# Patient Record
Sex: FEMALE | Race: BLACK OR AFRICAN AMERICAN | Employment: OTHER | URBAN - METROPOLITAN AREA
[De-identification: names, ages, dates, MRNs, and addresses within clinical notes are randomized per-mention and may not be internally consistent; named-entity substitution may affect disease eponyms.]

---

## 2017-01-17 ENCOUNTER — TELEPHONE (OUTPATIENT)
Dept: INTERNAL MEDICINE CLINIC | Age: 72
End: 2017-01-17

## 2017-02-08 ENCOUNTER — OFFICE VISIT (OUTPATIENT)
Dept: INTERNAL MEDICINE CLINIC | Age: 72
End: 2017-02-08

## 2017-02-08 VITALS
HEIGHT: 66 IN | OXYGEN SATURATION: 94 % | TEMPERATURE: 98.7 F | DIASTOLIC BLOOD PRESSURE: 86 MMHG | BODY MASS INDEX: 30.73 KG/M2 | SYSTOLIC BLOOD PRESSURE: 132 MMHG | WEIGHT: 191.2 LBS | HEART RATE: 104 BPM

## 2017-02-08 DIAGNOSIS — I10 ESSENTIAL HYPERTENSION: ICD-10-CM

## 2017-02-08 DIAGNOSIS — E78.00 PURE HYPERCHOLESTEROLEMIA: ICD-10-CM

## 2017-02-08 DIAGNOSIS — J45.20 ALLERGIC BRONCHITIS, MILD INTERMITTENT, UNCOMPLICATED: Primary | ICD-10-CM

## 2017-02-08 DIAGNOSIS — R09.89 ABNORMAL FINDING OF LUNG: ICD-10-CM

## 2017-02-08 PROCEDURE — 99214 OFFICE O/P EST MOD 30 MIN: CPT | Performed by: INTERNAL MEDICINE

## 2017-02-08 RX ORDER — ALBUTEROL SULFATE 90 UG/1
2 AEROSOL, METERED RESPIRATORY (INHALATION) EVERY 6 HOURS PRN
Qty: 1 INHALER | Refills: 3 | Status: SHIPPED | OUTPATIENT
Start: 2017-02-08 | End: 2017-04-27 | Stop reason: SDUPTHER

## 2017-02-09 ENCOUNTER — HOSPITAL ENCOUNTER (OUTPATIENT)
Dept: GENERAL RADIOLOGY | Facility: MEDICAL CENTER | Age: 72
Discharge: HOME | End: 2017-02-09
Attending: INTERNAL MEDICINE | Admitting: INTERNAL MEDICINE

## 2017-02-09 DIAGNOSIS — J45.20 ALLERGIC BRONCHITIS, MILD INTERMITTENT, UNCOMPLICATED: ICD-10-CM

## 2017-02-17 DIAGNOSIS — R91.8 LUNG MASS: Primary | ICD-10-CM

## 2017-02-20 RX ORDER — LORAZEPAM 1 MG/1
TABLET ORAL
Qty: 2 TABLET | Refills: 0 | Status: SHIPPED | OUTPATIENT
Start: 2017-02-20 | End: 2017-03-02 | Stop reason: ALTCHOICE

## 2017-02-20 ASSESSMENT — ENCOUNTER SYMPTOMS
EYES NEGATIVE: 1
GASTROINTESTINAL NEGATIVE: 1

## 2017-02-22 ENCOUNTER — HOSPITAL ENCOUNTER (OUTPATIENT)
Dept: CT IMAGING | Age: 72
Discharge: HOME | End: 2017-02-22
Attending: INTERNAL MEDICINE | Admitting: INTERNAL MEDICINE

## 2017-02-22 DIAGNOSIS — R91.8 OTHER NONSPECIFIC ABNORMAL FINDING OF LUNG FIELD: ICD-10-CM

## 2017-02-22 DIAGNOSIS — R91.8 LUNG MASS: ICD-10-CM

## 2017-02-22 RX ORDER — HYDROCODONE POLISTIREX AND CHLORPHENIRAMINE POLISTIREX 10; 8 MG/5ML; MG/5ML
5 SUSPENSION, EXTENDED RELEASE ORAL EVERY 12 HOURS PRN
Qty: 140 ML | Refills: 0 | Status: SHIPPED | OUTPATIENT
Start: 2017-02-22 | End: 2017-03-02 | Stop reason: ALTCHOICE

## 2017-02-23 ENCOUNTER — TELEPHONE (OUTPATIENT)
Dept: PULMONOLOGY | Age: 72
End: 2017-02-23

## 2017-02-23 ENCOUNTER — TELEPHONE (OUTPATIENT)
Dept: INTERNAL MEDICINE CLINIC | Age: 72
End: 2017-02-23

## 2017-02-27 ENCOUNTER — OFFICE VISIT (OUTPATIENT)
Dept: PULMONOLOGY | Age: 72
End: 2017-02-27

## 2017-02-27 ENCOUNTER — TELEPHONE (OUTPATIENT)
Dept: PULMONOLOGY | Age: 72
End: 2017-02-27

## 2017-02-27 VITALS
HEIGHT: 66 IN | OXYGEN SATURATION: 95 % | DIASTOLIC BLOOD PRESSURE: 88 MMHG | RESPIRATION RATE: 16 BRPM | HEART RATE: 64 BPM | BODY MASS INDEX: 29.67 KG/M2 | WEIGHT: 184.6 LBS | SYSTOLIC BLOOD PRESSURE: 134 MMHG

## 2017-02-27 DIAGNOSIS — R91.8 MASS OF RIGHT LUNG: Primary | ICD-10-CM

## 2017-02-27 DIAGNOSIS — R59.0 MEDIASTINAL LYMPHADENOPATHY: ICD-10-CM

## 2017-02-27 PROCEDURE — 99204 OFFICE O/P NEW MOD 45 MIN: CPT | Performed by: INTERNAL MEDICINE

## 2017-02-27 RX ORDER — AMLODIPINE BESYLATE 10 MG/1
10 TABLET ORAL
COMMUNITY
End: 2017-04-27 | Stop reason: SDUPTHER

## 2017-02-27 RX ORDER — HYDROCHLOROTHIAZIDE 25 MG/1
25 TABLET ORAL
COMMUNITY
End: 2017-04-27 | Stop reason: SDUPTHER

## 2017-03-02 ENCOUNTER — HOSPITAL ENCOUNTER (OUTPATIENT)
Dept: ENDOSCOPY | Age: 72
Discharge: HOME | End: 2017-03-02
Attending: INTERNAL MEDICINE | Admitting: INTERNAL MEDICINE

## 2017-03-02 ENCOUNTER — TELEPHONE (OUTPATIENT)
Dept: PULMONOLOGY | Age: 72
End: 2017-03-02

## 2017-03-02 VITALS
SYSTOLIC BLOOD PRESSURE: 146 MMHG | HEIGHT: 66 IN | HEART RATE: 98 BPM | WEIGHT: 180 LBS | TEMPERATURE: 98.5 F | RESPIRATION RATE: 18 BRPM | OXYGEN SATURATION: 95 % | BODY MASS INDEX: 28.93 KG/M2 | DIASTOLIC BLOOD PRESSURE: 91 MMHG

## 2017-03-02 DIAGNOSIS — E87.6 HYPOKALEMIA: Primary | ICD-10-CM

## 2017-03-02 LAB
MAGNESIUM: 2.1 MG/DL (ref 1.8–2.4)
POTASSIUM SERPL-SCNC: 3 MMOL/L (ref 3.5–5.1)

## 2017-03-02 PROCEDURE — 93010 ELECTROCARDIOGRAM REPORT: CPT | Performed by: INTERNAL MEDICINE

## 2017-03-02 RX ORDER — POTASSIUM CHLORIDE 20 MEQ/1
20 TABLET, EXTENDED RELEASE ORAL ONCE
Status: COMPLETED | OUTPATIENT
Start: 2017-03-02 | End: 2017-03-02

## 2017-03-02 RX ORDER — ATORVASTATIN CALCIUM 10 MG/1
10 TABLET, FILM COATED ORAL DAILY
COMMUNITY
End: 2017-04-27 | Stop reason: SDUPTHER

## 2017-03-02 RX ORDER — POTASSIUM CHLORIDE 20 MEQ/1
20 TABLET, EXTENDED RELEASE ORAL DAILY
Qty: 30 TABLET | Refills: 3 | Status: SHIPPED | OUTPATIENT
Start: 2017-03-02 | End: 2017-07-23 | Stop reason: SDUPTHER

## 2017-03-02 RX ORDER — MULTIVIT-MIN/IRON/FOLIC ACID/K 18-600-40
1 CAPSULE ORAL DAILY
COMMUNITY
End: 2019-09-09

## 2017-03-02 RX ADMIN — POTASSIUM CHLORIDE 20 MEQ: 20 TABLET, EXTENDED RELEASE ORAL at 08:42

## 2017-03-03 LAB
EKG ATRIAL RATE: 93 BPM
EKG DIAGNOSIS: NORMAL
EKG P AXIS: 86 DEGREES
EKG P-R INTERVAL: 156 MS
EKG Q-T INTERVAL: 364 MS
EKG QRS DURATION: 82 MS
EKG QTC CALCULATION (BAZETT): 452 MS
EKG R AXIS: 22 DEGREES
EKG T AXIS: 24 DEGREES
EKG VENTRICULAR RATE: 93 BPM

## 2017-03-06 LAB
ALBUMIN SERPL-MCNC: 4.3 G/DL (ref 3.4–5)
ANION GAP SERPL CALCULATED.3IONS-SCNC: 18 MMOL/L (ref 3–16)
BUN BLDV-MCNC: 6 MG/DL (ref 7–20)
CALCIUM SERPL-MCNC: 10.2 MG/DL (ref 8.3–10.6)
CHLORIDE BLD-SCNC: 101 MMOL/L (ref 99–110)
CO2: 25 MMOL/L (ref 21–32)
CREAT SERPL-MCNC: 0.7 MG/DL (ref 0.6–1.2)
GFR AFRICAN AMERICAN: >60
GFR NON-AFRICAN AMERICAN: >60
GLUCOSE BLD-MCNC: 103 MG/DL (ref 70–99)
PHOSPHORUS: 4 MG/DL (ref 2.5–4.9)
POTASSIUM SERPL-SCNC: 3.2 MMOL/L (ref 3.5–5.1)
SODIUM BLD-SCNC: 144 MMOL/L (ref 136–145)

## 2017-03-07 ENCOUNTER — HOSPITAL ENCOUNTER (OUTPATIENT)
Dept: SURGERY | Age: 72
Discharge: HOME | End: 2017-03-07
Attending: INTERNAL MEDICINE | Admitting: INTERNAL MEDICINE

## 2017-03-07 VITALS
OXYGEN SATURATION: 92 % | RESPIRATION RATE: 16 BRPM | SYSTOLIC BLOOD PRESSURE: 112 MMHG | TEMPERATURE: 98.1 F | HEART RATE: 105 BPM | DIASTOLIC BLOOD PRESSURE: 76 MMHG

## 2017-03-07 PROCEDURE — 31652 BRONCH EBUS SAMPLNG 1/2 NODE: CPT | Performed by: INTERNAL MEDICINE

## 2017-03-07 PROCEDURE — 31625 BRONCHOSCOPY W/BIOPSY(S): CPT | Performed by: INTERNAL MEDICINE

## 2017-03-07 RX ORDER — SODIUM CHLORIDE 0.9 % (FLUSH) 0.9 %
10 SYRINGE (ML) INJECTION EVERY 12 HOURS SCHEDULED
Status: DISCONTINUED | OUTPATIENT
Start: 2017-03-07 | End: 2017-03-08 | Stop reason: HOSPADM

## 2017-03-07 RX ORDER — LABETALOL HYDROCHLORIDE 5 MG/ML
5 INJECTION, SOLUTION INTRAVENOUS EVERY 10 MIN PRN
Status: DISCONTINUED | OUTPATIENT
Start: 2017-03-07 | End: 2017-03-08 | Stop reason: HOSPADM

## 2017-03-07 RX ORDER — ALBUTEROL SULFATE 2.5 MG/3ML
2.5 SOLUTION RESPIRATORY (INHALATION) EVERY 6 HOURS PRN
Status: DISCONTINUED | OUTPATIENT
Start: 2017-03-07 | End: 2017-03-08 | Stop reason: HOSPADM

## 2017-03-07 RX ORDER — SODIUM CHLORIDE 0.9 % (FLUSH) 0.9 %
10 SYRINGE (ML) INJECTION PRN
Status: DISCONTINUED | OUTPATIENT
Start: 2017-03-07 | End: 2017-03-08 | Stop reason: HOSPADM

## 2017-03-07 RX ORDER — SODIUM CHLORIDE, SODIUM LACTATE, POTASSIUM CHLORIDE, CALCIUM CHLORIDE 600; 310; 30; 20 MG/100ML; MG/100ML; MG/100ML; MG/100ML
INJECTION, SOLUTION INTRAVENOUS CONTINUOUS
Status: DISCONTINUED | OUTPATIENT
Start: 2017-03-07 | End: 2017-03-08 | Stop reason: HOSPADM

## 2017-03-07 RX ORDER — FENTANYL CITRATE 50 UG/ML
25 INJECTION, SOLUTION INTRAMUSCULAR; INTRAVENOUS EVERY 5 MIN PRN
Status: DISCONTINUED | OUTPATIENT
Start: 2017-03-07 | End: 2017-03-08 | Stop reason: HOSPADM

## 2017-03-07 RX ORDER — ALBUTEROL SULFATE 2.5 MG/3ML
2.5 SOLUTION RESPIRATORY (INHALATION)
Status: DISCONTINUED | OUTPATIENT
Start: 2017-03-07 | End: 2017-03-08 | Stop reason: HOSPADM

## 2017-03-07 RX ORDER — PROMETHAZINE HYDROCHLORIDE 25 MG/ML
6.25 INJECTION, SOLUTION INTRAMUSCULAR; INTRAVENOUS
Status: ACTIVE | OUTPATIENT
Start: 2017-03-07 | End: 2017-03-07

## 2017-03-07 RX ORDER — HYDRALAZINE HYDROCHLORIDE 20 MG/ML
2.5 INJECTION INTRAMUSCULAR; INTRAVENOUS EVERY 10 MIN PRN
Status: DISCONTINUED | OUTPATIENT
Start: 2017-03-07 | End: 2017-03-08 | Stop reason: HOSPADM

## 2017-03-07 RX ADMIN — ALBUTEROL SULFATE 2.5 MG: 2.5 SOLUTION RESPIRATORY (INHALATION) at 10:59

## 2017-03-07 RX ADMIN — ALBUTEROL SULFATE 2.5 MG: 2.5 SOLUTION RESPIRATORY (INHALATION) at 09:36

## 2017-03-07 ASSESSMENT — PAIN SCALES - GENERAL
PAINLEVEL_OUTOF10: 0

## 2017-03-08 ENCOUNTER — TELEPHONE (OUTPATIENT)
Dept: PULMONOLOGY | Age: 72
End: 2017-03-08

## 2017-03-09 ENCOUNTER — OFFICE VISIT (OUTPATIENT)
Dept: PULMONOLOGY | Age: 72
End: 2017-03-09

## 2017-03-09 VITALS
RESPIRATION RATE: 18 BRPM | DIASTOLIC BLOOD PRESSURE: 70 MMHG | BODY MASS INDEX: 31.55 KG/M2 | SYSTOLIC BLOOD PRESSURE: 120 MMHG | OXYGEN SATURATION: 94 % | HEIGHT: 64 IN | WEIGHT: 184.8 LBS | HEART RATE: 100 BPM

## 2017-03-09 DIAGNOSIS — C34.91 ADENOCARCINOMA OF RIGHT LUNG (HCC): Primary | ICD-10-CM

## 2017-03-09 DIAGNOSIS — R91.8 MASS OF RIGHT LUNG: ICD-10-CM

## 2017-03-09 DIAGNOSIS — R59.0 MEDIASTINAL LYMPHADENOPATHY: ICD-10-CM

## 2017-03-09 PROCEDURE — 99214 OFFICE O/P EST MOD 30 MIN: CPT | Performed by: INTERNAL MEDICINE

## 2017-03-10 ENCOUNTER — TELEPHONE (OUTPATIENT)
Dept: PULMONOLOGY | Age: 72
End: 2017-03-10

## 2017-03-16 PROBLEM — C79.72 MALIGNANT NEOPLASM METASTATIC TO LEFT ADRENAL GLAND (HCC): Status: ACTIVE | Noted: 2017-03-16

## 2017-03-16 PROBLEM — C78.7 SECONDARY MALIGNANT NEOPLASM OF LIVER (HCC): Status: ACTIVE | Noted: 2017-03-16

## 2017-03-16 PROBLEM — C77.1 SECONDARY MALIGNANT NEOPLASM OF MEDIASTINAL LYMPH NODES (HCC): Status: ACTIVE | Noted: 2017-02-27

## 2017-03-23 ENCOUNTER — TELEPHONE (OUTPATIENT)
Dept: SURGERY | Age: 72
End: 2017-03-23

## 2017-03-27 ENCOUNTER — OFFICE VISIT (OUTPATIENT)
Dept: SURGERY | Age: 72
End: 2017-03-27

## 2017-03-27 VITALS
TEMPERATURE: 98.1 F | SYSTOLIC BLOOD PRESSURE: 146 MMHG | DIASTOLIC BLOOD PRESSURE: 90 MMHG | BODY MASS INDEX: 28.12 KG/M2 | HEIGHT: 66 IN | WEIGHT: 175 LBS

## 2017-03-27 DIAGNOSIS — C34.11 PRIMARY MALIGNANT NEOPLASM OF RIGHT UPPER LOBE OF LUNG (HCC): Primary | ICD-10-CM

## 2017-03-27 PROCEDURE — 99214 OFFICE O/P EST MOD 30 MIN: CPT | Performed by: SURGERY

## 2017-03-28 PROBLEM — C34.11 PRIMARY MALIGNANT NEOPLASM OF RIGHT UPPER LOBE OF LUNG (HCC): Status: ACTIVE | Noted: 2017-03-28

## 2017-03-28 RX ORDER — SODIUM CHLORIDE, SODIUM LACTATE, POTASSIUM CHLORIDE, CALCIUM CHLORIDE 600; 310; 30; 20 MG/100ML; MG/100ML; MG/100ML; MG/100ML
INJECTION, SOLUTION INTRAVENOUS CONTINUOUS
Status: CANCELLED | OUTPATIENT
Start: 2017-03-28

## 2017-03-29 ENCOUNTER — HOSPITAL ENCOUNTER (OUTPATIENT)
Dept: PREADMISSION TESTING | Age: 72
Discharge: HOME | End: 2017-03-29
Attending: SURGERY | Admitting: SURGERY

## 2017-03-29 VITALS
DIASTOLIC BLOOD PRESSURE: 76 MMHG | HEART RATE: 93 BPM | OXYGEN SATURATION: 92 % | RESPIRATION RATE: 16 BRPM | SYSTOLIC BLOOD PRESSURE: 128 MMHG | WEIGHT: 175 LBS | TEMPERATURE: 98.3 F | BODY MASS INDEX: 28.12 KG/M2 | HEIGHT: 66 IN

## 2017-03-29 DIAGNOSIS — C34.91 MALIGNANT NEOPLASM OF UNSPECIFIED PART OF RIGHT BRONCHUS OR LUNG (HCC): ICD-10-CM

## 2017-03-29 DIAGNOSIS — C34.91 ADENOCARCINOMA OF RIGHT LUNG (HCC): ICD-10-CM

## 2017-03-29 PROCEDURE — 38510 BIOPSY/REMOVAL LYMPH NODES: CPT | Performed by: SURGERY

## 2017-03-29 PROCEDURE — 36561 INSERT TUNNELED CV CATH: CPT | Performed by: SURGERY

## 2017-03-29 PROCEDURE — 77001 FLUOROGUIDE FOR VEIN DEVICE: CPT | Performed by: SURGERY

## 2017-03-29 RX ORDER — SODIUM CHLORIDE, SODIUM LACTATE, POTASSIUM CHLORIDE, CALCIUM CHLORIDE 600; 310; 30; 20 MG/100ML; MG/100ML; MG/100ML; MG/100ML
INJECTION, SOLUTION INTRAVENOUS CONTINUOUS
Status: DISCONTINUED | OUTPATIENT
Start: 2017-03-29 | End: 2017-03-30 | Stop reason: HOSPADM

## 2017-03-29 RX ORDER — METOCLOPRAMIDE HYDROCHLORIDE 5 MG/ML
10 INJECTION INTRAMUSCULAR; INTRAVENOUS ONCE
Status: COMPLETED | OUTPATIENT
Start: 2017-03-29 | End: 2017-03-29

## 2017-03-29 RX ORDER — SODIUM CHLORIDE 0.9 % (FLUSH) 0.9 %
10 SYRINGE (ML) INJECTION PRN
Status: DISCONTINUED | OUTPATIENT
Start: 2017-03-29 | End: 2017-03-30 | Stop reason: HOSPADM

## 2017-03-29 RX ORDER — LABETALOL HYDROCHLORIDE 5 MG/ML
5 INJECTION, SOLUTION INTRAVENOUS EVERY 10 MIN PRN
Status: DISCONTINUED | OUTPATIENT
Start: 2017-03-29 | End: 2017-03-30 | Stop reason: HOSPADM

## 2017-03-29 RX ORDER — MIDAZOLAM HYDROCHLORIDE 1 MG/ML
2 INJECTION INTRAMUSCULAR; INTRAVENOUS
Status: ACTIVE | OUTPATIENT
Start: 2017-03-29 | End: 2017-03-29

## 2017-03-29 RX ORDER — MORPHINE SULFATE 2 MG/ML
2 INJECTION, SOLUTION INTRAMUSCULAR; INTRAVENOUS EVERY 5 MIN PRN
Status: DISCONTINUED | OUTPATIENT
Start: 2017-03-29 | End: 2017-03-30 | Stop reason: HOSPADM

## 2017-03-29 RX ORDER — MEPERIDINE HYDROCHLORIDE 25 MG/ML
12.5 INJECTION INTRAMUSCULAR; INTRAVENOUS; SUBCUTANEOUS EVERY 5 MIN PRN
Status: DISCONTINUED | OUTPATIENT
Start: 2017-03-29 | End: 2017-03-30 | Stop reason: HOSPADM

## 2017-03-29 RX ORDER — GLYCOPYRROLATE 0.2 MG/ML
0.1 INJECTION INTRAMUSCULAR; INTRAVENOUS ONCE
Status: COMPLETED | OUTPATIENT
Start: 2017-03-29 | End: 2017-03-29

## 2017-03-29 RX ORDER — ONDANSETRON 2 MG/ML
4 INJECTION INTRAMUSCULAR; INTRAVENOUS
Status: ACTIVE | OUTPATIENT
Start: 2017-03-29 | End: 2017-03-29

## 2017-03-29 RX ORDER — DEXAMETHASONE SODIUM PHOSPHATE 4 MG/ML
10 INJECTION, SOLUTION INTRA-ARTICULAR; INTRALESIONAL; INTRAMUSCULAR; INTRAVENOUS; SOFT TISSUE ONCE
Status: COMPLETED | OUTPATIENT
Start: 2017-03-29 | End: 2017-03-29

## 2017-03-29 RX ORDER — SODIUM CHLORIDE 0.9 % (FLUSH) 0.9 %
10 SYRINGE (ML) INJECTION EVERY 12 HOURS SCHEDULED
Status: DISCONTINUED | OUTPATIENT
Start: 2017-03-29 | End: 2017-03-30 | Stop reason: HOSPADM

## 2017-03-29 RX ORDER — ONDANSETRON 2 MG/ML
4 INJECTION INTRAMUSCULAR; INTRAVENOUS ONCE
Status: COMPLETED | OUTPATIENT
Start: 2017-03-29 | End: 2017-03-29

## 2017-03-29 RX ORDER — FENTANYL CITRATE 50 UG/ML
50 INJECTION, SOLUTION INTRAMUSCULAR; INTRAVENOUS EVERY 5 MIN PRN
Status: DISCONTINUED | OUTPATIENT
Start: 2017-03-29 | End: 2017-03-30 | Stop reason: HOSPADM

## 2017-03-29 RX ORDER — HYDROCODONE BITARTRATE AND ACETAMINOPHEN 7.5; 325 MG/1; MG/1
1 TABLET ORAL EVERY 6 HOURS PRN
Qty: 20 TABLET | Refills: 0 | Status: SHIPPED | OUTPATIENT
Start: 2017-03-29 | End: 2018-03-25 | Stop reason: CLARIF

## 2017-03-29 RX ADMIN — GLYCOPYRROLATE 0.1 MG: 0.2 INJECTION INTRAMUSCULAR; INTRAVENOUS at 10:35

## 2017-03-29 RX ADMIN — DEXAMETHASONE SODIUM PHOSPHATE 10 MG: 4 INJECTION, SOLUTION INTRA-ARTICULAR; INTRALESIONAL; INTRAMUSCULAR; INTRAVENOUS; SOFT TISSUE at 10:33

## 2017-03-29 RX ADMIN — METOCLOPRAMIDE HYDROCHLORIDE 10 MG: 5 INJECTION INTRAMUSCULAR; INTRAVENOUS at 10:34

## 2017-03-29 RX ADMIN — SODIUM CHLORIDE, SODIUM LACTATE, POTASSIUM CHLORIDE, CALCIUM CHLORIDE: 600; 310; 30; 20 INJECTION, SOLUTION INTRAVENOUS at 10:31

## 2017-03-29 RX ADMIN — ONDANSETRON 4 MG: 2 INJECTION INTRAMUSCULAR; INTRAVENOUS at 10:32

## 2017-03-29 ASSESSMENT — PAIN - FUNCTIONAL ASSESSMENT: PAIN_FUNCTIONAL_ASSESSMENT: 0-10

## 2017-03-29 ASSESSMENT — PAIN SCALES - GENERAL: PAINLEVEL_OUTOF10: 0

## 2017-03-30 ENCOUNTER — HOSPITAL ENCOUNTER (OUTPATIENT)
Dept: MRI IMAGING | Age: 72
Discharge: HOME | End: 2017-03-30
Attending: INTERNAL MEDICINE | Admitting: INTERNAL MEDICINE

## 2017-03-30 DIAGNOSIS — C34.91 MALIGNANT NEOPLASM OF UNSPECIFIED PART OF RIGHT BRONCHUS OR LUNG (HCC): ICD-10-CM

## 2017-03-30 DIAGNOSIS — C79.72 MALIGNANT NEOPLASM METASTATIC TO LEFT ADRENAL GLAND (HCC): ICD-10-CM

## 2017-03-30 DIAGNOSIS — C77.1 SECONDARY MALIGNANT NEOPLASM OF MEDIASTINAL LYMPH NODES (HCC): ICD-10-CM

## 2017-03-30 DIAGNOSIS — C34.91 ADENOCARCINOMA OF RIGHT LUNG (HCC): ICD-10-CM

## 2017-03-30 DIAGNOSIS — C78.7 SECONDARY MALIGNANT NEOPLASM OF LIVER (HCC): ICD-10-CM

## 2017-04-06 ENCOUNTER — TELEPHONE (OUTPATIENT)
Dept: INTERNAL MEDICINE CLINIC | Age: 72
End: 2017-04-06

## 2017-04-06 PROBLEM — Z51.11 ENCOUNTER FOR CHEMOTHERAPY MANAGEMENT: Status: ACTIVE | Noted: 2017-04-06

## 2017-04-07 PROBLEM — Z00.6 PATIENT IN CANCER RELATED RESEARCH STUDY: Status: ACTIVE | Noted: 2017-04-07

## 2017-04-27 ENCOUNTER — OFFICE VISIT (OUTPATIENT)
Dept: INTERNAL MEDICINE CLINIC | Age: 72
End: 2017-04-27

## 2017-04-27 VITALS
BODY MASS INDEX: 27.18 KG/M2 | WEIGHT: 168.4 LBS | OXYGEN SATURATION: 98 % | HEART RATE: 115 BPM | DIASTOLIC BLOOD PRESSURE: 84 MMHG | SYSTOLIC BLOOD PRESSURE: 124 MMHG

## 2017-04-27 DIAGNOSIS — J98.09 RECURRENT BRONCHOSPASM: ICD-10-CM

## 2017-04-27 DIAGNOSIS — I10 ESSENTIAL HYPERTENSION: Primary | ICD-10-CM

## 2017-04-27 DIAGNOSIS — Z12.31 ENCOUNTER FOR SCREENING MAMMOGRAM FOR HIGH-RISK PATIENT: ICD-10-CM

## 2017-04-27 DIAGNOSIS — Z13.9 SCREENING: ICD-10-CM

## 2017-04-27 DIAGNOSIS — E78.49 OTHER HYPERLIPIDEMIA: ICD-10-CM

## 2017-04-27 PROCEDURE — 99214 OFFICE O/P EST MOD 30 MIN: CPT | Performed by: INTERNAL MEDICINE

## 2017-04-27 RX ORDER — ALBUTEROL SULFATE 90 UG/1
2 AEROSOL, METERED RESPIRATORY (INHALATION) EVERY 6 HOURS PRN
Qty: 3 INHALER | Refills: 3 | Status: SHIPPED | OUTPATIENT
Start: 2017-04-27 | End: 2018-02-15 | Stop reason: SDUPTHER

## 2017-04-27 RX ORDER — HYDROCHLOROTHIAZIDE 25 MG/1
25 TABLET ORAL DAILY
Qty: 90 TABLET | Refills: 3 | Status: SHIPPED | OUTPATIENT
Start: 2017-04-27 | End: 2018-02-15 | Stop reason: SDUPTHER

## 2017-04-27 RX ORDER — ATORVASTATIN CALCIUM 10 MG/1
10 TABLET, FILM COATED ORAL DAILY
Qty: 90 TABLET | Refills: 3 | Status: SHIPPED | OUTPATIENT
Start: 2017-04-27 | End: 2018-02-15 | Stop reason: SDUPTHER

## 2017-04-27 RX ORDER — AMLODIPINE BESYLATE 10 MG/1
10 TABLET ORAL DAILY
Qty: 90 TABLET | Refills: 3 | Status: SHIPPED | OUTPATIENT
Start: 2017-04-27 | End: 2017-08-31 | Stop reason: SDUPTHER

## 2017-04-27 ASSESSMENT — PATIENT HEALTH QUESTIONNAIRE - PHQ9
SUM OF ALL RESPONSES TO PHQ9 QUESTIONS 1 & 2: 0
SUM OF ALL RESPONSES TO PHQ QUESTIONS 1-9: 0
2. FEELING DOWN, DEPRESSED OR HOPELESS: 0
1. LITTLE INTEREST OR PLEASURE IN DOING THINGS: 0

## 2017-05-07 ASSESSMENT — ENCOUNTER SYMPTOMS
EYES NEGATIVE: 1
GASTROINTESTINAL NEGATIVE: 1

## 2017-05-17 ENCOUNTER — HOSPITAL ENCOUNTER (OUTPATIENT)
Dept: CT IMAGING | Age: 72
Discharge: HOME | End: 2017-05-17
Attending: INTERNAL MEDICINE | Admitting: INTERNAL MEDICINE

## 2017-05-17 DIAGNOSIS — C34.91 MALIGNANT NEOPLASM OF UNSPECIFIED PART OF RIGHT BRONCHUS OR LUNG (HCC): ICD-10-CM

## 2017-05-17 DIAGNOSIS — C34.11 PRIMARY MALIGNANT NEOPLASM OF RIGHT UPPER LOBE OF LUNG (HCC): ICD-10-CM

## 2017-05-19 PROBLEM — Z71.2 ENCOUNTER TO DISCUSS TEST RESULTS: Status: ACTIVE | Noted: 2017-05-19

## 2017-06-28 ENCOUNTER — HOSPITAL ENCOUNTER (OUTPATIENT)
Dept: CT IMAGING | Age: 72
Discharge: HOME | End: 2017-06-28
Attending: INTERNAL MEDICINE | Admitting: INTERNAL MEDICINE

## 2017-06-28 DIAGNOSIS — C34.11 PRIMARY MALIGNANT NEOPLASM OF RIGHT UPPER LOBE OF LUNG (HCC): ICD-10-CM

## 2017-06-28 DIAGNOSIS — C34.91 MALIGNANT NEOPLASM OF UNSPECIFIED PART OF RIGHT BRONCHUS OR LUNG (HCC): ICD-10-CM

## 2017-07-24 RX ORDER — POTASSIUM CHLORIDE 1500 MG/1
TABLET, FILM COATED, EXTENDED RELEASE ORAL
Qty: 30 TABLET | Refills: 2 | Status: SHIPPED | OUTPATIENT
Start: 2017-07-24 | End: 2018-03-25 | Stop reason: CLARIF

## 2017-08-09 ENCOUNTER — HOSPITAL ENCOUNTER (OUTPATIENT)
Dept: CT IMAGING | Age: 72
Discharge: HOME | End: 2017-08-09
Attending: INTERNAL MEDICINE | Admitting: INTERNAL MEDICINE

## 2017-08-09 DIAGNOSIS — C34.11 PRIMARY MALIGNANT NEOPLASM OF RIGHT UPPER LOBE OF LUNG (HCC): ICD-10-CM

## 2017-08-09 DIAGNOSIS — C34.91 MALIGNANT NEOPLASM OF UNSPECIFIED PART OF RIGHT BRONCHUS OR LUNG (HCC): ICD-10-CM

## 2017-08-31 ENCOUNTER — OFFICE VISIT (OUTPATIENT)
Dept: INTERNAL MEDICINE CLINIC | Age: 72
End: 2017-08-31

## 2017-08-31 VITALS
HEART RATE: 95 BPM | WEIGHT: 164.4 LBS | DIASTOLIC BLOOD PRESSURE: 62 MMHG | OXYGEN SATURATION: 97 % | BODY MASS INDEX: 26.42 KG/M2 | SYSTOLIC BLOOD PRESSURE: 106 MMHG

## 2017-08-31 DIAGNOSIS — E78.2 MIXED HYPERLIPIDEMIA: ICD-10-CM

## 2017-08-31 DIAGNOSIS — R63.4 WEIGHT LOSS: ICD-10-CM

## 2017-08-31 DIAGNOSIS — I10 ESSENTIAL HYPERTENSION: ICD-10-CM

## 2017-08-31 DIAGNOSIS — Z12.31 ENCOUNTER FOR SCREENING MAMMOGRAM FOR HIGH-RISK PATIENT: ICD-10-CM

## 2017-08-31 DIAGNOSIS — I10 ESSENTIAL HYPERTENSION: Primary | ICD-10-CM

## 2017-08-31 PROCEDURE — 99214 OFFICE O/P EST MOD 30 MIN: CPT | Performed by: INTERNAL MEDICINE

## 2017-08-31 RX ORDER — AMLODIPINE BESYLATE 10 MG/1
10 TABLET ORAL DAILY
Qty: 90 TABLET | Refills: 3 | Status: SHIPPED | OUTPATIENT
Start: 2017-08-31 | End: 2018-02-15 | Stop reason: SDUPTHER

## 2017-08-31 RX ORDER — IBUPROFEN 800 MG/1
800 TABLET ORAL 2 TIMES DAILY PRN
Qty: 40 TABLET | Refills: 1 | Status: SHIPPED | OUTPATIENT
Start: 2017-08-31 | End: 2017-11-21 | Stop reason: SDUPTHER

## 2017-09-02 ASSESSMENT — ENCOUNTER SYMPTOMS
EYES NEGATIVE: 1
GASTROINTESTINAL NEGATIVE: 1

## 2017-09-25 DIAGNOSIS — I10 ESSENTIAL HYPERTENSION: ICD-10-CM

## 2017-09-26 RX ORDER — ASPIRIN 81 MG
TABLET, DELAYED RELEASE (ENTERIC COATED) ORAL
Qty: 30 TABLET | Refills: 2 | Status: SHIPPED | OUTPATIENT
Start: 2017-09-26 | End: 2018-03-25 | Stop reason: CLARIF

## 2017-10-04 ENCOUNTER — TELEPHONE (OUTPATIENT)
Dept: INTERNAL MEDICINE CLINIC | Age: 72
End: 2017-10-04

## 2017-10-11 RX ORDER — BUDESONIDE AND FORMOTEROL FUMARATE DIHYDRATE 160; 4.5 UG/1; UG/1
2 AEROSOL RESPIRATORY (INHALATION) 2 TIMES DAILY
Qty: 1 INHALER | Refills: 3 | Status: SHIPPED | OUTPATIENT
Start: 2017-10-11 | End: 2018-02-15 | Stop reason: SDUPTHER

## 2017-11-09 ENCOUNTER — HOSPITAL ENCOUNTER (OUTPATIENT)
Dept: CT IMAGING | Age: 72
Discharge: HOME | End: 2017-11-09
Attending: INTERNAL MEDICINE | Admitting: INTERNAL MEDICINE

## 2017-11-09 DIAGNOSIS — C34.90 MALIGNANT NEOPLASM OF LUNG, UNSPECIFIED LATERALITY, UNSPECIFIED PART OF LUNG (HCC): ICD-10-CM

## 2017-11-09 DIAGNOSIS — C34.91 MALIGNANT NEOPLASM OF UNSPECIFIED PART OF RIGHT BRONCHUS OR LUNG (HCC): ICD-10-CM

## 2017-11-16 RX ORDER — POTASSIUM CHLORIDE 20 MEQ/1
TABLET, EXTENDED RELEASE ORAL
Qty: 30 TABLET | Refills: 1 | OUTPATIENT
Start: 2017-11-16

## 2017-11-21 RX ORDER — IBUPROFEN 800 MG/1
TABLET ORAL
Qty: 40 TABLET | Refills: 0 | Status: SHIPPED | OUTPATIENT
Start: 2017-11-21 | End: 2017-12-18 | Stop reason: SDUPTHER

## 2017-12-18 RX ORDER — IBUPROFEN 800 MG/1
TABLET ORAL
Qty: 40 TABLET | Refills: 0 | Status: SHIPPED | OUTPATIENT
Start: 2017-12-18 | End: 2018-03-25 | Stop reason: CLARIF

## 2018-01-30 ENCOUNTER — HOSPITAL ENCOUNTER (OUTPATIENT)
Dept: CT IMAGING | Age: 73
Discharge: HOME | End: 2018-01-30
Attending: INTERNAL MEDICINE | Admitting: INTERNAL MEDICINE

## 2018-01-30 DIAGNOSIS — C34.91 MALIGNANT NEOPLASM OF UNSPECIFIED PART OF RIGHT BRONCHUS OR LUNG (HCC): ICD-10-CM

## 2018-01-30 DIAGNOSIS — C34.11 CANCER OF BRONCHUS OF RIGHT UPPER LOBE (HCC): ICD-10-CM

## 2018-02-15 ENCOUNTER — OFFICE VISIT (OUTPATIENT)
Dept: INTERNAL MEDICINE CLINIC | Age: 73
End: 2018-02-15

## 2018-02-15 VITALS
DIASTOLIC BLOOD PRESSURE: 78 MMHG | RESPIRATION RATE: 18 BRPM | TEMPERATURE: 99 F | WEIGHT: 161 LBS | SYSTOLIC BLOOD PRESSURE: 118 MMHG | HEIGHT: 66 IN | HEART RATE: 100 BPM | BODY MASS INDEX: 25.88 KG/M2

## 2018-02-15 DIAGNOSIS — J45.20 MILD INTERMITTENT ASTHMA WITHOUT COMPLICATION: ICD-10-CM

## 2018-02-15 DIAGNOSIS — E78.49 OTHER HYPERLIPIDEMIA: ICD-10-CM

## 2018-02-15 DIAGNOSIS — I10 ESSENTIAL HYPERTENSION: ICD-10-CM

## 2018-02-15 DIAGNOSIS — R00.0 TACHYCARDIA: ICD-10-CM

## 2018-02-15 LAB — TSH REFLEX: 1.19 UIU/ML (ref 0.27–4.2)

## 2018-02-15 PROCEDURE — G8400 PT W/DXA NO RESULTS DOC: HCPCS | Performed by: INTERNAL MEDICINE

## 2018-02-15 PROCEDURE — G8427 DOCREV CUR MEDS BY ELIG CLIN: HCPCS | Performed by: INTERNAL MEDICINE

## 2018-02-15 PROCEDURE — 1090F PRES/ABSN URINE INCON ASSESS: CPT | Performed by: INTERNAL MEDICINE

## 2018-02-15 PROCEDURE — 1123F ACP DISCUSS/DSCN MKR DOCD: CPT | Performed by: INTERNAL MEDICINE

## 2018-02-15 PROCEDURE — G8484 FLU IMMUNIZE NO ADMIN: HCPCS | Performed by: INTERNAL MEDICINE

## 2018-02-15 PROCEDURE — G8419 CALC BMI OUT NRM PARAM NOF/U: HCPCS | Performed by: INTERNAL MEDICINE

## 2018-02-15 PROCEDURE — 99214 OFFICE O/P EST MOD 30 MIN: CPT | Performed by: INTERNAL MEDICINE

## 2018-02-15 PROCEDURE — 1036F TOBACCO NON-USER: CPT | Performed by: INTERNAL MEDICINE

## 2018-02-15 PROCEDURE — 4040F PNEUMOC VAC/ADMIN/RCVD: CPT | Performed by: INTERNAL MEDICINE

## 2018-02-15 PROCEDURE — 3014F SCREEN MAMMO DOC REV: CPT | Performed by: INTERNAL MEDICINE

## 2018-02-15 PROCEDURE — 93000 ELECTROCARDIOGRAM COMPLETE: CPT | Performed by: INTERNAL MEDICINE

## 2018-02-15 PROCEDURE — 3017F COLORECTAL CA SCREEN DOC REV: CPT | Performed by: INTERNAL MEDICINE

## 2018-02-15 RX ORDER — ALBUTEROL SULFATE 90 UG/1
2 AEROSOL, METERED RESPIRATORY (INHALATION) EVERY 6 HOURS PRN
Qty: 3 INHALER | Refills: 3 | Status: SHIPPED | OUTPATIENT
Start: 2018-02-15 | End: 2019-03-18 | Stop reason: SDUPTHER

## 2018-02-15 RX ORDER — HYDROCHLOROTHIAZIDE 25 MG/1
25 TABLET ORAL DAILY
Qty: 90 TABLET | Refills: 3 | Status: SHIPPED | OUTPATIENT
Start: 2018-02-15 | End: 2019-01-24 | Stop reason: SDUPTHER

## 2018-02-15 RX ORDER — BUDESONIDE AND FORMOTEROL FUMARATE DIHYDRATE 160; 4.5 UG/1; UG/1
2 AEROSOL RESPIRATORY (INHALATION) 2 TIMES DAILY
Qty: 1 INHALER | Refills: 3 | Status: SHIPPED | OUTPATIENT
Start: 2018-02-15 | End: 2018-07-09 | Stop reason: SDUPTHER

## 2018-02-15 RX ORDER — AMLODIPINE BESYLATE 10 MG/1
10 TABLET ORAL DAILY
Qty: 90 TABLET | Refills: 3 | Status: SHIPPED | OUTPATIENT
Start: 2018-02-15 | End: 2018-10-01 | Stop reason: SDUPTHER

## 2018-02-15 RX ORDER — ATORVASTATIN CALCIUM 10 MG/1
10 TABLET, FILM COATED ORAL DAILY
Qty: 90 TABLET | Refills: 3 | Status: SHIPPED | OUTPATIENT
Start: 2018-02-15 | End: 2019-03-21 | Stop reason: SDUPTHER

## 2018-02-15 ASSESSMENT — PATIENT HEALTH QUESTIONNAIRE - PHQ9
SUM OF ALL RESPONSES TO PHQ9 QUESTIONS 1 & 2: 0
1. LITTLE INTEREST OR PLEASURE IN DOING THINGS: 0
2. FEELING DOWN, DEPRESSED OR HOPELESS: 0
SUM OF ALL RESPONSES TO PHQ QUESTIONS 1-9: 0

## 2018-02-15 NOTE — PROGRESS NOTES
Subjective:      Patient ID: Kathleen Mandel is a 71 y.o. female. [facility]. Seen today for hypertension, hyperlipidemia and asthma. She is taking medication as prescribed, eating a fairly balanced meal plan and is physically active as tolerated. She is on maintenance chemo for lung cancer. Scans are negative! Review of Systems   Constitutional: Negative. HENT: Negative. Eyes: Negative. Respiratory: Negative. Negative for wheezing. Asthma, stable. Mild intermittent. Cardiovascular:        Hypertension, treated with amlodipine, HCTZ. Tachycardia noted. H/H 11/34.4, 12/17. TSH normal.    Gastrointestinal: Negative. Endocrine:        Hyperlipidemia, treated with statin. Genitourinary: Negative. Musculoskeletal: Negative. Skin: Negative. Neurological: Negative. Psychiatric/Behavioral: Negative. Objective:   Physical Exam   Constitutional: She is oriented to person, place, and time. She appears well-developed and well-nourished. No distress. HENT:   Head: Normocephalic and atraumatic. Nose: Nose normal.   Mouth/Throat: Oropharynx is clear and moist.   Eyes: Conjunctivae and EOM are normal. Pupils are equal, round, and reactive to light. Neck: Normal range of motion. Neck supple. No thyromegaly present. Cardiovascular: Normal rate, regular rhythm and normal heart sounds. No murmur heard. Tachycardic. Pulmonary/Chest: Effort normal and breath sounds normal.   Abdominal: Soft. Bowel sounds are normal.   Musculoskeletal: Normal range of motion. Neurological: She is alert and oriented to person, place, and time. She has normal strength and normal reflexes. No cranial nerve deficit or sensory deficit. Skin: Skin is warm and dry. Normal skin turgor. Psychiatric: She has a normal mood and affect. Her behavior is normal. Judgment and thought content normal.       Assessment:       1. Tachycardia  EKG 12 Lead. NS changes.  [place]

## 2018-02-25 ASSESSMENT — ENCOUNTER SYMPTOMS
EYES NEGATIVE: 1
GASTROINTESTINAL NEGATIVE: 1
RESPIRATORY NEGATIVE: 1
WHEEZING: 0

## 2018-03-08 RX ORDER — IBUPROFEN 800 MG/1
TABLET ORAL
Qty: 40 TABLET | Refills: 0 | Status: SHIPPED | OUTPATIENT
Start: 2018-03-08 | End: 2018-03-25 | Stop reason: SDUPTHER

## 2018-03-26 RX ORDER — IBUPROFEN 800 MG/1
TABLET ORAL
Qty: 40 TABLET | Refills: 0 | Status: SHIPPED | OUTPATIENT
Start: 2018-03-26 | End: 2018-05-25 | Stop reason: SDUPTHER

## 2018-04-28 ENCOUNTER — CARE COORDINATION (OUTPATIENT)
Dept: CASE MANAGEMENT | Age: 73
End: 2018-04-28

## 2018-04-28 DIAGNOSIS — R53.1 WEAKNESS: Primary | ICD-10-CM

## 2018-04-28 PROCEDURE — 1111F DSCHRG MED/CURRENT MED MERGE: CPT

## 2018-05-03 ENCOUNTER — CARE COORDINATION (OUTPATIENT)
Dept: CASE MANAGEMENT | Age: 73
End: 2018-05-03

## 2018-05-10 ENCOUNTER — CARE COORDINATION (OUTPATIENT)
Dept: CASE MANAGEMENT | Age: 73
End: 2018-05-10

## 2018-05-14 ENCOUNTER — TELEPHONE (OUTPATIENT)
Dept: INTERNAL MEDICINE CLINIC | Age: 73
End: 2018-05-14

## 2018-05-14 RX ORDER — AZITHROMYCIN 500 MG/1
500 TABLET, FILM COATED ORAL DAILY
Qty: 1 PACKET | Refills: 0 | Status: SHIPPED | OUTPATIENT
Start: 2018-05-14 | End: 2018-05-17

## 2018-05-17 ENCOUNTER — OFFICE VISIT (OUTPATIENT)
Dept: INTERNAL MEDICINE CLINIC | Age: 73
End: 2018-05-17

## 2018-05-17 VITALS
HEART RATE: 100 BPM | DIASTOLIC BLOOD PRESSURE: 64 MMHG | BODY MASS INDEX: 23.95 KG/M2 | WEIGHT: 149 LBS | OXYGEN SATURATION: 93 % | SYSTOLIC BLOOD PRESSURE: 118 MMHG | HEIGHT: 66 IN

## 2018-05-17 DIAGNOSIS — Z23 NEED FOR PROPHYLACTIC VACCINATION AGAINST STREPTOCOCCUS PNEUMONIAE (PNEUMOCOCCUS): ICD-10-CM

## 2018-05-17 DIAGNOSIS — I10 ESSENTIAL HYPERTENSION: ICD-10-CM

## 2018-05-17 DIAGNOSIS — Z23 NEED FOR PROPHYLACTIC VACCINATION AGAINST DIPHTHERIA-TETANUS-PERTUSSIS (DTP): ICD-10-CM

## 2018-05-17 DIAGNOSIS — J98.01 BRONCHOSPASM: ICD-10-CM

## 2018-05-17 DIAGNOSIS — Z12.31 ENCOUNTER FOR SCREENING MAMMOGRAM FOR BREAST CANCER: ICD-10-CM

## 2018-05-17 DIAGNOSIS — J40 BRONCHITIS: Primary | ICD-10-CM

## 2018-05-17 DIAGNOSIS — H53.9 CHANGE IN VISION: ICD-10-CM

## 2018-05-17 DIAGNOSIS — R00.0 TACHYCARDIA: ICD-10-CM

## 2018-05-17 PROCEDURE — 1090F PRES/ABSN URINE INCON ASSESS: CPT | Performed by: INTERNAL MEDICINE

## 2018-05-17 PROCEDURE — 1111F DSCHRG MED/CURRENT MED MERGE: CPT | Performed by: INTERNAL MEDICINE

## 2018-05-17 PROCEDURE — G8400 PT W/DXA NO RESULTS DOC: HCPCS | Performed by: INTERNAL MEDICINE

## 2018-05-17 PROCEDURE — 1123F ACP DISCUSS/DSCN MKR DOCD: CPT | Performed by: INTERNAL MEDICINE

## 2018-05-17 PROCEDURE — 3017F COLORECTAL CA SCREEN DOC REV: CPT | Performed by: INTERNAL MEDICINE

## 2018-05-17 PROCEDURE — G8420 CALC BMI NORM PARAMETERS: HCPCS | Performed by: INTERNAL MEDICINE

## 2018-05-17 PROCEDURE — 4040F PNEUMOC VAC/ADMIN/RCVD: CPT | Performed by: INTERNAL MEDICINE

## 2018-05-17 PROCEDURE — G8427 DOCREV CUR MEDS BY ELIG CLIN: HCPCS | Performed by: INTERNAL MEDICINE

## 2018-05-17 PROCEDURE — 1036F TOBACCO NON-USER: CPT | Performed by: INTERNAL MEDICINE

## 2018-05-17 PROCEDURE — 99214 OFFICE O/P EST MOD 30 MIN: CPT | Performed by: INTERNAL MEDICINE

## 2018-05-17 RX ORDER — AZITHROMYCIN 500 MG/1
500 TABLET, FILM COATED ORAL DAILY
Qty: 1 PACKET | Refills: 0 | Status: SHIPPED | OUTPATIENT
Start: 2018-05-17 | End: 2018-05-20

## 2018-05-17 ASSESSMENT — PATIENT HEALTH QUESTIONNAIRE - PHQ9
2. FEELING DOWN, DEPRESSED OR HOPELESS: 0
SUM OF ALL RESPONSES TO PHQ9 QUESTIONS 1 & 2: 0
1. LITTLE INTEREST OR PLEASURE IN DOING THINGS: 0
SUM OF ALL RESPONSES TO PHQ QUESTIONS 1-9: 0

## 2018-05-25 RX ORDER — IBUPROFEN 800 MG/1
TABLET ORAL
Qty: 40 TABLET | Refills: 0 | Status: SHIPPED | OUTPATIENT
Start: 2018-05-25 | End: 2018-06-12 | Stop reason: SDUPTHER

## 2018-05-28 ASSESSMENT — ENCOUNTER SYMPTOMS: GASTROINTESTINAL NEGATIVE: 1

## 2018-07-09 DIAGNOSIS — J45.20 MILD INTERMITTENT ASTHMA WITHOUT COMPLICATION: ICD-10-CM

## 2018-07-10 RX ORDER — BUDESONIDE AND FORMOTEROL FUMARATE DIHYDRATE 160; 4.5 UG/1; UG/1
AEROSOL RESPIRATORY (INHALATION)
Qty: 1 INHALER | Refills: 2 | Status: SHIPPED | OUTPATIENT
Start: 2018-07-10 | End: 2019-06-05 | Stop reason: SDUPTHER

## 2018-07-18 ENCOUNTER — HOSPITAL ENCOUNTER (OUTPATIENT)
Dept: CT IMAGING | Age: 73
Discharge: HOME | End: 2018-07-18
Payer: MEDICARE

## 2018-07-18 DIAGNOSIS — C34.11 CANCER OF BRONCHUS OF RIGHT UPPER LOBE (HCC): ICD-10-CM

## 2018-07-18 PROCEDURE — 71260 CT THORAX DX C+: CPT

## 2018-07-18 PROCEDURE — 6360000004 HC RX CONTRAST MEDICATION: Performed by: INTERNAL MEDICINE

## 2018-07-18 PROCEDURE — 74177 CT ABD & PELVIS W/CONTRAST: CPT

## 2018-07-18 RX ADMIN — IOHEXOL 50 ML: 240 INJECTION, SOLUTION INTRATHECAL; INTRAVASCULAR; INTRAVENOUS; ORAL at 12:01

## 2018-07-18 RX ADMIN — IOPAMIDOL 80 ML: 755 INJECTION, SOLUTION INTRAVENOUS at 12:01

## 2018-07-19 ENCOUNTER — TELEPHONE (OUTPATIENT)
Dept: INTERNAL MEDICINE CLINIC | Age: 73
End: 2018-07-19

## 2018-08-20 ENCOUNTER — OFFICE VISIT (OUTPATIENT)
Dept: INTERNAL MEDICINE CLINIC | Age: 73
End: 2018-08-20

## 2018-08-20 VITALS
HEART RATE: 108 BPM | DIASTOLIC BLOOD PRESSURE: 82 MMHG | OXYGEN SATURATION: 97 % | SYSTOLIC BLOOD PRESSURE: 126 MMHG | HEIGHT: 66 IN | WEIGHT: 160 LBS | TEMPERATURE: 98 F | BODY MASS INDEX: 25.71 KG/M2

## 2018-08-20 DIAGNOSIS — I10 ESSENTIAL HYPERTENSION: ICD-10-CM

## 2018-08-20 DIAGNOSIS — G89.29 CHRONIC RIGHT SHOULDER PAIN: ICD-10-CM

## 2018-08-20 DIAGNOSIS — R00.0 TACHYCARDIA: Primary | ICD-10-CM

## 2018-08-20 DIAGNOSIS — E78.2 MIXED HYPERLIPIDEMIA: ICD-10-CM

## 2018-08-20 DIAGNOSIS — M25.511 CHRONIC RIGHT SHOULDER PAIN: ICD-10-CM

## 2018-08-20 PROCEDURE — 4040F PNEUMOC VAC/ADMIN/RCVD: CPT | Performed by: INTERNAL MEDICINE

## 2018-08-20 PROCEDURE — G8400 PT W/DXA NO RESULTS DOC: HCPCS | Performed by: INTERNAL MEDICINE

## 2018-08-20 PROCEDURE — G8419 CALC BMI OUT NRM PARAM NOF/U: HCPCS | Performed by: INTERNAL MEDICINE

## 2018-08-20 PROCEDURE — 1123F ACP DISCUSS/DSCN MKR DOCD: CPT | Performed by: INTERNAL MEDICINE

## 2018-08-20 PROCEDURE — 1036F TOBACCO NON-USER: CPT | Performed by: INTERNAL MEDICINE

## 2018-08-20 PROCEDURE — 3017F COLORECTAL CA SCREEN DOC REV: CPT | Performed by: INTERNAL MEDICINE

## 2018-08-20 PROCEDURE — 1090F PRES/ABSN URINE INCON ASSESS: CPT | Performed by: INTERNAL MEDICINE

## 2018-08-20 PROCEDURE — 1101F PT FALLS ASSESS-DOCD LE1/YR: CPT | Performed by: INTERNAL MEDICINE

## 2018-08-20 PROCEDURE — 99214 OFFICE O/P EST MOD 30 MIN: CPT | Performed by: INTERNAL MEDICINE

## 2018-08-20 PROCEDURE — G8427 DOCREV CUR MEDS BY ELIG CLIN: HCPCS | Performed by: INTERNAL MEDICINE

## 2018-08-20 RX ORDER — IBUPROFEN 800 MG/1
800 TABLET ORAL 2 TIMES DAILY PRN
Qty: 30 TABLET | Refills: 1 | Status: SHIPPED | OUTPATIENT
Start: 2018-08-20 | End: 2022-02-16

## 2018-08-20 RX ORDER — TETANUS AND DIPHTHERIA TOXOIDS ADSORBED 2; 2 [LF]/.5ML; [LF]/.5ML
0.5 INJECTION INTRAMUSCULAR ONCE
Qty: 0.5 ML | Refills: 0 | Status: CANCELLED | OUTPATIENT
Start: 2018-08-20 | End: 2018-08-20

## 2018-08-20 ASSESSMENT — PATIENT HEALTH QUESTIONNAIRE - PHQ9
SUM OF ALL RESPONSES TO PHQ9 QUESTIONS 1 & 2: 0
SUM OF ALL RESPONSES TO PHQ QUESTIONS 1-9: 0
SUM OF ALL RESPONSES TO PHQ QUESTIONS 1-9: 0
2. FEELING DOWN, DEPRESSED OR HOPELESS: 0
1. LITTLE INTEREST OR PLEASURE IN DOING THINGS: 0

## 2018-08-20 NOTE — PROGRESS NOTES
and dry. Normal skin turgor. Psychiatric: She has a normal mood and affect. Her behavior is normal. Judgment and thought content normal.       Assessment:        Diagnosis Orders   1. Tachycardia  Unclear cause. Not explained by anemia, volume depletion or hyperglycemia. Last TSH ok. EKG, sinus tach. Will repeat studies. Cardiology consult planned. 2. Essential hypertension  Stable. Continue same medication regimen. DASH diet discussed. 3. Mixed hyperlipidemia  Heart healthy diet and statin compliance discussed. 4. Chronic right shoulder pain  ibuprofen (ADVIL;MOTRIN) 800 MG tablet. Exercises suggested. Plan:    See plans above.         Jessica Bueno MD

## 2018-08-26 ASSESSMENT — ENCOUNTER SYMPTOMS
RESPIRATORY NEGATIVE: 1
EYES NEGATIVE: 1
GASTROINTESTINAL NEGATIVE: 1

## 2018-09-26 PROBLEM — Z71.2 ENCOUNTER TO DISCUSS TEST RESULTS: Status: RESOLVED | Noted: 2017-05-19 | Resolved: 2018-09-26

## 2018-09-28 DIAGNOSIS — I10 ESSENTIAL HYPERTENSION: ICD-10-CM

## 2018-10-01 DIAGNOSIS — I10 ESSENTIAL HYPERTENSION: ICD-10-CM

## 2018-10-01 RX ORDER — AMLODIPINE BESYLATE 10 MG/1
10 TABLET ORAL DAILY
Qty: 90 TABLET | Refills: 3 | Status: SHIPPED | OUTPATIENT
Start: 2018-10-01 | End: 2019-09-25 | Stop reason: SDUPTHER

## 2018-10-02 RX ORDER — AMLODIPINE BESYLATE 10 MG/1
TABLET ORAL
Qty: 90 TABLET | Refills: 2 | Status: SHIPPED | OUTPATIENT
Start: 2018-10-02 | End: 2018-11-29 | Stop reason: SDUPTHER

## 2018-10-18 ENCOUNTER — HOSPITAL ENCOUNTER (OUTPATIENT)
Dept: INTERVENTIONAL RADIOLOGY/VASCULAR | Age: 73
Discharge: HOME | End: 2018-10-18
Attending: INTERNAL MEDICINE | Admitting: INTERNAL MEDICINE
Payer: MEDICARE

## 2018-10-18 VITALS — HEIGHT: 67 IN | WEIGHT: 164 LBS | BODY MASS INDEX: 25.74 KG/M2 | TEMPERATURE: 98.4 F

## 2018-10-18 LAB
ANION GAP SERPL CALCULATED.3IONS-SCNC: 16 MMOL/L (ref 3–16)
BASOPHILS ABSOLUTE: 0 K/UL (ref 0–0.2)
BASOPHILS RELATIVE PERCENT: 0.4 %
BUN BLDV-MCNC: 15 MG/DL (ref 7–20)
CALCIUM SERPL-MCNC: 10.3 MG/DL (ref 8.3–10.6)
CHLORIDE BLD-SCNC: 96 MMOL/L (ref 99–110)
CO2: 27 MMOL/L (ref 21–32)
CREAT SERPL-MCNC: <0.5 MG/DL (ref 0.6–1.2)
EOSINOPHILS ABSOLUTE: 0 K/UL (ref 0–0.6)
EOSINOPHILS RELATIVE PERCENT: 0 %
GFR AFRICAN AMERICAN: >60
GFR NON-AFRICAN AMERICAN: >60
GLUCOSE BLD-MCNC: 101 MG/DL (ref 70–99)
HCT VFR BLD CALC: 38.1 % (ref 36–48)
HEMOGLOBIN: 12 G/DL (ref 12–16)
INR BLD: 1.04 (ref 0.86–1.14)
LYMPHOCYTES ABSOLUTE: 1 K/UL (ref 1–5.1)
LYMPHOCYTES RELATIVE PERCENT: 11.6 %
MCH RBC QN AUTO: 25.2 PG (ref 26–34)
MCHC RBC AUTO-ENTMCNC: 31.4 G/DL (ref 31–36)
MCV RBC AUTO: 80 FL (ref 80–100)
MONOCYTES ABSOLUTE: 0.8 K/UL (ref 0–1.3)
MONOCYTES RELATIVE PERCENT: 9.3 %
NEUTROPHILS ABSOLUTE: 6.9 K/UL (ref 1.7–7.7)
NEUTROPHILS RELATIVE PERCENT: 78.7 %
PDW BLD-RTO: 15.1 % (ref 12.4–15.4)
PLATELET # BLD: 583 K/UL (ref 135–450)
PMV BLD AUTO: 9 FL (ref 5–10.5)
POTASSIUM SERPL-SCNC: 4.3 MMOL/L (ref 3.5–5.1)
PROTHROMBIN TIME: 11.8 SEC (ref 9.8–13)
RBC # BLD: 4.76 M/UL (ref 4–5.2)
SODIUM BLD-SCNC: 139 MMOL/L (ref 136–145)
WBC # BLD: 8.7 K/UL (ref 4–11)

## 2018-10-18 PROCEDURE — 85025 COMPLETE CBC W/AUTO DIFF WBC: CPT

## 2018-10-18 PROCEDURE — 36598 INJ W/FLUOR EVAL CV DEVICE: CPT | Performed by: RADIOLOGY

## 2018-10-18 PROCEDURE — 85610 PROTHROMBIN TIME: CPT

## 2018-10-18 PROCEDURE — 80048 BASIC METABOLIC PNL TOTAL CA: CPT

## 2018-10-18 ASSESSMENT — PAIN - FUNCTIONAL ASSESSMENT: PAIN_FUNCTIONAL_ASSESSMENT: 0-10

## 2018-10-22 ENCOUNTER — OFFICE VISIT (OUTPATIENT)
Dept: CARDIOLOGY CLINIC | Age: 73
End: 2018-10-22
Payer: MEDICARE

## 2018-10-22 ENCOUNTER — TELEPHONE (OUTPATIENT)
Dept: INTERNAL MEDICINE CLINIC | Age: 73
End: 2018-10-22

## 2018-10-22 VITALS
BODY MASS INDEX: 26.39 KG/M2 | WEIGHT: 164.2 LBS | OXYGEN SATURATION: 92 % | SYSTOLIC BLOOD PRESSURE: 121 MMHG | DIASTOLIC BLOOD PRESSURE: 63 MMHG | HEART RATE: 112 BPM | HEIGHT: 66 IN

## 2018-10-22 DIAGNOSIS — Z23 NEED FOR INFLUENZA VACCINATION: Primary | ICD-10-CM

## 2018-10-22 DIAGNOSIS — Z23 NEED FOR PNEUMOCOCCAL VACCINATION: ICD-10-CM

## 2018-10-22 DIAGNOSIS — I10 HYPERTENSION, UNSPECIFIED TYPE: ICD-10-CM

## 2018-10-22 DIAGNOSIS — E55.9 VITAMIN D DEFICIENCY: Primary | ICD-10-CM

## 2018-10-22 PROCEDURE — 1090F PRES/ABSN URINE INCON ASSESS: CPT | Performed by: INTERNAL MEDICINE

## 2018-10-22 PROCEDURE — 1101F PT FALLS ASSESS-DOCD LE1/YR: CPT | Performed by: INTERNAL MEDICINE

## 2018-10-22 PROCEDURE — 3017F COLORECTAL CA SCREEN DOC REV: CPT | Performed by: INTERNAL MEDICINE

## 2018-10-22 PROCEDURE — 99204 OFFICE O/P NEW MOD 45 MIN: CPT | Performed by: INTERNAL MEDICINE

## 2018-10-22 PROCEDURE — G8427 DOCREV CUR MEDS BY ELIG CLIN: HCPCS | Performed by: INTERNAL MEDICINE

## 2018-10-22 PROCEDURE — G8484 FLU IMMUNIZE NO ADMIN: HCPCS | Performed by: INTERNAL MEDICINE

## 2018-10-22 PROCEDURE — G8419 CALC BMI OUT NRM PARAM NOF/U: HCPCS | Performed by: INTERNAL MEDICINE

## 2018-10-22 PROCEDURE — 93000 ELECTROCARDIOGRAM COMPLETE: CPT | Performed by: INTERNAL MEDICINE

## 2018-10-22 NOTE — PROGRESS NOTES
[facility] [place] [id number]     Outpatient Cardiology Consult  Consulting Cardiologist Veronica Lemus M.D., [facility] [place]  Referring Provider:  Wallace Kelly MD    10/22/2018,11:00 AM    Chief Complaint   Patient presents with    New Patient           Asked by No admitting provider for patient encounter./Napoleon Willingham MD  to evaluate and assess this patient's Tachycardia    History of Present Illness: Rl Mccoy is a 68 y.o. female here for evaluation of tachycardia. She's not bothered by it. No chest pains no shortness of breath no dyspnea. 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 She did not have surgery but did have radiation and chemotherapy. It appears that by recent CTs of the chest and abdomen that there is no evidence for recurring or metastasis. She does not have any known heart disease. Her heart recently been in the range of 105 of 211 showing essentially sinus rhythm with borderline first-degree AV block. Examination today is unremarkable. She does not smoke currently. She did quit about 6 years ago. The thyroid studies done earlier well normal.  The CBC showed that her anemia that was present early in the year has improved significantly. Past Medical History:   has a past medical history of Arthritis; Cancer ([address] Hyperlipidemia; and Hypertension. Surgical History:   has a past surgical history that includes Tubal ligation; bronchoscopy (03/07/2017); and other surgical history (Left, 03/29/2017). Social History:   reports that she quit smoking about 6 years ago. She smoked 0.00 packs per day for 0.00 years. She has never used smokeless tobacco. She reports that she drinks alcohol. She reports that she does not use drugs. Family History:  family history is not on file. Home Medications:  Prior to Admission medications    Medication Sig Start Date End Date Taking?  Authorizing Provider   Cyanocobalamin (VITAMIN B 12 bladder habits. · Genitourinary: No dysuria, trouble voiding, or hematuria. · Musculoskeletal:  No gait disturbance, weakness or joint complaints. · Integumentary: No rash or pruritis. Endocrine: No malaise, fatigue or temperature intolerance. Hematologic/Lymphatic: No abnormal bruising or bleeding, blood clots or swollen lymph nodes. · Allergic/Immunologic: No nasal congestion or hives. · All other ROS are reviewed and are unremarkable. Physical Examination:      Wt Readings from Last 3 Encounters:   10/22/18 164 lb 3.2 oz (74.5 kg)   10/18/18 164 lb (74.4 kg)   08/20/18 160 lb (72.6 kg)       BP Readings from Last 3 Encounters:   10/22/18 121/63   08/20/18 126/82   05/17/18 118/64       Pulse Readings from Last 3 Encounters:   10/22/18 112   08/20/18 108   05/17/18 100       Constitutional and General Appearance:  Healthy. And alert . HEENT: eyes and ears intact. No nasal masses  THYROID: not enlarged  LUNGS:  · Clear to auscultation and percussion  HEART and VASCULAR:  · The apical impulses not displaced  · Heart tones are crisp and normal  · Cervical veins are not engorged  · The carotid upstroke is normal in amplitude and contour without delay or bruit  · Peripheral pulses are symmetrical and full  · There is no clubbing, cyanosis of the extremities. · No peripheral edema  · Femoral Arteries: 2+ and equal  · Pedal Pulses: 2+ and equal   ABDOMEN[de-identified]  · No masses or tenderness  · Liver/Spleen: No Abnormalities Noted  NEUROLOGICAL:  . Moves all extremities to command. Cranial nerves 2-12 are in tact.   PSYCHIATRIC: alert and lucid  and oriented and appropriate  SKIN: No lesions or rashes  LYMPH NODES: none enlarged    ·   ·   ·     CBC with Differential:    Lab Results   Component Value Date    WBC 8.7 10/18/2018    RBC 4.76 10/18/2018    RBC 5.55 08/11/2017    HGB 12.0 10/18/2018    HCT 38.1 10/18/2018     10/18/2018    MCV 80.0 10/18/2018    MCH 25.2 10/18/2018    MCHC 31.4 10/18/2018 RDW 15.1 10/18/2018    BANDSPCT 0 06/29/2017    LYMPHOPCT 11.6 10/18/2018    LYMPHOPCT 34.6 08/11/2017    MONOPCT 9.3 10/18/2018    EOSPCT 5 06/29/2017    BASOPCT 0.4 10/18/2018    MONOSABS 0.8 10/18/2018    LYMPHSABS 1.0 10/18/2018    EOSABS 0.0 10/18/2018    BASOSABS 0.0 10/18/2018     BMP:    Lab Results   Component Value Date     10/18/2018    K 4.3 10/18/2018    K 3.3 03/25/2018    CL 96 10/18/2018    CO2 27 10/18/2018    BUN 15 10/18/2018    LABALBU 3.4 03/25/2018    CREATININE <0.5 10/18/2018    CALCIUM 10.3 10/18/2018    GFRAA >60 10/18/2018    LABGLOM >60 10/18/2018     Uric Acid:  No components found for: URIC  PT/INR:    Lab Results   Component Value Date    PROTIME 11.8 10/18/2018    INR 1.04 10/18/2018     Last 3 Troponin:    Lab Results   Component Value Date    TROPONINI 0.01 04/25/2018     FLP:    Lab Results   Component Value Date    TRIG 68 04/26/2018    HDL 60 04/26/2018    LDLCALC 50 04/26/2018    LABVLDL 14 04/26/2018       EKG:On 10/23/18 normal sinus rhythm, nonspecific ST and T waves changes 90/m.  echocardiogramConclusionsApril 26, 2018       Summary   Normal left ventricle size, wall thickness, and systolic function with an   estimated ejection fraction of 55-60%.  Mild tricuspid regurgitation.   Estimated pulmonary artery systolic pressure is at 41 mmHg assuming a right   atrial pressure of 3 mmHg.   A bubble study was performed and fails to show evidence of right to left   shunting.   There is a trivial circumferential pericardial effusion noted.   No prior echo for comparison. This patient was educated using the patient point room wall mount device. Absence from smokers and smoking and diet and exercising are important. Assessment/ Plan   This patient did have tachycardia earlier in the year. It seemed to occur around the time she was receiving the intensity of her chemotherapy and her radiation therapy. She is completely asymptomatic and has no known heart disease.   She did have an echocardiogram as noted   above in April 2018 that showed ejection fraction of 55-60% and was essentially unremarkable. At this time she is on Norvasc 10 mg. Hydrochlorothiazide 25 mg. Lipitor 10 mg. She is stable from a cardiac perspective and I suspect much of her earlier tachycardia may have been related to her lung cancer treatments. I find nothing at this time that some worry to me. I would at this time continue to monitor her as necessary. We'll probably see her back in about 6 months but earlier if she deems it to be necessary. Karyl Snellen M.D. [facility]     Please cc this note to Dr. Moshe Jackman. Thanks for allowing us the opportunity  to participate in the evaluation and care of your patients.  Please call if we may assist further [phone]    This note was likely completed using voice recognition technology and may contain unintended errors  Karyl Snellen, M.D., [facility] [place]  10/22/866915:00 AM

## 2018-10-26 ENCOUNTER — NURSE ONLY (OUTPATIENT)
Dept: INTERNAL MEDICINE CLINIC | Age: 73
End: 2018-10-26
Payer: MEDICARE

## 2018-10-26 DIAGNOSIS — Z23 NEED FOR VACCINATION: Primary | ICD-10-CM

## 2018-10-26 DIAGNOSIS — Z23 FLU VACCINE NEED: Primary | ICD-10-CM

## 2018-10-26 PROCEDURE — 90662 IIV NO PRSV INCREASED AG IM: CPT | Performed by: NURSE PRACTITIONER

## 2018-10-26 PROCEDURE — G0008 ADMIN INFLUENZA VIRUS VAC: HCPCS | Performed by: NURSE PRACTITIONER

## 2018-11-01 ENCOUNTER — HOSPITAL ENCOUNTER (OUTPATIENT)
Dept: CT IMAGING | Age: 73
Discharge: HOME | End: 2018-11-01
Payer: MEDICARE

## 2018-11-01 DIAGNOSIS — C34.90 NON-SMALL CELL LUNG CANCER, UNSPECIFIED LATERALITY (HCC): ICD-10-CM

## 2018-11-01 PROCEDURE — 71260 CT THORAX DX C+: CPT

## 2018-11-01 PROCEDURE — 74177 CT ABD & PELVIS W/CONTRAST: CPT

## 2018-11-01 PROCEDURE — 6360000004 HC RX CONTRAST MEDICATION: Performed by: INTERNAL MEDICINE

## 2018-11-01 RX ADMIN — IOHEXOL 50 ML: 240 INJECTION, SOLUTION INTRATHECAL; INTRAVASCULAR; INTRAVENOUS; ORAL at 11:40

## 2018-11-01 RX ADMIN — IOPAMIDOL 80 ML: 755 INJECTION, SOLUTION INTRAVENOUS at 11:40

## 2018-11-19 ENCOUNTER — HOSPITAL ENCOUNTER (OUTPATIENT)
Dept: MAMMOGRAPHY | Age: 73
Discharge: HOME | End: 2018-11-24
Payer: MEDICARE

## 2018-11-19 ENCOUNTER — HOSPITAL ENCOUNTER (OUTPATIENT)
Dept: GENERAL RADIOLOGY | Age: 73
Discharge: HOME | End: 2018-11-19
Payer: MEDICARE

## 2018-11-19 VITALS
HEART RATE: 90 BPM | RESPIRATION RATE: 16 BRPM | HEIGHT: 66 IN | WEIGHT: 164 LBS | BODY MASS INDEX: 26.36 KG/M2 | SYSTOLIC BLOOD PRESSURE: 107 MMHG | TEMPERATURE: 98.2 F | OXYGEN SATURATION: 95 % | DIASTOLIC BLOOD PRESSURE: 71 MMHG

## 2018-11-19 DIAGNOSIS — C34.11 CANCER OF BRONCHUS OF RIGHT UPPER LOBE (HCC): ICD-10-CM

## 2018-11-19 DIAGNOSIS — Z12.31 VISIT FOR SCREENING MAMMOGRAM: ICD-10-CM

## 2018-11-19 LAB
APTT: 33.9 SEC (ref 26–36)
INR BLD: 1.04 (ref 0.86–1.14)
PLATELET # BLD: 235 K/UL (ref 135–450)
PROTHROMBIN TIME: 11.9 SEC (ref 9.8–13)

## 2018-11-19 PROCEDURE — 36415 COLL VENOUS BLD VENIPUNCTURE: CPT

## 2018-11-19 PROCEDURE — 7100000010 HC PHASE II RECOVERY - FIRST 15 MIN

## 2018-11-19 PROCEDURE — 6360000002 HC RX W HCPCS: Performed by: RADIOLOGY

## 2018-11-19 PROCEDURE — 88342 IMHCHEM/IMCYTCHM 1ST ANTB: CPT

## 2018-11-19 PROCEDURE — 85730 THROMBOPLASTIN TIME PARTIAL: CPT

## 2018-11-19 PROCEDURE — 85049 AUTOMATED PLATELET COUNT: CPT

## 2018-11-19 PROCEDURE — 85610 PROTHROMBIN TIME: CPT

## 2018-11-19 PROCEDURE — 2709999900 CT BIOPSY ABDOMEN RETROPERITONEUM

## 2018-11-19 PROCEDURE — 88305 TISSUE EXAM BY PATHOLOGIST: CPT

## 2018-11-19 PROCEDURE — 7100000011 HC PHASE II RECOVERY - ADDTL 15 MIN

## 2018-11-19 RX ORDER — FENTANYL CITRATE 50 UG/ML
INJECTION, SOLUTION INTRAMUSCULAR; INTRAVENOUS
Status: COMPLETED | OUTPATIENT
Start: 2018-11-19 | End: 2018-11-19

## 2018-11-19 RX ORDER — MIDAZOLAM HYDROCHLORIDE 1 MG/ML
INJECTION INTRAMUSCULAR; INTRAVENOUS
Status: COMPLETED | OUTPATIENT
Start: 2018-11-19 | End: 2018-11-19

## 2018-11-19 RX ORDER — ACETAMINOPHEN 325 MG/1
650 TABLET ORAL EVERY 4 HOURS PRN
Status: DISCONTINUED | OUTPATIENT
Start: 2018-11-19 | End: 2018-11-20 | Stop reason: HOSPADM

## 2018-11-19 RX ORDER — HEPARIN SODIUM (PORCINE) LOCK FLUSH IV SOLN 100 UNIT/ML 100 UNIT/ML
500 SOLUTION INTRAVENOUS PRN
Status: DISCONTINUED | OUTPATIENT
Start: 2018-11-19 | End: 2018-11-20 | Stop reason: HOSPADM

## 2018-11-19 RX ADMIN — MIDAZOLAM HYDROCHLORIDE 0.5 MG: 1 INJECTION INTRAMUSCULAR; INTRAVENOUS at 09:55

## 2018-11-19 RX ADMIN — MIDAZOLAM HYDROCHLORIDE 1 MG: 1 INJECTION INTRAMUSCULAR; INTRAVENOUS at 09:43

## 2018-11-19 RX ADMIN — HEPARIN SODIUM (PORCINE) LOCK FLUSH IV SOLN 100 UNIT/ML 500 UNITS: 100 SOLUTION at 13:04

## 2018-11-19 RX ADMIN — FENTANYL CITRATE 50 MCG: 50 INJECTION, SOLUTION INTRAMUSCULAR; INTRAVENOUS at 09:43

## 2018-11-19 RX ADMIN — FENTANYL CITRATE 50 MCG: 50 INJECTION, SOLUTION INTRAMUSCULAR; INTRAVENOUS at 09:55

## 2018-11-19 ASSESSMENT — PAIN SCALES - GENERAL
PAINLEVEL_OUTOF10: 0
PAINLEVEL_OUTOF10: 0

## 2018-11-19 NOTE — SEDATION DOCUMENTATION
Patient arrived a & O x4. Port accessed for protocol. No blood return. Had recent port flow study, port has fibrin sheath on end per Dr. Wily Felipe. Labs reviewed, spoke to Dr. Weston Counter states ok to use port. Dr. Salima Miranda notified by Dr. Wily Felipe.

## 2018-11-19 NOTE — SEDATION DOCUMENTATION
L Retroperitoneal biopsy done, dsd applied no bleeding at stie. Pt to SDS 7 in stable condition breathing easily on 2L per NC. Report given to Wale Hayes.

## 2018-11-19 NOTE — PRE SEDATION
after pemetrexed infusion    Historical Provider, MD   atorvastatin (LIPITOR) 10 MG tablet Take 1 tablet by mouth daily 2/15/18 10/18/18  Jael Alex MD   hydrochlorothiazide (HYDRODIURIL) 25 MG tablet Take 1 tablet by mouth daily 2/15/18   Jael Alex MD   albuterol sulfate HFA (PROVENTIL HFA) 108 (90 Base) MCG/ACT inhaler Inhale 2 puffs into the lungs every 6 hours as needed for Wheezing 2/15/18 10/22/18  Jael Alex MD   folic acid (FOLVITE) 1 MG tablet Take 1 tablet by mouth daily 8/11/17   Jesika James MD   Cholecalciferol (VITAMIN D) 2000 UNITS CAPS capsule Take 1 capsule by mouth daily    Historical Provider, MD     Coumadin Use Last 7 Days:  no  Antiplatelet drug therapy use last 7 days: no  Other anticoagulant use last 7 days: no  Additional Medication Information:  -      Pre-Sedation Documentation and Exam:   Vital signs have been reviewed (see flow sheet for vitals).     Mallampati Airway Assessment:  Mallampati Class II - (soft palate, fauces & uvula are visible)    Prior History of Anesthesia Complications:   none    ASA Classification:  Class 2 - A normal healthy patient with mild systemic disease    Sedation/ Anesthesia Plan:   intravenous sedation    Medications Planned:   midazolam (Versed) intravenously and fentanyl intravenously    Patient is an appropriate candidate for plan of sedation: yes    Electronically signed by Kurt Gipson MD on 11/19/2018 at 1350

## 2018-11-29 ENCOUNTER — OFFICE VISIT (OUTPATIENT)
Dept: INTERNAL MEDICINE CLINIC | Age: 73
End: 2018-11-29
Payer: MEDICARE

## 2018-11-29 VITALS
WEIGHT: 166 LBS | HEART RATE: 68 BPM | DIASTOLIC BLOOD PRESSURE: 62 MMHG | SYSTOLIC BLOOD PRESSURE: 116 MMHG | BODY MASS INDEX: 26.68 KG/M2 | OXYGEN SATURATION: 95 % | HEIGHT: 66 IN

## 2018-11-29 DIAGNOSIS — I10 ESSENTIAL HYPERTENSION: Primary | ICD-10-CM

## 2018-11-29 DIAGNOSIS — Z23 NEED FOR PROPHYLACTIC VACCINATION AGAINST STREPTOCOCCUS PNEUMONIAE (PNEUMOCOCCUS): ICD-10-CM

## 2018-11-29 DIAGNOSIS — M19.011 PRIMARY OSTEOARTHRITIS OF RIGHT SHOULDER: ICD-10-CM

## 2018-11-29 DIAGNOSIS — Z12.31 ENCOUNTER FOR SCREENING MAMMOGRAM FOR BREAST CANCER: ICD-10-CM

## 2018-11-29 DIAGNOSIS — E78.2 MIXED HYPERLIPIDEMIA: ICD-10-CM

## 2018-11-29 PROCEDURE — 1090F PRES/ABSN URINE INCON ASSESS: CPT | Performed by: INTERNAL MEDICINE

## 2018-11-29 PROCEDURE — 1101F PT FALLS ASSESS-DOCD LE1/YR: CPT | Performed by: INTERNAL MEDICINE

## 2018-11-29 PROCEDURE — G0009 ADMIN PNEUMOCOCCAL VACCINE: HCPCS | Performed by: INTERNAL MEDICINE

## 2018-11-29 PROCEDURE — G8482 FLU IMMUNIZE ORDER/ADMIN: HCPCS | Performed by: INTERNAL MEDICINE

## 2018-11-29 PROCEDURE — 3017F COLORECTAL CA SCREEN DOC REV: CPT | Performed by: INTERNAL MEDICINE

## 2018-11-29 PROCEDURE — 1036F TOBACCO NON-USER: CPT | Performed by: INTERNAL MEDICINE

## 2018-11-29 PROCEDURE — 99214 OFFICE O/P EST MOD 30 MIN: CPT | Performed by: INTERNAL MEDICINE

## 2018-11-29 PROCEDURE — G8419 CALC BMI OUT NRM PARAM NOF/U: HCPCS | Performed by: INTERNAL MEDICINE

## 2018-11-29 PROCEDURE — G8400 PT W/DXA NO RESULTS DOC: HCPCS | Performed by: INTERNAL MEDICINE

## 2018-11-29 PROCEDURE — 1123F ACP DISCUSS/DSCN MKR DOCD: CPT | Performed by: INTERNAL MEDICINE

## 2018-11-29 PROCEDURE — G8427 DOCREV CUR MEDS BY ELIG CLIN: HCPCS | Performed by: INTERNAL MEDICINE

## 2018-11-29 PROCEDURE — 4040F PNEUMOC VAC/ADMIN/RCVD: CPT | Performed by: INTERNAL MEDICINE

## 2018-11-29 PROCEDURE — 90670 PCV13 VACCINE IM: CPT | Performed by: INTERNAL MEDICINE

## 2018-11-29 ASSESSMENT — PATIENT HEALTH QUESTIONNAIRE - PHQ9
SUM OF ALL RESPONSES TO PHQ QUESTIONS 1-9: 0
SUM OF ALL RESPONSES TO PHQ9 QUESTIONS 1 & 2: 0
1. LITTLE INTEREST OR PLEASURE IN DOING THINGS: 0
2. FEELING DOWN, DEPRESSED OR HOPELESS: 0
SUM OF ALL RESPONSES TO PHQ QUESTIONS 1-9: 0

## 2018-12-10 ASSESSMENT — ENCOUNTER SYMPTOMS
EYES NEGATIVE: 1
GASTROINTESTINAL NEGATIVE: 1

## 2018-12-27 ENCOUNTER — HOSPITAL ENCOUNTER (OUTPATIENT)
Dept: MAMMOGRAPHY | Age: 73
Discharge: HOME | End: 2019-01-01
Payer: MEDICARE

## 2018-12-27 DIAGNOSIS — Z12.31 ENCOUNTER FOR SCREENING MAMMOGRAM FOR BREAST CANCER: ICD-10-CM

## 2019-01-14 ENCOUNTER — HOSPITAL ENCOUNTER (OUTPATIENT)
Dept: CT IMAGING | Age: 74
Discharge: HOME | End: 2019-01-14
Payer: MEDICARE

## 2019-01-14 DIAGNOSIS — C34.11 CANCER OF BRONCHUS OF RIGHT UPPER LOBE (HCC): ICD-10-CM

## 2019-01-14 LAB
GFR AFRICAN AMERICAN: >60
GFR NON-AFRICAN AMERICAN: >60
PERFORMED ON: NORMAL
POC CREATININE: 0.8 MG/DL (ref 0.6–1.2)
POC SAMPLE TYPE: NORMAL

## 2019-01-14 PROCEDURE — 74177 CT ABD & PELVIS W/CONTRAST: CPT

## 2019-01-14 PROCEDURE — 6360000004 HC RX CONTRAST MEDICATION: Performed by: INTERNAL MEDICINE

## 2019-01-14 PROCEDURE — 82565 ASSAY OF CREATININE: CPT

## 2019-01-14 RX ADMIN — IOPAMIDOL 80 ML: 755 INJECTION, SOLUTION INTRAVENOUS at 11:24

## 2019-01-14 RX ADMIN — IOHEXOL 50 ML: 240 INJECTION, SOLUTION INTRATHECAL; INTRAVASCULAR; INTRAVENOUS; ORAL at 11:24

## 2019-01-24 DIAGNOSIS — I10 ESSENTIAL HYPERTENSION: ICD-10-CM

## 2019-01-24 RX ORDER — HYDROCHLOROTHIAZIDE 25 MG/1
TABLET ORAL
Qty: 90 TABLET | Refills: 2 | Status: SHIPPED | OUTPATIENT
Start: 2019-01-24 | End: 2019-10-29 | Stop reason: SDUPTHER

## 2019-02-26 ENCOUNTER — OFFICE VISIT (OUTPATIENT)
Dept: INTERNAL MEDICINE CLINIC | Age: 74
End: 2019-02-26
Payer: MEDICARE

## 2019-02-26 VITALS
SYSTOLIC BLOOD PRESSURE: 128 MMHG | OXYGEN SATURATION: 97 % | WEIGHT: 166 LBS | DIASTOLIC BLOOD PRESSURE: 80 MMHG | HEART RATE: 89 BPM | HEIGHT: 66 IN | BODY MASS INDEX: 26.68 KG/M2

## 2019-02-26 DIAGNOSIS — I10 ESSENTIAL HYPERTENSION: Primary | ICD-10-CM

## 2019-02-26 DIAGNOSIS — E78.2 MIXED HYPERLIPIDEMIA: ICD-10-CM

## 2019-02-26 DIAGNOSIS — R00.0 TACHYCARDIA: ICD-10-CM

## 2019-02-26 PROCEDURE — 1123F ACP DISCUSS/DSCN MKR DOCD: CPT | Performed by: INTERNAL MEDICINE

## 2019-02-26 PROCEDURE — 1090F PRES/ABSN URINE INCON ASSESS: CPT | Performed by: INTERNAL MEDICINE

## 2019-02-26 PROCEDURE — 4040F PNEUMOC VAC/ADMIN/RCVD: CPT | Performed by: INTERNAL MEDICINE

## 2019-02-26 PROCEDURE — 1101F PT FALLS ASSESS-DOCD LE1/YR: CPT | Performed by: INTERNAL MEDICINE

## 2019-02-26 PROCEDURE — G8400 PT W/DXA NO RESULTS DOC: HCPCS | Performed by: INTERNAL MEDICINE

## 2019-02-26 PROCEDURE — 3017F COLORECTAL CA SCREEN DOC REV: CPT | Performed by: INTERNAL MEDICINE

## 2019-02-26 PROCEDURE — 1036F TOBACCO NON-USER: CPT | Performed by: INTERNAL MEDICINE

## 2019-02-26 PROCEDURE — G8419 CALC BMI OUT NRM PARAM NOF/U: HCPCS | Performed by: INTERNAL MEDICINE

## 2019-02-26 PROCEDURE — G8482 FLU IMMUNIZE ORDER/ADMIN: HCPCS | Performed by: INTERNAL MEDICINE

## 2019-02-26 PROCEDURE — G8427 DOCREV CUR MEDS BY ELIG CLIN: HCPCS | Performed by: INTERNAL MEDICINE

## 2019-02-26 PROCEDURE — 99214 OFFICE O/P EST MOD 30 MIN: CPT | Performed by: INTERNAL MEDICINE

## 2019-02-26 RX ORDER — TRAMETINIB 2 MG/1
TABLET, FILM COATED ORAL
COMMUNITY
Start: 2019-02-07 | End: 2022-02-16

## 2019-02-26 RX ORDER — DABRAFENIB 75 MG/1
CAPSULE ORAL
COMMUNITY
Start: 2019-02-07

## 2019-02-26 ASSESSMENT — PATIENT HEALTH QUESTIONNAIRE - PHQ9
SUM OF ALL RESPONSES TO PHQ QUESTIONS 1-9: 0
SUM OF ALL RESPONSES TO PHQ QUESTIONS 1-9: 0
SUM OF ALL RESPONSES TO PHQ9 QUESTIONS 1 & 2: 0
1. LITTLE INTEREST OR PLEASURE IN DOING THINGS: 0
2. FEELING DOWN, DEPRESSED OR HOPELESS: 0

## 2019-02-28 PROBLEM — E78.2 MIXED HYPERLIPIDEMIA: Status: ACTIVE | Noted: 2019-02-28

## 2019-02-28 PROBLEM — R00.0 TACHYCARDIA: Status: ACTIVE | Noted: 2019-02-28

## 2019-02-28 ASSESSMENT — ENCOUNTER SYMPTOMS
RESPIRATORY NEGATIVE: 1
GASTROINTESTINAL NEGATIVE: 1
EYES NEGATIVE: 1

## 2019-03-18 DIAGNOSIS — J45.20 MILD INTERMITTENT ASTHMA WITHOUT COMPLICATION: ICD-10-CM

## 2019-03-21 ENCOUNTER — TELEPHONE (OUTPATIENT)
Dept: INTERNAL MEDICINE CLINIC | Age: 74
End: 2019-03-21

## 2019-03-21 DIAGNOSIS — E78.49 OTHER HYPERLIPIDEMIA: ICD-10-CM

## 2019-03-21 RX ORDER — ASPIRIN 81 MG/1
81 TABLET ORAL DAILY
Qty: 30 TABLET | Refills: 2 | Status: SHIPPED | OUTPATIENT
Start: 2019-03-21 | End: 2019-06-05

## 2019-03-21 RX ORDER — ATORVASTATIN CALCIUM 10 MG/1
10 TABLET, FILM COATED ORAL DAILY
Qty: 90 TABLET | Refills: 3 | Status: SHIPPED | OUTPATIENT
Start: 2019-03-21 | End: 2020-04-30 | Stop reason: SDUPTHER

## 2019-03-22 RX ORDER — ATORVASTATIN CALCIUM 10 MG/1
TABLET, FILM COATED ORAL
Qty: 90 TABLET | Refills: 2 | Status: SHIPPED | OUTPATIENT
Start: 2019-03-22 | End: 2019-06-05

## 2019-04-08 ENCOUNTER — HOSPITAL ENCOUNTER (OUTPATIENT)
Dept: MAMMOGRAPHY | Age: 74
Discharge: HOME | End: 2019-04-13
Payer: MEDICARE

## 2019-04-08 DIAGNOSIS — Z12.31 VISIT FOR SCREENING MAMMOGRAM: ICD-10-CM

## 2019-04-08 PROCEDURE — 77067 SCR MAMMO BI INCL CAD: CPT

## 2019-04-12 ENCOUNTER — HOSPITAL ENCOUNTER (OUTPATIENT)
Dept: CT IMAGING | Age: 74
Discharge: HOME | End: 2019-04-12
Payer: MEDICARE

## 2019-04-12 DIAGNOSIS — C34.11 PRIMARY MALIGNANT NEOPLASM OF RIGHT UPPER LOBE OF LUNG (HCC): ICD-10-CM

## 2019-04-12 DIAGNOSIS — C34.91 PRIMARY LUNG ADENOCARCINOMA, RIGHT (HCC): ICD-10-CM

## 2019-04-12 PROCEDURE — 6360000004 HC RX CONTRAST MEDICATION: Performed by: INTERNAL MEDICINE

## 2019-04-12 PROCEDURE — 74177 CT ABD & PELVIS W/CONTRAST: CPT

## 2019-04-12 RX ADMIN — IOHEXOL 50 ML: 240 INJECTION, SOLUTION INTRATHECAL; INTRAVASCULAR; INTRAVENOUS; ORAL at 08:30

## 2019-04-12 RX ADMIN — IOPAMIDOL 75 ML: 755 INJECTION, SOLUTION INTRAVENOUS at 09:54

## 2019-05-13 ENCOUNTER — TELEPHONE (OUTPATIENT)
Dept: INTERNAL MEDICINE CLINIC | Age: 74
End: 2019-05-13

## 2019-06-05 ENCOUNTER — OFFICE VISIT (OUTPATIENT)
Dept: INTERNAL MEDICINE CLINIC | Age: 74
End: 2019-06-05
Payer: MEDICARE

## 2019-06-05 VITALS
OXYGEN SATURATION: 96 % | BODY MASS INDEX: 27.48 KG/M2 | HEART RATE: 82 BPM | DIASTOLIC BLOOD PRESSURE: 78 MMHG | WEIGHT: 171 LBS | HEIGHT: 66 IN | SYSTOLIC BLOOD PRESSURE: 128 MMHG

## 2019-06-05 DIAGNOSIS — E78.2 MIXED HYPERLIPIDEMIA: Primary | ICD-10-CM

## 2019-06-05 DIAGNOSIS — J45.20 MILD INTERMITTENT ASTHMA WITHOUT COMPLICATION: ICD-10-CM

## 2019-06-05 DIAGNOSIS — R00.0 TACHYCARDIA: ICD-10-CM

## 2019-06-05 DIAGNOSIS — I10 ESSENTIAL HYPERTENSION: ICD-10-CM

## 2019-06-05 LAB
CHOLESTEROL, TOTAL: 148 MG/DL (ref 0–199)
HDLC SERPL-MCNC: 70 MG/DL (ref 40–60)
LDL CHOLESTEROL CALCULATED: 61 MG/DL
TRIGL SERPL-MCNC: 86 MG/DL (ref 0–150)
VLDLC SERPL CALC-MCNC: 17 MG/DL

## 2019-06-05 PROCEDURE — 4040F PNEUMOC VAC/ADMIN/RCVD: CPT | Performed by: INTERNAL MEDICINE

## 2019-06-05 PROCEDURE — G8419 CALC BMI OUT NRM PARAM NOF/U: HCPCS | Performed by: INTERNAL MEDICINE

## 2019-06-05 PROCEDURE — G8427 DOCREV CUR MEDS BY ELIG CLIN: HCPCS | Performed by: INTERNAL MEDICINE

## 2019-06-05 PROCEDURE — 1090F PRES/ABSN URINE INCON ASSESS: CPT | Performed by: INTERNAL MEDICINE

## 2019-06-05 PROCEDURE — G8400 PT W/DXA NO RESULTS DOC: HCPCS | Performed by: INTERNAL MEDICINE

## 2019-06-05 PROCEDURE — 3017F COLORECTAL CA SCREEN DOC REV: CPT | Performed by: INTERNAL MEDICINE

## 2019-06-05 PROCEDURE — 1036F TOBACCO NON-USER: CPT | Performed by: INTERNAL MEDICINE

## 2019-06-05 PROCEDURE — 1123F ACP DISCUSS/DSCN MKR DOCD: CPT | Performed by: INTERNAL MEDICINE

## 2019-06-05 PROCEDURE — 99214 OFFICE O/P EST MOD 30 MIN: CPT | Performed by: INTERNAL MEDICINE

## 2019-06-05 RX ORDER — BUDESONIDE AND FORMOTEROL FUMARATE DIHYDRATE 160; 4.5 UG/1; UG/1
2 AEROSOL RESPIRATORY (INHALATION) 2 TIMES DAILY
Qty: 1 INHALER | Refills: 2 | Status: SHIPPED | OUTPATIENT
Start: 2019-06-05 | End: 2019-12-08 | Stop reason: SDUPTHER

## 2019-06-05 RX ORDER — ALBUTEROL SULFATE 90 UG/1
2 AEROSOL, METERED RESPIRATORY (INHALATION) EVERY 6 HOURS PRN
Qty: 1 INHALER | Refills: 2 | Status: SHIPPED | OUTPATIENT
Start: 2019-06-05 | End: 2019-12-11 | Stop reason: SDUPTHER

## 2019-06-05 ASSESSMENT — PATIENT HEALTH QUESTIONNAIRE - PHQ9
SUM OF ALL RESPONSES TO PHQ QUESTIONS 1-9: 0
2. FEELING DOWN, DEPRESSED OR HOPELESS: 0
SUM OF ALL RESPONSES TO PHQ9 QUESTIONS 1 & 2: 0
SUM OF ALL RESPONSES TO PHQ QUESTIONS 1-9: 0
1. LITTLE INTEREST OR PLEASURE IN DOING THINGS: 0

## 2019-06-05 NOTE — PROGRESS NOTES
Subjective:      Patient ID: Rachel Plascencia is a 69 y.o. female. HPI She is here for a check up and f/u on hypertension, hyperlipidemia. She is taking medication as prescribed, eats a balanced meal plan with a good appetite. H/O lung cancer. Latest CT scan showed no evidence of cancer! Has a now fading rash around her neck that is erythematous, scaly, confluent. Pruritic. Treated with claritin. Review of Systems   Constitutional: Negative. HENT: Negative. Eyes: Negative. Respiratory:        H/O asthma mild intermittent/COPD. Do not see PFTs. Treated with MDIs. Rescue and ICS/LABA. She responds. Cardiovascular:        HTN, on stable regimen. Tachycardia. Her baseline at this time. ? related to residual chemo effect. Gastrointestinal: Negative. Endocrine:        HLD. Treated with statin. Genitourinary: Negative. Musculoskeletal: Negative. Skin: Negative. Neurological: Negative. Psychiatric/Behavioral: Negative. Objective:   Physical Exam   Constitutional: She is oriented to person, place, and time. She appears well-developed and well-nourished. No distress. HENT:   Head: Normocephalic and atraumatic. Right Ear: External ear normal.   Left Ear: External ear normal.   Nose: Nose normal.   Mouth/Throat: Oropharynx is clear and moist.   Eyes: Pupils are equal, round, and reactive to light. Conjunctivae and EOM are normal. No scleral icterus. Neck: Normal range of motion. Neck supple. No thyromegaly present. Cardiovascular: Normal rate, regular rhythm, normal heart sounds and intact distal pulses. Tachycardic. Pulmonary/Chest: Effort normal and breath sounds normal.   Abdominal: Soft. Bowel sounds are normal. She exhibits no mass. Lymphadenopathy:     She has no cervical adenopathy. Neurological: She is alert and oriented to person, place, and time. She has normal reflexes. Skin: Skin is warm and dry.    Psychiatric: She has a normal mood and affect. Her behavior is normal. Judgment and thought content normal.       Assessment:        Diagnosis Orders   1. Mixed hyperlipidemia  Heart healthy diet and statin compliance discussed. Lipid Panel   2. Mild intermittent asthma without complication  Diet and exercise stressed. albuterol sulfate HFA (VENTOLIN HFA) 108 (90 Base) MCG/ACT inhaler    budesonide-formoterol (SYMBICORT) 160-4.5 MCG/ACT AERO   3. Essential hypertension  Stable. Continue same medication regimen. DASH diet discussed. 4. Tachycardia  Stable. W/U for reversible cause negative. Will monitor. Plan:    See plans above.         Katarzyna See MD

## 2019-06-17 ASSESSMENT — ENCOUNTER SYMPTOMS
EYES NEGATIVE: 1
GASTROINTESTINAL NEGATIVE: 1

## 2019-07-05 ENCOUNTER — HOSPITAL ENCOUNTER (OUTPATIENT)
Dept: CT IMAGING | Age: 74
Discharge: HOME | End: 2019-07-05
Payer: MEDICARE

## 2019-07-05 DIAGNOSIS — C77.1 SECONDARY MALIGNANT NEOPLASM OF MEDIASTINAL LYMPH NODES (HCC): ICD-10-CM

## 2019-07-05 DIAGNOSIS — C79.72 MALIGNANT NEOPLASM METASTATIC TO LEFT ADRENAL GLAND (HCC): ICD-10-CM

## 2019-07-05 DIAGNOSIS — C78.7 SECONDARY MALIGNANT NEOPLASM OF LIVER (HCC): ICD-10-CM

## 2019-07-05 DIAGNOSIS — C34.11 PRIMARY MALIGNANT NEOPLASM OF RIGHT UPPER LOBE OF LUNG (HCC): ICD-10-CM

## 2019-07-05 DIAGNOSIS — C34.91 PRIMARY LUNG ADENOCARCINOMA, RIGHT (HCC): ICD-10-CM

## 2019-07-05 PROCEDURE — 74177 CT ABD & PELVIS W/CONTRAST: CPT

## 2019-07-05 PROCEDURE — 6360000004 HC RX CONTRAST MEDICATION: Performed by: INTERNAL MEDICINE

## 2019-07-05 RX ADMIN — IOHEXOL 50 ML: 240 INJECTION, SOLUTION INTRATHECAL; INTRAVASCULAR; INTRAVENOUS; ORAL at 11:24

## 2019-09-03 ENCOUNTER — HOSPITAL ENCOUNTER (OUTPATIENT)
Dept: CT IMAGING | Age: 74
Discharge: HOME | End: 2019-09-03
Payer: MEDICARE

## 2019-09-03 DIAGNOSIS — C77.1 SECONDARY MALIGNANT NEOPLASM OF MEDIASTINAL LYMPH NODES (HCC): ICD-10-CM

## 2019-09-03 DIAGNOSIS — C34.11 PRIMARY MALIGNANT NEOPLASM OF RIGHT UPPER LOBE OF LUNG (HCC): ICD-10-CM

## 2019-09-03 DIAGNOSIS — C79.72 MALIGNANT NEOPLASM METASTATIC TO LEFT ADRENAL GLAND (HCC): ICD-10-CM

## 2019-09-03 DIAGNOSIS — C78.7 SECONDARY MALIGNANT NEOPLASM OF LIVER (HCC): ICD-10-CM

## 2019-09-03 DIAGNOSIS — C34.91 PRIMARY LUNG ADENOCARCINOMA, RIGHT (HCC): ICD-10-CM

## 2019-09-03 PROCEDURE — 74177 CT ABD & PELVIS W/CONTRAST: CPT

## 2019-09-03 PROCEDURE — 6360000004 HC RX CONTRAST MEDICATION: Performed by: INTERNAL MEDICINE

## 2019-09-03 RX ADMIN — IOPAMIDOL 80 ML: 755 INJECTION, SOLUTION INTRAVENOUS at 11:22

## 2019-09-03 RX ADMIN — IOHEXOL 50 ML: 240 INJECTION, SOLUTION INTRATHECAL; INTRAVASCULAR; INTRAVENOUS; ORAL at 11:22

## 2019-09-09 ENCOUNTER — HOSPITAL ENCOUNTER (EMERGENCY)
Age: 74
Discharge: HOME | End: 2019-09-09
Attending: EMERGENCY MEDICINE
Payer: MEDICARE

## 2019-09-09 ENCOUNTER — TELEPHONE (OUTPATIENT)
Dept: INTERNAL MEDICINE CLINIC | Age: 74
End: 2019-09-09

## 2019-09-09 VITALS
BODY MASS INDEX: 27.32 KG/M2 | HEART RATE: 101 BPM | TEMPERATURE: 98.5 F | RESPIRATION RATE: 16 BRPM | HEIGHT: 66 IN | SYSTOLIC BLOOD PRESSURE: 123 MMHG | OXYGEN SATURATION: 97 % | DIASTOLIC BLOOD PRESSURE: 84 MMHG | WEIGHT: 170 LBS

## 2019-09-09 DIAGNOSIS — R30.0 DYSURIA: Primary | ICD-10-CM

## 2019-09-09 DIAGNOSIS — N39.0 URINARY TRACT INFECTION WITHOUT HEMATURIA, SITE UNSPECIFIED: Primary | ICD-10-CM

## 2019-09-09 LAB
BACTERIA: ABNORMAL /HPF
BILIRUBIN URINE: NEGATIVE
BLOOD, URINE: ABNORMAL
CLARITY: CLEAR
COLOR: YELLOW
EPITHELIAL CELLS, UA: ABNORMAL /HPF
GLUCOSE URINE: NEGATIVE MG/DL
KETONES, URINE: NEGATIVE MG/DL
LEUKOCYTE ESTERASE, URINE: ABNORMAL
MICROSCOPIC EXAMINATION: YES
NITRITE, URINE: NEGATIVE
PH UA: 7 (ref 5–8)
PROTEIN UA: 30 MG/DL
RBC UA: ABNORMAL /HPF (ref 0–2)
SPECIFIC GRAVITY UA: 1.01 (ref 1–1.03)
URINE TYPE: ABNORMAL
UROBILINOGEN, URINE: 2 E.U./DL
WBC UA: ABNORMAL /HPF (ref 0–5)

## 2019-09-09 PROCEDURE — 99283 EMERGENCY DEPT VISIT LOW MDM: CPT

## 2019-09-09 PROCEDURE — 6370000000 HC RX 637 (ALT 250 FOR IP): Performed by: STUDENT IN AN ORGANIZED HEALTH CARE EDUCATION/TRAINING PROGRAM

## 2019-09-09 PROCEDURE — 81001 URINALYSIS AUTO W/SCOPE: CPT

## 2019-09-09 RX ORDER — CEPHALEXIN 500 MG/1
500 CAPSULE ORAL 2 TIMES DAILY
Qty: 8 CAPSULE | Refills: 0 | Status: SHIPPED | OUTPATIENT
Start: 2019-09-09 | End: 2019-09-13

## 2019-09-09 RX ORDER — CEPHALEXIN 500 MG/1
500 CAPSULE ORAL ONCE
Status: COMPLETED | OUTPATIENT
Start: 2019-09-09 | End: 2019-09-09

## 2019-09-09 RX ADMIN — CEPHALEXIN 500 MG: 500 CAPSULE ORAL at 16:40

## 2019-09-09 ASSESSMENT — PAIN DESCRIPTION - PAIN TYPE: TYPE: ACUTE PAIN

## 2019-09-09 ASSESSMENT — PAIN DESCRIPTION - DESCRIPTORS: DESCRIPTORS: BURNING

## 2019-09-09 ASSESSMENT — PAIN DESCRIPTION - LOCATION: LOCATION: GROIN

## 2019-09-09 ASSESSMENT — PAIN SCALES - GENERAL: PAINLEVEL_OUTOF10: 3

## 2019-09-09 NOTE — TELEPHONE ENCOUNTER
Pt. Is experiencing some UTI issues and she wants to be seen today and Dr. Su Jennings is not in the office. There are no same day slots avalible and pt. Denies overflow options.  Best contact number [phone]

## 2019-09-09 NOTE — ED PROVIDER NOTES
ED Attending Attestation Note     Date of evaluation: 9/9/2019    This patient was seen by the resident. I have seen and examined the patient, agree with the workup, evaluation, management and diagnosis. The care plan has been discussed. My assessment reveals very well-appearing elderly patient, pleasantly conversational, in no acute distress. She presents with a 3-day history of dysuria, frequency, and hesitancy, without systemic symptoms. Abdomen is soft and benign. Her urinalysis is consistent with a mild, uncomplicated urinary tract infection.          Gemini Bull MD  09/09/19 [id number]

## 2019-09-09 NOTE — ED NOTES
[facility]           Allergies     She is allergic to codeine and penicillins. Physical Exam     INITIAL VITALS: BP: 123/84, Temp: 98.5 °F (36.9 °C), Pulse: 101, Resp: 16, SpO2: 97 %   Physical Exam   Constitutional: She is oriented to person, place, and time. She appears well-developed and well-nourished. No distress. HENT:   Head: Normocephalic and atraumatic. Eyes: Pupils are equal, round, and reactive to light. EOM are normal.   Neck: Normal range of motion. Neck supple. Cardiovascular: Normal rate, regular rhythm, normal heart sounds and intact distal pulses. Pulmonary/Chest: Effort normal and breath sounds normal.   Abdominal: Soft. Bowel sounds are normal. She exhibits no distension and no mass. There is no tenderness. There is no rebound and no guarding. No hernia. Musculoskeletal: Normal range of motion. She exhibits no edema, tenderness or deformity. Neurological: She is alert and oriented to person, place, and time. No cranial nerve deficit or sensory deficit. She exhibits normal muscle tone. Skin: Skin is warm and dry. Capillary refill takes less than 2 seconds. She is not diaphoretic. Psychiatric: She has a normal mood and affect.  Her behavior is normal. Judgment and thought content normal.       Diagnostic Results       RADIOLOGY:  No orders to display       LABS:   Results for orders placed or performed during the hospital encounter of 09/09/19   Urinalysis, reflex to microscopic (Lab)   Result Value Ref Range    Color, UA Yellow Straw/Yellow    Clarity, UA Clear Clear    Glucose, Ur Negative Negative mg/dL    Bilirubin Urine Negative Negative    Ketones, Urine Negative Negative mg/dL    Specific Gravity, UA 1.010 1.005 - 1.030    Blood, Urine LARGE (A) Negative    pH, UA 7.0 5.0 - 8.0    Protein, UA 30 (A) Negative mg/dL    Urobilinogen, Urine 2.0 (A) <2.0 E.U./dL    Nitrite, Urine Negative Negative    Leukocyte Esterase, Urine LARGE (A) Negative    Microscopic Examination YES

## 2019-09-17 ENCOUNTER — TELEPHONE (OUTPATIENT)
Dept: INTERNAL MEDICINE CLINIC | Age: 74
End: 2019-09-17

## 2019-09-25 DIAGNOSIS — I10 ESSENTIAL HYPERTENSION: ICD-10-CM

## 2019-09-30 RX ORDER — AMLODIPINE BESYLATE 10 MG/1
TABLET ORAL
Qty: 90 TABLET | Refills: 2 | Status: SHIPPED | OUTPATIENT
Start: 2019-09-30 | End: 2020-04-29 | Stop reason: SDUPTHER

## 2019-10-21 ENCOUNTER — TELEPHONE (OUTPATIENT)
Dept: INTERNAL MEDICINE CLINIC | Age: 74
End: 2019-10-21

## 2019-10-29 DIAGNOSIS — I10 ESSENTIAL HYPERTENSION: ICD-10-CM

## 2019-10-29 RX ORDER — HYDROCHLOROTHIAZIDE 25 MG/1
TABLET ORAL
Qty: 90 TABLET | Refills: 1 | Status: SHIPPED | OUTPATIENT
Start: 2019-10-29 | End: 2020-04-29 | Stop reason: SDUPTHER

## 2019-11-01 ENCOUNTER — TELEPHONE (OUTPATIENT)
Dept: INTERNAL MEDICINE CLINIC | Age: 74
End: 2019-11-01

## 2019-11-15 ENCOUNTER — NURSE ONLY (OUTPATIENT)
Dept: INTERNAL MEDICINE CLINIC | Age: 74
End: 2019-11-15
Payer: MEDICARE

## 2019-11-15 DIAGNOSIS — Z23 IMMUNIZATION DUE: Primary | ICD-10-CM

## 2019-11-15 PROCEDURE — G0008 ADMIN INFLUENZA VIRUS VAC: HCPCS | Performed by: INTERNAL MEDICINE

## 2019-11-15 PROCEDURE — 90653 IIV ADJUVANT VACCINE IM: CPT | Performed by: INTERNAL MEDICINE

## 2019-12-10 ENCOUNTER — HOSPITAL ENCOUNTER (OUTPATIENT)
Dept: CT IMAGING | Age: 74
Discharge: HOME | End: 2019-12-10
Payer: MEDICARE

## 2019-12-10 DIAGNOSIS — C79.72 MALIGNANT NEOPLASM METASTATIC TO LEFT ADRENAL GLAND (HCC): ICD-10-CM

## 2019-12-10 DIAGNOSIS — C78.7 SECONDARY MALIGNANT NEOPLASM OF LIVER (HCC): ICD-10-CM

## 2019-12-10 DIAGNOSIS — C34.91 PRIMARY LUNG ADENOCARCINOMA, RIGHT (HCC): ICD-10-CM

## 2019-12-10 DIAGNOSIS — C34.11 PRIMARY MALIGNANT NEOPLASM OF RIGHT UPPER LOBE OF LUNG (HCC): ICD-10-CM

## 2019-12-10 DIAGNOSIS — C77.1 SECONDARY MALIGNANT NEOPLASM OF MEDIASTINAL LYMPH NODES (HCC): ICD-10-CM

## 2019-12-10 LAB
GFR AFRICAN AMERICAN: >60
GFR NON-AFRICAN AMERICAN: >60
PERFORMED ON: ABNORMAL
POC CREATININE: 0.4 MG/DL (ref 0.6–1.2)
POC SAMPLE TYPE: ABNORMAL

## 2019-12-10 PROCEDURE — 6360000004 HC RX CONTRAST MEDICATION: Performed by: INTERNAL MEDICINE

## 2019-12-10 PROCEDURE — 74177 CT ABD & PELVIS W/CONTRAST: CPT

## 2019-12-10 PROCEDURE — 82565 ASSAY OF CREATININE: CPT

## 2019-12-10 RX ADMIN — IOHEXOL 50 ML: 240 INJECTION, SOLUTION INTRATHECAL; INTRAVASCULAR; INTRAVENOUS; ORAL at 10:45

## 2019-12-10 RX ADMIN — IOPAMIDOL 80 ML: 755 INJECTION, SOLUTION INTRAVENOUS at 10:45

## 2020-01-15 ENCOUNTER — OFFICE VISIT (OUTPATIENT)
Dept: INTERNAL MEDICINE CLINIC | Age: 75
End: 2020-01-15
Payer: MEDICARE

## 2020-01-15 VITALS
OXYGEN SATURATION: 96 % | HEIGHT: 66 IN | BODY MASS INDEX: 25.07 KG/M2 | WEIGHT: 156 LBS | SYSTOLIC BLOOD PRESSURE: 126 MMHG | DIASTOLIC BLOOD PRESSURE: 78 MMHG | HEART RATE: 83 BPM

## 2020-01-15 PROCEDURE — G8417 CALC BMI ABV UP PARAM F/U: HCPCS | Performed by: INTERNAL MEDICINE

## 2020-01-15 PROCEDURE — 99214 OFFICE O/P EST MOD 30 MIN: CPT | Performed by: INTERNAL MEDICINE

## 2020-01-15 PROCEDURE — 3017F COLORECTAL CA SCREEN DOC REV: CPT | Performed by: INTERNAL MEDICINE

## 2020-01-15 PROCEDURE — 1036F TOBACCO NON-USER: CPT | Performed by: INTERNAL MEDICINE

## 2020-01-15 PROCEDURE — 1123F ACP DISCUSS/DSCN MKR DOCD: CPT | Performed by: INTERNAL MEDICINE

## 2020-01-15 PROCEDURE — 4040F PNEUMOC VAC/ADMIN/RCVD: CPT | Performed by: INTERNAL MEDICINE

## 2020-01-15 PROCEDURE — 1090F PRES/ABSN URINE INCON ASSESS: CPT | Performed by: INTERNAL MEDICINE

## 2020-01-15 PROCEDURE — G8482 FLU IMMUNIZE ORDER/ADMIN: HCPCS | Performed by: INTERNAL MEDICINE

## 2020-01-15 PROCEDURE — G8400 PT W/DXA NO RESULTS DOC: HCPCS | Performed by: INTERNAL MEDICINE

## 2020-01-15 PROCEDURE — G8427 DOCREV CUR MEDS BY ELIG CLIN: HCPCS | Performed by: INTERNAL MEDICINE

## 2020-01-15 ASSESSMENT — PATIENT HEALTH QUESTIONNAIRE - PHQ9
SUM OF ALL RESPONSES TO PHQ QUESTIONS 1-9: 0
1. LITTLE INTEREST OR PLEASURE IN DOING THINGS: 0
SUM OF ALL RESPONSES TO PHQ9 QUESTIONS 1 & 2: 0
SUM OF ALL RESPONSES TO PHQ QUESTIONS 1-9: 0
2. FEELING DOWN, DEPRESSED OR HOPELESS: 0

## 2020-01-15 NOTE — PROGRESS NOTES
Subjective:      Patient ID: Rhina Reed is a 69 y.o. female. HPI She is here for a check up and f/u on hypertension, hyperlipidemia. She is taking medication as prescribed, eats a balanced meal plan with a good appetite. H/O lung cancer. Latest CT scan stable right pleural effusion no developing adenopathy of chest, abdomen or pelvis. Review of Systems   Constitutional: Negative. HENT: Negative. Eyes: Negative. Respiratory:        H/O asthma mild intermittent/COPD. Do not see PFTs. Treated with MDIs. Rescue and ICS/LABA. She responds. Cardiovascular:        HTN, on stable regimen. Tachycardia. Her baseline at this time. ? related to residual chemo effect. Gastrointestinal: Negative. Endocrine:        HLD. Treated with statin. Genitourinary: Negative. Musculoskeletal: low back pain, bilateral. No H/O trauma or lifting. Skin: Negative. Neurological: Negative. Psychiatric/Behavioral: Negative. Objective:   Physical Exam   Constitutional: She is oriented to person, place, and time. She appears well-developed and well-nourished. No distress. HENT:   Head: Normocephalic and atraumatic. Right Ear: External ear normal.   Left Ear: External ear normal.   Nose: Nose normal.   Mouth/Throat: Oropharynx is clear and moist.   Eyes: Pupils are equal, round, and reactive to light. Conjunctivae and EOM are normal. No scleral icterus. Neck: Normal range of motion. Neck supple. No thyromegaly present. Cardiovascular: Normal rate, regular rhythm, normal heart sounds and intact distal pulses. Tachycardic. Pulmonary/Chest: Effort normal and breath sounds normal.   JED: mild lumbar soft tissue tenderness  Abdominal: Soft. Bowel sounds are normal. She exhibits no mass. Lymphadenopathy:     She has no cervical adenopathy. Neurological: She is alert and oriented to person, place, and time. She has normal reflexes.    Skin: Skin is warm and dry. Psychiatric: She has a normal mood and affect. Her behavior is normal. Judgment and thought content normal.       Assessment:        Diagnosis Orders   1. Mixed hyperlipidemia  Heart healthy diet and statin compliance discussed. Lipid Panel   2. Mild intermittent asthma without complication  Diet and exercise stressed. albuterol sulfate HFA (VENTOLIN HFA) 108 (90 Base) MCG/ACT inhaler    budesonide-formoterol (SYMBICORT) 160-4.5 MCG/ACT AERO   3. Essential hypertension  Stable. Continue same medication regimen. DASH diet discussed. 4. Tachycardia  Stable. W/U for reversible cause negative. Will monitor. 5.      Lumbar strain/suspect DJD. Exercises given. Plan:    See plans above.

## 2020-01-17 ENCOUNTER — TELEPHONE (OUTPATIENT)
Dept: INTERNAL MEDICINE CLINIC | Age: 75
End: 2020-01-17

## 2020-01-23 ENCOUNTER — CARE COORDINATION (OUTPATIENT)
Dept: CARE COORDINATION | Age: 75
End: 2020-01-23

## 2020-01-23 NOTE — LETTER
1/23/2020    [address] [id number]      Dear Nayeli Shin,    My name is Thalia Young and I am a registered nurse who partners with Adis Isaac MD to improve patients' health. Adis Isaac MD believes you would benefit from working with me. As a member of your health care team, I would work with other providers involved in your care, offer education for your specific health conditions, and connect you with additional resources as needed. I will collaborate with Adis Isaac MD to support you in following your treatment plan. The additional support I provide is no additional cost to you. My primary focus is to help you achieve specific goals and improve your health. We are committed to walk with you on this journey and look forward to working with you. Please call me to further discuss your healthcare needs. I am available by phone or for appointments at the office. You can reach me at [phone].     In good health,     Thalia Young RN

## 2020-01-31 ENCOUNTER — CARE COORDINATION (OUTPATIENT)
Dept: CARE COORDINATION | Age: 75
End: 2020-01-31

## 2020-01-31 NOTE — CARE COORDINATION
LM:  Called patient to follow up with care. Left message and call back number. CCN:  Patient is participating in a Cancer Study. Patient has been in the study since 2017.

## 2020-02-05 ENCOUNTER — CARE COORDINATION (OUTPATIENT)
Dept: CARE COORDINATION | Age: 75
End: 2020-02-05

## 2020-02-05 NOTE — CARE COORDINATION
Patient Excluded from Care Coordination?  Yes     The patient will be excluded from Care Coordination for the following reason: Patient declined care coordination   Patient declined Care Coordination, patient says she is doing well and will keep my number if she needs something in the  future

## 2020-04-07 ENCOUNTER — HOSPITAL ENCOUNTER (OUTPATIENT)
Dept: CT IMAGING | Age: 75
Discharge: HOME | End: 2020-04-07
Payer: MEDICARE

## 2020-04-07 PROCEDURE — 6360000004 HC RX CONTRAST MEDICATION: Performed by: INTERNAL MEDICINE

## 2020-04-07 PROCEDURE — 82565 ASSAY OF CREATININE: CPT

## 2020-04-07 PROCEDURE — 74177 CT ABD & PELVIS W/CONTRAST: CPT

## 2020-04-07 RX ADMIN — IOPAMIDOL 80 ML: 755 INJECTION, SOLUTION INTRAVENOUS at 11:48

## 2020-04-29 ASSESSMENT — PATIENT HEALTH QUESTIONNAIRE - PHQ9
SUM OF ALL RESPONSES TO PHQ QUESTIONS 1-9: 0
1. LITTLE INTEREST OR PLEASURE IN DOING THINGS: 0
SUM OF ALL RESPONSES TO PHQ9 QUESTIONS 1 & 2: 0
2. FEELING DOWN, DEPRESSED OR HOPELESS: 0
SUM OF ALL RESPONSES TO PHQ QUESTIONS 1-9: 0

## 2020-04-30 ENCOUNTER — VIRTUAL VISIT (OUTPATIENT)
Dept: INTERNAL MEDICINE CLINIC | Age: 75
End: 2020-04-30
Payer: MEDICARE

## 2020-04-30 ENCOUNTER — TELEPHONE (OUTPATIENT)
Dept: INTERNAL MEDICINE CLINIC | Age: 75
End: 2020-04-30

## 2020-04-30 PROCEDURE — 1090F PRES/ABSN URINE INCON ASSESS: CPT | Performed by: INTERNAL MEDICINE

## 2020-04-30 PROCEDURE — 99214 OFFICE O/P EST MOD 30 MIN: CPT | Performed by: INTERNAL MEDICINE

## 2020-04-30 PROCEDURE — G8417 CALC BMI ABV UP PARAM F/U: HCPCS | Performed by: INTERNAL MEDICINE

## 2020-04-30 PROCEDURE — 1123F ACP DISCUSS/DSCN MKR DOCD: CPT | Performed by: INTERNAL MEDICINE

## 2020-04-30 PROCEDURE — G8427 DOCREV CUR MEDS BY ELIG CLIN: HCPCS | Performed by: INTERNAL MEDICINE

## 2020-04-30 PROCEDURE — G8400 PT W/DXA NO RESULTS DOC: HCPCS | Performed by: INTERNAL MEDICINE

## 2020-04-30 PROCEDURE — 1036F TOBACCO NON-USER: CPT | Performed by: INTERNAL MEDICINE

## 2020-04-30 PROCEDURE — 4040F PNEUMOC VAC/ADMIN/RCVD: CPT | Performed by: INTERNAL MEDICINE

## 2020-04-30 PROCEDURE — 3017F COLORECTAL CA SCREEN DOC REV: CPT | Performed by: INTERNAL MEDICINE

## 2020-04-30 RX ORDER — HYDROCHLOROTHIAZIDE 25 MG/1
TABLET ORAL
Qty: 90 TABLET | Refills: 3 | Status: SHIPPED | OUTPATIENT
Start: 2020-04-30 | End: 2021-01-15

## 2020-04-30 RX ORDER — BUDESONIDE AND FORMOTEROL FUMARATE DIHYDRATE 160; 4.5 UG/1; UG/1
AEROSOL RESPIRATORY (INHALATION)
Qty: 1 INHALER | Refills: 3 | Status: SHIPPED | OUTPATIENT
Start: 2020-04-30 | End: 2021-05-19

## 2020-04-30 RX ORDER — ATORVASTATIN CALCIUM 10 MG/1
10 TABLET, FILM COATED ORAL DAILY
Qty: 90 TABLET | Refills: 3 | Status: SHIPPED | OUTPATIENT
Start: 2020-04-30 | End: 2021-06-30

## 2020-04-30 RX ORDER — ALBUTEROL SULFATE 90 UG/1
AEROSOL, METERED RESPIRATORY (INHALATION)
Qty: 18 G | Refills: 3 | Status: SHIPPED | OUTPATIENT
Start: 2020-04-30 | End: 2021-05-19

## 2020-04-30 RX ORDER — AMLODIPINE BESYLATE 10 MG/1
TABLET ORAL
Qty: 90 TABLET | Refills: 3 | Status: SHIPPED | OUTPATIENT
Start: 2020-04-30 | End: 2021-01-15

## 2020-04-30 ASSESSMENT — ENCOUNTER SYMPTOMS
GASTROINTESTINAL NEGATIVE: 1
EYES NEGATIVE: 1

## 2020-04-30 NOTE — TELEPHONE ENCOUNTER
Patient is calling in stating that she couldn't remember if she told doc at her appointment to refill all her medications or not, and just wanted to remind doc, incase she forgot. . please advise

## 2020-06-25 NOTE — ED PROVIDER NOTES
[id number] [facility] RESIDENT NOTE         Date of evaluation: 9/9/2019     Chief Complaint      Dysuria        History of Present Illness      Leela Galvez is a 69 y.o. female w PMH lung cancer in remission who presents w 2-3 days of urinary frequency and burning. She suspects she has a UTI, has never had one before. Tried to see her PCP but given that he is on vacation she decided to come to the ED. Generally feeling quite well and denies fevers, chills, n/v/d, abd or flank pain.     Review of Systems      Review of Systems negative except as described in HPI.     Past Medical, Surgical, Family, and Social History      She has a past medical history of Arthritis, Cancer ([address] Hyperlipidemia, and Hypertension. She has a past surgical history that includes Tubal ligation; bronchoscopy (03/07/2017); and other surgical history (Left, 03/29/2017). Her family history is not on file. She reports that she quit smoking about 7 years ago. She smoked 0.00 packs per day for 0.00 years. She has never used smokeless tobacco. She reports that she drinks alcohol.  She reports that she does not use drugs.     Medications           Previous Medications     ALBUTEROL SULFATE HFA (VENTOLIN HFA) 108 (90 BASE) MCG/ACT INHALER    Inhale 2 puffs into the lungs every 6 hours as needed for Wheezing     AMLODIPINE (NORVASC) 10 MG TABLET    Take 1 tablet by mouth daily     ASPIRIN ADULT LOW STRENGTH 81 MG EC TABLET    TAKE ONE TABLET BY MOUTH DAILY     ATORVASTATIN (LIPITOR) 10 MG TABLET    Take 1 tablet by mouth daily     BUDESONIDE-FORMOTEROL (SYMBICORT) 160-4.5 MCG/ACT AERO    Inhale 2 puffs into the lungs 2 times daily     FOLIC ACID (FOLVITE) 1 MG TABLET    Take 1 tablet by mouth daily     HYDROCHLOROTHIAZIDE (HYDRODIURIL) 25 MG TABLET    TAKE ONE TABLET BY MOUTH DAILY     IBUPROFEN (ADVIL;MOTRIN) 800 MG TABLET    Take 1 tablet by mouth 2 times daily as needed for Pain     MEKINIST Urine LARGE (A) Negative     Microscopic Examination YES       Urine Type Not Specified     Microscopic Urinalysis   Result Value Ref Range     WBC, UA  (A) 0 - 5 /HPF     RBC, UA 5-10 (A) 0 - 2 /HPF     Epi Cells 0-2 /HPF     Bacteria, UA 2+ (A) /HPF         RECENT VITALS:  BP: 123/84, Temp: 98.5 °F (36.9 °C),Pulse: 101, Resp: 16, SpO2: 97 %      Procedures      None     ED Course      Nursing Notes, Past Medical Hx, Past Surgical Hx, Social Hx, Allergies, and FamilyHx were reviewed.     The patient was giventhe following medications:  Encounter Medications         Orders Placed This Encounter   Medications    cephALEXin (KEFLEX) capsule 500 mg    cephALEXin (KEFLEX) 500 MG capsule       Sig: Take 1 capsule by mouth 2 times daily for 4 days       Dispense:  8 capsule       Refill:  0            CONSULTS:  None     MEDICAL DECISION MAKING / ASSESSMENT / German Niecy is a 69 y.o. female w PMH lung Ca in remission p/w dysuria and urgency x 2-3 days, concerned for UTI. VSS wnl and she appears comfortable and in no distress. UA c/w UTI, patient w no prior hx of UTI and no recent hospitalizations, low risk for resistance, started on Keflex 500 bid, received first dose in ED, and discharged with 500 mg bid x 4 additional days. She remained stable and well during her time in the ED and was discharged home in stable condition w follow up w her PCP and standard return parameters.     This patient was also evaluated by the attending physician. All care plans were discussed and agreed upon.     Clinical Impression      1.  Dysuria          Disposition      PATIENT REFERRED TO:  Sonia Plunkett MD  [phone]  [address] [facility] [address] [phone]     Schedule an appointment as soon as possible for a visit in 1 week  ED follow up        DISCHARGE MEDICATIONS:       New Prescriptions     CEPHALEXIN (KEFLEX) 500 MG CAPSULE    Take 1 capsule by mouth 2 times daily for 4 days         DISPOSITION

## 2020-07-29 ENCOUNTER — VIRTUAL VISIT (OUTPATIENT)
Dept: INTERNAL MEDICINE CLINIC | Age: 75
End: 2020-07-29
Payer: MEDICARE

## 2020-07-29 PROCEDURE — 1123F ACP DISCUSS/DSCN MKR DOCD: CPT | Performed by: INTERNAL MEDICINE

## 2020-07-29 PROCEDURE — 1090F PRES/ABSN URINE INCON ASSESS: CPT | Performed by: INTERNAL MEDICINE

## 2020-07-29 PROCEDURE — G8427 DOCREV CUR MEDS BY ELIG CLIN: HCPCS | Performed by: INTERNAL MEDICINE

## 2020-07-29 PROCEDURE — 1036F TOBACCO NON-USER: CPT | Performed by: INTERNAL MEDICINE

## 2020-07-29 PROCEDURE — 3017F COLORECTAL CA SCREEN DOC REV: CPT | Performed by: INTERNAL MEDICINE

## 2020-07-29 PROCEDURE — 99214 OFFICE O/P EST MOD 30 MIN: CPT | Performed by: INTERNAL MEDICINE

## 2020-07-29 PROCEDURE — 4040F PNEUMOC VAC/ADMIN/RCVD: CPT | Performed by: INTERNAL MEDICINE

## 2020-07-29 PROCEDURE — G8400 PT W/DXA NO RESULTS DOC: HCPCS | Performed by: INTERNAL MEDICINE

## 2020-07-29 PROCEDURE — G8417 CALC BMI ABV UP PARAM F/U: HCPCS | Performed by: INTERNAL MEDICINE

## 2020-07-31 ENCOUNTER — HOSPITAL ENCOUNTER (OUTPATIENT)
Dept: CT IMAGING | Age: 75
Discharge: HOME | End: 2020-07-31
Payer: MEDICARE

## 2020-07-31 LAB
GFR AFRICAN AMERICAN: >60
GFR NON-AFRICAN AMERICAN: >60
PERFORMED ON: NORMAL
POC CREATININE: 0.7 MG/DL (ref 0.6–1.2)
POC SAMPLE TYPE: NORMAL

## 2020-07-31 PROCEDURE — 74177 CT ABD & PELVIS W/CONTRAST: CPT

## 2020-07-31 PROCEDURE — 6360000004 HC RX CONTRAST MEDICATION: Performed by: INTERNAL MEDICINE

## 2020-07-31 PROCEDURE — 82565 ASSAY OF CREATININE: CPT

## 2020-07-31 RX ADMIN — IOHEXOL 50 ML: 240 INJECTION, SOLUTION INTRATHECAL; INTRAVASCULAR; INTRAVENOUS; ORAL at 10:48

## 2020-07-31 RX ADMIN — IOPAMIDOL 80 ML: 755 INJECTION, SOLUTION INTRAVENOUS at 10:48

## 2020-08-14 ENCOUNTER — TELEPHONE (OUTPATIENT)
Dept: INTERNAL MEDICINE CLINIC | Age: 75
End: 2020-08-14

## 2020-08-14 RX ORDER — OMEPRAZOLE 40 MG/1
40 CAPSULE, DELAYED RELEASE ORAL
Qty: 30 CAPSULE | Refills: 5 | Status: SHIPPED | OUTPATIENT
Start: 2020-08-14 | End: 2021-02-15

## 2020-08-14 ASSESSMENT — ENCOUNTER SYMPTOMS
EYES NEGATIVE: 1
GASTROINTESTINAL NEGATIVE: 1

## 2020-08-14 NOTE — TELEPHONE ENCOUNTER
Patient is requesting something for acid reflex patient doesnt know if she has acid reflex or indigestion doesnt think it is due to her cancer medication wants to be advise on what she can take please advise.

## 2020-08-15 NOTE — PROGRESS NOTES
2020    TELEHEALTH EVALUATION -- Audio/Visual (During WYN-86 public health emergency)    HPI:    Henri Baca (:  1945) has requested an audio/video evaluation for the following concern(s):    Hypertension: she is taking medication as prescribed, except for HCTZ. Had become dehydrated recently and this med was held. 117/78 b/p     Hyperlipidemia: treated with statin. Last LDL 61. Asthma: no wheezing. Continues MDIs. H/O lung cancer: stable. Now enrolled in a study. CT scan in a few days. Review of Systems   Constitutional: Negative. HENT: Negative. Eyes: Negative. Respiratory:        Asthma: see HPI. Cardiovascular:        HTN, see HPI. See list.    Gastrointestinal: Negative. Endocrine:        HLD: see HPI. Genitourinary: Negative. Musculoskeletal: Negative. Skin: Negative. Neurological: Negative. Psychiatric/Behavioral: Negative. Prior to Visit Medications    Medication Sig Taking?  Authorizing Provider   albuterol sulfate HFA (VENTOLIN HFA) 108 (90 Base) MCG/ACT inhaler INHALE TWO PUFFS BY MOUTH EVERY 6 HOURS AS NEEDED FOR WHEEZING Yes Ann Otto MD   budesonide-formoterol (SYMBICORT) 160-4.5 MCG/ACT AERO INHALE TWO PUFFS BY MOUTH TWICE A DAY Yes Ann Otto MD   amLODIPine (NORVASC) 10 MG tablet TAKE ONE TABLET BY MOUTH DAILY Yes Ann Otto MD   atorvastatin (LIPITOR) 10 MG tablet Take 1 tablet by mouth daily Yes Ann Otto MD   hydroCHLOROthiazide (HYDRODIURIL) 25 MG tablet TAKE ONE TABLET BY MOUTH DAILY Yes Ann Otto MD   TAFINLAR 75 MG CAPS  Yes Historical Provider, MD   MEKINIST 2 MG TABS  Yes Historical Provider, MD   ibuprofen (ADVIL;MOTRIN) 800 MG tablet Take 1 tablet by mouth 2 times daily as needed for Pain Yes Ann Otto MD   ASPIRIN ADULT LOW STRENGTH 81 MG EC tablet TAKE ONE TABLET BY MOUTH DAILY Yes Ann Otto MD   folic acid (FOLVITE) 1 MG tablet Take 1 tablet by mouth daily Yes Miriam Wolfe MD omeprazole (PRILOSEC) 40 MG delayed release capsule Take 1 capsule by mouth every morning (before breakfast)  Sima Escudero MD       Social History     Tobacco Use    Smoking status: Former Smoker     Packs/day: 0.00     Years: 0.00     Pack years: 0.00     Last attempt to quit: 3/10/2012     Years since quittin.4    Smokeless tobacco: Never Used   Substance Use Topics    Alcohol use: Yes     Comment: 1-2 X WEEKLY    Drug use: No            PHYSICAL EXAMINATION:  [ INSTRUCTIONS:  \"[x]\" Indicates a positive item  \"[]\" Indicates a negative item  -- DELETE ALL ITEMS NOT EXAMINED]  Vital Signs: (As obtained by patient/caregiver or practitioner observation)    Blood pressure- 117/78 Heart rate-    Respiratory rate-    Temperature-  Pulse oximetry-     Constitutional: [x] Appears well-developed and well-nourished [x] No apparent distress      [] Abnormal-   Mental status  [x] Alert and awake  [x] Oriented to person/place/time []Able to follow commands      Eyes:  EOM    [x]  Normal  [] Abnormal-  Sclera  [x]  Normal  [] Abnormal -         Discharge []  None visible  [] Abnormal -    HENT:   [x] Normocephalic, atraumatic.   [] Abnormal   [x] Mouth/Throat: Mucous membranes are moist.     External Ears [x] Normal  [] Abnormal-     Neck: [x] No visualized mass     Pulmonary/Chest: [x] Respiratory effort normal.  [x] No visualized signs of difficulty breathing or respiratory distress        [] Abnormal-      Musculoskeletal:   [x] Normal gait with no signs of ataxia         [x] Normal range of motion of neck        [] Abnormal-       Neurological:        [x] No Facial Asymmetry (Cranial nerve 7 motor function) (limited exam to video visit)          [x] No gaze palsy        [] Abnormal-         Skin:        [x] No significant exanthematous lesions or discoloration noted on facial skin         [] Abnormal-            Psychiatric:       [x] Normal Affect [] No Hallucinations        [] Abnormal-     Other pertinent observable physical exam findings-     ASSESSMENT/PLAN:     Diagnosis Orders   1. Essential hypertension  Restart HCTZ at decreased dose of 12.5 mg. DASH and low sodium diet discussed. 2. Mild intermittent asthma without complication  Continue MDIs. Diet and exercise discussed. 3. Pure hypercholesterolemia  Heart healthy diet and statin compliance discussed. Due to this being a TeleHealth encounter (During [facility]- public health emergency), evaluation of the following organ systems was limited: Vitals/Constitutional/EENT/Resp/CV/GI//MS/Neuro/Skin/Heme-Lymph-Imm. Pursuant to the emergency declaration under the [address] [place] [address], [address] [place] [address] authority and the [employer] Act, this Virtual Visit was conducted with patient's (and/or legal guardian's) consent, to reduce the patient's risk of exposure to COVID-19 and provide necessary medical care. The patient (and/or legal guardian) has also been advised to contact this office for worsening conditions or problems, and seek emergency medical treatment and/or call 911 if deemed necessary. Patient identification was verified at the start of the visit: Yes    Total time spent on this encounter: Not billed by time    Services were provided through a video synchronous discussion virtually to substitute for in-person clinic visit. Patient and provider were located at their individual homes. --Ann Otto MD on 8/14/2020 at 9:02 PM    An electronic signature was used to authenticate this note.

## 2020-10-14 ENCOUNTER — OFFICE VISIT (OUTPATIENT)
Dept: INTERNAL MEDICINE CLINIC | Age: 75
End: 2020-10-14
Payer: MEDICARE

## 2020-10-14 VITALS
WEIGHT: 167.4 LBS | DIASTOLIC BLOOD PRESSURE: 72 MMHG | TEMPERATURE: 96.8 F | BODY MASS INDEX: 26.9 KG/M2 | HEIGHT: 66 IN | SYSTOLIC BLOOD PRESSURE: 126 MMHG | HEART RATE: 85 BPM | OXYGEN SATURATION: 95 %

## 2020-10-14 PROCEDURE — 90653 IIV ADJUVANT VACCINE IM: CPT | Performed by: INTERNAL MEDICINE

## 2020-10-14 PROCEDURE — G0008 ADMIN INFLUENZA VIRUS VAC: HCPCS | Performed by: INTERNAL MEDICINE

## 2020-10-14 PROCEDURE — 1123F ACP DISCUSS/DSCN MKR DOCD: CPT | Performed by: INTERNAL MEDICINE

## 2020-10-14 PROCEDURE — 4040F PNEUMOC VAC/ADMIN/RCVD: CPT | Performed by: INTERNAL MEDICINE

## 2020-10-14 PROCEDURE — 3017F COLORECTAL CA SCREEN DOC REV: CPT | Performed by: INTERNAL MEDICINE

## 2020-10-14 PROCEDURE — G8482 FLU IMMUNIZE ORDER/ADMIN: HCPCS | Performed by: INTERNAL MEDICINE

## 2020-10-14 PROCEDURE — G0438 PPPS, INITIAL VISIT: HCPCS | Performed by: INTERNAL MEDICINE

## 2020-10-14 ASSESSMENT — LIFESTYLE VARIABLES
HOW MANY STANDARD DRINKS CONTAINING ALCOHOL DO YOU HAVE ON A TYPICAL DAY: 0
HOW OFTEN DO YOU HAVE A DRINK CONTAINING ALCOHOL: 2
AUDIT-C TOTAL SCORE: 2
HOW OFTEN DURING THE LAST YEAR HAVE YOU BEEN UNABLE TO REMEMBER WHAT HAPPENED THE NIGHT BEFORE BECAUSE YOU HAD BEEN DRINKING: 0
HOW OFTEN DURING THE LAST YEAR HAVE YOU NEEDED AN ALCOHOLIC DRINK FIRST THING IN THE MORNING TO GET YOURSELF GOING AFTER A NIGHT OF HEAVY DRINKING: 0
HOW OFTEN DURING THE LAST YEAR HAVE YOU HAD A FEELING OF GUILT OR REMORSE AFTER DRINKING: 0
HAS A RELATIVE, FRIEND, DOCTOR, OR ANOTHER HEALTH PROFESSIONAL EXPRESSED CONCERN ABOUT YOUR DRINKING OR SUGGESTED YOU CUT DOWN: 0
HOW OFTEN DURING THE LAST YEAR HAVE YOU FOUND THAT YOU WERE NOT ABLE TO STOP DRINKING ONCE YOU HAD STARTED: 0
HOW OFTEN DURING THE LAST YEAR HAVE YOU FAILED TO DO WHAT WAS NORMALLY EXPECTED FROM YOU BECAUSE OF DRINKING: 0
HAVE YOU OR SOMEONE ELSE BEEN INJURED AS A RESULT OF YOUR DRINKING: 0
AUDIT TOTAL SCORE: 2
HOW OFTEN DO YOU HAVE SIX OR MORE DRINKS ON ONE OCCASION: 0

## 2020-10-14 ASSESSMENT — PATIENT HEALTH QUESTIONNAIRE - PHQ9
SUM OF ALL RESPONSES TO PHQ QUESTIONS 1-9: 0
2. FEELING DOWN, DEPRESSED OR HOPELESS: 0
1. LITTLE INTEREST OR PLEASURE IN DOING THINGS: 0
SUM OF ALL RESPONSES TO PHQ QUESTIONS 1-9: 0
SUM OF ALL RESPONSES TO PHQ9 QUESTIONS 1 & 2: 0

## 2020-10-14 NOTE — PATIENT INSTRUCTIONS
Some of the tests and screenings are paid in full while other may be subject to a deductible, co-insurance, and/or copay. Some of these benefits include a comprehensive review of your medical history including lifestyle, illnesses that may run in your family, and various assessments and screenings as appropriate. After reviewing your medical record and screening and assessments performed today your provider may have ordered immunizations, labs, imaging, and/or referrals for you. A list of these orders (if applicable) as well as your Preventive Care list are included within your After Visit Summary for your review. Other Preventive Recommendations:    A preventive eye exam performed by an eye specialist is recommended every 1-2 years to screen for glaucoma; cataracts, macular degeneration, and other eye disorders. A preventive dental visit is recommended every 6 months. Try to get at least 150 minutes of exercise per week or 10,000 steps per day on a pedometer . Order or download the FREE \"Exercise & Physical Activity: Your Everyday Guide\" from [employer]. Call 8-685.392.2880 or search [employer] online. You need 3928-4450 mg of calcium and 1429-1453 IU of vitamin D per day. It is possible to meet your calcium requirement with diet alone, but a vitamin D supplement is usually necessary to meet this goal.  When exposed to the sun, use a sunscreen that protects against both UVA and UVB radiation with an SPF of 30 or greater. Reapply every 2 to 3 hours or after sweating, drying off with a towel, or swimming. Always wear a seat belt when traveling in a car. Always wear a helmet when riding a bicycle or motorcycle.

## 2020-10-14 NOTE — PROGRESS NOTES
Vaccine Information Sheet, \"Influenza - Inactivated\"  given to Richard Contreras, or parent/legal guardian of  Richard Contreras and verbalized understanding. Patient responses:    Have you ever had a reaction to a flu vaccine? No  Do you have any current illness? No  Have you ever had [place] Syndrome? No  Do you have a serious allergy to any of the follow: Neomycin, Polymyxin, Thimerosal, eggs or egg products? No    Flu vaccine given per order. Please see immunization tab. Risks and benefits explained. Current VIS given.       Immunizations Administered     Name Date Dose Route    Influenza, Triv, inactivated, subunit, adjuvanted, IM (Fluad 65 yrs and older) 10/14/2020 0.5 mL Intramuscular    Site: Deltoid- Left    Lot: [id number]    [address]: 40854-135-57

## 2020-10-14 NOTE — PROGRESS NOTES
MEKINIST 2 MG TABS   Historical Provider, MD   ibuprofen (ADVIL;MOTRIN) 800 MG tablet Take 1 tablet by mouth 2 times daily as needed for Pain  Zenaida Gautam MD   ASPIRIN ADULT LOW STRENGTH 81 MG EC tablet TAKE ONE TABLET BY MOUTH DAILY  Zenaida Gautam MD   folic acid (FOLVITE) 1 MG tablet Take 1 tablet by mouth daily  Deysi Meng MD       Past Medical History:   Diagnosis Date    Arthritis     Cancer [facility])     lung     Hyperlipidemia     Hypertension        Past Surgical History:   Procedure Laterality Date    BRONCHOSCOPY  03/07/2017    brochoscopy-EBUS    OTHER SURGICAL HISTORY Left 03/29/2017    LEFT SUBCLAVIAN PORT- CATH INSERTION      TUBAL LIGATION         No family history on file. CareTeam (Including outside providers/suppliers regularly involved in providing care):   Patient Care Team:  Zenaida Gautam MD as PCP - Cody Nunez MD as PCP - Hematology/Oncology (Hematology and Oncology)  Zenaida Gautam MD as PCP - [address] [place] [address] Provider  Ethan Lake MD as Consulting Physician (Pulmonology)  Bhavik Gonzalez MD as Surgeon (General Surgery)    Wt Readings from Last 3 Encounters:   10/14/20 167 lb 6.4 oz (75.9 kg)   01/15/20 156 lb (70.8 kg)   09/09/19 170 lb (77.1 kg)     Vitals:    10/14/20 0911   BP: 126/72   Site: Left Upper Arm   Position: Sitting   Cuff Size: Medium Adult   Pulse: 85   Temp: 96.8 °F (36 °C)   SpO2: 95%   Weight: 167 lb 6.4 oz (75.9 kg)   Height: 5' 6\" (1.676 m)     Body mass index is 27.02 kg/m². Stands from sitting position without problem. Based upon direct observation of the patient, evaluation of cognition reveals memory and cognition are normal based on MMS exam. Created clock and placed correct time. Patient's complete Health Risk Assessment and screening values have been reviewed and are found in Flowsheets.  The following problems were reviewed today and where indicated follow up appointments were made and/or referrals ordered. Positive Risk Factor Screenings with Interventions:     No Positive Risk Factors identified today. Personalized Preventive Plan   Current Health Maintenance Status  Immunization History   Administered Date(s) Administered    Influenza, High Dose (Fluzone 65 yrs and older) 09/24/2014, 10/26/2018, 11/15/2019    Pneumococcal Conjugate 13-valent (Monique Duverney) 11/29/2018        Health Maintenance   Topic Date Due    DTaP/Tdap/Td vaccine (1 - Tdap) 05/07/1965    Shingles Vaccine (1 of 2) 05/07/1996    Annual Wellness Visit (AWV)  05/29/2019    Potassium monitoring  10/18/2019    Creatinine monitoring  10/18/2019    Pneumococcal 65+ yrs at Risk Vaccine (2 of 2 - PPSV23) 11/29/2019    Flu vaccine (1) 09/01/2020    Breast cancer screen  04/08/2021    Colon cancer screen colonoscopy  09/19/2022    Lipid screen  06/05/2024    Hepatitis C screen  Completed    DEXA (modify frequency per FRAX score)  Addressed    Hepatitis A vaccine  Aged Out    Hepatitis B vaccine  Aged Out    Hib vaccine  Aged Out    Meningococcal (ACWY) vaccine  Aged Out     Recommendations for Imagimod Due: see orders and patient instructions/AVS.  . Recommended screening schedule for the next 5-10 years is provided to the patient in written form: see Patient Instructions/AVS.   Diagnosis Orders   1. Flu vaccine need  INFLUENZA, TRIV, INACTIVATED, SUBUNIT, ADJUVANTED, 65 YRS AND OLDER, IM, PREFILL SYR, 0.5ML (FLUAD TRIV)   2. Need for pneumococcal vaccine  PNEUMOVAX 23 subcutaneous/IM (Pneumococcal polysaccharide vaccine 23-valent >= 3yo)   3. Routine general medical examination at a health care facility  See above. 4. Essential hypertension  DASH and low sodium diet discussed. 5. Mixed hyperlipidemia  Heart healthy diet discussed. 6. Mild intermittent asthma without complication  Rescue mdi, prn.   7. Gastroesophageal reflux disease, unspecified whether esophagitis present  Diet discussed. PPI.     8. Tachycardia  Stable, chronic condition. Continue to monitor. Cardiovascular Disease Risk Counseling: Assessed the patient's risk to develop cardiovascular disease and reviewed main risk factors. Reviewed steps to reduce disease risk including:   · Quitting tobacco use, reducing amount smoked, or not starting the habit  · Making healthy food choices  · Being physically active and gradualy increasing activity levels   · Reduce weight and determine a healthy BMI goal  · Monitor blood pressure and treat if higher than 140/90 mmHg  · Maintain blood total cholesterol levels under 5 mmol/l or 190 mg/dl  · Maintain LDL cholesterol levels under 3.0 mmol/l or 115 mg/dl   · Control blood glucose levels  · Consider taking aspirin (75 mg daily), once blood pressure is controlled   Provided a follow up plan. Time spent (minutes): 10 minutes.

## 2020-10-22 ENCOUNTER — HOSPITAL ENCOUNTER (OUTPATIENT)
Dept: ULTRASOUND IMAGING | Age: 75
Discharge: HOME | End: 2020-10-22
Payer: MEDICARE

## 2020-10-22 PROCEDURE — 76770 US EXAM ABDO BACK WALL COMP: CPT

## 2020-10-26 PROCEDURE — G0009 ADMIN PNEUMOCOCCAL VACCINE: HCPCS | Performed by: INTERNAL MEDICINE

## 2020-10-26 PROCEDURE — 90732 PPSV23 VACC 2 YRS+ SUBQ/IM: CPT | Performed by: INTERNAL MEDICINE

## 2020-11-20 ENCOUNTER — NURSE ONLY (OUTPATIENT)
Dept: INTERNAL MEDICINE CLINIC | Age: 75
End: 2020-11-20
Payer: MEDICARE

## 2020-11-20 VITALS — TEMPERATURE: 97 F

## 2020-11-20 PROCEDURE — 90732 PPSV23 VACC 2 YRS+ SUBQ/IM: CPT | Performed by: INTERNAL MEDICINE

## 2020-11-20 PROCEDURE — G0009 ADMIN PNEUMOCOCCAL VACCINE: HCPCS | Performed by: INTERNAL MEDICINE

## 2020-12-11 ENCOUNTER — HOSPITAL ENCOUNTER (OUTPATIENT)
Dept: CT IMAGING | Age: 75
Discharge: HOME | End: 2020-12-11
Payer: MEDICARE

## 2020-12-11 LAB
GFR AFRICAN AMERICAN: >60
GFR NON-AFRICAN AMERICAN: >60
PERFORMED ON: NORMAL
POC CREATININE: 0.6 MG/DL (ref 0.6–1.2)
POC SAMPLE TYPE: NORMAL

## 2020-12-11 PROCEDURE — 82565 ASSAY OF CREATININE: CPT

## 2020-12-11 PROCEDURE — 6360000004 HC RX CONTRAST MEDICATION: Performed by: INTERNAL MEDICINE

## 2020-12-11 PROCEDURE — 74177 CT ABD & PELVIS W/CONTRAST: CPT

## 2020-12-11 RX ADMIN — IOPAMIDOL 80 ML: 755 INJECTION, SOLUTION INTRAVENOUS at 11:21

## 2020-12-11 RX ADMIN — IOHEXOL 50 ML: 240 INJECTION, SOLUTION INTRATHECAL; INTRAVASCULAR; INTRAVENOUS; ORAL at 11:21

## 2020-12-21 ENCOUNTER — TELEPHONE (OUTPATIENT)
Dept: INTERNAL MEDICINE CLINIC | Age: 75
End: 2020-12-21

## 2020-12-21 NOTE — TELEPHONE ENCOUNTER
Patient was at her oncologist on Friday. She wants to get an opinion regarding irregular heart beat. Wants a call from physician.

## 2020-12-22 ENCOUNTER — TELEPHONE (OUTPATIENT)
Dept: INTERNAL MEDICINE CLINIC | Age: 75
End: 2020-12-22

## 2020-12-22 NOTE — TELEPHONE ENCOUNTER
----- Message from Yeny Gab sent at 12/18/2020  4:36 PM EST -----  Subject: Message to Provider    QUESTIONS  Information for Provider? Patient is requesting a call back from Shante drummond. Is concerned about getting an oncology appointment. Do not to wait 2   weeks for an appointment. Anytime would be great. ..  ---------------------------------------------------------------------------  --------------  CALL BACK INFO  What is the best way for the office to contact you? OK to leave message on   voicemail  Do not leave any message   patient will call back for answer  Preferred Call Back Phone Number? [phone]  ---------------------------------------------------------------------------  --------------  SCRIPT ANSWERS  Relationship to Patient?  Self

## 2020-12-22 NOTE — TELEPHONE ENCOUNTER
Spoke with pt, pt says she was told by her oncologist that may have Afib and she is concerned. She called to see Dr. Wilcox Doing and he doesn't have anything open and pt doesn't want to wait too long to see someone.

## 2020-12-23 ENCOUNTER — TELEPHONE (OUTPATIENT)
Dept: INTERNAL MEDICINE CLINIC | Age: 75
End: 2020-12-23

## 2020-12-23 ENCOUNTER — NURSE ONLY (OUTPATIENT)
Dept: INTERNAL MEDICINE CLINIC | Age: 75
End: 2020-12-23
Payer: MEDICARE

## 2020-12-23 VITALS — TEMPERATURE: 97.1 F

## 2020-12-23 DIAGNOSIS — I10 ESSENTIAL HYPERTENSION: Primary | ICD-10-CM

## 2020-12-23 PROCEDURE — 93000 ELECTROCARDIOGRAM COMPLETE: CPT | Performed by: INTERNAL MEDICINE

## 2020-12-30 DIAGNOSIS — I49.9 IRREGULAR CARDIAC RHYTHM: ICD-10-CM

## 2020-12-31 LAB
ANION GAP SERPL CALCULATED.3IONS-SCNC: 10 MMOL/L (ref 3–16)
BUN BLDV-MCNC: 9 MG/DL (ref 7–20)
CALCIUM SERPL-MCNC: 10 MG/DL (ref 8.3–10.6)
CHLORIDE BLD-SCNC: 99 MMOL/L (ref 99–110)
CO2: 28 MMOL/L (ref 21–32)
CREAT SERPL-MCNC: 0.6 MG/DL (ref 0.6–1.2)
GFR AFRICAN AMERICAN: >60
GFR NON-AFRICAN AMERICAN: >60
GLUCOSE BLD-MCNC: 95 MG/DL (ref 70–99)
POTASSIUM SERPL-SCNC: 4.3 MMOL/L (ref 3.5–5.1)
SODIUM BLD-SCNC: 137 MMOL/L (ref 136–145)

## 2021-01-15 ENCOUNTER — OFFICE VISIT (OUTPATIENT)
Dept: CARDIOLOGY CLINIC | Age: 76
End: 2021-01-15
Payer: MEDICARE

## 2021-01-15 VITALS
HEART RATE: 101 BPM | DIASTOLIC BLOOD PRESSURE: 95 MMHG | WEIGHT: 165.8 LBS | BODY MASS INDEX: 26.76 KG/M2 | SYSTOLIC BLOOD PRESSURE: 138 MMHG | TEMPERATURE: 96.8 F

## 2021-01-15 DIAGNOSIS — C79.72 MALIGNANT NEOPLASM METASTATIC TO LEFT ADRENAL GLAND (HCC): ICD-10-CM

## 2021-01-15 DIAGNOSIS — C78.7 SECONDARY MALIGNANT NEOPLASM OF LIVER (HCC): ICD-10-CM

## 2021-01-15 DIAGNOSIS — R53.1 WEAKNESS: ICD-10-CM

## 2021-01-15 DIAGNOSIS — C77.1 SECONDARY MALIGNANT NEOPLASM OF MEDIASTINAL LYMPH NODES (HCC): ICD-10-CM

## 2021-01-15 DIAGNOSIS — R00.0 TACHYCARDIA: ICD-10-CM

## 2021-01-15 DIAGNOSIS — E55.9 VITAMIN D DEFICIENCY: ICD-10-CM

## 2021-01-15 DIAGNOSIS — R55 SYNCOPE AND COLLAPSE: ICD-10-CM

## 2021-01-15 DIAGNOSIS — E78.2 MIXED HYPERLIPIDEMIA: ICD-10-CM

## 2021-01-15 DIAGNOSIS — C34.11 PRIMARY MALIGNANT NEOPLASM OF RIGHT UPPER LOBE OF LUNG (HCC): ICD-10-CM

## 2021-01-15 DIAGNOSIS — Z87.898 HISTORY OF URINE COLOR CHANGES: ICD-10-CM

## 2021-01-15 DIAGNOSIS — R07.9 CHEST PAIN, UNSPECIFIED TYPE: ICD-10-CM

## 2021-01-15 DIAGNOSIS — I10 HYPERTENSION, UNSPECIFIED TYPE: ICD-10-CM

## 2021-01-15 DIAGNOSIS — R00.0 TACHYCARDIA: Primary | ICD-10-CM

## 2021-01-15 LAB — C4 COMPLEMENT: 52.7 MG/DL (ref 10–40)

## 2021-01-15 PROCEDURE — G8417 CALC BMI ABV UP PARAM F/U: HCPCS | Performed by: INTERNAL MEDICINE

## 2021-01-15 PROCEDURE — G8427 DOCREV CUR MEDS BY ELIG CLIN: HCPCS | Performed by: INTERNAL MEDICINE

## 2021-01-15 PROCEDURE — 93000 ELECTROCARDIOGRAM COMPLETE: CPT | Performed by: INTERNAL MEDICINE

## 2021-01-15 PROCEDURE — 99204 OFFICE O/P NEW MOD 45 MIN: CPT | Performed by: INTERNAL MEDICINE

## 2021-01-15 PROCEDURE — G8482 FLU IMMUNIZE ORDER/ADMIN: HCPCS | Performed by: INTERNAL MEDICINE

## 2021-01-15 PROCEDURE — 93246 EXT ECG>7D<15D RECORDING: CPT | Performed by: INTERNAL MEDICINE

## 2021-01-15 PROCEDURE — 1090F PRES/ABSN URINE INCON ASSESS: CPT | Performed by: INTERNAL MEDICINE

## 2021-01-15 RX ORDER — AMLODIPINE BESYLATE 5 MG/1
5 TABLET ORAL DAILY
Status: ON HOLD | COMMUNITY
End: 2021-07-30 | Stop reason: HOSPADM

## 2021-01-15 RX ORDER — HYDROCHLOROTHIAZIDE 12.5 MG/1
12.5 CAPSULE, GELATIN COATED ORAL DAILY
COMMUNITY
End: 2021-03-11 | Stop reason: ALTCHOICE

## 2021-01-15 NOTE — PROGRESS NOTES
[facility] [place] [address]     Outpatient Cardiology Consult  Consulting Cardiologist Conner Davila M.D., [facility]  Referring Provider:  Delmy Licea MD    1/15/2021,10:47 AM               Asked by No admitting provider for patient encounter./Napoleon Yates MD  to evaluate and assess this patient's tachycardia and questionable atrial fibrillation    History of Present Illness: Geoffrey Ko is a 77 y.o. female with a history of breast cancer 4 years ago treated with chemotherapy and some radiation therapy. She is followed closely by Dr. Fabrizio Oviedo and is felt to be in remission. Recently she has been evaluated and someone thought she had atrial fibrillation. The EKG at that time was unremarkable with some baseline artifact. She herself feels palpitations on occasion and some tachycardia most of the time. Today she is at 110 bpm without provocation. Reviewing her labs which have been sporadic over the last couple years suggested that her electrolytes were normal in December 2020 but we do not have a recent thyroid study nor a recent DC. She denies any PND and no edema but dyspneic with minimal activity. Syncope or blackouts. She does have hypertension and is on medication. The hydrochlorothiazide recently was reduced from 25 mg down to 12.5 daily. Breast cancer treated with radiation and chemotherapy 5 years ago. Hypertension  Hyperlipidemia  Past Medical History:   has a past medical history of Arthritis, Cancer ([address] Hyperlipidemia, and Hypertension. Surgical History:   has a past surgical history that includes Tubal ligation; bronchoscopy (03/07/2017); and other surgical history (Left, 03/29/2017). Social History:   reports that she quit smoking about 8 years ago. She smoked 0.00 packs per day for 0.00 years. She has never used smokeless tobacco. She reports current alcohol use. She reports that she does not use drugs.      Family History:  family history is not on file. Home Medications:  Prior to Admission medications    Medication Sig Start Date End Date Taking? Authorizing Provider   omeprazole (PRILOSEC) 40 MG delayed release capsule Take 1 capsule by mouth every morning (before breakfast) 8/14/20  Yes Shelley Muñoz MD   albuterol sulfate HFA (VENTOLIN HFA) 108 (90 Base) MCG/ACT inhaler INHALE TWO PUFFS BY MOUTH EVERY 6 HOURS AS NEEDED FOR WHEEZING 4/30/20  Yes Shelley Muñoz MD   budesonide-formoterol (SYMBICORT) 160-4.5 MCG/ACT AERO INHALE TWO PUFFS BY MOUTH TWICE A DAY 4/30/20  Yes Shelley Muñoz MD   amLODIPine (NORVASC) 10 MG tablet TAKE ONE TABLET BY MOUTH DAILY 4/30/20  Yes Shelley Muñoz MD   hydroCHLOROthiazide (HYDRODIURIL) 25 MG tablet TAKE ONE TABLET BY MOUTH DAILY 4/30/20  Yes Shelley Muñoz MD   TAFINLAR 75 MG CAPS  2/7/19  Yes Historical Provider, MD   MEKINIST 2 MG TABS  2/7/19  Yes Historical Provider, MD   ibuprofen (ADVIL;MOTRIN) 800 MG tablet Take 1 tablet by mouth 2 times daily as needed for Pain 8/20/18  Yes Shelley Muñoz MD   ASPIRIN ADULT LOW STRENGTH 81 MG EC tablet TAKE ONE TABLET BY MOUTH DAILY 7/24/18  Yes Shelley Muñoz MD   folic acid (FOLVITE) 1 MG tablet Take 1 tablet by mouth daily 8/11/17  Yes Salena Webber MD   atorvastatin (LIPITOR) 10 MG tablet Take 1 tablet by mouth daily 4/30/20 7/29/20  Shelley Muñoz MD           Allergies:  Codeine and Penicillins     Review of Systems:   · Constitutional: there has been no unanticipated weight loss. · Eyes: No visual changes or diplopia. No scleral icterus. · ENT: No Headaches, hearing loss or vertigo. No mouth sores or sore throat. · Cardiovascular: Reviewed in HPI  ·  Pulmonary:  no cough or sputum production. · Gastrointestinal: No abdominal pain, appetite loss, blood in stools. No change in bowel or bladder habits. · Genitourinary: No dysuria, trouble voiding, or hematuria. · Musculoskeletal:  No gait disturbance, weakness or joint complaints.   · Integumentary: No rash or pruritis. Endocrine: No malaise, fatigue or temperature intolerance. Hematologic/Lymphatic: No abnormal bruising or bleeding, blood clots or swollen lymph nodes. · Allergic/Immunologic: No nasal congestion or hives. · All other ROS are reviewed and are unremarkable. Physical Examination:      Wt Readings from Last 3 Encounters:   01/15/21 165 lb 12.8 oz (75.2 kg)   10/14/20 167 lb 6.4 oz (75.9 kg)   01/15/20 156 lb (70.8 kg)       BP Readings from Last 3 Encounters:   01/15/21 (!) 138/95   10/14/20 126/72   01/15/20 126/78       Pulse Readings from Last 3 Encounters:   01/15/21 101   10/14/20 85   01/15/20 83       Constitutional and General Appearance:  Healthy. And alert . HEENT: eyes and ears intact. No nasal masses  THYROID: not enlarged  LUNGS:  · Clear to auscultation and percussion  HEART and VASCULAR:  · The apical impulses not displaced  · Heart tones are crisp and normal  · Cervical veins are not engorged  · The carotid upstroke is normal in amplitude and contour without delay or bruit  · Peripheral pulses are symmetrical and full  · There is no clubbing, cyanosis of the extremities. · No peripheral edema  · Femoral Arteries: 2+ and equal  · Pedal Pulses: 2+ and equal   ABDOMEN[de-identified]  · No masses or tenderness  · Liver/Spleen: No Abnormalities Noted  NEUROLOGICAL:  . Moves all extremities to command. Cranial nerves 2-12 are in tact.   PSYCHIATRIC: alert and lucid  and oriented and appropriate  SKIN: No lesions or rashes  LYMPH NODES: none enlarged    ·   ·   ·     CBC with Differential:    Lab Results   Component Value Date    WBC 8.7 10/18/2018    RBC 4.76 10/18/2018    RBC 5.55 08/11/2017    HGB 12.0 10/18/2018    HCT 38.1 10/18/2018     11/19/2018    MCV 80.0 10/18/2018    MCH 25.2 10/18/2018    MCHC 31.4 10/18/2018    RDW 15.1 10/18/2018    BANDSPCT 0 06/29/2017    LYMPHOPCT 11.6 10/18/2018    LYMPHOPCT 34.6 08/11/2017    MONOPCT 9.3 10/18/2018    EOSPCT 5 06/29/2017    BASOPCT 0.4 participate in the evaluation and care of your patients.  Please call if we may assist further [phone]    This note was likely completed using voice recognition technology and may contain unintended errors  Celeste Talamantes M.D., [facility]  2/07/968599:05 AM

## 2021-01-17 LAB — URINE CULTURE, ROUTINE: NORMAL

## 2021-01-29 ENCOUNTER — HOSPITAL ENCOUNTER (OUTPATIENT)
Dept: NON INVASIVE DIAGNOSTICS | Age: 76
Discharge: HOME | End: 2021-01-29
Payer: MEDICARE

## 2021-01-29 DIAGNOSIS — R55 SYNCOPE AND COLLAPSE: ICD-10-CM

## 2021-01-29 DIAGNOSIS — R00.0 TACHYCARDIA: ICD-10-CM

## 2021-01-29 LAB
LV EF: 45 %
LVEF MODALITY: NORMAL

## 2021-01-29 PROCEDURE — 93306 TTE W/DOPPLER COMPLETE: CPT

## 2021-02-04 ENCOUNTER — OFFICE VISIT (OUTPATIENT)
Dept: INTERNAL MEDICINE CLINIC | Age: 76
End: 2021-02-04
Payer: MEDICARE

## 2021-02-04 VITALS
SYSTOLIC BLOOD PRESSURE: 128 MMHG | DIASTOLIC BLOOD PRESSURE: 85 MMHG | TEMPERATURE: 97 F | WEIGHT: 166 LBS | OXYGEN SATURATION: 97 % | HEART RATE: 80 BPM | BODY MASS INDEX: 26.68 KG/M2 | HEIGHT: 66 IN

## 2021-02-04 DIAGNOSIS — J45.20 MILD INTERMITTENT ASTHMA WITHOUT COMPLICATION: ICD-10-CM

## 2021-02-04 DIAGNOSIS — E78.2 MIXED HYPERLIPIDEMIA: Primary | ICD-10-CM

## 2021-02-04 DIAGNOSIS — F43.9 STRESS: ICD-10-CM

## 2021-02-04 DIAGNOSIS — I10 ESSENTIAL HYPERTENSION: ICD-10-CM

## 2021-02-04 PROCEDURE — 1036F TOBACCO NON-USER: CPT | Performed by: INTERNAL MEDICINE

## 2021-02-04 PROCEDURE — G8417 CALC BMI ABV UP PARAM F/U: HCPCS | Performed by: INTERNAL MEDICINE

## 2021-02-04 PROCEDURE — 1090F PRES/ABSN URINE INCON ASSESS: CPT | Performed by: INTERNAL MEDICINE

## 2021-02-04 PROCEDURE — 1123F ACP DISCUSS/DSCN MKR DOCD: CPT | Performed by: INTERNAL MEDICINE

## 2021-02-04 PROCEDURE — G8400 PT W/DXA NO RESULTS DOC: HCPCS | Performed by: INTERNAL MEDICINE

## 2021-02-04 PROCEDURE — 4040F PNEUMOC VAC/ADMIN/RCVD: CPT | Performed by: INTERNAL MEDICINE

## 2021-02-04 PROCEDURE — G8482 FLU IMMUNIZE ORDER/ADMIN: HCPCS | Performed by: INTERNAL MEDICINE

## 2021-02-04 PROCEDURE — 3017F COLORECTAL CA SCREEN DOC REV: CPT | Performed by: INTERNAL MEDICINE

## 2021-02-04 PROCEDURE — G8427 DOCREV CUR MEDS BY ELIG CLIN: HCPCS | Performed by: INTERNAL MEDICINE

## 2021-02-04 PROCEDURE — 99214 OFFICE O/P EST MOD 30 MIN: CPT | Performed by: INTERNAL MEDICINE

## 2021-02-04 ASSESSMENT — PATIENT HEALTH QUESTIONNAIRE - PHQ9
SUM OF ALL RESPONSES TO PHQ QUESTIONS 1-9: 0
2. FEELING DOWN, DEPRESSED OR HOPELESS: 0
1. LITTLE INTEREST OR PLEASURE IN DOING THINGS: 0
SUM OF ALL RESPONSES TO PHQ QUESTIONS 1-9: 0

## 2021-02-04 NOTE — PROGRESS NOTES
diuretic. Gastrointestinal: Negative. Endocrine:        Hyperlipidemia, statin treated. LDL 61 previously. Current result pending. Genitourinary: Negative. Musculoskeletal: Negative. Skin: Negative. Neurological: Negative. Psychiatric/Behavioral:        Stress, see HPI. Physical Exam  Constitutional:       General: She is not in acute distress. Appearance: She is well-developed. HENT:      Head: Normocephalic and atraumatic. Right Ear: External ear normal.      Left Ear: External ear normal.      Nose: Nose normal.   Eyes:      General: No scleral icterus. Conjunctiva/sclera: Conjunctivae normal.      Pupils: Pupils are equal, round, and reactive to light. Neck:      Musculoskeletal: Normal range of motion and neck supple. Thyroid: No thyromegaly. Cardiovascular:      Rate and Rhythm: Normal rate and regular rhythm. Heart sounds: Normal heart sounds. Pulmonary:      Effort: Pulmonary effort is normal.      Breath sounds: Normal breath sounds. Abdominal:      General: Bowel sounds are normal.      Palpations: Abdomen is soft. There is no mass. Lymphadenopathy:      Cervical: No cervical adenopathy. Skin:     General: Skin is warm and dry. Neurological:      Mental Status: She is alert and oriented to person, place, and time. Deep Tendon Reflexes: Reflexes are normal and symmetric. Psychiatric:         Behavior: Behavior normal.         Thought Content: Thought content normal.         Judgment: Judgment normal.         Vitals:    02/04/21 1634   BP: 136/82   Pulse: 80   Temp: 97 °F (36.1 °C)   SpO2: 97%       Assessment:   Diagnosis Orders   1. Mixed hyperlipidemia  Heart healthy diet and statin compliance discussed. Lipid Panel   2. Mild intermittent asthma without complication  Diet and exercise discussed. Continue maintenance and rescue MDI. 3. Essential hypertension  Continue diuretic and Ca blker. DASH diet discussed.     4. Stress  Counseled and discussed relaxation techniques, healthy boundaries, and acceptance. Plan:  See plans above.

## 2021-02-11 DIAGNOSIS — R00.0 TACHYCARDIA: Primary | ICD-10-CM

## 2021-02-11 PROCEDURE — 93248 EXT ECG>7D<15D REV&INTERPJ: CPT | Performed by: INTERNAL MEDICINE

## 2021-02-11 RX ORDER — METOPROLOL SUCCINATE 25 MG/1
25 TABLET, EXTENDED RELEASE ORAL DAILY
Qty: 90 TABLET | Refills: 1 | Status: SHIPPED | OUTPATIENT
Start: 2021-02-11 | End: 2021-04-16 | Stop reason: SDUPTHER

## 2021-02-15 RX ORDER — OMEPRAZOLE 40 MG/1
CAPSULE, DELAYED RELEASE ORAL
Qty: 30 CAPSULE | Refills: 4 | Status: SHIPPED | OUTPATIENT
Start: 2021-02-15 | End: 2021-02-16

## 2021-02-16 RX ORDER — OMEPRAZOLE 40 MG/1
CAPSULE, DELAYED RELEASE ORAL
Qty: 30 CAPSULE | Refills: 4 | Status: SHIPPED | OUTPATIENT
Start: 2021-02-16 | End: 2021-12-15

## 2021-02-18 ENCOUNTER — TELEPHONE (OUTPATIENT)
Dept: CARDIOLOGY CLINIC | Age: 76
End: 2021-02-18

## 2021-03-04 ASSESSMENT — ENCOUNTER SYMPTOMS
GASTROINTESTINAL NEGATIVE: 1
EYES NEGATIVE: 1

## 2021-03-11 ENCOUNTER — OFFICE VISIT (OUTPATIENT)
Dept: CARDIOLOGY CLINIC | Age: 76
End: 2021-03-11
Payer: MEDICARE

## 2021-03-11 VITALS
TEMPERATURE: 97.1 F | BODY MASS INDEX: 26.63 KG/M2 | HEART RATE: 72 BPM | SYSTOLIC BLOOD PRESSURE: 110 MMHG | DIASTOLIC BLOOD PRESSURE: 80 MMHG | WEIGHT: 165 LBS

## 2021-03-11 DIAGNOSIS — E78.5 HYPERLIPIDEMIA, UNSPECIFIED HYPERLIPIDEMIA TYPE: ICD-10-CM

## 2021-03-11 DIAGNOSIS — I10 HYPERTENSION, UNSPECIFIED TYPE: Primary | ICD-10-CM

## 2021-03-11 PROCEDURE — 1090F PRES/ABSN URINE INCON ASSESS: CPT | Performed by: INTERNAL MEDICINE

## 2021-03-11 PROCEDURE — 1123F ACP DISCUSS/DSCN MKR DOCD: CPT | Performed by: INTERNAL MEDICINE

## 2021-03-11 PROCEDURE — 1036F TOBACCO NON-USER: CPT | Performed by: INTERNAL MEDICINE

## 2021-03-11 PROCEDURE — 99214 OFFICE O/P EST MOD 30 MIN: CPT | Performed by: INTERNAL MEDICINE

## 2021-03-11 PROCEDURE — G8482 FLU IMMUNIZE ORDER/ADMIN: HCPCS | Performed by: INTERNAL MEDICINE

## 2021-03-11 PROCEDURE — G8427 DOCREV CUR MEDS BY ELIG CLIN: HCPCS | Performed by: INTERNAL MEDICINE

## 2021-03-11 PROCEDURE — 3017F COLORECTAL CA SCREEN DOC REV: CPT | Performed by: INTERNAL MEDICINE

## 2021-03-11 PROCEDURE — G8400 PT W/DXA NO RESULTS DOC: HCPCS | Performed by: INTERNAL MEDICINE

## 2021-03-11 PROCEDURE — 4040F PNEUMOC VAC/ADMIN/RCVD: CPT | Performed by: INTERNAL MEDICINE

## 2021-03-11 PROCEDURE — G8417 CALC BMI ABV UP PARAM F/U: HCPCS | Performed by: INTERNAL MEDICINE

## 2021-03-11 NOTE — PROGRESS NOTES
on file. Home Medications:  Prior to Admission medications    Medication Sig Start Date End Date Taking? Authorizing Provider   omeprazole (PRILOSEC) 40 MG delayed release capsule TAKE ONE CAPSULE BY MOUTH EVERY MORNING BEFORE BREAKFAST 2/16/21  Yes Chas Mcneal MD   metoprolol succinate (TOPROL XL) 25 MG extended release tablet Take 1 tablet by mouth daily 2/11/21  Yes Louisa Ambriz MD   hydroCHLOROthiazide (MICROZIDE) 12.5 MG capsule Take 12.5 mg by mouth daily   Yes Historical Provider, MD   amLODIPine (NORVASC) 5 MG tablet Take 5 mg by mouth daily   Yes Historical Provider, MD   albuterol sulfate HFA (VENTOLIN HFA) 108 (90 Base) MCG/ACT inhaler INHALE TWO PUFFS BY MOUTH EVERY 6 HOURS AS NEEDED FOR WHEEZING 4/30/20  Yes Chas Mcneal MD   budesonide-formoterol (SYMBICORT) 160-4.5 MCG/ACT AERO INHALE TWO PUFFS BY MOUTH TWICE A DAY 4/30/20  Yes Chas Mcneal MD   TAFINLAR 75 MG CAPS  2/7/19  Yes Historical Provider, MD   MEKINIST 2 MG TABS  2/7/19  Yes Historical Provider, MD   ibuprofen (ADVIL;MOTRIN) 800 MG tablet Take 1 tablet by mouth 2 times daily as needed for Pain 8/20/18  Yes Chas Mcneal MD   ASPIRIN ADULT LOW STRENGTH 81 MG EC tablet TAKE ONE TABLET BY MOUTH DAILY 7/24/18  Yes Chas Mcneal MD   folic acid (FOLVITE) 1 MG tablet Take 1 tablet by mouth daily 8/11/17  Yes Lillie Snyder MD   atorvastatin (LIPITOR) 10 MG tablet Take 1 tablet by mouth daily 4/30/20 7/29/20  Chas Mcneal MD           Allergies:  Codeine and Penicillins     Review of Systems:   · Constitutional: there has been no unanticipated weight loss. · Eyes: No visual changes or diplopia. No scleral icterus. · ENT: No Headaches, hearing loss or vertigo. No mouth sores or sore throat. · Cardiovascular: Reviewed in HPI  ·  Pulmonary:  no cough or sputum production. · Gastrointestinal: No abdominal pain, appetite loss, blood in stools. No change in bowel or bladder habits.   · Genitourinary: No dysuria, trouble voiding, or hematuria. · Musculoskeletal:  No gait disturbance, weakness or joint complaints. · Integumentary: No rash or pruritis. Endocrine: No malaise, fatigue or temperature intolerance. Hematologic/Lymphatic: No abnormal bruising or bleeding, blood clots or swollen lymph nodes. · Allergic/Immunologic: No nasal congestion or hives. · All other ROS are reviewed and are unremarkable. Physical Examination:      Wt Readings from Last 3 Encounters:   03/11/21 165 lb (74.8 kg)   02/04/21 166 lb (75.3 kg)   01/15/21 165 lb 12.8 oz (75.2 kg)       BP Readings from Last 3 Encounters:   03/11/21 110/80   02/04/21 128/85   01/15/21 (!) 138/95       Pulse Readings from Last 3 Encounters:   03/11/21 72   02/04/21 80   01/15/21 101       Constitutional and General Appearance:  Healthy. And alert . HEENT: eyes and ears intact. No nasal masses  THYROID: not enlarged  LUNGS:  · Clear to auscultation and percussion  HEART and VASCULAR:  · The apical impulses not displaced  · Heart tones are crisp and normal  · Cervical veins are not engorged  · The carotid upstroke is normal in amplitude and contour without delay or bruit  · Peripheral pulses are symmetrical and full  · There is no clubbing, cyanosis of the extremities. · No peripheral edema  · Femoral Arteries: 2+ and equal  · Pedal Pulses: 2+ and equal   ABDOMEN[de-identified]  · No masses or tenderness  · Liver/Spleen: No Abnormalities Noted  NEUROLOGICAL:  . Moves all extremities to command. Cranial nerves 2-12 are in tact.   PSYCHIATRIC: alert and lucid  and oriented and appropriate  SKIN: No lesions or rashes  LYMPH NODES: none enlarged    ·   ·   ·     CBC with Differential:    Lab Results   Component Value Date    WBC 8.7 10/18/2018    RBC 4.76 10/18/2018    RBC 5.55 08/11/2017    HGB 12.0 10/18/2018    HCT 38.1 10/18/2018     11/19/2018    MCV 80.0 10/18/2018    MCH 25.2 10/18/2018    MCHC 31.4 10/18/2018    RDW 15.1 10/18/2018    BANDSPCT 0 06/29/2017    LYMPHOPCT 11.6 10/18/2018    LYMPHOPCT 34.6 2017    MONOPCT 9.3 10/18/2018    EOSPCT 5 2017    BASOPCT 0.4 10/18/2018    MONOSABS 0.8 10/18/2018    LYMPHSABS 1.0 10/18/2018    EOSABS 0.0 10/18/2018    BASOSABS 0.0 10/18/2018     BMP:    Lab Results   Component Value Date     2020    K 4.3 2020    K 3.3 2018    CL 99 2020    CO2 28 2020    BUN 9 2020    LABALBU 3.4 2018    CREATININE 0.6 2020    CALCIUM 10.0 2020    GFRAA >60 2020    LABGLOM >60 2020     Uric Acid:  No components found for: URIC  PT/INR:    Lab Results   Component Value Date    PROTIME 11.9 2018    INR 1.04 2018     Last 3 Troponin:    Lab Results   Component Value Date    TROPONINI 0.01 2018     FLP:    Lab Results   Component Value Date    TRIG 86 2019    HDL 70 2019    LDLCALC 61 2019    LABVLDL 17 2019       EK/15/2021 sinus rhythm 101. echocardiogramSummary 18   Normal left ventricle size, wall thickness, and systolic function with an   estimated ejection fraction of 55-60%. Mild tricuspid regurgitation. Estimated pulmonary artery systolic pressure is at 41 mmHg assuming a right   atrial pressure of 3 mmHg. A bubble study was performed and fails to show evidence of right to left   shunting. There is a trivial circumferential pericardial This patient was educated using the patient point room wall mount device. Absence from smokers and smoking and diet and exercising are important. Assessment/ Plan     Tachycardia and possible atrial fibrillation by someone's clinical assessment. We have not seen documentation. She has dyspnea shortness of breath. We will evaluate her cardiac status and I think that she needs to be on some beta-blocker therapy.     #1 T4 and TSH  #2 CBC  #3 echocardiogram  #4 ZIO for 2 weeks  #5 consider stress test nuclear study  #6 add metoprolol 25 mg twice daily  #7 reduce Norvasc from 10 mg to 5 mg  #8 continue HCTZ at 12.5 mg daily  #9 return in 8 weeks. Thanks for allowing us the opportunity  to participate in the evaluation and care of your patients.  Please call if we may assist further [phone]    This note was likely completed using voice recognition technology and may contain unintended errors  Jose Au M.D., [facility]  9/63/505065:19 AM

## 2021-03-17 ENCOUNTER — TELEPHONE (OUTPATIENT)
Dept: CARDIOLOGY CLINIC | Age: 76
End: 2021-03-17

## 2021-03-17 NOTE — TELEPHONE ENCOUNTER
Spoke to Jani and we are going to restart the hydrochlorothiazide and will have the patient follow up in a month to see how her blood pressure is doing and see if it's helping with her swelling. Called and spoke to the patient to make her aware of the new changes.

## 2021-04-12 ENCOUNTER — OFFICE VISIT (OUTPATIENT)
Dept: CARDIOLOGY CLINIC | Age: 76
End: 2021-04-12
Payer: MEDICARE

## 2021-04-12 ENCOUNTER — OFFICE VISIT (OUTPATIENT)
Dept: PULMONOLOGY | Age: 76
End: 2021-04-12
Payer: MEDICARE

## 2021-04-12 VITALS
OXYGEN SATURATION: 98 % | SYSTOLIC BLOOD PRESSURE: 141 MMHG | BODY MASS INDEX: 26.48 KG/M2 | DIASTOLIC BLOOD PRESSURE: 85 MMHG | HEIGHT: 66 IN | HEART RATE: 77 BPM | WEIGHT: 164.8 LBS

## 2021-04-12 VITALS
WEIGHT: 164 LBS | HEART RATE: 77 BPM | DIASTOLIC BLOOD PRESSURE: 84 MMHG | SYSTOLIC BLOOD PRESSURE: 138 MMHG | BODY MASS INDEX: 26.47 KG/M2

## 2021-04-12 DIAGNOSIS — G47.10 HYPERSOMNIA: Primary | ICD-10-CM

## 2021-04-12 DIAGNOSIS — C78.7 SECONDARY MALIGNANT NEOPLASM OF LIVER (HCC): ICD-10-CM

## 2021-04-12 DIAGNOSIS — I10 ESSENTIAL HYPERTENSION: Chronic | ICD-10-CM

## 2021-04-12 DIAGNOSIS — E78.5 HYPERLIPIDEMIA, UNSPECIFIED HYPERLIPIDEMIA TYPE: ICD-10-CM

## 2021-04-12 DIAGNOSIS — I10 HYPERTENSION, UNSPECIFIED TYPE: Primary | ICD-10-CM

## 2021-04-12 PROBLEM — E78.2 MIXED HYPERLIPIDEMIA: Chronic | Status: ACTIVE | Noted: 2019-02-28

## 2021-04-12 PROCEDURE — G8427 DOCREV CUR MEDS BY ELIG CLIN: HCPCS | Performed by: INTERNAL MEDICINE

## 2021-04-12 PROCEDURE — 4040F PNEUMOC VAC/ADMIN/RCVD: CPT | Performed by: INTERNAL MEDICINE

## 2021-04-12 PROCEDURE — 1090F PRES/ABSN URINE INCON ASSESS: CPT | Performed by: INTERNAL MEDICINE

## 2021-04-12 PROCEDURE — 1036F TOBACCO NON-USER: CPT | Performed by: INTERNAL MEDICINE

## 2021-04-12 PROCEDURE — 99214 OFFICE O/P EST MOD 30 MIN: CPT | Performed by: INTERNAL MEDICINE

## 2021-04-12 PROCEDURE — 99204 OFFICE O/P NEW MOD 45 MIN: CPT | Performed by: INTERNAL MEDICINE

## 2021-04-12 PROCEDURE — 3017F COLORECTAL CA SCREEN DOC REV: CPT | Performed by: INTERNAL MEDICINE

## 2021-04-12 PROCEDURE — G8417 CALC BMI ABV UP PARAM F/U: HCPCS | Performed by: INTERNAL MEDICINE

## 2021-04-12 PROCEDURE — G8400 PT W/DXA NO RESULTS DOC: HCPCS | Performed by: INTERNAL MEDICINE

## 2021-04-12 PROCEDURE — 1123F ACP DISCUSS/DSCN MKR DOCD: CPT | Performed by: INTERNAL MEDICINE

## 2021-04-12 ASSESSMENT — SLEEP AND FATIGUE QUESTIONNAIRES
HOW LIKELY ARE YOU TO NOD OFF OR FALL ASLEEP WHILE SITTING INACTIVE IN A PUBLIC PLACE: 0
HOW LIKELY ARE YOU TO NOD OFF OR FALL ASLEEP WHILE SITTING AND READING: 0
HOW LIKELY ARE YOU TO NOD OFF OR FALL ASLEEP WHILE SITTING QUIETLY AFTER LUNCH WITHOUT ALCOHOL: 0
HOW LIKELY ARE YOU TO NOD OFF OR FALL ASLEEP WHILE SITTING AND TALKING TO SOMEONE: 1
ESS TOTAL SCORE: 2
HOW LIKELY ARE YOU TO NOD OFF OR FALL ASLEEP WHILE LYING DOWN TO REST IN THE AFTERNOON WHEN CIRCUMSTANCES PERMIT: 1

## 2021-04-12 NOTE — PROGRESS NOTES
Terri Wallis MD, [facility], [facility]  Tiffanie Kehrt CNP Janith Fabry CNP [address]  Shamar Cosme [address] [facility], [address] [facility] [phone]   [facility] [address] Shamar Cosme. [address] [facility]. [address] [phone]     [address] [place], [place] [address] [address] [facility] [address]  [address] [address]  Dept: [phone]  Loc: [phone]    Assessment:      Visit Diagnoses and Associated Orders     Hypersomnia   (New Problem)  -  Primary    needs work-up    Home Sleep Study (HST) [[address] Custom]   - Future Order         Essential hypertension   (Stable)                  Plan:      One or more undiagnosed new problem with uncertain prognosis till final diagnosis is made. Differential diagnosis includes but not limited to: SHANNA, PLMD's, narcolepsy, parasomnias. Reviewed SHANNA (highest likelihood Dx): pathophysiology, diagnosis, complications and treatment. Instructed her not to drive if drowsy. Continue medications per her PCP and other physicians. Limit caffeine use after 3pm. Standard of care is to do in-lab PSG but insurance is mandating an inferior HST. 1 wk follow up after study to review her results. The chronic medical conditions listed are directly related to the primary diagnosis listed above. The management of the primary diagnosis affects the secondary diagnosis and vice versa. Reviewed referral note from patient's oncologist on 3/19/2021 that shows problems with daytime fatigue and sleepiness. Patient reviewed EKG tracings done on 1/15/2021 that shows normal sinus rhythm. Echocardiogram in 1/29/2021 showed borderline EF of 45%. This information was analyzed to assess complexity and medical decision making in regards to further testing and management. Continue meds for: HTN. Pt would medically benefit from wt loss for SHANNA (diet, exercise, surgical).     Orders Placed This Encounter   Procedures    Home Sleep Study (HST)          Subjective:     Patient ID: William Martines Frankey Handler is a 77 y.o. female. Chief Complaint   Patient presents with    Sleep Apnea    Breathing Problem    Insomnia    Fatigue       HPI:      Blanchard Eisenmenger is a 77 y.o. female referred by Dr. Alicia Sellers for a sleep evaluation. She complains of: excessive daytime sleepiness , non-restorative sleep, napping and tossing and turning at night. She denies: cataplexy and hypnagogic hallucinations. Symptoms began 5 years ago, gradually worsening since that time. Previous evaluation and treatment has included- none    DOT/CDL - No  FAA/'s license -No    Previous Report(s) Reviewed: historical medical records, office notes, andreferral letter(s). Pertinent data has been documented. [place] - Total score: 2    Caffeine Intake - None.     Social History     Socioeconomic History    Marital status: Single     Spouse name: Not on file    Number of children: Not on file    Years of education: Not on file    Highest education level: Not on file   Occupational History    Not on file   Social Needs    Financial resource strain: Not on file    Food insecurity     Worry: Not on file     Inability: Not on file    Transportation needs     Medical: Not on file     Non-medical: Not on file   Tobacco Use    Smoking status: Former Smoker     Packs/day: 0.00     Years: 0.00     Pack years: 0.00     Quit date: 3/10/2012     Years since quittin.0    Smokeless tobacco: Never Used   Substance and Sexual Activity    Alcohol use: Yes     Comment: 1-2 X WEEKLY    Drug use: No    Sexual activity: Yes     Partners: Male   Lifestyle    Physical activity     Days per week: Not on file     Minutes per session: Not on file    Stress: Not on file   Relationships    Social connections     Talks on phone: Not on file     Gets together: Not on file     Attends Tenriism service: Not on file     Active member of club or organization: Not on file     Attends meetings of clubs or organizations: Not on file     Relationship status: Not on file    Intimate partner violence     Fear of current or ex partner: Not on file     Emotionally abused: Not on file     Physically abused: Not on file     Forced sexual activity: Not on file   Other Topics Concern    Not on file   Social History Narrative    Not on file        Current Outpatient Medications   Medication Sig Dispense Refill    omeprazole (PRILOSEC) 40 MG delayed release capsule TAKE ONE CAPSULE BY MOUTH EVERY MORNING BEFORE BREAKFAST 30 capsule 4    metoprolol succinate (TOPROL XL) 25 MG extended release tablet Take 1 tablet by mouth daily 90 tablet 1    amLODIPine (NORVASC) 5 MG tablet Take 5 mg by mouth daily      albuterol sulfate HFA (VENTOLIN HFA) 108 (90 Base) MCG/ACT inhaler INHALE TWO PUFFS BY MOUTH EVERY 6 HOURS AS NEEDED FOR WHEEZING 18 g 3    budesonide-formoterol (SYMBICORT) 160-4.5 MCG/ACT AERO INHALE TWO PUFFS BY MOUTH TWICE A DAY 1 Inhaler 3    TAFINLAR 75 MG CAPS       MEKINIST 2 MG TABS       ibuprofen (ADVIL;MOTRIN) 800 MG tablet Take 1 tablet by mouth 2 times daily as needed for Pain 30 tablet 1    ASPIRIN ADULT LOW STRENGTH 81 MG EC tablet TAKE ONE TABLET BY MOUTH DAILY 30 tablet 5    folic acid (FOLVITE) 1 MG tablet Take 1 tablet by mouth daily 30 tablet 3    atorvastatin (LIPITOR) 10 MG tablet Take 1 tablet by mouth daily 90 tablet 3     No current facility-administered medications for this visit.         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 No pertinent family history. Objective:     Vitals:  Weight BMI   Wt Readings from Last 3 Encounters:   04/12/21 164 lb (74.4 kg)   04/12/21 164 lb 12.8 oz (74.8 kg)   03/11/21 165 lb (74.8 kg)    Body mass index is 26.6 kg/m². BP HR SaO2   BP Readings from Last 3 Encounters:   04/12/21 138/84   04/12/21 (!) 141/85   03/11/21 110/80    Pulse Readings from Last 3 Encounters:   04/12/21 77   04/12/21 77   03/11/21 72    SpO2 Readings from Last 3 Encounters:   04/12/21 98%   02/04/21 97%   10/14/20 95%        Physical Exam  Vitals signs reviewed. Constitutional:       General: She is not in acute distress. Appearance: Normal appearance. She is well-developed. She is not toxic-appearing or diaphoretic. HENT:      Head: Normocephalic and atraumatic. Not macrocephalic and not microcephalic. Right Ear: External ear normal.      Left Ear: External ear normal.      Nose: Nasal deformity, septal deviation and mucosal edema present. Mouth/Throat:      Lips: Pink. Mouth: Mucous membranes are moist.      Tongue: No lesions. Palate: No mass. Pharynx: Uvula midline. No oropharyngeal exudate or uvula swelling. Tonsils: No tonsillar exudate or tonsillar abscesses. Comments: Tonsils: normal size  Eyes:      General: Lids are normal.      Extraocular Movements: Extraocular movements intact. Conjunctiva/sclera: Conjunctivae normal.      Pupils: Pupils are equal, round, and reactive to light. Neck:      Musculoskeletal: Normal range of motion. Vascular: No JVD.       Trachea: Trachea normal.      Comments: Neck Circ: 14 inches Cardiovascular:      Rate and Rhythm: Normal rate and regular rhythm. Heart sounds: Normal heart sounds. Pulmonary:      Effort: Pulmonary effort is normal.      Breath sounds: Normal breath sounds. Abdominal:      General: Bowel sounds are normal.   Musculoskeletal:      Comments: No evidence of cyanosis or clubbing of nails   Skin:     General: Skin is warm. Nails: There is no clubbing. Neurological:      General: No focal deficit present. Mental Status: She is alert. Psychiatric:         Attention and Perception: Attention normal.         Mood and Affect: Mood and affect normal.         Speech: Speech normal.         Behavior: Behavior normal.         Thought Content:  Thought content normal.         Electronically signed by Jude Smith MD on4/12/2021 at 11:29 AM

## 2021-04-12 NOTE — PROGRESS NOTES
[address]   Dr Nayeli Nazario. Jazzy Pool MD, [address] [facility]    Outpatient Follow Up Note    2021,11:22 AM  Subjective:   CHIEF COMPLAINT / HPI:  Follow Up secondary to {hypertension hyperlipidemia SVT     Anmol Huddleston is 77 y.o. female who presents today for a routine follow up. She did have a coronavirus vaccine Moderna. She tolerated it very well. As far as her lung cancer is concerned she is to follow-up with Dr. Honey Fletcher and is due to have another CAT scan soon. She seems clinically stable looks feel very good and no palpitations no chest pains. Metoprolol 50 mg succinate daily. Obstructive sleep apnea with CPAP   Lung right cancer Dr. Honey Fletcher   Dilated aortic root  Recurring SVT  Hypertension  Past Medical History:    Past Medical History:   Diagnosis Date    Arthritis     Cancer ([address]     lung     Hyperlipidemia     Hypertension                                                                                                                                Past Surgical History  Past Surgical History:   Procedure Laterality Date    BRONCHOSCOPY  2017    brochoscopy-EBUS    OTHER SURGICAL HISTORY Left 2017    LEFT SUBCLAVIAN PORT- CATH INSERTION      TUBAL LIGATION       Social History:       Social History     Tobacco Use   Smoking Status Former Smoker    Packs/day: 0.00    Years: 0.00    Pack years: 0.00    Quit date: 3/10/2012    Years since quittin.0   Smokeless Tobacco Never Used     Current Medications:  Prior to Visit Medications    Medication Sig Taking?  Authorizing Provider   omeprazole (PRILOSEC) 40 MG delayed release capsule TAKE ONE CAPSULE BY MOUTH EVERY MORNING BEFORE BREAKFAST Yes Andrew Lr MD   metoprolol succinate (TOPROL XL) 25 MG extended release tablet Take 1 tablet by mouth daily Yes Elías Payne MD   amLODIPine (NORVASC) 5 MG tablet Take 5 mg by mouth daily Yes Historical Provider, MD   albuterol sulfate HFA (VENTOLIN HFA) 108 (90 Base) MCG/ACT inhaler INHALE TWO PUFFS BY MOUTH EVERY 6 HOURS AS NEEDED FOR WHEEZING Yes Claudio Briggs MD   budesonide-formoterol (SYMBICORT) 160-4.5 MCG/ACT AERO INHALE TWO PUFFS BY MOUTH TWICE A DAY Yes Claudio Briggs MD   TAFINLAR 75 MG CAPS  Yes Historical Provider, MD   MEKINIST 2 MG TABS  Yes Historical Provider, MD   ibuprofen (ADVIL;MOTRIN) 800 MG tablet Take 1 tablet by mouth 2 times daily as needed for Pain Yes Claudio Briggs MD   ASPIRIN ADULT LOW STRENGTH 81 MG EC tablet TAKE ONE TABLET BY MOUTH DAILY Yes Claudio Briggs MD   folic acid (FOLVITE) 1 MG tablet Take 1 tablet by mouth daily Yes Sarina Garcia MD   atorvastatin (LIPITOR) 10 MG tablet Take 1 tablet by mouth daily  Claudio Briggs MD     Family History  No family history on file. Current Medications  Current Outpatient Medications   Medication Sig Dispense Refill    omeprazole (PRILOSEC) 40 MG delayed release capsule TAKE ONE CAPSULE BY MOUTH EVERY MORNING BEFORE BREAKFAST 30 capsule 4    metoprolol succinate (TOPROL XL) 25 MG extended release tablet Take 1 tablet by mouth daily 90 tablet 1    amLODIPine (NORVASC) 5 MG tablet Take 5 mg by mouth daily      albuterol sulfate HFA (VENTOLIN HFA) 108 (90 Base) MCG/ACT inhaler INHALE TWO PUFFS BY MOUTH EVERY 6 HOURS AS NEEDED FOR WHEEZING 18 g 3    budesonide-formoterol (SYMBICORT) 160-4.5 MCG/ACT AERO INHALE TWO PUFFS BY MOUTH TWICE A DAY 1 Inhaler 3    TAFINLAR 75 MG CAPS       MEKINIST 2 MG TABS       ibuprofen (ADVIL;MOTRIN) 800 MG tablet Take 1 tablet by mouth 2 times daily as needed for Pain 30 tablet 1    ASPIRIN ADULT LOW STRENGTH 81 MG EC tablet TAKE ONE TABLET BY MOUTH DAILY 30 tablet 5    folic acid (FOLVITE) 1 MG tablet Take 1 tablet by mouth daily 30 tablet 3    atorvastatin (LIPITOR) 10 MG tablet Take 1 tablet by mouth daily 90 tablet 3     No current facility-administered medications for this visit.          REVIEW OF SYSTEMS:    CONSTITUTIONAL: No major weight gain or loss, fatigue, weakness, night sweats or fever. HEENT: No new vision difficulties or ringing in the ears. RESPIRATORY: No new SOB, PND, orthopnea or cough. CARDIOVASCULAR: See HPI  GI: No nausea, vomiting, diarrhea, constipation, abdominal pain or changes in bowel habits. : No urinary frequency, urgency, incontinence hematuria or dysuria. SKIN: No cyanosis or skin lesions. MUSCULOSKELETAL: No new muscle or joint pain. NEUROLOGICAL: No syncope or TIA-like symptoms. PSYCHIATRIC: No anxiety, pain, insomnia or depression    Objective:   PHYSICAL EXAM:        VITALS:    Wt Readings from Last 3 Encounters:   04/12/21 164 lb (74.4 kg)   04/12/21 164 lb 12.8 oz (74.8 kg)   03/11/21 165 lb (74.8 kg)     BP Readings from Last 3 Encounters:   04/12/21 138/84   04/12/21 (!) 141/85   03/11/21 110/80     Pulse Readings from Last 3 Encounters:   04/12/21 77   04/12/21 77   03/11/21 72       CONSTITUTIONAL: Cooperative, no apparent distress, and appears well nourished / developed  NEUROLOGIC:  Awake and orientated to person, place and time. PSYCH: Calm affect. SKIN: Warm and dry. HEENT: Sclera non-icteric, normocephalic, neck supple, no elevation of JVP, normal carotid pulses with no bruits and thyroid normal size. LUNGS:  No increased work of breathing and clear to auscultation, no crackles or wheezing  CARDIOVASCULAR:  Regular rate and rhythm with no murmurs, gallops, rubs, or abnormal heart sounds, normal PMI. The apical impulses not displaced  Heart tones are crisp and normal  Cervical veins are not engorged  The carotid upstroke is normal in amplitude and contour without delay or bruit  JVP is not elevated  ABDOMEN:  Normal bowel sounds, non-distended and non-tender to palpation  EXT: No edema, no calf tenderness. Pulses are present bilaterally.     DATA:    Lab Results   Component Value Date    ALT 16 03/25/2018    AST 21 03/25/2018    ALKPHOS 64 03/25/2018    BILITOT 0.6 03/25/2018     Lab Results recognition technology and may contain unintended errors

## 2021-04-12 NOTE — LETTER
questions or concerns, please do not hesitate to call me. I look forward to following Rupali Reece along with you.     Sincerely,      Heidi Tomlinson MD    CC providers:  Delilah Banuelos MD  [phone]  Via Outside Provider Messaging     Cleveland Mcpherson MD  [address] [place] [address]  Via In [place]

## 2021-04-13 ENCOUNTER — TELEPHONE (OUTPATIENT)
Dept: SLEEP CENTER | Age: 76
End: 2021-04-13

## 2021-04-13 NOTE — TELEPHONE ENCOUNTER
Called to schedule hst per Carin Arita.      Pt answered and said she'll call us back when ready to schedule this appointment.     [facility]

## 2021-04-15 ENCOUNTER — TELEPHONE (OUTPATIENT)
Dept: CARDIOLOGY CLINIC | Age: 76
End: 2021-04-15

## 2021-04-16 DIAGNOSIS — R00.0 TACHYCARDIA: ICD-10-CM

## 2021-04-16 RX ORDER — METOPROLOL SUCCINATE 25 MG/1
50 TABLET, EXTENDED RELEASE ORAL DAILY
Qty: 90 TABLET | Refills: 3 | Status: SHIPPED | OUTPATIENT
Start: 2021-04-16 | End: 2022-01-12 | Stop reason: SDUPTHER

## 2021-05-11 ENCOUNTER — OFFICE VISIT (OUTPATIENT)
Dept: INTERNAL MEDICINE CLINIC | Age: 76
End: 2021-05-11
Payer: MEDICARE

## 2021-05-11 VITALS
HEART RATE: 101 BPM | HEIGHT: 66 IN | WEIGHT: 164.4 LBS | DIASTOLIC BLOOD PRESSURE: 85 MMHG | OXYGEN SATURATION: 92 % | BODY MASS INDEX: 26.42 KG/M2 | SYSTOLIC BLOOD PRESSURE: 135 MMHG

## 2021-05-11 DIAGNOSIS — R00.0 TACHYCARDIA: ICD-10-CM

## 2021-05-11 DIAGNOSIS — E78.2 MIXED HYPERLIPIDEMIA: ICD-10-CM

## 2021-05-11 DIAGNOSIS — I10 ESSENTIAL HYPERTENSION: Primary | ICD-10-CM

## 2021-05-11 LAB
CHOLESTEROL, TOTAL: 130 MG/DL (ref 0–199)
HDLC SERPL-MCNC: 68 MG/DL (ref 40–60)
LDL CHOLESTEROL CALCULATED: 51 MG/DL
TRIGL SERPL-MCNC: 56 MG/DL (ref 0–150)
VLDLC SERPL CALC-MCNC: 11 MG/DL

## 2021-05-11 PROCEDURE — 1123F ACP DISCUSS/DSCN MKR DOCD: CPT | Performed by: INTERNAL MEDICINE

## 2021-05-11 PROCEDURE — 1090F PRES/ABSN URINE INCON ASSESS: CPT | Performed by: INTERNAL MEDICINE

## 2021-05-11 PROCEDURE — 1036F TOBACCO NON-USER: CPT | Performed by: INTERNAL MEDICINE

## 2021-05-11 PROCEDURE — 4040F PNEUMOC VAC/ADMIN/RCVD: CPT | Performed by: INTERNAL MEDICINE

## 2021-05-11 PROCEDURE — 99214 OFFICE O/P EST MOD 30 MIN: CPT | Performed by: INTERNAL MEDICINE

## 2021-05-11 PROCEDURE — 3017F COLORECTAL CA SCREEN DOC REV: CPT | Performed by: INTERNAL MEDICINE

## 2021-05-11 PROCEDURE — G8417 CALC BMI ABV UP PARAM F/U: HCPCS | Performed by: INTERNAL MEDICINE

## 2021-05-11 PROCEDURE — G8427 DOCREV CUR MEDS BY ELIG CLIN: HCPCS | Performed by: INTERNAL MEDICINE

## 2021-05-11 PROCEDURE — G8400 PT W/DXA NO RESULTS DOC: HCPCS | Performed by: INTERNAL MEDICINE

## 2021-05-11 SDOH — ECONOMIC STABILITY: FOOD INSECURITY: WITHIN THE PAST 12 MONTHS, THE FOOD YOU BOUGHT JUST DIDN'T LAST AND YOU DIDN'T HAVE MONEY TO GET MORE.: NEVER TRUE

## 2021-05-11 SDOH — ECONOMIC STABILITY: INCOME INSECURITY: HOW HARD IS IT FOR YOU TO PAY FOR THE VERY BASICS LIKE FOOD, HOUSING, MEDICAL CARE, AND HEATING?: NOT HARD AT ALL

## 2021-05-11 SDOH — EDUCATIONAL SECURITY: EDUCATION ATTAINMENT: WHAT IS THE HIGHEST LEVEL OF SCHOOL YOU HAVE COMPLETED OR THE HIGHEST DEGREE YOU HAVE RECEIVED?: PATIENT REFUSED

## 2021-05-11 ASSESSMENT — PATIENT HEALTH QUESTIONNAIRE - PHQ9
SUM OF ALL RESPONSES TO PHQ QUESTIONS 1-9: 0
SUM OF ALL RESPONSES TO PHQ9 QUESTIONS 1 & 2: 0
2. FEELING DOWN, DEPRESSED OR HOPELESS: 0
1. LITTLE INTEREST OR PLEASURE IN DOING THINGS: 0

## 2021-05-11 NOTE — PROGRESS NOTES
Patient: Alea Roper is a 77 y.o. female who presents today with the following Chief Complaint(s):    Chief Complaint   Patient presents with    Hyperlipidemia    Follow-up         HPIShe is here for a check up and f/u on hypertension, hyperlipidemia. She is taking medication as prescribed, eating a balanced meal plan and exercising including riding her bike. H/O tachycardia, treated with metoprolol per cardiology. H/O metastatic lung cancer. Last CT of chest and abdomen showed stable findings. Current Outpatient Medications   Medication Sig Dispense Refill    metoprolol succinate (TOPROL XL) 25 MG extended release tablet Take 2 tablets by mouth daily 90 tablet 3    omeprazole (PRILOSEC) 40 MG delayed release capsule TAKE ONE CAPSULE BY MOUTH EVERY MORNING BEFORE BREAKFAST 30 capsule 4    amLODIPine (NORVASC) 5 MG tablet Take 5 mg by mouth daily      albuterol sulfate HFA (VENTOLIN HFA) 108 (90 Base) MCG/ACT inhaler INHALE TWO PUFFS BY MOUTH EVERY 6 HOURS AS NEEDED FOR WHEEZING 18 g 3    budesonide-formoterol (SYMBICORT) 160-4.5 MCG/ACT AERO INHALE TWO PUFFS BY MOUTH TWICE A DAY 1 Inhaler 3    atorvastatin (LIPITOR) 10 MG tablet Take 1 tablet by mouth daily 90 tablet 3    TAFINLAR 75 MG CAPS       MEKINIST 2 MG TABS       ibuprofen (ADVIL;MOTRIN) 800 MG tablet Take 1 tablet by mouth 2 times daily as needed for Pain 30 tablet 1    ASPIRIN ADULT LOW STRENGTH 81 MG EC tablet TAKE ONE TABLET BY MOUTH DAILY 30 tablet 5    folic acid (FOLVITE) 1 MG tablet Take 1 tablet by mouth daily 30 tablet 3     No current facility-administered medications for this visit. Patient's past medical history, surgical history, family history, medications,and allergies  were all reviewed and updated as appropriate today. Review of Systems   Constitutional: Negative. HENT: Negative. Eyes: Negative. Respiratory:        See HPI.    Cardiovascular:        Hypertension, treated with Ca and B blker, diuretic. Tachycardia, see HPI. Gastrointestinal: Negative. Endocrine:        Hyperlipidemia, treated with statin. LDL 51. Genitourinary: Negative. Musculoskeletal: Negative. Skin: Negative. Neurological: Negative. Psychiatric/Behavioral: Negative. Physical Exam  Constitutional:       General: She is not in acute distress. Appearance: She is well-developed. HENT:      Head: Normocephalic and atraumatic. Right Ear: External ear normal.      Left Ear: External ear normal.      Nose: Nose normal.   Eyes:      General: No scleral icterus. Conjunctiva/sclera: Conjunctivae normal.      Pupils: Pupils are equal, round, and reactive to light. Neck:      Thyroid: No thyromegaly. Cardiovascular:      Rate and Rhythm: Normal rate and regular rhythm. Heart sounds: Normal heart sounds. Comments: Tachycardic. Pulse 100. Pulmonary:      Effort: Pulmonary effort is normal.      Breath sounds: Normal breath sounds. Abdominal:      General: Bowel sounds are normal.      Palpations: Abdomen is soft. There is no mass. Musculoskeletal:      Cervical back: Normal range of motion and neck supple. Lymphadenopathy:      Cervical: No cervical adenopathy. Skin:     General: Skin is warm and dry. Neurological:      Mental Status: She is alert and oriented to person, place, and time. Deep Tendon Reflexes: Reflexes are normal and symmetric. Psychiatric:         Behavior: Behavior normal.         Thought Content: Thought content normal.         Judgment: Judgment normal.         Vitals:    05/11/21 0858   BP: 135/85   Pulse: 101   SpO2: 92%       Assessment:   Diagnosis Orders   1. Essential hypertension  Continue Ca and B Blker,   DASH and low sodium diet discussed. 2. Mixed hyperlipidemia  Heart healthy diet and statin compliance discussed. 3. Tachycardia  Continue B Blker. HR slower although still fast.  Will monitor.         Plan:  See plans above.

## 2021-05-13 ENCOUNTER — HOSPITAL ENCOUNTER (OUTPATIENT)
Dept: SLEEP CENTER | Age: 76
Discharge: HOME | End: 2021-05-13
Payer: MEDICARE

## 2021-05-13 DIAGNOSIS — G47.10 HYPERSOMNIA: ICD-10-CM

## 2021-05-13 PROCEDURE — 95806 SLEEP STUDY UNATT&RESP EFFT: CPT | Performed by: INTERNAL MEDICINE

## 2021-05-13 PROCEDURE — 95806 SLEEP STUDY UNATT&RESP EFFT: CPT

## 2021-05-18 DIAGNOSIS — J45.20 MILD INTERMITTENT ASTHMA WITHOUT COMPLICATION: ICD-10-CM

## 2021-05-19 ENCOUNTER — TELEPHONE (OUTPATIENT)
Dept: PULMONOLOGY | Age: 76
End: 2021-05-19

## 2021-05-19 RX ORDER — ALBUTEROL SULFATE 90 UG/1
AEROSOL, METERED RESPIRATORY (INHALATION)
Qty: 18 G | Refills: 2 | Status: SHIPPED | OUTPATIENT
Start: 2021-05-19 | End: 2021-12-15

## 2021-05-19 RX ORDER — BUDESONIDE AND FORMOTEROL FUMARATE DIHYDRATE 160; 4.5 UG/1; UG/1
AEROSOL RESPIRATORY (INHALATION)
Qty: 10.2 G | Refills: 2 | Status: SHIPPED | OUTPATIENT
Start: 2021-05-19 | End: 2021-12-15

## 2021-05-19 NOTE — PROGRESS NOTES
Hitesh Driscoll         : 1945  [facility]    Diagnosis: [x] SHANNA (G47.33) [] CSA (G47.31) [] Apnea (G47.30)   Length of Need: [x] 12 Months [] 99 Months [] Other:    Machine (JANIE!): [x] Respironics Dream Station   2   Auto [] ResMed AirSense     Auto [] Other:     [x]  CPAP () [] Bilevel ()   Mode: [x] Auto [] Spontaneous    Mode: [] Auto [] Spontaneous          P min 6 cmH2O  P max 16 cmH2O                 Comfort Settings:   - Ramp Pressure: 4 cmH2O                                        - Ramp time: 15 min                                     -  Flex/EPR - 3 full time                                    - For ResMed Bilevel (TiMax-4 sec   TiMin- 0.2 sec)     Humidifier: [x] Heated ()        [x] Water chamber replacement ()/ 1 per 6 months        Mask:   [x] Nasal () /1 per 3 months [] Full Face () /1 per 3 months   [x] Patient choice -Size and fit mask [] Patient Choice - Size and fit mask   [] Dispense:  [] Dispense:    [x] Headgear () / 1 per 3 months [] Headgear () / 1 per 3 months   [x] Replacement Nasal Cushion ()/2 per month [] Interface Replacement ()/1 per month   [] Replacement Nasal Pillows ()/2 per month         Tubing: [x] Heated ()/1 per 3 months    [] Standard ()/1 per 3 months [] Other:           Filters: [x] Non-disposable ()/1 per 6 months     [x] Ultra-Fine, Disposable ()/2 per month        Miscellaneous: [] Chin Strap ()/ 1 per 6 months [] O2 bleed-in:       LPM   [] Oximetry on CPAP/Bilevel []  Other:    [x] Modem: ()         Start Order Date: 21    MEDICAL JUSTIFICATION:  I, the undersigned, certify that the above prescribed supplies are medically necessary for this patients wellbeing. In my opinion, the supplies are both reasonable and necessary in reference to accepted standards of medicalpractice in treatment of this patients condition.     Morgan Michel MD      NPI: [id number]       Order Signed Date: 21    Electronically signed by Adam Terrazas MD on 2021 at 3:22 PM    Seth Lemus  1945  [address] [place]  [phone] (home)   [phone] (mobile)      Insurance Info (confirm with patient if correct):  Payor/Plan Subscr  Sex Relation Sub.  Ins. ID Effective Group Num

## 2021-05-19 NOTE — TELEPHONE ENCOUNTER
Spoke with pt to review HST.  Pt to call her daughter before ordering unit and scheduling a f/u appointment

## 2021-05-24 ENCOUNTER — TELEPHONE (OUTPATIENT)
Dept: PULMONOLOGY | Age: 76
End: 2021-05-24

## 2021-06-09 ASSESSMENT — ENCOUNTER SYMPTOMS
GASTROINTESTINAL NEGATIVE: 1
EYES NEGATIVE: 1

## 2021-06-11 ENCOUNTER — HOSPITAL ENCOUNTER (OUTPATIENT)
Dept: CT IMAGING | Age: 76
Discharge: HOME | End: 2021-06-11
Payer: MEDICARE

## 2021-06-11 DIAGNOSIS — C77.1 SECONDARY MALIGNANT NEOPLASM OF MEDIASTINAL LYMPH NODES (HCC): ICD-10-CM

## 2021-06-11 DIAGNOSIS — C79.72 MALIGNANT NEOPLASM METASTATIC TO LEFT ADRENAL GLAND (HCC): ICD-10-CM

## 2021-06-11 DIAGNOSIS — C78.7 SECONDARY MALIGNANT NEOPLASM OF LIVER (HCC): ICD-10-CM

## 2021-06-11 DIAGNOSIS — C34.11 PRIMARY MALIGNANT NEOPLASM OF RIGHT UPPER LOBE OF LUNG (HCC): ICD-10-CM

## 2021-06-11 LAB
GFR AFRICAN AMERICAN: >60
GFR NON-AFRICAN AMERICAN: >60
PERFORMED ON: ABNORMAL
POC CREATININE: 0.5 MG/DL (ref 0.6–1.2)
POC SAMPLE TYPE: ABNORMAL

## 2021-06-11 PROCEDURE — 82565 ASSAY OF CREATININE: CPT

## 2021-06-11 PROCEDURE — 74177 CT ABD & PELVIS W/CONTRAST: CPT

## 2021-06-11 PROCEDURE — 6360000004 HC RX CONTRAST MEDICATION: Performed by: INTERNAL MEDICINE

## 2021-06-11 RX ADMIN — IOPAMIDOL 80 ML: 755 INJECTION, SOLUTION INTRAVENOUS at 11:19

## 2021-06-11 RX ADMIN — IOHEXOL 50 ML: 240 INJECTION, SOLUTION INTRATHECAL; INTRAVASCULAR; INTRAVENOUS; ORAL at 11:20

## 2021-06-28 DIAGNOSIS — E78.49 OTHER HYPERLIPIDEMIA: ICD-10-CM

## 2021-06-30 RX ORDER — ATORVASTATIN CALCIUM 10 MG/1
TABLET, FILM COATED ORAL
Qty: 90 TABLET | Refills: 2 | Status: SHIPPED | OUTPATIENT
Start: 2021-06-30 | End: 2022-03-28

## 2021-07-11 ENCOUNTER — HOSPITAL ENCOUNTER (OUTPATIENT)
Dept: MRI IMAGING | Age: 76
Discharge: HOME | End: 2021-07-11
Payer: MEDICARE

## 2021-07-11 DIAGNOSIS — C34.11 SMALL CELL LUNG CANCER, RIGHT UPPER LOBE (HCC): ICD-10-CM

## 2021-07-11 PROCEDURE — 6360000004 HC RX CONTRAST MEDICATION: Performed by: RADIOLOGY

## 2021-07-11 PROCEDURE — 74183 MRI ABD W/O CNTR FLWD CNTR: CPT

## 2021-07-11 PROCEDURE — A9581 GADOXETATE DISODIUM INJ: HCPCS | Performed by: RADIOLOGY

## 2021-07-11 RX ADMIN — GADOXETATE DISODIUM 10 ML: 181.43 INJECTION, SOLUTION INTRAVENOUS at 10:52

## 2021-07-21 ENCOUNTER — TELEPHONE (OUTPATIENT)
Dept: INTERNAL MEDICINE CLINIC | Age: 76
End: 2021-07-21

## 2021-07-21 DIAGNOSIS — N30.00 ACUTE CYSTITIS WITHOUT HEMATURIA: Primary | ICD-10-CM

## 2021-07-21 NOTE — TELEPHONE ENCOUNTER
Patient thinks she has a uti eants to know if she can leave a urine at the office or one of the other labs please advise.

## 2021-07-22 LAB
BILIRUBIN URINE: NEGATIVE
BLOOD, URINE: ABNORMAL
CLARITY: ABNORMAL
COLOR: YELLOW
EPITHELIAL CELLS, UA: 0 /HPF (ref 0–5)
GLUCOSE URINE: NEGATIVE MG/DL
HYALINE CASTS: 2 /LPF (ref 0–8)
KETONES, URINE: NEGATIVE MG/DL
LEUKOCYTE ESTERASE, URINE: ABNORMAL
MICROSCOPIC EXAMINATION: YES
NITRITE, URINE: NEGATIVE
PH UA: 7.5 (ref 5–8)
PROTEIN UA: NEGATIVE MG/DL
RBC UA: 2 /HPF (ref 0–4)
SPECIFIC GRAVITY UA: 1.01 (ref 1–1.03)
URINE TYPE: ABNORMAL
UROBILINOGEN, URINE: 0.2 E.U./DL
WBC UA: 45 /HPF (ref 0–5)

## 2021-07-24 LAB
ORGANISM: ABNORMAL
URINE CULTURE, ROUTINE: ABNORMAL

## 2021-07-26 DIAGNOSIS — N30.00 ACUTE CYSTITIS WITHOUT HEMATURIA: Primary | ICD-10-CM

## 2021-07-27 ENCOUNTER — HOSPITAL ENCOUNTER (INPATIENT)
Age: 76
LOS: 3 days | Discharge: HOME | DRG: 641 | End: 2021-07-30
Attending: EMERGENCY MEDICINE | Admitting: INTERNAL MEDICINE
Payer: MEDICARE

## 2021-07-27 DIAGNOSIS — N30.00 ACUTE CYSTITIS WITHOUT HEMATURIA: Primary | ICD-10-CM

## 2021-07-27 DIAGNOSIS — E87.1 HYPONATREMIA: ICD-10-CM

## 2021-07-27 LAB
ANION GAP SERPL CALCULATED.3IONS-SCNC: 11 MMOL/L (ref 3–16)
BACTERIA: ABNORMAL /HPF
BASOPHILS ABSOLUTE: 0 K/UL (ref 0–0.2)
BASOPHILS RELATIVE PERCENT: 0.3 %
BILIRUBIN URINE: NEGATIVE
BLOOD, URINE: ABNORMAL
BUN BLDV-MCNC: 16 MG/DL (ref 7–20)
CALCIUM SERPL-MCNC: 8.8 MG/DL (ref 8.3–10.6)
CHLORIDE BLD-SCNC: 85 MMOL/L (ref 99–110)
CLARITY: CLEAR
CO2: 25 MMOL/L (ref 21–32)
COLOR: YELLOW
CREAT SERPL-MCNC: 0.8 MG/DL (ref 0.6–1.2)
EOSINOPHILS ABSOLUTE: 0 K/UL (ref 0–0.6)
EOSINOPHILS RELATIVE PERCENT: 0.1 %
EPITHELIAL CELLS, UA: ABNORMAL /HPF (ref 0–5)
GFR AFRICAN AMERICAN: >60
GFR NON-AFRICAN AMERICAN: >60
GLUCOSE BLD-MCNC: 125 MG/DL (ref 70–99)
GLUCOSE URINE: NEGATIVE MG/DL
HCT VFR BLD CALC: 34.3 % (ref 36–48)
HEMOGLOBIN: 11.4 G/DL (ref 12–16)
KETONES, URINE: NEGATIVE MG/DL
LACTIC ACID: 2.1 MMOL/L (ref 0.4–2)
LEUKOCYTE ESTERASE, URINE: ABNORMAL
LYMPHOCYTES ABSOLUTE: 0.7 K/UL (ref 1–5.1)
LYMPHOCYTES RELATIVE PERCENT: 6.8 %
MCH RBC QN AUTO: 25 PG (ref 26–34)
MCHC RBC AUTO-ENTMCNC: 33.3 G/DL (ref 31–36)
MCV RBC AUTO: 75 FL (ref 80–100)
MICROSCOPIC EXAMINATION: YES
MONOCYTES ABSOLUTE: 1.1 K/UL (ref 0–1.3)
MONOCYTES RELATIVE PERCENT: 10 %
NEUTROPHILS ABSOLUTE: 9 K/UL (ref 1.7–7.7)
NEUTROPHILS RELATIVE PERCENT: 82.8 %
NITRITE, URINE: NEGATIVE
OSMOLALITY: 261 MOSM/KG (ref 278–305)
PDW BLD-RTO: 13.5 % (ref 12.4–15.4)
PH UA: 6 (ref 5–8)
PLATELET # BLD: 186 K/UL (ref 135–450)
PMV BLD AUTO: 8.1 FL (ref 5–10.5)
POTASSIUM REFLEX MAGNESIUM: 4.9 MMOL/L (ref 3.5–5.1)
PROTEIN UA: 100 MG/DL
RBC # BLD: 4.58 M/UL (ref 4–5.2)
RBC UA: ABNORMAL /HPF (ref 0–4)
RENAL EPITHELIAL, UA: ABNORMAL /HPF (ref 0–1)
SODIUM BLD-SCNC: 121 MMOL/L (ref 136–145)
SPECIFIC GRAVITY UA: >=1.03 (ref 1–1.03)
URINE REFLEX TO CULTURE: YES
URINE TYPE: ABNORMAL
UROBILINOGEN, URINE: 1 E.U./DL
WBC # BLD: 10.8 K/UL (ref 4–11)
WBC UA: >100 /HPF (ref 0–5)

## 2021-07-27 PROCEDURE — 2580000003 HC RX 258: Performed by: INTERNAL MEDICINE

## 2021-07-27 PROCEDURE — 81001 URINALYSIS AUTO W/SCOPE: CPT

## 2021-07-27 PROCEDURE — 2580000003 HC RX 258: Performed by: EMERGENCY MEDICINE

## 2021-07-27 PROCEDURE — 1200000000 HC SEMI PRIVATE

## 2021-07-27 PROCEDURE — 36415 COLL VENOUS BLD VENIPUNCTURE: CPT

## 2021-07-27 PROCEDURE — 83605 ASSAY OF LACTIC ACID: CPT

## 2021-07-27 PROCEDURE — 99283 EMERGENCY DEPT VISIT LOW MDM: CPT

## 2021-07-27 PROCEDURE — 87086 URINE CULTURE/COLONY COUNT: CPT

## 2021-07-27 PROCEDURE — 80048 BASIC METABOLIC PNL TOTAL CA: CPT

## 2021-07-27 PROCEDURE — 85025 COMPLETE CBC W/AUTO DIFF WBC: CPT

## 2021-07-27 PROCEDURE — 6370000000 HC RX 637 (ALT 250 FOR IP): Performed by: EMERGENCY MEDICINE

## 2021-07-27 PROCEDURE — 83930 ASSAY OF BLOOD OSMOLALITY: CPT

## 2021-07-27 PROCEDURE — 6360000002 HC RX W HCPCS: Performed by: EMERGENCY MEDICINE

## 2021-07-27 RX ORDER — SODIUM CHLORIDE, SODIUM LACTATE, POTASSIUM CHLORIDE, CALCIUM CHLORIDE 600; 310; 30; 20 MG/100ML; MG/100ML; MG/100ML; MG/100ML
1000 INJECTION, SOLUTION INTRAVENOUS ONCE
Status: COMPLETED | OUTPATIENT
Start: 2021-07-27 | End: 2021-07-27

## 2021-07-27 RX ORDER — CIPROFLOXACIN 2 MG/ML
400 INJECTION, SOLUTION INTRAVENOUS ONCE
Status: COMPLETED | OUTPATIENT
Start: 2021-07-27 | End: 2021-07-27

## 2021-07-27 RX ORDER — SODIUM CHLORIDE 9 MG/ML
INJECTION, SOLUTION INTRAVENOUS CONTINUOUS
Status: DISCONTINUED | OUTPATIENT
Start: 2021-07-27 | End: 2021-07-30

## 2021-07-27 RX ORDER — ACETAMINOPHEN 325 MG/1
650 TABLET ORAL EVERY 6 HOURS PRN
Status: DISCONTINUED | OUTPATIENT
Start: 2021-07-27 | End: 2021-07-30 | Stop reason: HOSPADM

## 2021-07-27 RX ORDER — ASPIRIN 81 MG/1
81 TABLET ORAL DAILY
Status: DISCONTINUED | OUTPATIENT
Start: 2021-07-28 | End: 2021-07-30 | Stop reason: HOSPADM

## 2021-07-27 RX ORDER — SODIUM CHLORIDE 0.9 % (FLUSH) 0.9 %
5-40 SYRINGE (ML) INJECTION EVERY 12 HOURS SCHEDULED
Status: DISCONTINUED | OUTPATIENT
Start: 2021-07-27 | End: 2021-07-30 | Stop reason: HOSPADM

## 2021-07-27 RX ORDER — ATORVASTATIN CALCIUM 10 MG/1
10 TABLET, FILM COATED ORAL DAILY
Status: DISCONTINUED | OUTPATIENT
Start: 2021-07-28 | End: 2021-07-30 | Stop reason: HOSPADM

## 2021-07-27 RX ORDER — PANTOPRAZOLE SODIUM 40 MG/1
40 TABLET, DELAYED RELEASE ORAL
Status: DISCONTINUED | OUTPATIENT
Start: 2021-07-28 | End: 2021-07-30 | Stop reason: HOSPADM

## 2021-07-27 RX ORDER — BUDESONIDE AND FORMOTEROL FUMARATE DIHYDRATE 160; 4.5 UG/1; UG/1
1 AEROSOL RESPIRATORY (INHALATION) 2 TIMES DAILY
Status: DISCONTINUED | OUTPATIENT
Start: 2021-07-27 | End: 2021-07-28 | Stop reason: CLARIF

## 2021-07-27 RX ORDER — ALBUTEROL SULFATE 2.5 MG/3ML
2.5 SOLUTION RESPIRATORY (INHALATION) EVERY 4 HOURS PRN
Status: DISCONTINUED | OUTPATIENT
Start: 2021-07-27 | End: 2021-07-28

## 2021-07-27 RX ORDER — SODIUM CHLORIDE 9 MG/ML
25 INJECTION, SOLUTION INTRAVENOUS PRN
Status: DISCONTINUED | OUTPATIENT
Start: 2021-07-27 | End: 2021-07-30 | Stop reason: HOSPADM

## 2021-07-27 RX ORDER — SODIUM CHLORIDE 0.9 % (FLUSH) 0.9 %
5-40 SYRINGE (ML) INJECTION PRN
Status: DISCONTINUED | OUTPATIENT
Start: 2021-07-27 | End: 2021-07-30 | Stop reason: HOSPADM

## 2021-07-27 RX ORDER — ACETAMINOPHEN 650 MG/1
650 SUPPOSITORY RECTAL EVERY 6 HOURS PRN
Status: DISCONTINUED | OUTPATIENT
Start: 2021-07-27 | End: 2021-07-30 | Stop reason: HOSPADM

## 2021-07-27 RX ORDER — POLYETHYLENE GLYCOL 3350 17 G/17G
17 POWDER, FOR SOLUTION ORAL DAILY PRN
Status: DISCONTINUED | OUTPATIENT
Start: 2021-07-27 | End: 2021-07-30 | Stop reason: HOSPADM

## 2021-07-27 RX ORDER — FOLIC ACID 1 MG/1
1 TABLET ORAL DAILY
Status: DISCONTINUED | OUTPATIENT
Start: 2021-07-28 | End: 2021-07-27

## 2021-07-27 RX ORDER — ONDANSETRON 2 MG/ML
4 INJECTION INTRAMUSCULAR; INTRAVENOUS EVERY 6 HOURS PRN
Status: DISCONTINUED | OUTPATIENT
Start: 2021-07-27 | End: 2021-07-30 | Stop reason: HOSPADM

## 2021-07-27 RX ORDER — ONDANSETRON 4 MG/1
4 TABLET, ORALLY DISINTEGRATING ORAL EVERY 8 HOURS PRN
Status: DISCONTINUED | OUTPATIENT
Start: 2021-07-27 | End: 2021-07-30 | Stop reason: HOSPADM

## 2021-07-27 RX ORDER — ACETAMINOPHEN 325 MG/1
650 TABLET ORAL ONCE
Status: COMPLETED | OUTPATIENT
Start: 2021-07-27 | End: 2021-07-27

## 2021-07-27 RX ADMIN — SODIUM CHLORIDE: 9 INJECTION, SOLUTION INTRAVENOUS at 23:06

## 2021-07-27 RX ADMIN — SODIUM CHLORIDE, POTASSIUM CHLORIDE, SODIUM LACTATE AND CALCIUM CHLORIDE 1000 ML: 600; 310; 30; 20 INJECTION, SOLUTION INTRAVENOUS at 19:42

## 2021-07-27 RX ADMIN — CIPROFLOXACIN 400 MG: 2 INJECTION, SOLUTION INTRAVENOUS at 19:10

## 2021-07-27 RX ADMIN — ACETAMINOPHEN 650 MG: 325 TABLET ORAL at 19:42

## 2021-07-27 ASSESSMENT — PAIN SCALES - GENERAL
PAINLEVEL_OUTOF10: 0
PAINLEVEL_OUTOF10: 3

## 2021-07-27 NOTE — ED NOTES
Bed: B19-19  Expected date:   Expected time:   Means of arrival:   Comments:  ABAD Olson RN  07/27/21 [id number]

## 2021-07-27 NOTE — ED PROVIDER NOTES
[address] [facility]          ATTENDING PHYSICIAN NOTE       Date of evaluation: 7/27/2021    Chief Complaint     Urinary Tract Infection (fever 100.5; ohc pt; known uti) and Dehydration      History of Present Illness     Nayana Cristina is a 77 y.o. female with pmh of R lung cancer, not on chemotherapy frequent UTIs with +urine culture 7/22 pan-sensititve who presents with fatigue, confusion. Patient has had UTIs in the past and started to have symptoms about 8 days ago on 720 notified her physician had a positive urinalysis there was pansensitive E. coli. She obtained antibiotics today ciprofloxacin took 1 but has had intermittent confusion some mild disorientation per family and dehydration. No nausea no vomiting or any other symptoms. Review of Systems     Review of Systems  A full 10 point review of systems was obtained. Pertinent positives and negatives as documented in the HPI, otherwise all other systems were reviewed and were negative. Past Medical, Surgical, Family, and Social History     She has a past medical history of Arthritis, Cancer ([address] Hyperlipidemia, and Hypertension. She has a past surgical history that includes Tubal ligation; bronchoscopy (03/07/2017); and other surgical history (Left, 03/29/2017). Her family history is not on file. She reports that she quit smoking about 9 years ago. She smoked 0.00 packs per day for 0.00 years. She has never used smokeless tobacco. She reports current alcohol use. She reports that she does not use drugs.     Medications     Previous Medications    ALBUTEROL SULFATE  (90 BASE) MCG/ACT INHALER    INHALE TWO PUFFS BY MOUTH EVERY 6 HOURS AS NEEDED FOR WHEEZING    AMLODIPINE (NORVASC) 5 MG TABLET    Take 5 mg by mouth daily    ASPIRIN ADULT LOW STRENGTH 81 MG EC TABLET    TAKE ONE TABLET BY MOUTH DAILY    ATORVASTATIN (LIPITOR) 10 MG TABLET    TAKE ONE TABLET BY MOUTH DAILY    BUDESONIDE-FORMOTEROL (SYMBICORT) 160-4.5 MCG/ACT AERO    INHALE TWO PUFFS BY MOUTH TWICE A DAY    FOLIC ACID (FOLVITE) 1 MG TABLET    Take 1 tablet by mouth daily    HYDROCHLOROTHIAZIDE (HYDRODIURIL) 25 MG TABLET    TAKE ONE TABLET BY MOUTH DAILY    IBUPROFEN (ADVIL;MOTRIN) 800 MG TABLET    Take 1 tablet by mouth 2 times daily as needed for Pain    MEKINIST 2 MG TABS        METOPROLOL SUCCINATE (TOPROL XL) 25 MG EXTENDED RELEASE TABLET    Take 2 tablets by mouth daily    OMEPRAZOLE (PRILOSEC) 40 MG DELAYED RELEASE CAPSULE    TAKE ONE CAPSULE BY MOUTH EVERY MORNING BEFORE BREAKFAST    TAFINLAR 75 MG CAPS           Allergies     She is allergic to codeine and penicillins.     Physical Exam     INITIAL VITALS: BP: 119/78, Temp: 100.5 °F (38.1 °C), Pulse: 95, Resp: 16, SpO2: 94 %   Physical Exam  General: Well appearing in NAD,  HEENT:  head is atraumatic, sclera are clear, oropharynx is nonerythematous, mucus membranes are dry  Neck: Trachea midline  Chest: Nonlabored respirations, clear to auscultation bilaterally  Cardiovascular: Regular rate and rhythm, 2+ radial pulses bilaterally  Abdominal: Nondistended abdomen, soft, nontender without rebound or gaurding  Skin: Warm, dry well perfused, no rashes  Extremities: no obvious deformities, no tenderness to palpation diffusely  Neurologic:  Alert and oriented, speech is clear and intact without dysarthria, gait is intact  Psychologic: appropriate mood and affect  Diagnostic Results     EKG       RADIOLOGY:  No orders to display       LABS:   Results for orders placed or performed during the hospital encounter of 07/27/21   CBC Auto Differential   Result Value Ref Range    WBC 10.8 4.0 - 11.0 K/uL    RBC 4.58 4.00 - 5.20 M/uL    Hemoglobin 11.4 (L) 12.0 - 16.0 g/dL    Hematocrit 34.3 (L) 36.0 - 48.0 %    MCV 75.0 (L) 80.0 - 100.0 fL    MCH 25.0 (L) 26.0 - 34.0 pg    MCHC 33.3 31.0 - 36.0 g/dL    RDW 13.5 12.4 - 15.4 %    Platelets 933 570 - 505 K/uL    MPV 8.1 5.0 - 10.5 fL    Neutrophils % 82.8 %    Lymphocytes % 6.8 %    Monocytes % 10.0 %    Eosinophils % 0.1 %    Basophils % 0.3 %    Neutrophils Absolute 9.0 (H) 1.7 - 7.7 K/uL    Lymphocytes Absolute 0.7 (L) 1.0 - 5.1 K/uL    Monocytes Absolute 1.1 0.0 - 1.3 K/uL    Eosinophils Absolute 0.0 0.0 - 0.6 K/uL    Basophils Absolute 0.0 0.0 - 0.2 K/uL   Basic Metabolic Panel w/ Reflex to MG   Result Value Ref Range    Sodium 121 (L) 136 - 145 mmol/L    Potassium reflex Magnesium 4.9 3.5 - 5.1 mmol/L    Chloride 85 (L) 99 - 110 mmol/L    CO2 25 21 - 32 mmol/L    Anion Gap 11 3 - 16    Glucose 125 (H) 70 - 99 mg/dL    BUN 16 7 - 20 mg/dL    CREATININE 0.8 0.6 - 1.2 mg/dL    GFR Non-African American >60 >60    GFR African American >60 >60    Calcium 8.8 8.3 - 10.6 mg/dL   Urinalysis Reflex to Culture    Specimen: Urine, clean catch   Result Value Ref Range    Color, UA Yellow Straw/Yellow    Clarity, UA Clear Clear    Glucose, Ur Negative Negative mg/dL    Bilirubin Urine Negative Negative    Ketones, Urine Negative Negative mg/dL    Specific Gravity, UA >=1.030 1.005 - 1.030    Blood, Urine SMALL (A) Negative    pH, UA 6.0 5.0 - 8.0    Protein,  (A) Negative mg/dL    Urobilinogen, Urine 1.0 <2.0 E.U./dL    Nitrite, Urine Negative Negative    Leukocyte Esterase, Urine MODERATE (A) Negative    Microscopic Examination YES     Urine Type NotGiven     Urine Reflex to Culture Yes    Microscopic Urinalysis   Result Value Ref Range    WBC, UA >100 (A) 0 - 5 /HPF    RBC, UA see below (A) 0 - 4 /HPF    Epithelial Cells, UA 11-20 (A) 0 - 5 /HPF    Renal Epithelial, UA 2-5 (A) 0 - 1 /HPF    Bacteria, UA 3+ (A) None Seen /HPF       ED BEDSIDE ULTRASOUND:      RECENT VITALS:  BP: 119/78,Temp: 100.5 °F (38.1 °C), Pulse: 95, Resp: 16, SpO2: 94 %     Procedures       ED Course     Nursing Notes, Past Medical Hx, Past Surgical Hx, Social Hx,Allergies, and Family Hx were reviewed.     patient was given the following medications:  Orders Placed This Encounter Medications    lactated ringers infusion 1,000 mL    ciprofloxacin (CIPRO) IVPB 400 mg     Order Specific Question:   Antimicrobial Indications     Answer:   Urinary Tract Infection    acetaminophen (TYLENOL) tablet 650 mg       CONSULTS:  IP CONSULT TO HOSPITALIST    MEDICAL DECISIONMAKING / ASSESSMENT / Edwin Tomasa is a 77 y.o. female who presents today with previous UTI dehydration and intermittent confusion. For me she is alert she is answering questions. She does appear to have dry mucous membranes. She was given ciprofloxacin here, has a penicillin allergy and E. coli was sensitive to ciprofloxacin on culture. Laboratory evaluation showed sodium of 121. I believe she is volume down but will obtain studies. She did receive 1 L of lactated Ringer's per sepsis protocol on arrival, will hold on any fluid resuscitation given her hyponatremia at this time. She at this point time will be admitted for hyponatremia and UTI. Clinical Impression     1. Acute cystitis without hematuria    2. Hyponatremia        Disposition     PATIENT REFERRED TO:  No follow-up provider specified.     DISCHARGE MEDICATIONS:  New Prescriptions    No medications on file       DISPOSITION Decision To Admit 07/27/2021 08:03:51 PM       Jassi Ortega MD  07/27/21 2012

## 2021-07-28 ENCOUNTER — APPOINTMENT (OUTPATIENT)
Dept: GENERAL RADIOLOGY | Age: 76
DRG: 641 | End: 2021-07-28
Payer: MEDICARE

## 2021-07-28 LAB
A/G RATIO: 0.8 (ref 1.1–2.2)
ALBUMIN SERPL-MCNC: 2.9 G/DL (ref 3.4–5)
ALBUMIN SERPL-MCNC: 2.9 G/DL (ref 3.4–5)
ALP BLD-CCNC: 76 U/L (ref 40–129)
ALT SERPL-CCNC: 11 U/L (ref 10–40)
ANION GAP SERPL CALCULATED.3IONS-SCNC: 10 MMOL/L (ref 3–16)
ANION GAP SERPL CALCULATED.3IONS-SCNC: 8 MMOL/L (ref 3–16)
AST SERPL-CCNC: 26 U/L (ref 15–37)
BASOPHILS ABSOLUTE: 0 K/UL (ref 0–0.2)
BASOPHILS RELATIVE PERCENT: 0.3 %
BILIRUB SERPL-MCNC: 0.3 MG/DL (ref 0–1)
BUN BLDV-MCNC: 11 MG/DL (ref 7–20)
BUN BLDV-MCNC: 13 MG/DL (ref 7–20)
BUN BLDV-MCNC: 8 MG/DL (ref 7–20)
BUN BLDV-MCNC: 9 MG/DL (ref 7–20)
C-REACTIVE PROTEIN: 100.6 MG/L (ref 0–5.1)
CALCIUM SERPL-MCNC: 8.4 MG/DL (ref 8.3–10.6)
CALCIUM SERPL-MCNC: 8.4 MG/DL (ref 8.3–10.6)
CALCIUM SERPL-MCNC: 9 MG/DL (ref 8.3–10.6)
CALCIUM SERPL-MCNC: 9 MG/DL (ref 8.3–10.6)
CHLORIDE BLD-SCNC: 89 MMOL/L (ref 99–110)
CHLORIDE BLD-SCNC: 89 MMOL/L (ref 99–110)
CHLORIDE BLD-SCNC: 90 MMOL/L (ref 99–110)
CHLORIDE BLD-SCNC: 91 MMOL/L (ref 99–110)
CO2: 24 MMOL/L (ref 21–32)
CO2: 25 MMOL/L (ref 21–32)
CO2: 27 MMOL/L (ref 21–32)
CO2: 29 MMOL/L (ref 21–32)
CORTISOL TOTAL: 20.4 UG/DL
CREAT SERPL-MCNC: 0.6 MG/DL (ref 0.6–1.2)
CREAT SERPL-MCNC: 0.7 MG/DL (ref 0.6–1.2)
CREATININE URINE: 40 MG/DL (ref 28–259)
EOSINOPHILS ABSOLUTE: 0 K/UL (ref 0–0.6)
EOSINOPHILS RELATIVE PERCENT: 0.1 %
GFR AFRICAN AMERICAN: >60
GFR NON-AFRICAN AMERICAN: >60
GLOBULIN: 3.6 G/DL
GLUCOSE BLD-MCNC: 126 MG/DL (ref 70–99)
GLUCOSE BLD-MCNC: 136 MG/DL (ref 70–99)
GLUCOSE BLD-MCNC: 166 MG/DL (ref 70–99)
GLUCOSE BLD-MCNC: 172 MG/DL (ref 70–99)
HCT VFR BLD CALC: 29.9 % (ref 36–48)
HCT VFR BLD CALC: 32.6 % (ref 36–48)
HEMOGLOBIN: 10.6 G/DL (ref 12–16)
HEMOGLOBIN: 9.8 G/DL (ref 12–16)
LYMPHOCYTES ABSOLUTE: 0.3 K/UL (ref 1–5.1)
LYMPHOCYTES RELATIVE PERCENT: 8 %
MAGNESIUM: 1.4 MG/DL (ref 1.8–2.4)
MAGNESIUM: 1.6 MG/DL (ref 1.8–2.4)
MAGNESIUM: 1.7 MG/DL (ref 1.8–2.4)
MCH RBC QN AUTO: 24.7 PG (ref 26–34)
MCH RBC QN AUTO: 24.7 PG (ref 26–34)
MCHC RBC AUTO-ENTMCNC: 32.7 G/DL (ref 31–36)
MCHC RBC AUTO-ENTMCNC: 32.9 G/DL (ref 31–36)
MCV RBC AUTO: 75 FL (ref 80–100)
MCV RBC AUTO: 75.5 FL (ref 80–100)
MONOCYTES ABSOLUTE: 0.6 K/UL (ref 0–1.3)
MONOCYTES RELATIVE PERCENT: 15.1 %
NEUTROPHILS ABSOLUTE: 3.1 K/UL (ref 1.7–7.7)
NEUTROPHILS RELATIVE PERCENT: 76.5 %
OSMOLALITY URINE: 302 MOSM/KG (ref 390–1070)
PDW BLD-RTO: 13.3 % (ref 12.4–15.4)
PDW BLD-RTO: 13.5 % (ref 12.4–15.4)
PHOSPHORUS: 1.2 MG/DL (ref 2.5–4.9)
PLATELET # BLD: 134 K/UL (ref 135–450)
PLATELET # BLD: 138 K/UL (ref 135–450)
PMV BLD AUTO: 8 FL (ref 5–10.5)
PMV BLD AUTO: 8.2 FL (ref 5–10.5)
POTASSIUM REFLEX MAGNESIUM: 2.9 MMOL/L (ref 3.5–5.1)
POTASSIUM REFLEX MAGNESIUM: 3 MMOL/L (ref 3.5–5.1)
POTASSIUM REFLEX MAGNESIUM: 3.3 MMOL/L (ref 3.5–5.1)
POTASSIUM SERPL-SCNC: 3.4 MMOL/L (ref 3.5–5.1)
PROCALCITONIN: 0.18 NG/ML (ref 0–0.15)
RBC # BLD: 3.99 M/UL (ref 4–5.2)
RBC # BLD: 4.31 M/UL (ref 4–5.2)
SEDIMENTATION RATE, ERYTHROCYTE: 51 MM/HR (ref 0–30)
SODIUM BLD-SCNC: 124 MMOL/L (ref 136–145)
SODIUM BLD-SCNC: 124 MMOL/L (ref 136–145)
SODIUM BLD-SCNC: 125 MMOL/L (ref 136–145)
SODIUM BLD-SCNC: 126 MMOL/L (ref 136–145)
SODIUM URINE: 45 MMOL/L
TOTAL PROTEIN: 6.5 G/DL (ref 6.4–8.2)
URIC ACID, SERUM: 4.7 MG/DL (ref 2.6–6)
URINE CULTURE, ROUTINE: NORMAL
WBC # BLD: 4.1 K/UL (ref 4–11)
WBC # BLD: 5.7 K/UL (ref 4–11)

## 2021-07-28 PROCEDURE — 84295 ASSAY OF SERUM SODIUM: CPT

## 2021-07-28 PROCEDURE — 36415 COLL VENOUS BLD VENIPUNCTURE: CPT

## 2021-07-28 PROCEDURE — 2700000000 HC OXYGEN THERAPY PER DAY

## 2021-07-28 PROCEDURE — 84145 PROCALCITONIN (PCT): CPT

## 2021-07-28 PROCEDURE — 83735 ASSAY OF MAGNESIUM: CPT

## 2021-07-28 PROCEDURE — 2580000003 HC RX 258: Performed by: INTERNAL MEDICINE

## 2021-07-28 PROCEDURE — 6360000002 HC RX W HCPCS: Performed by: INTERNAL MEDICINE

## 2021-07-28 PROCEDURE — 83935 ASSAY OF URINE OSMOLALITY: CPT

## 2021-07-28 PROCEDURE — 85025 COMPLETE CBC W/AUTO DIFF WBC: CPT

## 2021-07-28 PROCEDURE — 84550 ASSAY OF BLOOD/URIC ACID: CPT

## 2021-07-28 PROCEDURE — 6370000000 HC RX 637 (ALT 250 FOR IP): Performed by: INTERNAL MEDICINE

## 2021-07-28 PROCEDURE — 85652 RBC SED RATE AUTOMATED: CPT

## 2021-07-28 PROCEDURE — 82533 TOTAL CORTISOL: CPT

## 2021-07-28 PROCEDURE — 84300 ASSAY OF URINE SODIUM: CPT

## 2021-07-28 PROCEDURE — 85027 COMPLETE CBC AUTOMATED: CPT

## 2021-07-28 PROCEDURE — 94664 DEMO&/EVAL PT USE INHALER: CPT

## 2021-07-28 PROCEDURE — 82570 ASSAY OF URINE CREATININE: CPT

## 2021-07-28 PROCEDURE — 86140 C-REACTIVE PROTEIN: CPT

## 2021-07-28 PROCEDURE — 80053 COMPREHEN METABOLIC PANEL: CPT

## 2021-07-28 PROCEDURE — 94640 AIRWAY INHALATION TREATMENT: CPT

## 2021-07-28 PROCEDURE — 94761 N-INVAS EAR/PLS OXIMETRY MLT: CPT

## 2021-07-28 PROCEDURE — 84443 ASSAY THYROID STIM HORMONE: CPT

## 2021-07-28 PROCEDURE — 71045 X-RAY EXAM CHEST 1 VIEW: CPT

## 2021-07-28 PROCEDURE — 1200000000 HC SEMI PRIVATE

## 2021-07-28 RX ORDER — CIPROFLOXACIN 2 MG/ML
400 INJECTION, SOLUTION INTRAVENOUS EVERY 12 HOURS
Status: DISCONTINUED | OUTPATIENT
Start: 2021-07-28 | End: 2021-07-30 | Stop reason: HOSPADM

## 2021-07-28 RX ORDER — POTASSIUM CHLORIDE 20 MEQ/1
40 TABLET, EXTENDED RELEASE ORAL ONCE
Status: COMPLETED | OUTPATIENT
Start: 2021-07-28 | End: 2021-07-28

## 2021-07-28 RX ORDER — BUDESONIDE 0.5 MG/2ML
0.5 INHALANT ORAL 2 TIMES DAILY
Status: DISCONTINUED | OUTPATIENT
Start: 2021-07-28 | End: 2021-07-30 | Stop reason: HOSPADM

## 2021-07-28 RX ORDER — ARFORMOTEROL TARTRATE 15 UG/2ML
15 SOLUTION RESPIRATORY (INHALATION) 2 TIMES DAILY
Status: DISCONTINUED | OUTPATIENT
Start: 2021-07-28 | End: 2021-07-30 | Stop reason: HOSPADM

## 2021-07-28 RX ORDER — ALBUTEROL SULFATE 2.5 MG/3ML
2.5 SOLUTION RESPIRATORY (INHALATION)
Status: DISCONTINUED | OUTPATIENT
Start: 2021-07-28 | End: 2021-07-30 | Stop reason: HOSPADM

## 2021-07-28 RX ORDER — MAGNESIUM SULFATE IN WATER 40 MG/ML
2000 INJECTION, SOLUTION INTRAVENOUS ONCE
Status: COMPLETED | OUTPATIENT
Start: 2021-07-28 | End: 2021-07-28

## 2021-07-28 RX ORDER — POTASSIUM CHLORIDE 7.45 MG/ML
10 INJECTION INTRAVENOUS
Status: DISPENSED | OUTPATIENT
Start: 2021-07-28 | End: 2021-07-28

## 2021-07-28 RX ADMIN — BUDESONIDE 500 MCG: 0.5 SUSPENSION RESPIRATORY (INHALATION) at 21:31

## 2021-07-28 RX ADMIN — ACETAMINOPHEN 650 MG: 325 TABLET ORAL at 19:30

## 2021-07-28 RX ADMIN — POTASSIUM CHLORIDE 10 MEQ: 10 INJECTION, SOLUTION INTRAVENOUS at 06:54

## 2021-07-28 RX ADMIN — BUDESONIDE 500 MCG: 0.5 SUSPENSION RESPIRATORY (INHALATION) at 07:53

## 2021-07-28 RX ADMIN — POTASSIUM CHLORIDE 10 MEQ: 10 INJECTION, SOLUTION INTRAVENOUS at 08:37

## 2021-07-28 RX ADMIN — ATORVASTATIN CALCIUM 10 MG: 10 TABLET, FILM COATED ORAL at 08:37

## 2021-07-28 RX ADMIN — PANTOPRAZOLE SODIUM 40 MG: 40 TABLET, DELAYED RELEASE ORAL at 05:29

## 2021-07-28 RX ADMIN — POTASSIUM CHLORIDE 40 MEQ: 1500 TABLET, EXTENDED RELEASE ORAL at 05:29

## 2021-07-28 RX ADMIN — SODIUM CHLORIDE: 9 INJECTION, SOLUTION INTRAVENOUS at 16:29

## 2021-07-28 RX ADMIN — ASPIRIN 81 MG: 81 TABLET, COATED ORAL at 08:37

## 2021-07-28 RX ADMIN — ENOXAPARIN SODIUM 40 MG: 40 INJECTION SUBCUTANEOUS at 08:37

## 2021-07-28 RX ADMIN — CIPROFLOXACIN 400 MG: 2 INJECTION, SOLUTION INTRAVENOUS at 20:46

## 2021-07-28 RX ADMIN — ARFORMOTEROL TARTRATE 15 MCG: 15 SOLUTION RESPIRATORY (INHALATION) at 07:53

## 2021-07-28 RX ADMIN — ARFORMOTEROL TARTRATE 15 MCG: 15 SOLUTION RESPIRATORY (INHALATION) at 21:26

## 2021-07-28 RX ADMIN — POTASSIUM CHLORIDE 10 MEQ: 10 INJECTION, SOLUTION INTRAVENOUS at 09:44

## 2021-07-28 RX ADMIN — POTASSIUM CHLORIDE 10 MEQ: 10 INJECTION, SOLUTION INTRAVENOUS at 10:59

## 2021-07-28 RX ADMIN — MAGNESIUM SULFATE HEPTAHYDRATE 2000 MG: 2 INJECTION, SOLUTION INTRAVENOUS at 05:29

## 2021-07-28 ASSESSMENT — PAIN SCALES - GENERAL
PAINLEVEL_OUTOF10: 0
PAINLEVEL_OUTOF10: 0

## 2021-07-28 NOTE — PROGRESS NOTES
Hospitalist Progress Note      PCP: Marta Ramos MD    Date of Admission: 7/27/2021    Chief Complaint: UTI, dehydration    Hospital Course: Admitted for hyponatremia, hypokalemia and UTI. Continue IV fluids. Holding diuretics. Nephrology following. Subjective: Patient states that she is feeling better. Alert and oriented x3. Following commands. Denies any complaints. Daughter at bedside, answered question. Daughter states that patient confusion has resolved. Medications:  Reviewed    Infusion Medications    sodium chloride      sodium chloride 50 mL/hr at 07/27/21 2306     Scheduled Medications    budesonide  0.5 mg Nebulization BID    And    Arformoterol Tartrate  15 mcg Nebulization BID    potassium chloride  10 mEq Intravenous Q1H    aspirin  81 mg Oral Daily    atorvastatin  10 mg Oral Daily    pantoprazole  40 mg Oral QAM AC    sodium chloride flush  5-40 mL Intravenous 2 times per day    enoxaparin  40 mg Subcutaneous Daily     PRN Meds: albuterol, sodium chloride flush, sodium chloride, ondansetron **OR** ondansetron, polyethylene glycol, acetaminophen **OR** acetaminophen      Intake/Output Summary (Last 24 hours) at 7/28/2021 0901  Last data filed at 7/28/2021 0859  Gross per 24 hour   Intake --   Output 1000 ml   Net -1000 ml       Physical Exam Performed:    /76   Pulse 97   Temp 99.2 °F (37.3 °C) (Oral)   Resp 16   Ht 5' 6\" (1.676 m)   Wt 167 lb 5.3 oz (75.9 kg)   SpO2 92%   BMI 27.01 kg/m²     General appearance: No apparent distress, appears stated age and cooperative. HEENT: Pupils equal, round, and reactive to light. Conjunctivae/corneas clear. Neck: Supple, with full range of motion. No jugular venous distention. Trachea midline. Respiratory:  Normal respiratory effort. Clear to auscultation, bilaterally without Rales/Wheezes/Rhonchi. Cardiovascular: Regular rate and rhythm with normal S1/S2 without murmurs, rubs or gallops.   Abdomen: Soft, non-tender, non-distended with normal bowel sounds. Musculoskeletal: No clubbing, cyanosis or edema bilaterally. Full range of motion without deformity. Skin: Skin color, texture, turgor normal.  No rashes or lesions. Neurologic:  Neurovascularly intact without any focal sensory/motor deficits. Cranial nerves: II-XII intact, grossly non-focal.  Psychiatric: Alert and oriented, thought content appropriate, normal insight  Capillary Refill: Brisk,< 3 seconds   Peripheral Pulses: +2 palpable, equal bilaterally       Labs:   Recent Labs     07/27/21 1921 07/28/21  0516   WBC 10.8 5.7   HGB 11.4* 10.6*   HCT 34.3* 32.6*    134*     Recent Labs     07/27/21 1921 07/28/21  0147 07/28/21  0516   * 126* 126*   K 4.9 3.0* 2.9*   CL 85* 89* 89*   CO2 25 27 29   BUN 16 13 11   CREATININE 0.8 0.7 0.6   CALCIUM 8.8 9.0 9.0     No results for input(s): AST, ALT, BILIDIR, BILITOT, ALKPHOS in the last 72 hours. No results for input(s): INR in the last 72 hours. No results for input(s): Tor Altes in the last 72 hours. Urinalysis:      Lab Results   Component Value Date    NITRU Negative 07/27/2021    WBCUA >100 07/27/2021    BACTERIA 3+ 07/27/2021    RBCUA see below 07/27/2021    BLOODU SMALL 07/27/2021    SPECGRAV >=1.030 07/27/2021    GLUCOSEU Negative 07/27/2021       Radiology:  No orders to display           Assessment/Plan:    Hypovolemic hyponatremia  Patient appeared dehydrated  Urine osmolality 302, urine sodium 45, urine creatinine 40  TSH pending  Cortisol and uric acid normal  Holding hydrochlorothiazide  Monitor sodium  Continue IV fluids, rate increase per nephrology  Nephrology following, appreciate recommendation    Hypokalemia:  Monitor potassium  Continue replace as needed    UTI  Penicillin allergic. On July 22 E. coli pansensitive  Continue Cipro    Hypertension:  Monitor blood pressure  Holding hydrochlorothiazide with concern for hyponatremia    History of lung cancer:   Follows with Dr. Evie Xavier  Oncology consulted       DVT Prophylaxis: Lovenox  Diet: ADULT DIET;  Regular  Code Status: DNR-CCA    PT/OT Eval Status: once able    Dispo - Pending electrolyte improvement, nephrology/oncology Tiesha Garland MD

## 2021-07-28 NOTE — RT PROTOCOL NOTE
RT Nebulizer Bronchodilator Protocol Note    There is a bronchodilator order in the chart from a provider indicating to follow the RT Bronchodilator Protocol and there is an Initiate RT Bronchodilator Protocol order as well (see protocol at bottom of note). The findings from the last RT Protocol Assessment were as follows:  Smoking: >15 Pack years  Surgical Status: No surgery  Xray: X-ray not available  Respiratory Pattern: RR 12-20  Mental Status: Alert and Oriented  Breath Sounds: Clear  Cough: Strong, spontaneous, non-productive  Activity Level: Mostly sedentary, minimal walking  Oxygen Requirement: Room Air - 2LNC/28% or home setting  Indication for Bronchodilator Therapy: On home bronchodilators  Bronchodilator Assessment Score: 2    Aerosolized bronchodilator medication orders have been revised according to the RT Bronchodilator Protocol. RT Bronchodilator Protocol:    Respiratory Therapist to perform RT Therapy Protocol Assessment then follow the protocol. No Indications - adjust the frequency to every 6 hours PRN wheezing or bronchospasm, if no treatments needed after 48 hours then discontinue using Per Protocol order mode. If indication present, adjust the RT bronchodilator orders based on the Bronchodilator Assessment Score as follows:    0-6 - enter or revise RT Bronchodilator order to Albuterol Nebulizer order with frequency of every 2 hours PRN for wheezing or increased work of breathing using Per Protocol order mode. Repeat RT Therapy Protocol Assessment as needed. 7-10 - discontinue any other Inpatient aerosolized bronchodilator medication orders and enter or revise two Albuterol Nebulizer orders, one with BID frequency and one with frequency of every 2 hours PRN wheezing or increased work of breathing using Per Protocol order mode. Repeat RT Therapy Protocol Assessment with second treatment then BID and as needed.       11-13 - discontinue any other Inpatient aerosolized bronchodilator medication orders and enter DuoNeb Nebulizer order with QID frequency and an Albuterol Nebulizer order with frequency of every 2 hours PRN wheezing or increased work of breathing using Per Protocol order mode. Repeat RT Therapy Protocol Assessment with second treatment then QID and as needed. Greater than 13 - discontinue any other Inpatient aerosolized bronchodilator medication orders and enter a DuoNeb Nebulizer order with every 4 hours frequency and an Albuterol Nebulizer order with frequency of every 2 hours PRN wheezing or increased work of breathing using Per Protocol order mode. Repeat RT Therapy Protocol Assessment with second treatment then every 4 hours and as needed. RT to enter RT Home Evaluation for COPD & MDI Assessment order using Per Protocol order mode.     Electronically signed by Moises Brown RCP on 7/28/2021 at 7:47 AM

## 2021-07-28 NOTE — H&P
HOSPITALISTS HISTORY AND PHYSICAL    7/27/2021 8:31 PM    Patient Information:  Samantha Frias is a 77 y.o. female [id number]  PCP:  Jil Daniels MD (Tel: [phone] )    Chief complaint:    Chief Complaint   Patient presents with    Urinary Tract Infection     fever 100.5; [facility] pt; known uti    Dehydration       Problem List  Hyponatremia  UTI  Mild metabolic encephalopathy resolving  History of lung cancer    Assessment/Plan:   Hyponatremia  Patient does appear dehydrated, urine studies osmolality urine electrolytes TSH cortisol, gentle hydration, nephrology consultation, monitor for any sudden increase, serial monitoring of sodium, patient is on hydrochlorothiazide will hold off on that    UTI  Pen allergic  Cipro  On July 22 E. coli pansensitive    History of lung cancer follows with Dr. Sabi Lainez, on [place], it can contribute to hyponatremia           DVT prophylaxis Lovenox  Code status full  Diet regular  IV access peripheral  Roberson Catheter no    Admit as inpatient. I anticipate hospitalization spanning more than two midnights for investigation and treatment of the above medically necessary diagnoses. History of Present Illness:  Hannah Hutton is a 77 y.o. female who presented with confusion. Patient lives with her grandson. Patient is here in the hospital with her daughter. Patient is able to give me good history. Patient does mention that she started having UTI symptoms last week she called her PCP but somehow she was not able to pick her antibiotics she picked her antibiotics yesterday and she took only 1 pill as of now. She does seem to have pansensitive E. coli. She is not eating and drinking well for couple of days. Her daughter felt that she was slightly confused which is better after the IV hydration in the ED. In the ED she has been given 500 cc of fluid.   Patient does have history of UTI in the past 2. She did mention that she did have an episode of loose stools. Patient does have a history of lung cancer follows with Dr. Eden Meyer. Patient is on Tafinlar at this time. History obtained from patient and going over the chart  Old medical records show PCP visit in May. REVIEW OF SYSTEMS:   All other ROS negative except mentioned in [address]      Past Medical History:   has a past medical history of Arthritis, Cancer ([address] Hyperlipidemia, and Hypertension. Past Surgical History:   has a past surgical history that includes Tubal ligation; bronchoscopy (03/07/2017); and other surgical history (Left, 03/29/2017). Medications:  No current facility-administered medications on file prior to encounter. Current Outpatient Medications on File Prior to Encounter   Medication Sig Dispense Refill    atorvastatin (LIPITOR) 10 MG tablet TAKE ONE TABLET BY MOUTH DAILY 90 tablet 2    hydroCHLOROthiazide (HYDRODIURIL) 25 MG tablet TAKE ONE TABLET BY MOUTH DAILY 90 tablet 3    budesonide-formoterol (SYMBICORT) 160-4.5 MCG/ACT AERO INHALE TWO PUFFS BY MOUTH TWICE A DAY 10.2 g 2    albuterol sulfate  (90 Base) MCG/ACT inhaler INHALE TWO PUFFS BY MOUTH EVERY 6 HOURS AS NEEDED FOR WHEEZING 18 g 2    metoprolol succinate (TOPROL XL) 25 MG extended release tablet Take 2 tablets by mouth daily 90 tablet 3    omeprazole (PRILOSEC) 40 MG delayed release capsule TAKE ONE CAPSULE BY MOUTH EVERY MORNING BEFORE BREAKFAST 30 capsule 4    amLODIPine (NORVASC) 5 MG tablet Take 5 mg by mouth daily      TAFINLAR 75 MG CAPS       MEKINIST 2 MG TABS       ibuprofen (ADVIL;MOTRIN) 800 MG tablet Take 1 tablet by mouth 2 times daily as needed for Pain 30 tablet 1    ASPIRIN ADULT LOW STRENGTH 81 MG EC tablet TAKE ONE TABLET BY MOUTH DAILY 30 tablet 5    folic acid (FOLVITE) 1 MG tablet Take 1 tablet by mouth daily 30 tablet 3       Allergies:   Allergies   Allergen Reactions    Codeine Itching and Other (See Comments)     LIP SWELLING    Penicillins Other (See Comments)     UNKNOWN        Social History:  Patient Lives with her grand son   reports that she quit smoking about 9 years ago. She smoked 0.00 packs per day for 0.00 years. She has never used smokeless tobacco. She reports current alcohol use. She reports that she does not use drugs. Family History:  family history is not on file. , No FH of cancer   Grand mother had heart disease     Physical Exam:  /78   Pulse 95   Temp 100.5 °F (38.1 °C) (Oral)   Resp 16   SpO2 94%   Oral mucosa is significantly dry  General appearance:  Appears comfortable. Well nourished  Eyes: Sclera clear, pupils equal  ENT: mucus membranes dry, no thrush. Trachea midline. Cardiovascular: Regular rhythm, normal S1, S2. No murmur, gallop, rub. No edema in lower extremities  Respiratory: Clear to auscultation bilaterally, no wheeze, good inspiratory effort  Gastrointestinal: Abdomen soft, non-tender, not distended, normal bowel sounds  Musculoskeletal: No cyanosis in digits, neck supple  Neurology: Cranial nerves grossly intact. Alert and oriented in time, place and person. No speech or motor deficits  Psychiatry: Appropriate affect.  Not agitated  Skin: Warm, dry, normal turgor, no rash  Brisk capillary refill, peripheral pulses palpable   Labs:  CBC:   Lab Results   Component Value Date    WBC 10.8 07/27/2021    RBC 4.58 07/27/2021    RBC 5.55 08/11/2017    HGB 11.4 07/27/2021    HCT 34.3 07/27/2021    MCV 75.0 07/27/2021    MCH 25.0 07/27/2021    MCHC 33.3 07/27/2021    RDW 13.5 07/27/2021     07/27/2021    MPV 8.1 07/27/2021     BMP:    Lab Results   Component Value Date     07/27/2021    K 4.9 07/27/2021    CL 85 07/27/2021    CO2 25 07/27/2021    BUN 16 07/27/2021    CREATININE 0.8 07/27/2021    CALCIUM 8.8 07/27/2021    GFRAA >60 07/27/2021    LABGLOM >60 07/27/2021    GLUCOSE 125 07/27/2021    GLUCOSE 97 06/29/2017     No orders to display

## 2021-07-28 NOTE — CONSULTS
77 y.o. female with PMHx of arthritis, hyperlipidemia, hypertension, CHFrEF and lung cancer is admitted to the hospital on 7/27/2021 with chief complain of confusion. Pt mentions that she started having dysuria and urinary frequency about a week ago. She called who prescribed her antibiotics. She was able to  her medication a day before her presentation to ED. However, she was having decrease in her appetite and oral intake. Her daughter felt that she slightly confused and brought her to the ED. Pt mentions that she had two previous episodes of UTI in the past year. In ED, In ED, T 100.5F, RR 16, HR 95, /78. Labs were significant for Na 121, lactic acid 2.1. UA was positive for moderate leukocyte esterase, WBC>100. Pt was given 1L of LR and started on NS 50 ml/hr. Pt was started on cipro for UTI.       ROS:     Seen with-     positives in bold   Constitutional:  fever, chills, weakness, weight change, fatigue  Skin:  rash, pruritus, hair loss, bruising, dry skin, petechiae  Head, Face, Neck   headaches, swelling,  cervical adenopathy  Respiratory: shortness of breath, cough, or wheezing  Cardiovascular: chest pain, palpitations, dizzy, edema  Gastrointestinal: nausea, vomiting, diarrhea, constipation,belly pain    Yellow skin, blood in stool  Musculoskeletal:  back pain, muscle weakness, gait problems,       joint pain or swelling. Genitourinary:  + dysuria, poor urine flow, flank pain, blood in urine  Neurologic:  vertigo, TIA'S, syncope, seizures, focal weakness  Psychosocial:  insomnia, anxiety, or depression.   Additional positive findings: Decrease oral intake, confusion                      All other remaining systems are negative or unable to obtain        PMH/PSH/SH/Family History:     Past Medical History:   Diagnosis Date    Arthritis     Cancer ([facility] [address]     lung     Hyperlipidemia     Hypertension        Past Surgical History:   Procedure Laterality Date    BRONCHOSCOPY  03/07/2017 brochoscopy-EBUS    OTHER SURGICAL HISTORY Left 03/29/2017    LEFT SUBCLAVIAN PORT- CATH INSERTION      TUBAL LIGATION          reports that she quit smoking about 9 years ago. She smoked 0.00 packs per day for 0.00 years. She has never used smokeless tobacco. She reports current alcohol use. She reports that she does not use drugs. family history is not on file.          Medication:     Current Facility-Administered Medications: budesonide (PULMICORT) nebulizer suspension 500 mcg, 0.5 mg, Nebulization, BID **AND** Arformoterol Tartrate (BROVANA) nebulizer solution 15 mcg, 15 mcg, Nebulization, BID  potassium chloride 10 mEq/100 mL IVPB (Peripheral Line), 10 mEq, Intravenous, Q1H  albuterol (PROVENTIL) nebulizer solution 2.5 mg, 2.5 mg, Nebulization, Q2H PRN  aspirin EC tablet 81 mg, 81 mg, Oral, Daily  atorvastatin (LIPITOR) tablet 10 mg, 10 mg, Oral, Daily  pantoprazole (PROTONIX) tablet 40 mg, 40 mg, Oral, QAM AC  sodium chloride flush 0.9 % injection 5-40 mL, 5-40 mL, Intravenous, 2 times per day  sodium chloride flush 0.9 % injection 5-40 mL, 5-40 mL, Intravenous, PRN  0.9 % sodium chloride infusion, 25 mL, Intravenous, PRN  enoxaparin (LOVENOX) injection 40 mg, 40 mg, Subcutaneous, Daily  ondansetron (ZOFRAN-ODT) disintegrating tablet 4 mg, 4 mg, Oral, Q8H PRN **OR** ondansetron (ZOFRAN) injection 4 mg, 4 mg, Intravenous, Q6H PRN  polyethylene glycol (GLYCOLAX) packet 17 g, 17 g, Oral, Daily PRN  acetaminophen (TYLENOL) tablet 650 mg, 650 mg, Oral, Q6H PRN **OR** acetaminophen (TYLENOL) suppository 650 mg, 650 mg, Rectal, Q6H PRN  0.9 % sodium chloride infusion, , Intravenous, Continuous       Vitals :     Vitals:    07/28/21 0813   BP: 116/76   Pulse: 97   Resp: 16   Temp: 99.2 °F (37.3 °C)   SpO2:        I & O :       Intake/Output Summary (Last 24 hours) at 7/28/2021 0853  Last data filed at 7/28/2021 0626  Gross per 24 hour   Intake --   Output 800 ml   Net -800 ml        Physical Examination : General appearance: Anxious- no, distressed- no, in good spirits- yes  HEENT: Lips- normal, teeth- ok , oral mucosa- moist  Neck : Mass- no, appears symmetrical, JVD- not visible  Respiratory: Respiratory effort-  wheeze- no, crackles -   Cardiovascular: Ausculation- No M/R/G, Edema   Abdomen: visible mass- no, distention- no, scar- no, tenderness- no                            hepatosplenomegaly-  no  Musculoskeletal:  clubbing no,cyanosis- no , digital ischemia- no                           muscle strength- grossly normal , tone - grossly normal  Skin: rashes- no , ulcers- no, induration- no, tightening - no  Psychiatric:  Judgement and insight- normal           AAO X 3  Additional finding:      LABS:     Recent Labs     07/27/21 1921 07/28/21  0516   WBC 10.8 5.7   HGB 11.4* 10.6*   HCT 34.3* 32.6*    134*     Recent Labs     07/27/21 1921 07/28/21  0147 07/28/21  0516   * 126* 126*   K 4.9 3.0* 2.9*   CL 85* 89* 89*   CO2 25 27 29   BUN 16 13 11   CREATININE 0.8 0.7 0.6   GLUCOSE 125* 136* 126*   MG  --  1.60* 1.40*          Patient was seen and examined and the case was discussed with the resident. He acted as my scribe. I agree with the assessment and plan.     Thanks  Nephrology  [address] [facility], [address] [place] [address]  Office: [phone]  Cell: [phone]  Fax: [phone]

## 2021-07-28 NOTE — PROGRESS NOTES
Physician Progress Note      PATIENTConstcolton Richey  CSN #:                  [id number]  :                       1945  ADMIT DATE:       2021 6:28 PM  [address] [place] DATE:  RESPONDING  PROVIDER #:        Braxton Ramirez MD          QUERY TEXT:    Patient admitted with Hyponatremia, dehydration, and possible UTI. Noted   documentation of mild metabolic encephalopathy resolving per H&P under problem   list with no current date. Per H&P presented with confusion - alert and   oriented in time, place and person. Per ED: Presents with intermittent   confusion. For me she is alert she is answering questions. [Metabolic encephalopathy present  as evidenced by::Metabolic encephalopathy   is present as evidenced by##Please document evidence.]]    The medical record reflects the following:  Risk Factors: 77 y.o. female with hyponatremia, dehydration and possible UTI  Clinical Indicators: Per ED Intermittent confusion For me she is alert   answering questions. Per H&P: presented with confusion - alert and oriented   to person, place and time. Treatment: Monitor  Options provided:  -- Metabolic encephalopathy was ruled out  -- Other - I will add my own diagnosis  -- Disagree - Not applicable / Not valid  -- Disagree - Clinically unable to determine / Unknown  -- Refer to Clinical Documentation Reviewer    PROVIDER RESPONSE TEXT:    metabolic encephalopathy was ruled out after study.     Query created by: Brian Robles on 2021 11:33 AM      Electronically signed by:  Braxton Ramirez MD 2021 1:59 PM

## 2021-07-28 NOTE — PROGRESS NOTES
Pt arrived from ED to room 5320. VSS, temp 99.8. A/o x4. Per pt, she has been weaker and less independent at home. Pt assisted to restroom with walker. Urine sample sent down. Pt tolerated some of a turkey sandwich. Denies pain. IVF infusing at 50ml/hr. Fall precautions in place. Will continue to monitor.

## 2021-07-28 NOTE — CARE COORDINATION
CM following, from home independent with grandson, family support. Pt with Na+ 126 and 124, K+ 2.9 today. Nephro consulted increase IVF, held HCTZ, labs ordered. Pt family states confusion improving, plan to DC home open to [facility] at DC if needed.    Electronically signed by Misael Orozco RN on 7/28/2021 at 3:01 PM  [phone]

## 2021-07-28 NOTE — PROGRESS NOTES
RN messaged for tachycardia, fever 103F  Patient admitted for hyponatremia, acute cystitis  On IV fluids for hyponatremia  With fever 103 I will get a set of blood cultures, repeat CBC CMP sed rate CRP and procalcitonin  If labs alarming then will consider CT imaging  Continue Cipro  mg twice daily    Hospitalist on call for Dr. [place] Thomas

## 2021-07-28 NOTE — CONSULTS
[facility]  Courtesy Note      Oncology History:  (Recent Office Note)  Patient Name: Araceli Prado  Patient : 1945  Patient MRN: [id number]    Primary Oncologist: Ondina Arvizu  Referring Physician: Oswaldo Bullard    Date of Service: 2021      Chief Complaint  Non-small cell lung cancer (disorder) ( Stage Date: 2017, Stage IVB (Primary, Unspecified upper lobe, bronchus or lung, TX, NX, M1c)-Pathological  Date of Dx:2017 Histopathologic Type: Adenocarcinoma; EGFR Expression: Negative; ALK (FISH): Negative; PD-L1: >= 50% Expression; ROS1 Gene: Negative; BRAF Mutation: BRAF V600E (Mutated); )    Problem List  Non-small cell lung cancer (disorder) ( Stage Date: 2017, Stage IVB (Primary, Unspecified upper lobe, bronchus or lung, TX, NX, M1c)-Pathological  Date of Dx:2017 Histopathologic Type: Adenocarcinoma; EGFR Expression: Negative; ALK (FISH): Negative; PD-L1: >= 50% Expression; ROS1 Gene: Negative; BRAF Mutation: BRAF V600E (Mutated); )  Anemia (disorder)  Anemia (disorder)  Arthritis  COPD  Dysuria  Esophagitis  High risk drug monitoring status (finding)  Hyperlipidemia  Hypertension  Hypokalemia  Intravenous catheter in situ (finding)  Iron deficiency  SHANNA  Patient eligible for clinical trial (finding)  Rash  Secondary malignant neoplasm of adrenal gland (disorder)  Secondary malignant neoplasm of liver (disorder)  Secondary malignant neoplasm of mediastinal lymph nodes  Test result to patient personally (situation)  Tobacco use  UTI  Vitamin D deficiency (disorder)    HPI  Ms. Leah Campbell is a 77 y.o. female we are seeing for a metastatic adenocarcinoma of the lung, originally diagnosed in 2017. She originally presented with a cough. This was evaluated with imaging and she was unfortunately found to have a right upper lobe lung mass. She was therefore referred to Dr. Reyna Menon.  He performed further imaging as well as a bronchoscopy with FNA of a subcarinal lymph node. Pathologic examination of the specimen yielded the diagnosis of adenocarcinoma. She underwent a PET scan which showed the right upper lobe primary hypermetabolic lesion as well as multiple hypermetabolic lymph node stations in the hilum the mediastinum and the supraclavicular spaces. In addition there was hypermetabolic nodules involving the adrenal gland and the liver. All of this consistent with stage IV or advanced/metastatic adenocarcinoma of the lung. She wished to participate in a clinical trial as initial metastatic therapy. [id number]; completion of 3 cycles (ipilumumab/Nivolumab). At progression, she was treated with a combination of carboplatin and Alimta followed by Alimta maintenance for almost 1 year. NexGen sequencing of her tumor revealed a BRAF V600E mutation. Previous Therapies  Nivolumab/Ipilumumab on protocol [id number]  Carboplatin/pemetrexed; Pemetrexed maintenance  Trametinib/dabrafenib (current tx) 2019 - present    Current Therapy  Central venous catheter flush Cycle Length: 1 Number Cycles: 10 Start: Boni De Los Santos on 2017 Assoc Dx: Intravenous catheter in situ (finding) LOT: Other 2017 C4 D1  Sodium Chloride Flush IV 0.9 %, 250 mL  Sodium Chloride Flush IV 0.9 %, 20 mL  Heparin Flush IV, 30 unit  Heparin Flush IV, 500 unit  Alteplase IV, 2 mg PRN  Vitamin B12 Pre-Pemetrexed Cycle Length: 7 Number Cycles: 1 Start: C1D1 on 2017 Assoc Dx: Non-small cell lung cancer (disorder) LOT: Other 2017 C1 D1  Cyanocobalamin IM, 1000 mcg  Iron Sucrose D1 Q14D Cycle Length: 7 Number Cycles: 3 Start: C1D1 on 2018 Assoc Dx:  Iron deficiency LOT: 1st Line or Induction 2018 C2 D1  Iron Sucrose IV,  mg, Dose modified  Trametinib + Dabrafenib Cycle Length: 30 Number Cycles: 6 Start: Boni De Los Santos on 2019 Assoc Dx: Non-small cell lung cancer (disorder) LOT: 3rd Line Metastatic Stage: IVB 2019 C3 D1  Trametinib Oral, 2 mg  Dabrafenib Oral, 150 mg bid  OHC Hydration Cycle Length: 1 Number Cycles: 1 Start: C1D1 on 2020 Assoc Dx: Non-small cell lung cancer (disorder) LOT: Other 2020 C1 D1  Sodium Chloride IV 0.9 %, .9 % 1000 mL, Dose modified  Potassium Chloride IV,  mEq, Dose modified    Interval History  Follow-up scans 2021 showed resist stable disease. The left adrenal nodule had increased by a few millimeters. Everything else was either stable or regressive. She looks and feels well. She has benefited from CPAP. Review of Systems  Constitutional:  No weight loss, No fever, No chills, No night sweats. Energy level good.   Eyes:  No impairment or change in vision  ENT / Mouth:  No pain, abnormal ulceration, bleeding, nasal drip or change in voice or hearing  Cardiovascular:  No chest pain, palpitations, new edema, or calf discomfort  Respiratory:  No pain, hemoptysis, change to breathing  Breast:  No pain, discharge, change in appearance or texture  Gastrointestinal:  No pain, cramping, jaundice, change to eating and bowel habits  Urinary:  No pain, bleeding or change in continence  Genitalia: No pain, bleeding or discharge  Musculoskeletal:  No redness, pain, edema or weakness  Skin:  No pruritus, rash, change to nodules or lesions  Neurologic:  No discomfort, change in mental status, speech, sensory or motor activity  Psychiatric:  No change in concentration or change to affect or mood  Endocrine:  No hot flashes, increased thirst, or change to urine production  Hematologic: No petechiae, ecchymosis or bleeding  Lymphatic:  No lymphadenopathy or lymphedema  Allergy / Immunologic:  No eczema, hives, frequent or recurrent infections      Vital Signs  Blood pressure: 126/72, L arm, Regular, Pulse: 68, Temperature: 97.8 F, Respirations: 12, O2 sat: , Pain Scale: 0, Height: 66 in, Weight: 162.4 lb, BSA: 1.85, BMI: 26.21 kg/m2    Physical Exam    CONSTITUTIONAL: awake, alert, cooperative, no apparent distress   EYES: pupils equal, round and reactive to light, sclera clear and conjunctiva normal  ENT: Normocephalic, without obvious abnormality, atraumatic  NECK: supple, symmetrical, no jugular venous distension and no carotid bruits   HEMATOLOGIC/LYMPHATIC: no cervical, supraclavicular or axillary lymphadenopathy   LUNGS: no increased work of breathing and clear to auscultation   CARDIOVASCULAR: regular rate and rhythm, normal S1 and S2, no murmur noted; port site free of erythema  ABDOMEN: normal bowel sounds x 4, soft, non-distended, non-tender, no masses palpated, no hepatosplenomegaly   MUSCULOSKELETAL: full range of motion noted, tone is normal  NEUROLOGIC: awake, alert, oriented to name, place and time. Motor skills grossly intact. SKIN: Normal skin color, texture, turgor and no jaundice. appears intact;  Patch-like area of macular rash in the superior aspect of the chest  EXTREMITIES: no LE edema    Labs  CBC  Lab Results 06/18/2021 06/11/2021 05/21/2021 05/11/2021 04/16/2021 03/19/2021    CBC         WBC x 10^3/uL 6.3   7.1   4.8 6.5      RBC x 10^6/uL 4.87   4.80   5.00 5.05      HGB g/dL 11.6   11.4   11.8 11.9      HCT % 36.5   35.4   36.1 36.9      MCV fL 74.9 (L)   73.8 (L)   72.2 (L) 73.1 (L)      MCH pg 23.8 (L)   23.8 (L)   23.6 (L) 23.6 (L)      MCHC g/dL 31.8 (L)   32.2   32.7 32.2      RDW-CV, % 14.7 (H)   14.9 (H)   13.9 14.7 (H)      PLT x 10^3/uL 223.0   216.0   228.0 260.0      Sonia % 53.8   51.0   46.7 51.5      LY % 28.7   30.8   31.6 26.1      MO % 7.5   8.6   12.6 (H) 8.4      EO % 9.7 (H)   9.2 (H)   8.7 (H) 13.7 (H)      BA % 0.3   0.4   0.4 0.3      Sonia # (ANC) x 10^3/uL 3.39   3.62   2.26 3.36      LY # x 10^3/uL 1.81   2.19   1.53 1.70      MO # x 10^3/uL 0.47   0.61   0.61 0.55      EO # x 10^3/uL 0.61 (H)   0.65 (H)   0.42 (H) 0.89 (H)      BA # x 10^3/uL 0.02   0.03   0.02 0.02  CMP  Lab Results 06/18/2021 06/11/2021 05/21/2021 05/11/2021 04/16/2021 03/19/2021    Chemistries         Glucose mg/dL     101   96 81 BUN mg/dL     12   10 9      Creatinine mg/dL     0.65   0.67 0.60      BUN/Creatinine ratio     18.5   14.9 15.0      Creatinine, POC mg/dL   0.5 (L)         Sodium mmol/L     138   131 (L) 140      Potassium mmol/L     3.9   4.3 4.1      Chloride mmol/L     100   94 (L) 102      CO2 mmol/L     29.9   32.7 (H) 32.4 (H)      Anion gap, mmol/L     8.1   4.6 5.6      Calcium mg/dL     9.5   9.8 10.1      Albumin g/dL     3.5   3.3 (L) 3.6      Total protein g/dL     7.0   6.6 6.9      A/G ratio     1.0   1.0 1.1      Bilirubin, total mg/dL     0.5   0.5 0.6      Alkaline phosphatase U/L     104   114 109      AST/SGOT U/L     25   24 22      ALT/SGPT U/L     11   9 (L) 11      GFR non-African American, estimated mL/min/1.73m2   >60 >60.0   >60.0 >60.0      GFR African American, estimated mL/min/1.73m2   >60 >60.0   >60.0 >60.0      Chemistries POC sample type   JANES      Imaging  June 11, 2021  CT chest abdomen pelvis with IV  HISTORY: Right lung carcinoma with left adrenal metastasis, liver metastasis and mediastinal lymphadenopathy; follow-up  Scans from thoracic inlet to diaphragm and from diaphragm to pubic symphysis with oral and IV contrast-80 mL Isovue-370. Comparison 12/11/2020. Up-to-date CT equipment with radiation dose reduction techniques  Chest-  Bilateral paramediastinal traction bronchiectasis and mediastinal fibrosis related to post irradiation change, stable. Similar bronchiectasis and airspace disease in the right midlung, also unchanged. Small right pleural effusion redemonstrated. Giant  bulla in the right upper chest unchanged measuring 5.3 x 8.6 cm  Preaortic lymph node measures 13 x 6 mm, series 3 image 34 (previously 16 x 6.5 mm). AP window node measures 7 x 9 mm, image 34, (previously 9 x 13 mm). No developing intrathoracic, axillary or supraclavicular lymphadenopathy. Thoracic aorta minimally calcific but normal size. Central pulmonary arteries patent.  No suspicious osseous lesion  Abdomen and pelvis-  Multiple hepatic cysts are redemonstrated without significant change. Dominant cyst is located in the lateral segment of the left lobe measuring 2.5 cm. Spleen, pancreas and kidneys normal.  12 x 7 mm left adrenal nodule series 3 image 89 (previously 8 x 5 mm). Mild thickening of right adrenal is stable. No calcified gallstones. Large and small bowel loops of normal caliber. No signs of appendicitis or diverticulitis. No pneumoperitoneum or ascites  No developing abdominal or pelvic lymphadenopathy. No macroscopic peritoneal tumor implant. Abdominal aorta moderately calcific but normal size. Patency of portal vein, SMV, SMA and celiac axis  Urinary bladder unremarkable. Uterus not enlarged. Osseous structures without suspicious lesion  IMPRESSION:  Post therapy changes in the chest with traction bronchiectasis and mediastinal fibrosis. Slight decrease in size of preaortic and AP window lymph nodes. Stable hepatic cysts  Mild increase in size of left adrenal mass. Interpreted by:  uSsi Saldaña MD  Signed by:  Susi Saldaña MD  6/11/21    OCM - Patient Care Management    Pain, if applicable:        Performance Status: ECOG 1 Symptoms, but ambulatory. Restricted in physically strenuous activity, but ambulatory and able to carry out work of a light or sedentary nature (e.g., light housework, office work). (Date: 06/18/2021)     Depression Status: Was screened; Outcome positive: No; Screening Date: 01/22/2021; Screening Tool: Patient Health Questionnaire (PHQ9)    Psycho-social PHQ-9 Follow-up Plan (if applicable):     Research    Would you like this patient screened for clinical trial eligibility? If yes, please enter the order for \"Research: Screen for Eligibility\"    Assessment & Plan  Metastatic adenocarcinoma of the lung - liver, adrenal, mediastinal and supraclavicular murali metastases. EGFR, ALK, ROS1 all wild-type. Originally diagnosed March 2017.  She completed 3 cycles of checkpoint inhibitor therapy on clinical trial [id number] - a combination of Nivolumab and ipilimumab. Unfortunately she had evidence of progressive disease in the mediastinal lymph nodes by CT scan Oct 2017. Interestingly disease in the liver was regressive. The concerning feature of the disease in the mediastinum was invasion by the paratracheal lymph nodes into the trachea. In reviewing the images, it appeared that in at least one of the cases, there was evidence of tumor traversing the full-thickness of the trachea wall with an intraluminal component. She completed radiation to the adenopathy in the mediastinum. She had an excellent clinical and radiographic response as documented by CAT scan November 9, 2017. We switched to carboplatin and Alimta off protocol as second line therapy Nov 2017. Although we had a new clinical trial utilizing a combination of a tyrosine kinase inhibitor (sitravatinib) with immune checkpoint inhibitor therapy (nivolumab) in patients who have progressed despite immune checkpoint inhibitor therapy ([id number]), her previous therapy with Ipilumumab on study excluded her. So we proceeded with Carboplatin/Alimta off protocol. She completed four initial cycles with an excellent response and was therefore converted to maintenance Alimta therapy. Scans during maintenance (April  & July 2018) showed an ongoing response. Scans November 1, 2018 showed an increased periaortic lymph node and an increased right pleural effusion. The PET/CT confirmed hypermetabolic retroperitoneal metastatic disease and nodular hypermetabolic activity adjacent to a bulla in the right lower lobe. Interestingly the disease in the liver was not hypermetabolic. She had a fresh biopsy of one of the retroperitoneal lymph nodes. NexGen sequencing revealed BRAF V600E mutation. We submitted orders for the combination of Trametinib/Dabrafenib and obtained insurance approval.  She began this medication on January 11, 2019. Follow-up scans, Sept & Dec 2019, and April, July & Dec 2020 show a positive therapeutic response particularly in the right lower lung. RECIST stable disease. Repeat scans June 11, 2021 Show RECIST stable disease. With regard to the slight increase in the left adrenal nodule, I will refer her to Dr. Dexter Moyer for consideration of SBRT. She will return for follow-up on July 30. Otherwise I appreciate the help of Dr. Donovan Schofield at the drug development unit at [facility] in [place]. She is eligible for several protocols at his center. The alternative to all of this is salvage Taxotere which is not a particularly attractive choice. The other consideration is carbo/alimta/pembro. She has benefited from a course of IV iron. We continue to monitor her hemoglobin and hematocrit which are stable. Red cell indices are somewhat microcytic today. I will repeat iron studies today. With regard to the TIA, per neurology recommendations, she continues with a baby aspirin daily, cholesterol medicine and blood pressure medicine. The TIA in combination with my observing an irregular pulse at her visit Dec 2020 are suspicious for paroxysmal atrial fibrillation. With regard to fatigue and palpitations, I previously noted an irregular heart rate on physical exam.  She has been working with Dr. Tyler Buckner. Now that she has been diagnosed with SHANNA, I suspect she has intermittently had dysrhythmias, perhaps pulmonary hypertension, systemic hypertension. I also suspect that this is contributing to her fatigue. She has greatly benefited from CPAP. It is important to remember that Mrs. Hi Spicer has been benefiting from systemic therapy for metastatic lung adenocarcinoma since March 2017. She has been benefiting from her current BRAF targeted therapy regimen since January 2019. UTI. Completed Bactrim DS BID and asymptomatic. The antibiotic also caused diarrhea which resulted in hyponatremia.   Fortunately she did not have C. difficile and responded favorably to IV hydration. All of this now completely resolved. A subsequent UTI was successfully treated with a course of Cipro. In the future we should probably avoid Bactrim. Hypertension. Being managed by her PCP, Dr. Medina Benjamin and Dr Stevenson Pacheco. She takes Amlodipine - now decreased to 5 mg, Metoprolol and HCTZ. Hyperlipidemia. Being managed by her PCP, Dr. Medina Benjamin. She takes Atorvastatin. GERD. Being managed by her PCP, Dr. Medina Benjamin. She takes omeprazole. COPD. Being managed by Dr. Jesus Sandoval with albuterol. No increasing shortness of breath or cough or sputum production. I appreciate the assistance of Dr. Stevenson Pacheco in evaluating for possibility of paroxysmal atrial fibrillation. She is not offering any anginal symptoms and is not experiencing palpitations. Her monitor study revealed episodes of tachycardia. Her beta-blocker dose has been increased. I suspect that once she begins treatment for sleep apnea that both blood pressure and palpitations will improve or even resolve. Obstructive sleep apnea. She is being Fitted for CPAP by Dr. Varsha Phelps. Risk of Complications/Morbidity/Mortality High  Illness(es) present that pose threat to life/bodily function  Severe exacerbation of illness/side effects of treatment  Use of parenteral controlled substances  Therapy requiring intensive monitoring for toxicity    Recent pathology, molecular testing, imaging and labs were reviewed and discussed with the patient. Time Based Itemization    A total of  minutes was spent on today's patient encounter. If applicable, non-patient-facing activities:     (   )   Preparing to see the patient and reviewing records     (   )   Individual interpretation of results      (   )   Discussion or coordination of care with other health care professionals     (   )   Ordering of unique tests, medications, or procedures     (   )   Documentation within the EHR   .   Recent imaging and labs were reviewed and discussed with the patient. I have recommended that the patient follow CDC guidelines for prevention of COVID-19 infection. Liz Bone. Siobhan Soares MD, PhD  [facility], Medical Oncology  Co-Director of Clinical Research   for Breast, GI, Phase 1 and Molecular Profiling  [address] [facility] [address]  Phone: [phone]  Fax: [phone]  Online: [email or web address]     Note Recipients:   MD JOSE LUIS Hammond (Referring)  Angel Rdz MD    Electronically signed by Liz Bone. Siobhan Soares MD, PhD 06/18/2021 13:16 EDT        IMPRESSION/RECOMMENDATIONS:    I was not consulted to see this pt. This is a courtesy visit. She is under my care for ongoing treatment of metastatic lung adenocarcinoma. Recent office note is above. She has a scheduled follow-up appointment with me in the office Friday, July 30, 2021. Please call with questions. Discussed with patient.       Christianne Lopez MD  7/28/2021  7:55 AM

## 2021-07-28 NOTE — CONSULTS
Consult received. Labs and notes were reviewed. Case was discussed with the staff. Full note to follow.     Thanks  Nephrology  [address]  Office: [phone]  Cell: [phone]  Fax: [phone]

## 2021-07-29 LAB
ALBUMIN SERPL-MCNC: 2.9 G/DL (ref 3.4–5)
ANION GAP SERPL CALCULATED.3IONS-SCNC: 9 MMOL/L (ref 3–16)
BUN BLDV-MCNC: 5 MG/DL (ref 7–20)
CALCIUM SERPL-MCNC: 8.4 MG/DL (ref 8.3–10.6)
CHLORIDE BLD-SCNC: 93 MMOL/L (ref 99–110)
CO2: 25 MMOL/L (ref 21–32)
CREAT SERPL-MCNC: 0.6 MG/DL (ref 0.6–1.2)
GFR AFRICAN AMERICAN: >60
GFR NON-AFRICAN AMERICAN: >60
GLUCOSE BLD-MCNC: 176 MG/DL (ref 70–99)
MAGNESIUM: 1.8 MG/DL (ref 1.8–2.4)
OSMOLALITY URINE: 137 MOSM/KG (ref 390–1070)
OSMOLALITY URINE: 210 MOSM/KG (ref 390–1070)
PHOSPHORUS: 1.5 MG/DL (ref 2.5–4.9)
POTASSIUM SERPL-SCNC: 3.3 MMOL/L (ref 3.5–5.1)
SODIUM BLD-SCNC: 125 MMOL/L (ref 136–145)
SODIUM BLD-SCNC: 127 MMOL/L (ref 136–145)
SODIUM BLD-SCNC: 128 MMOL/L (ref 136–145)
TSH REFLEX: 0.59 UIU/ML (ref 0.27–4.2)

## 2021-07-29 PROCEDURE — 94640 AIRWAY INHALATION TREATMENT: CPT

## 2021-07-29 PROCEDURE — 2500000003 HC RX 250 WO HCPCS: Performed by: INTERNAL MEDICINE

## 2021-07-29 PROCEDURE — 83735 ASSAY OF MAGNESIUM: CPT

## 2021-07-29 PROCEDURE — 6360000002 HC RX W HCPCS: Performed by: INTERNAL MEDICINE

## 2021-07-29 PROCEDURE — 6370000000 HC RX 637 (ALT 250 FOR IP): Performed by: INTERNAL MEDICINE

## 2021-07-29 PROCEDURE — 80069 RENAL FUNCTION PANEL: CPT

## 2021-07-29 PROCEDURE — 82306 VITAMIN D 25 HYDROXY: CPT

## 2021-07-29 PROCEDURE — 83935 ASSAY OF URINE OSMOLALITY: CPT

## 2021-07-29 PROCEDURE — 2580000003 HC RX 258: Performed by: INTERNAL MEDICINE

## 2021-07-29 PROCEDURE — 84295 ASSAY OF SERUM SODIUM: CPT

## 2021-07-29 PROCEDURE — 36415 COLL VENOUS BLD VENIPUNCTURE: CPT

## 2021-07-29 PROCEDURE — 1200000000 HC SEMI PRIVATE

## 2021-07-29 RX ORDER — METOPROLOL SUCCINATE 25 MG/1
25 TABLET, EXTENDED RELEASE ORAL DAILY
Status: DISCONTINUED | OUTPATIENT
Start: 2021-07-29 | End: 2021-07-30 | Stop reason: HOSPADM

## 2021-07-29 RX ORDER — POTASSIUM CHLORIDE 20 MEQ/1
20 TABLET, EXTENDED RELEASE ORAL ONCE
Status: COMPLETED | OUTPATIENT
Start: 2021-07-29 | End: 2021-07-29

## 2021-07-29 RX ADMIN — POTASSIUM PHOSPHATE, MONOBASIC AND POTASSIUM PHOSPHATE, DIBASIC 30 MMOL: 224; 236 INJECTION, SOLUTION, CONCENTRATE INTRAVENOUS at 10:16

## 2021-07-29 RX ADMIN — SODIUM CHLORIDE: 9 INJECTION, SOLUTION INTRAVENOUS at 20:02

## 2021-07-29 RX ADMIN — METOPROLOL SUCCINATE 25 MG: 25 TABLET, EXTENDED RELEASE ORAL at 12:05

## 2021-07-29 RX ADMIN — BUDESONIDE 500 MCG: 0.5 SUSPENSION RESPIRATORY (INHALATION) at 09:39

## 2021-07-29 RX ADMIN — ATORVASTATIN CALCIUM 10 MG: 10 TABLET, FILM COATED ORAL at 08:02

## 2021-07-29 RX ADMIN — ENOXAPARIN SODIUM 40 MG: 40 INJECTION SUBCUTANEOUS at 08:02

## 2021-07-29 RX ADMIN — PANTOPRAZOLE SODIUM 40 MG: 40 TABLET, DELAYED RELEASE ORAL at 07:56

## 2021-07-29 RX ADMIN — ACETAMINOPHEN 650 MG: 325 TABLET ORAL at 07:56

## 2021-07-29 RX ADMIN — ARFORMOTEROL TARTRATE 15 MCG: 15 SOLUTION RESPIRATORY (INHALATION) at 09:38

## 2021-07-29 RX ADMIN — POTASSIUM CHLORIDE 20 MEQ: 1500 TABLET, EXTENDED RELEASE ORAL at 10:16

## 2021-07-29 RX ADMIN — ASPIRIN 81 MG: 81 TABLET, COATED ORAL at 08:02

## 2021-07-29 RX ADMIN — CIPROFLOXACIN 400 MG: 2 INJECTION, SOLUTION INTRAVENOUS at 20:04

## 2021-07-29 RX ADMIN — CIPROFLOXACIN 400 MG: 2 INJECTION, SOLUTION INTRAVENOUS at 08:03

## 2021-07-29 RX ADMIN — Medication 10 ML: at 08:03

## 2021-07-29 ASSESSMENT — PAIN SCALES - GENERAL
PAINLEVEL_OUTOF10: 0

## 2021-07-29 NOTE — PROGRESS NOTES
Patient alert and oriented. VSS Patient denies any pain or nausea. Assessment done. see flowsheet. Patient in bed lowest position call light and bedside table within reach. All needs are met at this time. Patient aware to call if any help needed. Will continue to monitor  /69   Pulse 89   Temp 97.8 °F (36.6 °C) (Oral)   Resp 18   Ht 5' 6\" (1.676 m)   Wt 167 lb 5.3 oz (75.9 kg)   SpO2 93%   BMI 27.01 kg/m²

## 2021-07-29 NOTE — FLOWSHEET NOTE
07/29/21 1410   Encounter Summary   Services provided to: Patient   Referral/Consult From: [facility] Family members   Continue Visiting   (7/29, gabriel )   Complexity of Encounter High   Length of Encounter 30 minutes   Routine   Type Initial   Assessment Calm; Approachable; Hopeful   Intervention Active listening;Explored feelings, thoughts, concerns;Nurtured hope;Prayer;Discussed belief system/Faith practices/maria a   Outcome Comfort;Expressed gratitude;Engaged in conversation   Staff Marcio Stephenson, [address]

## 2021-07-29 NOTE — PROGRESS NOTES
Patient alert and oriented. Denies pain this shift. IV left hand infusing NS at 75 ml/hr. Up with standby assist to bathroom. Supplements of magnesium and kcl infused as ordered. Attempts made to get urine specimen, but patient either missed container, or had urine mixed with stool. Reported to night shift nurse specimen still needed. At end of shift, temp elevated to 103.3, respirations increased, with increased HR and O2 demands. O2 place on patient at 2 liters per nasal canula,  to maintain O2 sats > 92%. Messaged Dr Ellie Girard, new orders received, report given to pm nurse to monitor.

## 2021-07-29 NOTE — PROGRESS NOTES
[facility]    [facility]              [phone]                      Pt is 71-year-old F with PMHx of arthritis, hyperlipidemia, hypertension, CHFrEF and lung cancer is admitted with chief complain of confusion. We are following for hyponatremia. Interval History and plan:     Pt presented with chief complain of confusion. Sodium is slowly improving 124>128  K is 3.4  Phosphorus is low  Etiology for hyponatremia appears to be poor intake    Plan:    - Na q8hr  - Increase IVF   - Na goal 136 by the am  - DC HCTZ   - Urine osmolality is low at 210  - Renal panel q 12hr  - Mg daily  -  Replace potassium and phosphorus                    Assessment :     Hyponatremia:  - 2/2 to most likely hypovolemic hyponatremia. However, effect of HCTZ and ADH cannot be ruled out. - Pt presented with Na 121. Mentions that she has had decrease in oral intake for the past two days. She is on HCTZ for her HTN. She continued taking her medications. Her lactic acid level was 2.1 when presented to ED. He has history of non small cell lung cancer. Her Na improved after giving 1L of LR in the ED.   - Serum osmolality 261  - Cortisol wnl  - Urine osmolality 302  - Urine Na 45   - TSH is normal with normal uric acid    Hypokalemia:  - Replete K    Hypomagnesemia:  - Replete Mg    UTI:  - UA positive for moderate leukocyte esterase, WBC>100.   - on Cipro    CHFrEF:  - last Echo on 01/29/2021 EF 45% and normal diastolic function.       Hyperlipidemia:  - atorvastatin 10 mg daily    [facility] would like to thank Emmy Orozco MD   for opportunity to serve this patient      Please call with questions at-   24 Hrs Answering service [phone] or  7 am- 5 pm via Perfect serve or cell phone  Kenzie Woodward MD          CC/reason for consult :     Hyponatremia     HPI :     Boubacar Kohler is a 77 y.o. female with PMHx of arthritis, hyperlipidemia, hypertension, CHFrEF and lung cancer is admitted to the hospital on 7/27/2021 with chief complain of confusion. Pt mentions that she started having dysuria and urinary frequency about a week ago. She called who prescribed her antibiotics. She was able to  her medication a day before her presentation to ED. However, she was having decrease in her appetite and oral intake. Her daughter felt that she slightly confused and brought her to the ED. Pt mentions that she had two previous episodes of UTI in the past year. In ED, In ED, T 100.5F, RR 16, HR 95, /78. Labs were significant for Na 121, lactic acid 2.1. UA was positive for moderate leukocyte esterase, WBC>100. Pt was given 1L of LR and started on NS 50 ml/hr. Pt was started on cipro for UTI.       ROS:     Seen with-     positives in bold   Constitutional:  fever, chills, weakness, weight change, fatigue  Skin:  rash, pruritus, hair loss, bruising, dry skin, petechiae  Head, Face, Neck   headaches, swelling,  cervical adenopathy  Respiratory: shortness of breath, cough, or wheezing  Cardiovascular: chest pain, palpitations, dizzy, edema  Gastrointestinal: nausea, vomiting, diarrhea, constipation,belly pain    Yellow skin, blood in stool  Musculoskeletal:  back pain, muscle weakness, gait problems,       joint pain or swelling. Genitourinary:  + dysuria, poor urine flow, flank pain, blood in urine  Neurologic:  vertigo, TIA'S, syncope, seizures, focal weakness  Psychosocial:  insomnia, anxiety, or depression.   Additional positive findings: Decrease oral intake, confusion                      All other remaining systems are negative or unable to obtain        PMH/PSH/SH/Family History:     Past Medical History:   Diagnosis Date    Arthritis     Cancer ([facility] [address]     lung     Hyperlipidemia     Hypertension        Past Surgical History:   Procedure Laterality Date    BRONCHOSCOPY  03/07/2017    brochoscopy-EBUS    OTHER SURGICAL HISTORY Left 03/29/2017    LEFT SUBCLAVIAN PORT- CATH INSERTION      TUBAL LIGATION          reports that she quit smoking about 9 years ago. She smoked 0.00 packs per day for 0.00 years. She has never used smokeless tobacco. She reports current alcohol use. She reports that she does not use drugs. family history is not on file.          Medication:     Current Facility-Administered Medications: budesonide (PULMICORT) nebulizer suspension 500 mcg, 0.5 mg, Nebulization, BID **AND** Arformoterol Tartrate (BROVANA) nebulizer solution 15 mcg, 15 mcg, Nebulization, BID  albuterol (PROVENTIL) nebulizer solution 2.5 mg, 2.5 mg, Nebulization, Q2H PRN  ciprofloxacin (CIPRO) IVPB 400 mg, 400 mg, Intravenous, Q12H  aspirin EC tablet 81 mg, 81 mg, Oral, Daily  atorvastatin (LIPITOR) tablet 10 mg, 10 mg, Oral, Daily  pantoprazole (PROTONIX) tablet 40 mg, 40 mg, Oral, QAM AC  sodium chloride flush 0.9 % injection 5-40 mL, 5-40 mL, Intravenous, 2 times per day  sodium chloride flush 0.9 % injection 5-40 mL, 5-40 mL, Intravenous, PRN  0.9 % sodium chloride infusion, 25 mL, Intravenous, PRN  enoxaparin (LOVENOX) injection 40 mg, 40 mg, Subcutaneous, Daily  ondansetron (ZOFRAN-ODT) disintegrating tablet 4 mg, 4 mg, Oral, Q8H PRN **OR** ondansetron (ZOFRAN) injection 4 mg, 4 mg, Intravenous, Q6H PRN  polyethylene glycol (GLYCOLAX) packet 17 g, 17 g, Oral, Daily PRN  acetaminophen (TYLENOL) tablet 650 mg, 650 mg, Oral, Q6H PRN **OR** acetaminophen (TYLENOL) suppository 650 mg, 650 mg, Rectal, Q6H PRN  0.9 % sodium chloride infusion, , Intravenous, Continuous       Vitals :     Vitals:    07/29/21 0755   BP:    Pulse:    Resp:    Temp:    SpO2: 97%       I & O :       Intake/Output Summary (Last 24 hours) at 7/29/2021 0837  Last data filed at 7/29/2021 3212  Gross per 24 hour   Intake 3242 ml   Output 1800 ml   Net 1442 ml        Physical Examination :     General appearance: Anxious- no, distressed- no, in good spirits- yes  HEENT: Lips- normal, teeth- ok , oral mucosa- moist  Neck : Mass- no, appears symmetrical, JVD- not visible  Respiratory: Respiratory effort-  wheeze- no, crackles -   Cardiovascular: Ausculation- No M/R/G, Edema   Abdomen: visible mass- no, distention- no, scar- no, tenderness- no                            hepatosplenomegaly-  no  Musculoskeletal:  clubbing no,cyanosis- no , digital ischemia- no                           muscle strength- grossly normal , tone - grossly normal  Skin: rashes- no , ulcers- no, induration- no, tightening - no  Psychiatric:  Judgement and insight- normal           AAO X 3  Additional finding:      LABS:     Recent Labs     07/27/21  1921 07/28/21  0516 07/28/21  2048   WBC 10.8 5.7 4.1   HGB 11.4* 10.6* 9.8*   HCT 34.3* 32.6* 29.9*    134* 138     Recent Labs     07/28/21  0147 07/28/21  0147 07/28/21  0516 07/28/21  0908 07/28/21  1524 07/28/21  2048 07/29/21  0239   *   < > 126*   < > 125* 126*  124* 128*   K 3.0*   < > 2.9*  --   --  3.4*  3.3*  --    CL 89*  --  89*  --   --  91*  90*  --    CO2 27  --  29  --   --  25  24  --    BUN 13  --  11  --   --  9  8  --    CREATININE 0.7  --  0.6  --   --  0.6  0.6  --    GLUCOSE 136*  --  126*  --   --  166*  172*  --    MG 1.60*  --  1.40*  --   --  1.70*  --    PHOS  --   --   --   --   --  1.2*  --     < > = values in this interval not displayed.           Ceci Crenshaw    Thanks  Nephrology  [address] [facility], [address] [place] [address]  Office: [phone]  Cell: [phone]  Fax: [phone]

## 2021-07-29 NOTE — PROGRESS NOTES
Patient alert and oriented, vitals stable, afebrile since early in shift. IV left hand infiltrated, new iv  #22 placed left forearm. NS infusing at 100 ml/hr. Supplements of KCL oral 20 meq given along with infusion of potassium phosphate as ordered. Patient up to chair, did ambulate in halls, gait steady, good safety awareness. Family with patient. Tolerating general diet, food brought from home by family. Call light in reach, will monitor.

## 2021-07-29 NOTE — PLAN OF CARE
Problem: Falls - Risk of:  Goal: Will remain free from falls  Description: Will remain free from falls  Outcome: Met This Shift  Note: Patient up with stand by assist, gait steady. Denies weakness this shift. Bed and chair alarms used when family not present. Call light kept in reach. Bed locked and in low position. Problem: Sensory:  Goal: General experience of comfort will improve  Description: General experience of comfort will improve  Outcome: Met This Shift  Note: Patient denies pain this shift. Problem: Urinary Elimination:  Goal: Ability to reestablish a normal urinary elimination pattern will improve - after catheter removal  Description: Ability to reestablish a normal urinary elimination pattern will improve  Outcome: Met This Shift     Problem: Urinary Elimination:  Goal: Signs and symptoms of infection will decrease  Description: Signs and symptoms of infection will decrease  Outcome: Ongoing  Note: Elevated temperature early in shift, none since. Last wbc wnl. Antibiotic infused as ordered. Denies any flank pain, or dysuria.

## 2021-07-29 NOTE — CARE COORDINATION
CM following pt with temp over night 103 down to 100.6, on IV ABX for +UTI, electrolytes replacements Na+127 K+3.3 Phos 1.5 albumin 2.9. Pt plans to DC home with family support.   Electronically signed by Evan Aguilera RN on 7/29/2021 at 1:44 PM  [phone]

## 2021-07-29 NOTE — PROGRESS NOTES
Hospitalist Progress Note      PCP: Mynor Patricio MD    Date of Admission: 7/27/2021    Chief Complaint: UTI, dehydration    Hospital Course: Admitted for hyponatremia, hypokalemia and UTI. Continue IV fluids and antibiotics. Holding diuretics. Nephrology following. Subjective: Patient spiked a temp of 103 yesterday and 100.6 this morning. Patient states that she is feeling okay. Alert and oriented x3. Following commands. Denies any complaints. Medications:  Reviewed    Infusion Medications    sodium chloride      sodium chloride 75 mL/hr at 07/28/21 1629     Scheduled Medications    potassium phosphate IVPB  30 mmol Intravenous Once    potassium chloride  20 mEq Oral Once    budesonide  0.5 mg Nebulization BID    And    Arformoterol Tartrate  15 mcg Nebulization BID    ciprofloxacin  400 mg Intravenous Q12H    aspirin  81 mg Oral Daily    atorvastatin  10 mg Oral Daily    pantoprazole  40 mg Oral QAM AC    sodium chloride flush  5-40 mL Intravenous 2 times per day    enoxaparin  40 mg Subcutaneous Daily     PRN Meds: albuterol, sodium chloride flush, sodium chloride, ondansetron **OR** ondansetron, polyethylene glycol, acetaminophen **OR** acetaminophen      Intake/Output Summary (Last 24 hours) at 7/29/2021 0858  Last data filed at 7/29/2021 0806  Gross per 24 hour   Intake 3242 ml   Output 1800 ml   Net 1442 ml       Physical Exam Performed:    /78   Pulse 113   Temp 100.6 °F (38.1 °C) (Oral)   Resp 20   Ht 5' 6\" (1.676 m)   Wt 167 lb 5.3 oz (75.9 kg)   SpO2 97%   BMI 27.01 kg/m²     General appearance: No apparent distress, appears stated age and cooperative. HEENT: Pupils equal, round, and reactive to light. Conjunctivae/corneas clear. Neck: Supple, with full range of motion. No jugular venous distention. Trachea midline. Respiratory:  Normal respiratory effort. Clear to auscultation, bilaterally without Rales/Wheezes/Rhonchi.   Cardiovascular: Regular rate and

## 2021-07-30 VITALS
RESPIRATION RATE: 16 BRPM | HEIGHT: 66 IN | OXYGEN SATURATION: 98 % | BODY MASS INDEX: 26.89 KG/M2 | SYSTOLIC BLOOD PRESSURE: 113 MMHG | DIASTOLIC BLOOD PRESSURE: 74 MMHG | WEIGHT: 167.33 LBS | TEMPERATURE: 98.4 F | HEART RATE: 70 BPM

## 2021-07-30 LAB
ALBUMIN SERPL-MCNC: 2.9 G/DL (ref 3.4–5)
ALBUMIN SERPL-MCNC: 3 G/DL (ref 3.4–5)
ANION GAP SERPL CALCULATED.3IONS-SCNC: 10 MMOL/L (ref 3–16)
ANION GAP SERPL CALCULATED.3IONS-SCNC: 9 MMOL/L (ref 3–16)
BUN BLDV-MCNC: 2 MG/DL (ref 7–20)
BUN BLDV-MCNC: 4 MG/DL (ref 7–20)
CALCIUM SERPL-MCNC: 8.5 MG/DL (ref 8.3–10.6)
CALCIUM SERPL-MCNC: 8.6 MG/DL (ref 8.3–10.6)
CHLORIDE BLD-SCNC: 94 MMOL/L (ref 99–110)
CHLORIDE BLD-SCNC: 98 MMOL/L (ref 99–110)
CO2: 25 MMOL/L (ref 21–32)
CO2: 26 MMOL/L (ref 21–32)
CREAT SERPL-MCNC: 0.5 MG/DL (ref 0.6–1.2)
CREAT SERPL-MCNC: <0.5 MG/DL (ref 0.6–1.2)
GFR AFRICAN AMERICAN: >60
GFR AFRICAN AMERICAN: >60
GFR NON-AFRICAN AMERICAN: >60
GFR NON-AFRICAN AMERICAN: >60
GLUCOSE BLD-MCNC: 102 MG/DL (ref 70–99)
GLUCOSE BLD-MCNC: 102 MG/DL (ref 70–99)
MAGNESIUM: 1.6 MG/DL (ref 1.8–2.4)
PHOSPHORUS: 2 MG/DL (ref 2.5–4.9)
PHOSPHORUS: 2.4 MG/DL (ref 2.5–4.9)
POTASSIUM SERPL-SCNC: 3.6 MMOL/L (ref 3.5–5.1)
POTASSIUM SERPL-SCNC: 3.8 MMOL/L (ref 3.5–5.1)
SODIUM BLD-SCNC: 129 MMOL/L (ref 136–145)
SODIUM BLD-SCNC: 130 MMOL/L (ref 136–145)
SODIUM BLD-SCNC: 133 MMOL/L (ref 136–145)
VITAMIN D 25-HYDROXY: 14.5 NG/ML

## 2021-07-30 PROCEDURE — 6370000000 HC RX 637 (ALT 250 FOR IP): Performed by: INTERNAL MEDICINE

## 2021-07-30 PROCEDURE — 36415 COLL VENOUS BLD VENIPUNCTURE: CPT

## 2021-07-30 PROCEDURE — 2580000003 HC RX 258: Performed by: INTERNAL MEDICINE

## 2021-07-30 PROCEDURE — 6360000002 HC RX W HCPCS: Performed by: INTERNAL MEDICINE

## 2021-07-30 PROCEDURE — 83735 ASSAY OF MAGNESIUM: CPT

## 2021-07-30 PROCEDURE — 94761 N-INVAS EAR/PLS OXIMETRY MLT: CPT

## 2021-07-30 PROCEDURE — 84295 ASSAY OF SERUM SODIUM: CPT

## 2021-07-30 PROCEDURE — 94640 AIRWAY INHALATION TREATMENT: CPT

## 2021-07-30 PROCEDURE — 80069 RENAL FUNCTION PANEL: CPT

## 2021-07-30 RX ORDER — CIPROFLOXACIN 500 MG/1
500 TABLET, FILM COATED ORAL 2 TIMES DAILY
Qty: 8 TABLET | Refills: 0 | Status: SHIPPED | OUTPATIENT
Start: 2021-07-30 | End: 2021-08-03

## 2021-07-30 RX ORDER — MAGNESIUM SULFATE IN WATER 40 MG/ML
2000 INJECTION, SOLUTION INTRAVENOUS ONCE
Status: COMPLETED | OUTPATIENT
Start: 2021-07-30 | End: 2021-07-30

## 2021-07-30 RX ORDER — ERGOCALCIFEROL 1.25 MG/1
50000 CAPSULE ORAL WEEKLY
Status: DISCONTINUED | OUTPATIENT
Start: 2021-07-30 | End: 2021-07-30 | Stop reason: HOSPADM

## 2021-07-30 RX ORDER — ERGOCALCIFEROL 1.25 MG/1
50000 CAPSULE ORAL WEEKLY
Qty: 8 CAPSULE | Refills: 0 | Status: SHIPPED | OUTPATIENT
Start: 2021-07-30 | End: 2022-02-16

## 2021-07-30 RX ADMIN — ARFORMOTEROL TARTRATE 15 MCG: 15 SOLUTION RESPIRATORY (INHALATION) at 08:26

## 2021-07-30 RX ADMIN — ENOXAPARIN SODIUM 40 MG: 40 INJECTION SUBCUTANEOUS at 09:13

## 2021-07-30 RX ADMIN — SODIUM CHLORIDE: 9 INJECTION, SOLUTION INTRAVENOUS at 07:49

## 2021-07-30 RX ADMIN — CIPROFLOXACIN 400 MG: 2 INJECTION, SOLUTION INTRAVENOUS at 11:43

## 2021-07-30 RX ADMIN — BUDESONIDE 500 MCG: 0.5 SUSPENSION RESPIRATORY (INHALATION) at 08:25

## 2021-07-30 RX ADMIN — PANTOPRAZOLE SODIUM 40 MG: 40 TABLET, DELAYED RELEASE ORAL at 07:15

## 2021-07-30 RX ADMIN — METOPROLOL SUCCINATE 25 MG: 25 TABLET, EXTENDED RELEASE ORAL at 09:13

## 2021-07-30 RX ADMIN — Medication 10 ML: at 09:14

## 2021-07-30 RX ADMIN — MAGNESIUM SULFATE HEPTAHYDRATE 2000 MG: 2 INJECTION, SOLUTION INTRAVENOUS at 09:13

## 2021-07-30 RX ADMIN — ASPIRIN 81 MG: 81 TABLET, COATED ORAL at 09:13

## 2021-07-30 RX ADMIN — ERGOCALCIFEROL 50000 UNITS: 1.25 CAPSULE ORAL at 13:59

## 2021-07-30 RX ADMIN — ATORVASTATIN CALCIUM 10 MG: 10 TABLET, FILM COATED ORAL at 09:13

## 2021-07-30 ASSESSMENT — PAIN SCALES - GENERAL
PAINLEVEL_OUTOF10: 0

## 2021-07-30 NOTE — PROGRESS NOTES
Had BM this AM. Ambulated in room twice. Up to chair currently. Voiding adequately. VSS. A+Ox4. Denies pain/nausea. Tolerating small bites of meals.  Will monitor

## 2021-07-30 NOTE — PLAN OF CARE
Problem: Falls - Risk of:  Goal: Will remain free from falls  Description: Will remain free from falls  7/30/2021 0534 by Prosper Herrmann RN  Outcome: Ongoing  Note: Patient remains free from physical injury. Fall precautions in place: Patient in bed lowest position,wheels locked. 2/4 side rails up Call light and beside table within reach. Will continue to monitor       Problem: Sensory:  Goal: General experience of comfort will improve  Description: General experience of comfort will improve  7/29/2021 1943 by rTue Lassiter RN  Outcome: Met This Shift  Note: Patient denies pain this shift. Problem: Urinary Elimination:  Goal: Signs and symptoms of infection will decrease  Description: Signs and symptoms of infection will decrease  7/30/2021 0534 by Prosper Herrmann RN  Outcome: Ongoing  Note: Patient afebrile throughout the night. Antibiotic given as ordered.  Will continue to monitor

## 2021-07-30 NOTE — PROGRESS NOTES
IV removed without difficulty. DC paperwork explained. All questions answered. Prescriptions will be ready for pickup at [facility] around 1500 (Vit D- Cipro will not be picked up bc family already has it filled from just prior to admission). VSS on RA, A+Ox4, steady with SBA. Voiding adequately. Pt looking forward to getting home.  Family at bedside helping her shower prior to getting home

## 2021-07-30 NOTE — CARE COORDINATION
Case Management Assessment            Discharge Note                    Date / Time of Note: 7/30/2021 12:33 PM                  Discharge Note Completed by: Abeil Rahman RN    Patient Name: Brenda Fernandez   YOB: 1945  Diagnosis: Hyponatremia [E87.1]   Date / Time: 7/27/2021  6:28 PM    Current PCP: Benton Velázquez MD  Clinic patient: No    Hospitalization in the last 30 days: No    Advance Directives:  Code Status: DNR-CCA  [facility] DNR form completed and on chart: Not Indicated    Financial:  Payor: Terra [employer]ua / Plan: [place] HMO / Product Type: *No Product type* /      Pharmacy:    [facility] [address], [address] [address]  Phone: [phone] Fax: [phone]      Assistance purchasing medications?: Potential Assistance Purchasing Medications: No  Assistance provided by Case Management: None at this time    Does patient want to participate in local refill/ meds to beds program?: Yes    Meds To Beds General Rules:  1. Can ONLY be done Monday- Friday between 8:30am-5pm  2. Prescription(s) must be in pharmacy by 3pm to be filled same day  3. Copy of patient's insurance/ prescription drug card and patient face sheet must be sent along with the prescription(s)  4. Cost of Rx cannot be added to hospital bill. If financial assistance is needed, please contact unit  or ;  or  CANNOT provide pharmacy voucher for patients co-pays  5.  Patients can then  the prescription on their way out of the hospital at discharge, or pharmacy can deliver to the bedside if staff is available. (payment due at time of pick-up or delivery - cash, check, or card accepted)     Able to afford home medications/ co-pay costs: Yes    ADLS:  Current PT AM-PAC Score:   /24  Current OT AM-PAC Score:   /24      DISCHARGE Disposition: Home- No Services Needed    LOC at

## 2021-07-30 NOTE — DISCHARGE SUMMARY
Hospital Medicine Discharge Summary    Patient ID: Yordan Saavedra      Patient's PCP: Zenaida Gautam MD    Admit Date: 7/27/2021     Discharge Date:   07/30/21    Admitting Physician: Andrzej Rivas MD     Discharge Physician: William Rajan MD     Discharge Diagnoses: Active Hospital Problems    Diagnosis Date Noted    Hyponatremia [E87.1] 07/27/2021       The patient was seen and examined on day of discharge and this discharge summary is in conjunction with any daily progress note from day of discharge. Hospital Course:   Patient was admitted and treated for following:    Hypovolemic hyponatremia:  Patient appeared dehydrated on admission. Urine studies, TSH/cortisol/uric acid level was ordered. Hydrochlorothiazide was held. Nephrology was consulted. Sodium was monitored. Patient was continued on IV fluids and IV fluid rate was increased per nephrology. Urine osmolality 302, urine sodium 45, urine creatinine 40. TSH, Cortisol and uric acid normal.  Patient hyponatremia was secondary to poor intake. Sodium level improved. IV fluids were discontinued. Nephrology recommended discontinuing hydrochlorothiazide. Patient is being discharged in stable condition with recommendation to increase oral intake. Stop taking hydrochlorothiazide and follow-up with PCP.     Hypokalemia/hypomagnessemia:  Electrolytes were monitored and replaced as needed. Vitamin D deficiency:  Patient vitamin D level was 14.5. Patient was started on vitamin D 50,000 units weekly. Patient to follow-up with PCP.     UTI  Patient was penicillin allergic. On July 22 urine culture was positive for E. coli pansensitive. Patient was continued on ciprofloxacin. Patient to complete antibiotic course on discharge.     Hypertension:  Patient was placed on telemetry and blood pressure was monitored. Hydrochlorothiazide was held with concern to hyponatremia.   Amlodipine and metoprolol was held due to patient blood 07/28/2021    CL 98 07/30/2021    CO2 26 07/30/2021    BUN 2 07/30/2021    CREATININE 0.5 07/30/2021    CALCIUM 8.6 07/30/2021    PHOS 2.0 07/30/2021         Significant Diagnostic Studies    Radiology:   XR CHEST PORTABLE   Final Result      Overall stable findings in comparison to the prior PET/CT.              Consults:     IP CONSULT TO HOSPITALIST  IP CONSULT TO NEPHROLOGY    Disposition: Home    Condition at Discharge: Stable    Discharge Instructions/Follow-up: Complete antibiotic course  Monitor blood pressure  Follow up with oncology  Follow up with PCP     Code Status:  DNR-CCA     Activity: activity as tolerated    Diet: regular diet      Discharge Medications:     Current Discharge Medication List           Details   ciprofloxacin (CIPRO) 500 MG tablet Take 1 tablet by mouth 2 times daily for 4 days  Qty: 8 tablet, Refills: 0      vitamin D (ERGOCALCIFEROL) 1.25 MG (68206 UT) CAPS capsule Take 1 capsule by mouth once a week  Qty: 8 capsule, Refills: 0              Details   atorvastatin (LIPITOR) 10 MG tablet TAKE ONE TABLET BY MOUTH DAILY  Qty: 90 tablet, Refills: 2    Associated Diagnoses: Other hyperlipidemia      albuterol sulfate  (90 Base) MCG/ACT inhaler INHALE TWO PUFFS BY MOUTH EVERY 6 HOURS AS NEEDED FOR WHEEZING  Qty: 18 g, Refills: 2    Associated Diagnoses: Mild intermittent asthma without complication      metoprolol succinate (TOPROL XL) 25 MG extended release tablet Take 2 tablets by mouth daily  Qty: 90 tablet, Refills: 3    Associated Diagnoses: Tachycardia      omeprazole (PRILOSEC) 40 MG delayed release capsule TAKE ONE CAPSULE BY MOUTH EVERY MORNING BEFORE BREAKFAST  Qty: 30 capsule, Refills: 4      TAFINLAR 75 MG CAPS       MEKINIST 2 MG TABS       ASPIRIN ADULT LOW STRENGTH 81 MG EC tablet TAKE ONE TABLET BY MOUTH DAILY  Qty: 30 tablet, Refills: 5    Associated Diagnoses: Essential hypertension      budesonide-formoterol (SYMBICORT) 160-4.5 MCG/ACT AERO INHALE TWO PUFFS BY MOUTH TWICE A DAY  Qty: 10.2 g, Refills: 2    Associated Diagnoses: Mild intermittent asthma without complication      ibuprofen (ADVIL;MOTRIN) 800 MG tablet Take 1 tablet by mouth 2 times daily as needed for Pain  Qty: 30 tablet, Refills: 1    Associated Diagnoses: Chronic right shoulder pain             Time Spent on discharge is more than 30 minutes in the examination, evaluation, counseling and review of medications and discharge plan. Signed:    Faisal Ortiz MD   7/30/2021      Thank you Gerber Rodriguez MD for the opportunity to be involved in this patient's care. If you have any questions or concerns please feel free to contact me at [phone].

## 2021-07-30 NOTE — PLAN OF CARE
Problem: Falls - Risk of:  Goal: Will remain free from falls  Description: Will remain free from falls  7/30/2021 1033 by Charito Carcamo RN  Outcome: Ongoing  Note: SBA w/ walker. Appropriate safety awareness. Non skid socks on. Bed alarm on. Call light in reach  7/30/2021 0534 by Humera Armstrong RN  Outcome: Ongoing  Note: Patient remains free from physical injury. Fall precautions in place: Patient in bed lowest position,wheels locked. 2/4 side rails up Call light and beside table within reach. Will continue to monitor       Problem: Urinary Elimination:  Goal: Signs and symptoms of infection will decrease  Description: Signs and symptoms of infection will decrease  7/30/2021 1033 by Charito Carcamo RN  Outcome: Ongoing  Note: IV abx given per orders. WBC wnl. Increased mentation  7/30/2021 0534 by Humrea Armstrong RN  Outcome: Ongoing  Note: Patient afebrile throughout the night. Antibiotic given as ordered.  Will continue to monitor

## 2021-07-30 NOTE — PROGRESS NOTES
Patient alert and oriented. VSS Patient denies any pain or nausea. Assessment done. see flowsheet. Patient in bed lowest position call light and bedside table within reach. All needs are met at this time. Patient aware to call if any help needed. Will continue to monitor  /73   Pulse 83   Temp 98.8 °F (37.1 °C) (Oral)   Resp 16   Ht 5' 6\" (1.676 m)   Wt 167 lb 5.3 oz (75.9 kg)   SpO2 95%   BMI 27.01 kg/m²

## 2021-07-30 NOTE — PROGRESS NOTES
[facility]    [facility]              [phone]                      Pt is 71-year-old F with PMHx of arthritis, hyperlipidemia, hypertension, CHFrEF and lung cancer is admitted with chief complain of confusion. We are following for hyponatremia. Interval History and plan:     Pt presented with chief complain of confusion. Sodium is slowly improving 124>128> 130  K is 3.8  Phosphorus is low  Etiology for hyponatremia appears to be poor intake    Plan:    - Na q12 hr  - stop IVF   - DC HCTZ   - Urine osmolality is low at 137  - Renal panel q 12hr  - Mg daily  -  Replace magnesium and phosphorus                    Assessment :     Hyponatremia:  - 2/2 to most likely hypovolemic hyponatremia. However, effect of HCTZ and ADH cannot be ruled out. - Pt presented with Na 121. Mentions that she has had decrease in oral intake for the past two days. She is on HCTZ for her HTN. She continued taking her medications. Her lactic acid level was 2.1 when presented to ED. He has history of non small cell lung cancer. Her Na improved after giving 1L of LR in the ED.   - Serum osmolality 261  - Cortisol wnl  - Urine osmolality 302  - Urine Na 45   - TSH is normal with normal uric acid    Hypokalemia:  - Replete K    Hypomagnesemia:  - Replete Mg    UTI:  - UA positive for moderate leukocyte esterase, WBC>100.   - on Cipro    CHFrEF:  - last Echo on 01/29/2021 EF 45% and normal diastolic function.       Hyperlipidemia:  - atorvastatin 10 mg daily    [facility] would like to thank Carlos Enrique Starr MD   for opportunity to serve this patient      Please call with questions at-   24 Hrs Answering service [phone] or  7 am- 5 pm via Perfect serve or cell phone  Brenden Boo MD          CC/reason for consult :     Hyponatremia     HPI :     Kyle Hahn is a 77 y.o. female with PMHx of arthritis, hyperlipidemia, hypertension, CHFrEF and lung cancer is admitted to the hospital on 7/27/2021 with chief complain of confusion. Pt mentions that she started having dysuria and urinary frequency about a week ago. She called who prescribed her antibiotics. She was able to  her medication a day before her presentation to ED. However, she was having decrease in her appetite and oral intake. Her daughter felt that she slightly confused and brought her to the ED. Pt mentions that she had two previous episodes of UTI in the past year. In ED, In ED, T 100.5F, RR 16, HR 95, /78. Labs were significant for Na 121, lactic acid 2.1. UA was positive for moderate leukocyte esterase, WBC>100. Pt was given 1L of LR and started on NS 50 ml/hr. Pt was started on cipro for UTI.       ROS:     Seen with-     positives in bold   Constitutional:  fever, chills, weakness, weight change, fatigue  Skin:  rash, pruritus, hair loss, bruising, dry skin, petechiae  Head, Face, Neck   headaches, swelling,  cervical adenopathy  Respiratory: shortness of breath, cough, or wheezing  Cardiovascular: chest pain, palpitations, dizzy, edema  Gastrointestinal: nausea, vomiting, diarrhea, constipation,belly pain    Yellow skin, blood in stool  Musculoskeletal:  back pain, muscle weakness, gait problems,       joint pain or swelling. Genitourinary:  + dysuria, poor urine flow, flank pain, blood in urine  Neurologic:  vertigo, TIA'S, syncope, seizures, focal weakness  Psychosocial:  insomnia, anxiety, or depression.   Additional positive findings: Decrease oral intake, confusion                      All other remaining systems are negative or unable to obtain        PMH/PSH/SH/Family History:     Past Medical History:   Diagnosis Date    Arthritis     Cancer ([facility] [address]     lung     Hyperlipidemia     Hypertension        Past Surgical History:   Procedure Laterality Date    BRONCHOSCOPY  03/07/2017    brochoscopy-EBUS    OTHER SURGICAL HISTORY Left 03/29/2017    LEFT SUBCLAVIAN PORT- CATH INSERTION      TUBAL LIGATION          reports that she quit smoking about 9 years ago. She smoked 0.00 packs per day for 0.00 years. She has never used smokeless tobacco. She reports current alcohol use. She reports that she does not use drugs. family history is not on file.          Medication:     Current Facility-Administered Medications: metoprolol succinate (TOPROL XL) extended release tablet 25 mg, 25 mg, Oral, Daily  budesonide (PULMICORT) nebulizer suspension 500 mcg, 0.5 mg, Nebulization, BID **AND** Arformoterol Tartrate (BROVANA) nebulizer solution 15 mcg, 15 mcg, Nebulization, BID  albuterol (PROVENTIL) nebulizer solution 2.5 mg, 2.5 mg, Nebulization, Q2H PRN  ciprofloxacin (CIPRO) IVPB 400 mg, 400 mg, Intravenous, Q12H  aspirin EC tablet 81 mg, 81 mg, Oral, Daily  atorvastatin (LIPITOR) tablet 10 mg, 10 mg, Oral, Daily  pantoprazole (PROTONIX) tablet 40 mg, 40 mg, Oral, QAM AC  sodium chloride flush 0.9 % injection 5-40 mL, 5-40 mL, Intravenous, 2 times per day  sodium chloride flush 0.9 % injection 5-40 mL, 5-40 mL, Intravenous, PRN  0.9 % sodium chloride infusion, 25 mL, Intravenous, PRN  enoxaparin (LOVENOX) injection 40 mg, 40 mg, Subcutaneous, Daily  ondansetron (ZOFRAN-ODT) disintegrating tablet 4 mg, 4 mg, Oral, Q8H PRN **OR** ondansetron (ZOFRAN) injection 4 mg, 4 mg, Intravenous, Q6H PRN  polyethylene glycol (GLYCOLAX) packet 17 g, 17 g, Oral, Daily PRN  acetaminophen (TYLENOL) tablet 650 mg, 650 mg, Oral, Q6H PRN **OR** acetaminophen (TYLENOL) suppository 650 mg, 650 mg, Rectal, Q6H PRN  0.9 % sodium chloride infusion, , Intravenous, Continuous       Vitals :     Vitals:    07/30/21 0746   BP: 113/74   Pulse: 103   Resp: 15   Temp: 98.7 °F (37.1 °C)   SpO2: 94%       I & O :       Intake/Output Summary (Last 24 hours) at 7/30/2021 0802  Last data filed at 7/30/2021 0330  Gross per 24 hour   Intake 1526 ml   Output 750 ml   Net 776 ml        Physical Examination :     General appearance: Anxious- no, distressed- no, in good spirits- yes  HEENT: Lips- normal, teeth- ok , oral mucosa- moist  Neck : Mass- no, appears symmetrical, JVD- not visible  Respiratory: Respiratory effort-  wheeze- no, crackles -   Cardiovascular: Ausculation- No M/R/G, Edema   Abdomen: visible mass- no, distention- no, scar- no, tenderness- no                            hepatosplenomegaly-  no  Musculoskeletal:  clubbing no,cyanosis- no , digital ischemia- no                           muscle strength- grossly normal , tone - grossly normal  Skin: rashes- no , ulcers- no, induration- no, tightening - no  Psychiatric:  Judgement and insight- normal           AAO X 3  Additional finding:      LABS:     Recent Labs     07/27/21  1921 07/28/21  0516 07/28/21 2048   WBC 10.8 5.7 4.1   HGB 11.4* 10.6* 9.8*   HCT 34.3* 32.6* 29.9*    134* 138     Recent Labs     07/28/21  2048 07/29/21  0239 07/29/21  0931 07/29/21  0931 07/29/21  1455 07/29/21  2311 07/30/21  0332   *  124*   < > 127*   < > 125* 129* 130*   K 3.4*  3.3*  --  3.3*  --   --  3.8  --    CL 91*  90*  --  93*  --   --  94*  --    CO2 25  24  --  25  --   --  25  --    BUN 9  8  --  5*  --   --  4*  --    CREATININE 0.6  0.6  --  0.6  --   --  <0.5*  --    GLUCOSE 166*  172*  --  176*  --   --  102*  --    MG 1.70*  --  1.80  --   --   --  1.60*   PHOS 1.2*  --  1.5*  --   --  2.4*  --     < > = values in this interval not displayed.           Fred Quiroz    Thanks  Nephrology  [address]  Office: [phone]  Cell: [phone]  Fax: [phone]

## 2021-08-02 ENCOUNTER — TELEPHONE (OUTPATIENT)
Dept: INTERNAL MEDICINE CLINIC | Age: 76
End: 2021-08-02

## 2021-08-02 ENCOUNTER — CARE COORDINATION (OUTPATIENT)
Dept: CASE MANAGEMENT | Age: 76
End: 2021-08-02

## 2021-08-02 DIAGNOSIS — E87.1 HYPONATREMIA: Primary | ICD-10-CM

## 2021-08-02 PROCEDURE — 1111F DSCHRG MED/CURRENT MED MERGE: CPT | Performed by: INTERNAL MEDICINE

## 2021-08-02 NOTE — CARE COORDINATION
[facility] Transitions Initial Follow Up Call    Call within 2 business days of discharge: Yes    Patient: Elisa Martinez Patient : 1945   MRN: [id number]  Reason for Admission: Hyervolemia  Discharge Date: 21 RARS: Readmission Risk Score: 12      Last Discharge [facility]       Complaint Diagnosis Description Type Department Provider    21 Urinary Tract Infection; Dehydration Acute cystitis without hematuria . .. ED to Hosp-Admission (Discharged) (ADMITTED) Marianne Camacho MD; Melina Valenzuela... Spoke with: [facility]Th [facility]: [facility]    Non-face-to-face services provided:  telephone  Transitions of Care Initial Call    Was this an external facility discharge? No    Challenges to be reviewed by the provider   Additional needs identified to be addressed with provider: No  none             Method of communication with provider : none      Advance Care Planning:   Does patient have an Advance Directive: reviewed and current, reviewed and needs to be updated, not on file; education provided, patient declined education, decision maker updated and referral to internal ACP facilitator. Was this a readmission? No  Patient stated reason for admission: none  Patients top risk factors for readmission: none    Care Transition Nurse (CTN) contacted the patient by telephone to perform post hospital discharge assessment. Verified name and  with patient as identifiers. Provided introduction to self, and explanation of the CTN role. CTN reviewed discharge instructions, medical action plan and red flags with patient who verbalized understanding. Patient given an opportunity to ask questions and does not have any further questions or concerns at this time. Were discharge instructions available to patient? Yes.  Reviewed appropriate site of care based on symptoms and resources available to patient including: PCP and Specialist. The patient agrees to contact the PCP office for questions related to their healthcare. Medication reconciliation was performed with patient, who verbalizes understanding of administration of home medications. Advised obtaining a 90-day supply of all daily and as-needed medications. Reviewed and educated patient on any new and changed medications related to discharge diagnosis. Was patient discharged with a pulse oximeter? No Discussed and confirmed pulse oximeter discharge instructions and when to notify provider or seek emergency care. LPN CC provided contact information. Plan for follow-up call in 3-5 days based on severity of symptoms and risk factors. Plan for next call: self management-care        Care Transitions 24 Hour Call    Do you have any ongoing symptoms?: No  Do you have a copy of your discharge instructions?: Yes  Do you have all of your prescriptions and are they filled?: Yes  Have you been contacted by a [address]?: No  Have you scheduled your follow up appointment?: Yes  How are you going to get to your appointment?: Car - family or friend to transport  Were you discharged with any Home Care or Post Acute Services: No  Do you feel like you have everything you need to keep you well at home?: Yes  Care Transitions Interventions  No Identified Needs         Follow Up  Future Appointments   Date Time Provider Heide Carmen   8/3/2021 12:00 PM RICHELLE Snow SLEEP MED [facility]   8/11/2021  9:20 AM MD JERRICA Lewis - [place]   8/18/2021 11:00 AM Afua Zavala MD [facility]     Summary: This [facility] spoke with this pt today and pt stated that she was doing well. Pt denied any worsening symptoms and stated that the swelling in her legs and feet were still present but had significantly improved. Pt denied pain and any needs at this time. Pt has a f/u appt scheduled for 08/11/21 with PCP. Pt stated that her B/P was 124/79 and H/R 91 today.  She also stated that her appetite has resumed to normal and that she is having regular BMs and that her I&O are normal for her as well. Advised pt to continue to   monitor BP and HR and leg/feet swelling and to report any worsening symptoms to   her PCP. Pt verbalized understanding and agreed. Pt agreeable to f/u calls.     Dimple Leonard LPN

## 2021-08-02 NOTE — TELEPHONE ENCOUNTER
----- Message from Sanjana Adan sent at 8/2/2021  9:33 AM EDT -----  Subject: Hospital Follow Up    QUESTIONS  What hospital was the Patient Discharged from? [facility].  Date of Discharge? 2021-07-30  Discharge Location? Home  Reason for hospitalization as patient stated? Patient was admitted for a   UTI. Patient given IV fluids due to low electrolytes. BP medicine was   discontinued. What question does the patient have, if applicable? Patient needs to know   if she should restart her BP medicine,  ---------------------------------------------------------------------------  --------------  CALL BACK INFO  What is the best way for the office to contact you? OK to leave message on   voicemail  Preferred Call Back Phone Number? [phone]  ---------------------------------------------------------------------------  --------------  SCRIPT ANSWERS  Relationship to Patient?  Self

## 2021-08-03 PROBLEM — G47.33 OSA (OBSTRUCTIVE SLEEP APNEA): Status: ACTIVE | Noted: 2021-08-03

## 2021-08-11 ENCOUNTER — CARE COORDINATION (OUTPATIENT)
Dept: CASE MANAGEMENT | Age: 76
End: 2021-08-11

## 2021-08-11 NOTE — CARE COORDINATION
[facility] Transitions Follow Up Call    2021    Patient: Clifford Patrick  Patient : 1945   MRN: [id number]   Reason for Admission: Hyponatremia  Discharge Date: 21 RARS: Readmission Risk Score: 12         Spoke with: [address] Transitions Subsequent and Final Call    Schedule Follow Up Appointment with PCP: Declined  Subsequent and Final Calls  Do you have any ongoing symptoms?: Yes  Onset of Patient-reported symptoms: Today  Patient-reported symptoms: Other  Have your medications changed?: No  Do you have any questions related to your medications?: No  Do you currently have any active services?: No  Do you have any needs or concerns that I can assist you with?: No  Identified Barriers: None  Care Transitions Interventions  No Identified Needs  Other Interventions:         Summary  CTN spoke with patient this afternoon for follow up CTN call. Patient states she is doing well, reporting no complaints of any fever, chills, nausea, vomiting, chest pain, SOB or cough. No difficulty with urination, BM's or appetite. Patient does report having some BLE, in feet only. Instructed to elevate legs on pillows, above heart level, while sitting or lying. CTN encouraged patient to continue to monitor for any of the above s/s, reporting any that may present to MD immediately. Patient also instructed to monitor for any worsening BLE as well. Patient did have follow up with PCP, no new or changed medications, no other issues or concerns at this time. CTN provided education on s/s that require medical attention and when to seek medical attention. CTN advised Pt of use urgent care or physicians 24 hr access line if assistance is needed after hours or on the weekend. Pt denies any needs or concerns and is agreeable with additional calls.     Follow Up  Future Appointments   Date Time Provider Heide Carmen   2021 11:00 AM Yenni Wray MD [facility]   2021  1:00 PM MD JERRICA Tomlinson IM [place] - [facility]   9/2/2021  9:00 AM RICHELLE Lyons RN

## 2021-08-18 ENCOUNTER — CARE COORDINATION (OUTPATIENT)
Dept: CASE MANAGEMENT | Age: 76
End: 2021-08-18

## 2021-08-18 ENCOUNTER — OFFICE VISIT (OUTPATIENT)
Dept: CARDIOLOGY CLINIC | Age: 76
End: 2021-08-18
Payer: MEDICARE

## 2021-08-18 VITALS
SYSTOLIC BLOOD PRESSURE: 142 MMHG | WEIGHT: 159 LBS | BODY MASS INDEX: 25.66 KG/M2 | HEART RATE: 60 BPM | DIASTOLIC BLOOD PRESSURE: 88 MMHG

## 2021-08-18 DIAGNOSIS — C79.72 MALIGNANT NEOPLASM METASTATIC TO LEFT ADRENAL GLAND (HCC): ICD-10-CM

## 2021-08-18 DIAGNOSIS — E78.5 HYPERLIPIDEMIA, UNSPECIFIED HYPERLIPIDEMIA TYPE: Primary | ICD-10-CM

## 2021-08-18 DIAGNOSIS — R07.9 CHEST PAIN, UNSPECIFIED TYPE: ICD-10-CM

## 2021-08-18 PROCEDURE — 1123F ACP DISCUSS/DSCN MKR DOCD: CPT | Performed by: INTERNAL MEDICINE

## 2021-08-18 PROCEDURE — G8400 PT W/DXA NO RESULTS DOC: HCPCS | Performed by: INTERNAL MEDICINE

## 2021-08-18 PROCEDURE — G8427 DOCREV CUR MEDS BY ELIG CLIN: HCPCS | Performed by: INTERNAL MEDICINE

## 2021-08-18 PROCEDURE — 1111F DSCHRG MED/CURRENT MED MERGE: CPT | Performed by: INTERNAL MEDICINE

## 2021-08-18 PROCEDURE — 1090F PRES/ABSN URINE INCON ASSESS: CPT | Performed by: INTERNAL MEDICINE

## 2021-08-18 PROCEDURE — 99214 OFFICE O/P EST MOD 30 MIN: CPT | Performed by: INTERNAL MEDICINE

## 2021-08-18 PROCEDURE — G8417 CALC BMI ABV UP PARAM F/U: HCPCS | Performed by: INTERNAL MEDICINE

## 2021-08-18 PROCEDURE — 3017F COLORECTAL CA SCREEN DOC REV: CPT | Performed by: INTERNAL MEDICINE

## 2021-08-18 PROCEDURE — 1036F TOBACCO NON-USER: CPT | Performed by: INTERNAL MEDICINE

## 2021-08-18 PROCEDURE — 4040F PNEUMOC VAC/ADMIN/RCVD: CPT | Performed by: INTERNAL MEDICINE

## 2021-08-18 NOTE — CARE COORDINATION
[facility] Transitions Initial Follow Up Call    Call within 2 business days of discharge: Yes    Patient:  Adrianna Vickery   Patient :  1945  MRN:  [id number]     Reason for Admission: Hyponatremia  Discharge Date:  21    RARS:       Non-face-to-face services provided:    1ST CTC attempt to reach Pt regarding recent hospital discharge. CTC left voice recording with call back number requesting a call back.     Follow up appointments:    Future Appointments   Date Time Provider Heide Carmen   2021  1:00 PM MD JERRICA Hills IM [place] - [facility]   2021  9:00 AM Freddi Burkitt, APRN [facility]   2022 11:00 AM Miki Valdez MD [facility]       Thank Pato Fisher RN  Care Transition Coordinator  Contact Number:[phone]

## 2021-08-18 NOTE — PROGRESS NOTES
[facility]   Dr Phong Jalloh. Aparna Beavers MD, [address] [facility]    Outpatient Follow Up Note    2021,11:33 AM  Subjective:   CHIEF COMPLAINT / HPI:  Follow Up secondary to {hypertension hyperlipidemia SVT     Elisa Martinez is 77 y.o. female who presents today for a routine follow up. She did have a coronavirus vaccine Moderna. She is generally doing well. Still complains of general aches and pains which she thinks is age-related. Easy fatigue. Apparently was in hospital recently at [facility] with acute urinary tract sepsis. She was treated and has improved significantly and is back home. Her last LDL was 51 on current therapy. Cardiac wise all else looks stable. Did have a CT of the chest and abdomen in January and her aortic root is still dilated but no appreciable change. To have another study in 2021. Coronavirus vaccine x2 Moderna  Obstructive sleep apnea with CPAP   Lung right cancer Dr. Ahmet Rodríguez   Dilated aortic root  Recurring SVT  Hypertension  Past Medical History:    Past Medical History:   Diagnosis Date    Arthritis     Cancer ([address]     lung     Hyperlipidemia     Hypertension                                                                                                                                Past Surgical History  Past Surgical History:   Procedure Laterality Date    BRONCHOSCOPY  2017    brochoscopy-EBUS    OTHER SURGICAL HISTORY Left 2017    LEFT SUBCLAVIAN PORT- CATH INSERTION      TUBAL LIGATION       Social History:       Social History     Tobacco Use   Smoking Status Former Smoker    Packs/day: 0.00    Years: 0.00    Pack years: 0.00    Quit date: 3/10/2012    Years since quittin.4   Smokeless Tobacco Never Used     Current Medications:  Prior to Visit Medications    Medication Sig Taking?  Authorizing Provider   vitamin D (ERGOCALCIFEROL) 1.25 MG (20682 UT) CAPS capsule Take 1 capsule by mouth once a week Yes Ketty Strange MD atorvastatin (LIPITOR) 10 MG tablet TAKE ONE TABLET BY MOUTH DAILY Yes Susana Garces MD   budesonide-formoterol (SYMBICORT) 160-4.5 MCG/ACT AERO INHALE TWO PUFFS BY MOUTH TWICE A DAY Yes Susana Garces MD   albuterol sulfate  (90 Base) MCG/ACT inhaler INHALE TWO PUFFS BY MOUTH EVERY 6 HOURS AS NEEDED FOR WHEEZING Yes Susana Garces MD   metoprolol succinate (TOPROL XL) 25 MG extended release tablet Take 2 tablets by mouth daily  Patient taking differently: Take 25 mg by mouth daily  Yes RICHELLE Young CNP   omeprazole (PRILOSEC) 40 MG delayed release capsule TAKE ONE CAPSULE BY MOUTH EVERY MORNING BEFORE BREAKFAST Yes Susana Garces MD   TAFINLAR 75 MG CAPS  Yes Historical Provider, MD   MEKINIST 2 MG TABS  Yes Historical Provider, MD   ibuprofen (ADVIL;MOTRIN) 800 MG tablet Take 1 tablet by mouth 2 times daily as needed for Pain Yes Susana Garces MD   ASPIRIN ADULT LOW STRENGTH 81 MG EC tablet Bj Kowalski Yes Susana Garces MD     Family History  No family history on file.     Current Medications  Current Outpatient Medications   Medication Sig Dispense Refill    vitamin D (ERGOCALCIFEROL) 1.25 MG (08097 UT) CAPS capsule Take 1 capsule by mouth once a week 8 capsule 0    atorvastatin (LIPITOR) 10 MG tablet TAKE ONE TABLET BY MOUTH DAILY 90 tablet 2    budesonide-formoterol (SYMBICORT) 160-4.5 MCG/ACT AERO INHALE TWO PUFFS BY MOUTH TWICE A DAY 10.2 g 2    albuterol sulfate  (90 Base) MCG/ACT inhaler INHALE TWO PUFFS BY MOUTH EVERY 6 HOURS AS NEEDED FOR WHEEZING 18 g 2    metoprolol succinate (TOPROL XL) 25 MG extended release tablet Take 2 tablets by mouth daily (Patient taking differently: Take 25 mg by mouth daily ) 90 tablet 3    omeprazole (PRILOSEC) 40 MG delayed release capsule TAKE ONE CAPSULE BY MOUTH EVERY MORNING BEFORE BREAKFAST 30 capsule 4    TAFINLAR 75 MG CAPS       MEKINIST 2 MG TABS       ibuprofen (ADVIL;MOTRIN) 800 MG tablet Take 1 tablet by sounds, non-distended and non-tender to palpation  EXT: No edema, no calf tenderness. Pulses are present bilaterally. DATA:    Lab Results   Component Value Date    ALT 11 07/28/2021    AST 26 07/28/2021    ALKPHOS 76 07/28/2021    BILITOT 0.3 07/28/2021     Lab Results   Component Value Date    CREATININE 0.5 (L) 07/30/2021    BUN 2 (L) 07/30/2021     (L) 07/30/2021    K 3.6 07/30/2021    CL 98 (L) 07/30/2021    CO2 26 07/30/2021     Lab Results   Component Value Date    TSH 0.236 (L) 06/29/2017     Lab Results   Component Value Date    WBC 4.1 07/28/2021    HGB 9.8 (L) 07/28/2021    HCT 29.9 (L) 07/28/2021    MCV 75.0 (L) 07/28/2021     07/28/2021     No components found for: CHLPL  Lab Results   Component Value Date    TRIG 56 05/11/2021    TRIG 86 06/05/2019    TRIG 68 04/26/2018     Lab Results   Component Value Date    HDL 68 (H) 05/11/2021    HDL 70 (H) 06/05/2019    HDL 60 04/26/2018     Lab Results   Component Value Date    LDLCALC 51 05/11/2021    LDLCALC 61 06/05/2019    LDLCALC 50 04/26/2018     Lab Results   Component Value Date    LABVLDL 11 05/11/2021    LABVLDL 17 06/05/2019    LABVLDL 14 04/26/2018     Radiology Review:  Pertinent images / reports were reviewed as a part of this visit and reveals the following:    Echo:Summary1/28/21   Normal left ventricular size. Mild septal hypertrophy. Mildly decreased left ventricular systolic function with an estimated   ejection fraction of 45%. Normal diastolic function. The aortic root is dilated measuring 3.8 cm. The ascending aorta is dilated measuring 4.3 cm. Trivial tricuspid regurgitation. Stress Test / Angiogram:    ECG:Sinus  Tachycardia 1/15/21  WITHIN NORMAL LIMITS  This patient was educated using the patient point room wall mount device. Absence from smokers and smoking and diet and exercising are important. Assessment:   Dilated aortic root and hypertension and SVT. Plan:   Continue current therapy.  To have a CAT scan of the chest in January 2021 to follow-up on her dilated aortic root. Please call if we can assist further [phone]. Pia Toribio.  Donato AMADO, [facility]      This note was likely completed using voice  recognition technology and may contain unintended errors

## 2021-08-23 ENCOUNTER — CARE COORDINATION (OUTPATIENT)
Dept: CASE MANAGEMENT | Age: 76
End: 2021-08-23

## 2021-08-23 NOTE — CARE COORDINATION
Care Transitions Outreach Attempt    Call within 2 business days of discharge: Yes   Attempted to reach patient for transitions of care follow up. Unable to reach patient. This was the 2nd attempt at f/u transitions call. Left vm. Resolving per policy and transitions care ends on 2021 as well. Marjan Grubbs LPN, [facility]: [phone]      Patient: Harlan Leyden Patient : 1945 MRN: [id number]    Last Discharge [address] [address]       Complaint Diagnosis Description Type Department Provider    21 Urinary Tract Infection; Dehydration Acute cystitis without hematuria . .. ED to Hosp-Admission (Discharged) (ADMITTED) Marianne Camacho MD; Del Newberry,... Was this an external facility discharge?  No     Noted following upcoming appointments from discharge chart review:   [facility] follow up appointment(s):   Future Appointments   Date Time Provider Heide Carmen   2021  1:00 PM Kelsy Jean MD AVJOSE DAVID IM [place] - [facility]   2021  9:00 AM RICHELLE Posadas SLEEP [facility]   2022 11:00 AM Fazal Raymundo MD [facility]

## 2021-08-24 ENCOUNTER — TELEPHONE (OUTPATIENT)
Dept: PULMONOLOGY | Age: 76
End: 2021-08-24

## 2021-08-25 ENCOUNTER — VIRTUAL VISIT (OUTPATIENT)
Dept: INTERNAL MEDICINE CLINIC | Age: 76
End: 2021-08-25
Payer: MEDICARE

## 2021-08-25 DIAGNOSIS — D69.6 THROMBOCYTOPENIA, UNSPECIFIED (HCC): ICD-10-CM

## 2021-08-25 DIAGNOSIS — Z87.440 HISTORY OF RECURRENT UTIS: ICD-10-CM

## 2021-08-25 DIAGNOSIS — D50.8 OTHER IRON DEFICIENCY ANEMIA: ICD-10-CM

## 2021-08-25 DIAGNOSIS — R26.89 BALANCE DISORDER: Primary | ICD-10-CM

## 2021-08-25 DIAGNOSIS — R53.81 PHYSICAL DECONDITIONING: ICD-10-CM

## 2021-08-25 PROCEDURE — G8400 PT W/DXA NO RESULTS DOC: HCPCS | Performed by: INTERNAL MEDICINE

## 2021-08-25 PROCEDURE — 1111F DSCHRG MED/CURRENT MED MERGE: CPT | Performed by: INTERNAL MEDICINE

## 2021-08-25 PROCEDURE — 4040F PNEUMOC VAC/ADMIN/RCVD: CPT | Performed by: INTERNAL MEDICINE

## 2021-08-25 PROCEDURE — 1090F PRES/ABSN URINE INCON ASSESS: CPT | Performed by: INTERNAL MEDICINE

## 2021-08-25 PROCEDURE — G8417 CALC BMI ABV UP PARAM F/U: HCPCS | Performed by: INTERNAL MEDICINE

## 2021-08-25 PROCEDURE — 99214 OFFICE O/P EST MOD 30 MIN: CPT | Performed by: INTERNAL MEDICINE

## 2021-08-25 PROCEDURE — 1123F ACP DISCUSS/DSCN MKR DOCD: CPT | Performed by: INTERNAL MEDICINE

## 2021-08-25 PROCEDURE — 3017F COLORECTAL CA SCREEN DOC REV: CPT | Performed by: INTERNAL MEDICINE

## 2021-08-25 PROCEDURE — 1036F TOBACCO NON-USER: CPT | Performed by: INTERNAL MEDICINE

## 2021-08-25 PROCEDURE — G8427 DOCREV CUR MEDS BY ELIG CLIN: HCPCS | Performed by: INTERNAL MEDICINE

## 2021-08-25 SDOH — ECONOMIC STABILITY: FOOD INSECURITY: WITHIN THE PAST 12 MONTHS, YOU WORRIED THAT YOUR FOOD WOULD RUN OUT BEFORE YOU GOT MONEY TO BUY MORE.: NEVER TRUE

## 2021-08-25 SDOH — ECONOMIC STABILITY: FOOD INSECURITY: WITHIN THE PAST 12 MONTHS, THE FOOD YOU BOUGHT JUST DIDN'T LAST AND YOU DIDN'T HAVE MONEY TO GET MORE.: NEVER TRUE

## 2021-08-25 ASSESSMENT — PATIENT HEALTH QUESTIONNAIRE - PHQ9
SUM OF ALL RESPONSES TO PHQ QUESTIONS 1-9: 0
SUM OF ALL RESPONSES TO PHQ QUESTIONS 1-9: 0
SUM OF ALL RESPONSES TO PHQ9 QUESTIONS 1 & 2: 0
SUM OF ALL RESPONSES TO PHQ QUESTIONS 1-9: 0
1. LITTLE INTEREST OR PLEASURE IN DOING THINGS: 0
2. FEELING DOWN, DEPRESSED OR HOPELESS: 0

## 2021-08-25 ASSESSMENT — SOCIAL DETERMINANTS OF HEALTH (SDOH): HOW HARD IS IT FOR YOU TO PAY FOR THE VERY BASICS LIKE FOOD, HOUSING, MEDICAL CARE, AND HEATING?: NOT HARD AT ALL

## 2021-08-27 ENCOUNTER — TELEPHONE (OUTPATIENT)
Dept: PULMONOLOGY | Age: 76
End: 2021-08-27

## 2021-09-02 ENCOUNTER — VIRTUAL VISIT (OUTPATIENT)
Dept: PULMONOLOGY | Age: 76
End: 2021-09-02
Payer: MEDICARE

## 2021-09-02 ENCOUNTER — TELEPHONE (OUTPATIENT)
Dept: PULMONOLOGY | Age: 76
End: 2021-09-02

## 2021-09-02 DIAGNOSIS — I10 ESSENTIAL HYPERTENSION: Chronic | ICD-10-CM

## 2021-09-02 DIAGNOSIS — C34.91 PRIMARY LUNG ADENOCARCINOMA, RIGHT (HCC): ICD-10-CM

## 2021-09-02 DIAGNOSIS — G47.33 OSA (OBSTRUCTIVE SLEEP APNEA): Primary | ICD-10-CM

## 2021-09-02 PROCEDURE — 1123F ACP DISCUSS/DSCN MKR DOCD: CPT | Performed by: NURSE PRACTITIONER

## 2021-09-02 PROCEDURE — 1036F TOBACCO NON-USER: CPT | Performed by: NURSE PRACTITIONER

## 2021-09-02 PROCEDURE — G8400 PT W/DXA NO RESULTS DOC: HCPCS | Performed by: NURSE PRACTITIONER

## 2021-09-02 PROCEDURE — G8417 CALC BMI ABV UP PARAM F/U: HCPCS | Performed by: NURSE PRACTITIONER

## 2021-09-02 PROCEDURE — 1090F PRES/ABSN URINE INCON ASSESS: CPT | Performed by: NURSE PRACTITIONER

## 2021-09-02 PROCEDURE — 3017F COLORECTAL CA SCREEN DOC REV: CPT | Performed by: NURSE PRACTITIONER

## 2021-09-02 PROCEDURE — 4040F PNEUMOC VAC/ADMIN/RCVD: CPT | Performed by: NURSE PRACTITIONER

## 2021-09-02 PROCEDURE — G8427 DOCREV CUR MEDS BY ELIG CLIN: HCPCS | Performed by: NURSE PRACTITIONER

## 2021-09-02 PROCEDURE — 99214 OFFICE O/P EST MOD 30 MIN: CPT | Performed by: NURSE PRACTITIONER

## 2021-09-02 ASSESSMENT — SLEEP AND FATIGUE QUESTIONNAIRES
HOW LIKELY ARE YOU TO NOD OFF OR FALL ASLEEP WHILE LYING DOWN TO REST IN THE AFTERNOON WHEN CIRCUMSTANCES PERMIT: 1
HOW LIKELY ARE YOU TO NOD OFF OR FALL ASLEEP WHILE SITTING AND TALKING TO SOMEONE: 0
HOW LIKELY ARE YOU TO NOD OFF OR FALL ASLEEP IN A CAR, WHILE STOPPED FOR A FEW MINUTES IN TRAFFIC: 0
HOW LIKELY ARE YOU TO NOD OFF OR FALL ASLEEP WHILE SITTING AND READING: 0
ESS TOTAL SCORE: 3
HOW LIKELY ARE YOU TO NOD OFF OR FALL ASLEEP WHILE WATCHING TV: 1
HOW LIKELY ARE YOU TO NOD OFF OR FALL ASLEEP WHILE SITTING INACTIVE IN A PUBLIC PLACE: 0
HOW LIKELY ARE YOU TO NOD OFF OR FALL ASLEEP WHILE SITTING QUIETLY AFTER LUNCH WITHOUT ALCOHOL: 1
HOW LIKELY ARE YOU TO NOD OFF OR FALL ASLEEP WHEN YOU ARE A PASSENGER IN A CAR FOR AN HOUR WITHOUT A BREAK: 0

## 2021-09-02 NOTE — Clinical Note
[facility] Sleep Medicine  [address] [facility] [address]  Phone: [phone]  Fax: [phone]    RICHELLE Marley    September 2, 2021     Bruce Alvarenga MD  [phone] [facility] [address]    Patient: Montana Juarez   MR Number: [id number]   YOB: 1945   Date of Visit: 9/2/2021       Dear Bruce Alvarenga:    Thank you for referring Dorice Boas to me for evaluation/treatment. Below are the relevant portions of my assessment and plan of care. If you have questions, please do not hesitate to call me. I look forward to following Ashkan Mcclendon along with you.     Sincerely,    RICHELLE Marley APRN

## 2021-09-02 NOTE — LETTER
[facility] Sleep Medicine  [place] [address] [place] [address]  Phone: [phone]  Fax: [phone]    September 2, 2021       Patient: Ameena Do   MR Number: [id number]   YOB: 1945   Date of Visit: 9/2/2021       Anamaria Syed was seen for a follow up visit today. Here is my assessment and plan as well as an attached copy of her visit today:    Primary lung adenocarcinoma, right ([address]   Chronic- Stable. Discussed the importance of treating obstructive sleep apnea as part of the management of this disorder. Cont any meds per PCP and other physicians. Hypertension  Chronic- Stable. Discussed the importance of treating obstructive sleep apnea as part of the management of this disorder. Cont any meds per PCP and other physicians. SHANNA (obstructive sleep apnea)   Chronic-Improving: Reviewed Results of HST with patient. Reviewed and analyzed results of physiologic download from patient's machine and reviewed with patient. Supplies and parts as needed for her machine. These are medically necessary. Limit caffeine use after 3pm. Based on the analyzed data will change following settings: Pmin: 12, Pmax: 20, Ramp time 45 minutes, Humidity to 1. Will place order for overnight oximetry due to hypoxia shown on HST. Will call results of oximetry and make adjustments as needed. Follow up in 2-3 months or sooner if issues arise. If you have questions or concerns, please do not hesitate to call me. I look forward to following Poncho Isaacs along with you.     Sincerely,    RICHELLE Lyn    CC providers:  Marta Ramos MD  [address] [address] [address] [place] [address]

## 2021-09-02 NOTE — PROGRESS NOTES
Maricruz Dc MD, Cornelio Barragan, [facility]  Tiffanie Kehrt CNP  Leasusana Bojorquez CNP [address]  Solo [address]sg[address], [address] [facility]- [phone]   [facility] [address]. [address] [facility]. [address] [phone]     Video Visit- Follow up    Pursuant to the emergency declaration under the [address] [place] [address] [facility] authority and the [employer] Act, this Virtual  Visit was conducted, with patient's consent, to reduce the patient's risk of exposure to COVID-19 and provide continuity of care for an established patient. Services were provided through a video synchronous discussion virtually to substitute for in-person clinic visit. Patient was located in their home. Assessment/Plan:      1. SHANNA (obstructive sleep apnea)  Assessment & Plan:   Chronic-Improving: Reviewed Results of HST with patient. Reviewed and analyzed results of physiologic download from patient's machine and reviewed with patient. Supplies and parts as needed for her machine. These are medically necessary. Limit caffeine use after 3pm. Based on the analyzed data will change following settings: Pmin: 12, Pmax: 20, Ramp time 45 minutes, Humidity to 1. Will place order for overnight oximetry due to hypoxia shown on HST. Will call results of oximetry and make adjustments as needed. Follow up in 2-3 months or sooner if issues arise. 2. Essential hypertension  Assessment & Plan:  Chronic- Stable. Discussed the importance of treating obstructive sleep apnea as part of the management of this disorder. Cont any meds per PCP and other physicians. 3. Primary lung adenocarcinoma, right [address] [address]  Assessment & Plan:   Chronic- Stable. Discussed the importance of treating obstructive sleep apnea as part of the management of this disorder. Cont any meds per PCP and other physicians.       Reviewed, analyzed, and documented physiologic data from patient's PAP tablet TAKE ONE TABLET BY MOUTH DAILY    budesonide-formoterol (SYMBICORT) 160-4.5 MCG/ACT AERO INHALE TWO PUFFS BY MOUTH TWICE A DAY    ibuprofen (ADVIL;MOTRIN) 800 mg, Oral, 2 TIMES DAILY PRN    MEKINIST 2 MG TABS No dose, route, or frequency recorded.  metoprolol succinate (TOPROL XL) 50 mg, Oral, DAILY    omeprazole (PRILOSEC) 40 MG delayed release capsule TAKE ONE CAPSULE BY MOUTH EVERY MORNING BEFORE BREAKFAST    TAFINLAR 75 MG CAPS No dose, route, or frequency recorded.     vitamin D (ERGOCALCIFEROL) 50,000 Units, Oral, WEEKLY        Electronically signed by RICHELLE Jones on 9/2/2021 at 9:43 AM

## 2021-09-02 NOTE — PROGRESS NOTES
Nando Escalante         : 1945    Diagnosis: [x] Hypoxia (R09.02) [] CSA (G47.31) [] Apnea (G47.30)   Length of Need: [] 13 Months [] 99 Months [] Other:    Machine (JANIE!): [] Respironics Dream Station      Auto [] ResMed AirSense     Auto [] Other:     []  CPAP () [] Bilevel ()   Mode: [] Auto [] Spontaneous    Mode: [] Auto [] Spontaneous              Comfort Settings:        Humidifier: [] Heated ()        [] Water chamber replacement ()/ 1 per 6 months        Mask:   [] Nasal () /1 per 3 months [] Full Face () /1 per 3 months   [] Patient choice -Size and fit mask [] Patient Choice - Size and fit mask   [] Dispense:  [] Dispense:    [] Headgear () / 1 per 3 months [] Headgear () / 1 per 3 months   [] Replacement Nasal Cushion ()/2 per month [] Interface Replacement ()/1 per month   [] Replacement Nasal Pillows ()/2 per month         Tubing: [] Heated ()/1 per 3 months    [] Standard ()/1 per 3 months [] Other:           Filters: [] Non-disposable ()/1 per 6 months     [] Ultra-Fine, Disposable ()/2 per month        Miscellaneous: [] Chin Strap ()/ 1 per 6 months [] O2 bleed-in:       LPM   [x] Overnight Oximetry on CPAP/Bilevel []  Other:    [] Modem: ()         Start Order Date: 21    MEDICAL JUSTIFICATION:  I, the undersigned, certify that the above prescribed supplies are medically necessary for this patients wellbeing. In my opinion, the supplies are both reasonable and necessary in reference to accepted standards of medicalpractice in treatment of this patients condition.     RICHELLE Benjamin      NPI: [id number]       Order Signed Date: 21    Electronically signed by RICHELLE Benjamin on 2021 at 9:44 AM    Nando Escalante  1945  [address] [place]  [phone] (home)   [phone] (mobile)      Insurance Info (confirm with patient if correct):  Payor/Plan Subscr  Sex Relation Sub. Ins. ID Effective Group Num   1. Jm Florez Y 1945 Female  [id number] Not Eff                                    [address] [address]   2.  MEDICARE - MEMaryruth Mcfarland 1945 Female  [id number] Not Eff                                    [address] [address]

## 2021-09-10 ENCOUNTER — TELEPHONE (OUTPATIENT)
Dept: PULMONOLOGY | Age: 76
End: 2021-09-10

## 2021-09-10 DIAGNOSIS — G47.33 OSA (OBSTRUCTIVE SLEEP APNEA): Primary | ICD-10-CM

## 2021-09-10 NOTE — TELEPHONE ENCOUNTER
Overnight oximetry still shows hypoxia despite multiple pressure changes. Machine download shows uncontrolled AHI despite a well fitting mask. Patient will require an in lab titration. Will place order for titration. Will increase P min to 14. Pressure changes sent to patients modem.

## 2021-09-14 ENCOUNTER — TELEPHONE (OUTPATIENT)
Dept: PULMONOLOGY | Age: 76
End: 2021-09-14

## 2021-09-14 NOTE — TELEPHONE ENCOUNTER
Pt having issues with water in her tubing and mask. Humidifier settings were reviewed with pt changing while on the phone. Pt is not comfortable with her unit and may call her DME to review.

## 2021-09-14 NOTE — TELEPHONE ENCOUNTER
Patient called back to go over titration study. Transferred patient to Sleep Lab to schedule titration.

## 2021-09-15 ENCOUNTER — HOSPITAL ENCOUNTER (OUTPATIENT)
Dept: PHYSICAL THERAPY | Age: 76
Setting detail: THERAPIES SERIES
End: 2021-09-15
Payer: MEDICARE

## 2021-09-19 ASSESSMENT — ENCOUNTER SYMPTOMS
RESPIRATORY NEGATIVE: 1
GASTROINTESTINAL NEGATIVE: 1
EYES NEGATIVE: 1

## 2021-09-19 NOTE — PROGRESS NOTES
2021    TELEHEALTH EVALUATION -- Audio/Visual (During NXXNT-04 public health emergency)    HPI:    Spero Koyanagi (:  1945) has requested an audio/video evaluation for the following concern(s):    Hypertension: taking medication as prescribed. Eats a balanced meal plan including low sodium. Pain: back, knees, if standing a long time. Stretching exercises for back help. Balance difficulties: notes with walking. Tendency to sway. No falls. proprioception ok. Recurrent UTIs. Suggest urology f/u. Tired often: sleeping ok. Check CBC. Review of Systems   Constitutional: Positive for fatigue. See HPI. HENT: Negative. Eyes: Negative. Respiratory: Negative. Cardiovascular:        Hypertension, see HPI. Treated with LUANNE ram. Gastrointestinal: Negative. Genitourinary: Negative. Musculoskeletal: Negative. Skin: Negative. Neurological:        Balance concerns. See HPI. Psychiatric/Behavioral: Negative. Prior to Visit Medications    Medication Sig Taking?  Authorizing Provider   vitamin D (ERGOCALCIFEROL) 1.25 MG (66293 UT) CAPS capsule Take 1 capsule by mouth once a week Yes Philip Metcalf MD   atorvastatin (LIPITOR) 10 MG tablet TAKE ONE TABLET BY MOUTH DAILY Yes Marcio Mosqueda MD   budesonide-formoterol (SYMBICORT) 160-4.5 MCG/ACT AERO INHALE TWO PUFFS BY MOUTH TWICE A DAY Yes Marcio Mosqueda MD   albuterol sulfate  (90 Base) MCG/ACT inhaler INHALE TWO PUFFS BY MOUTH EVERY 6 HOURS AS NEEDED FOR WHEEZING Yes Marcio Mosqueda MD   metoprolol succinate (TOPROL XL) 25 MG extended release tablet Take 2 tablets by mouth daily  Patient taking differently: Take 25 mg by mouth daily  Yes RICHELLE Harris - CNP   omeprazole (PRILOSEC) 40 MG delayed release capsule TAKE ONE CAPSULE BY MOUTH EVERY MORNING BEFORE BREAKFAST Yes Marcio Mosqueda MD   TAFINLAR 75 MG CAPS  Yes Historical Provider, MD   MEKINIST 2 MG TABS  Yes Historical Provider, MD   ibuprofen (ADVIL;MOTRIN) 800 MG tablet Take 1 tablet by mouth 2 times daily as needed for Pain Yes Saulo Urena MD   ASPIRIN ADULT LOW STRENGTH 81 MG EC tablet TAKE ONE TABLET BY MOUTH DAILY Yes Saulo Urena MD       Social History     Tobacco Use    Smoking status: Former Smoker     Packs/day: 0.00     Years: 0.00     Pack years: 0.00     Quit date: 3/10/2012     Years since quittin.5    Smokeless tobacco: Never Used   Vaping Use    Vaping Use: Never used   Substance Use Topics    Alcohol use: Yes     Comment: 1-2 X WEEKLY    Drug use: No            PHYSICAL EXAMINATION:  [ INSTRUCTIONS:  \"[x]\" Indicates a positive item  \"[]\" Indicates a negative item  -- DELETE ALL ITEMS NOT EXAMINED]  Vital Signs: (As obtained by patient/caregiver or practitioner observation)    Blood pressure- 131/87 Heart rate- 77   Respiratory rate-    Temperature-  Pulse oximetry-     Constitutional: [x] Appears well-developed and well-nourished [x] No apparent distress      [] Abnormal-   Mental status  [x] Alert and awake  [x] Oriented to person/place/time [x]Able to follow commands      Eyes:  EOM    [x]  Normal  [] Abnormal-  Sclera  [x]  Normal  [] Abnormal -         Discharge [x]  None visible  [] Abnormal -    HENT:   [x] Normocephalic, atraumatic.   [] Abnormal   [x] Mouth/Throat: Mucous membranes are moist.     External Ears [x] Normal  [] Abnormal-     Neck: [x] No visualized mass     Pulmonary/Chest: [x] Respiratory effort normal.  [] No visualized signs of difficulty breathing or respiratory distress        [] Abnormal-      Musculoskeletal:   [x] Normal gait with no signs of ataxia         [x] Normal range of motion of neck        [] Abnormal-       Neurological:        [x] No Facial Asymmetry (Cranial nerve 7 motor function) (limited exam to video visit)          [x] No gaze palsy        [] Abnormal-         Skin:        [x] No significant exanthematous lesions or discoloration noted on facial skin         [] Abnormal- Psychiatric:       [x] Normal Affect [] No Hallucinations        [] Abnormal-     Other pertinent observable physical exam findings-     ASSESSMENT/PLAN:  1. Balance disorder  Exercises discussed. - [facility] - [place]    2. Physical deconditioning  - [facility] - [place]    3. Other iron deficiency anemia  - CBC WITH AUTO DIFFERENTIAL; Future  - FERRITIN; Future  - IRON AND TIBC; Future  - RETICULOCYTES; Future    4. History of recurrent UTIs  - AFL - Sonia Garcia MD, Urology, [place]    5. Thrombocytopenia, unspecified  Check CBC          Milton Castro, was evaluated through a synchronous (real-time) audio-video encounter. The patient (or guardian if applicable) is aware that this is a billable service. Verbal consent to proceed has been obtained within the past 12 months. The visit was conducted pursuant to the emergency declaration under the [address] [place] [address], [address] [place] [address] authority and the [employer] General Act. Patient identification was verified, and a caregiver was present when appropriate. The patient was located in a state where the provider was credentialed to provide care. Total time spent on this encounter: billing not based on time. --Jonh Carballo MD on 9/19/2021 at 6:52 PM    An electronic signature was used to authenticate this note.

## 2021-09-22 ENCOUNTER — HOSPITAL ENCOUNTER (OUTPATIENT)
Dept: PHYSICAL THERAPY | Age: 76
Setting detail: THERAPIES SERIES
Discharge: HOME | End: 2021-09-22
Payer: MEDICARE

## 2021-09-22 PROCEDURE — 97161 PT EVAL LOW COMPLEX 20 MIN: CPT

## 2021-09-22 PROCEDURE — 97112 NEUROMUSCULAR REEDUCATION: CPT

## 2021-09-22 NOTE — FLOWSHEET NOTE
[facility] Outpatient Therapy  [address] [place] [address] [address], [address]  Phone: [phone]   Fax: [phone]    Physical Therapy Treatment Note/ Progress Report:     Date:  2021    Patient Name:  Maria M Funez    :  1945  MRN: [id number]    Medical/Treatment Diagnosis Information:  · Diagnosis: R26.89 (ICD-10-CM) - Balance disorder; R53.81 (ICD-10-CM) - Physical deconditioning  · Treatment Diagnosis: impaired gait  Insurance/Certification information:  PT Insurance Information: [employer]  Physician Information:  Referring Practitioner: Arie Hope MD  Plan of care signed:    [] Yes  [] No    Date of Patient follow up with Physician:      Progress Report: []  Yes  []  No     Date Range for reporting period:  Beginning:    Ending:      Progress report due (10 Rx/or 30 days whichever is less):      Recertification due (POC duration/ or 90 days whichever is less): 11/3/21     Visit # Insurance Allowable Auth Needed   1 [place]- [place] [x]Yes   []No     RESTRICTIONS/PRECAUTIONS:   Latex Allergy:  [x]NO      []YES  Preferred Language for Healthcare:   [x]English       []other:  Functional Scale:  BERGERON 48/56    Pain level:  /10     SUBJECTIVE: Pt presents with c/o balance impairments. She also feels that she has fatigue in her low back when standing for prolonged periods of time- washing dishes. Pt reports no history of falls. She feels unsteady when turning, or when carrying heavier items.        OBJECTIVE: See eval   Observation:    Test measurements:      Exercises/Interventions: Exercises in bold performed in department today. Items not bolded are carried forward from prior visits for continuity of the record.     Exercise/Equipment Resistance/Repetitions HEP Other comments     []      []      []      []      []      []      []      []      []      []      []      []      []      []      []      []      []      []      Home Exercise Program:      Therapeutic Exercise:   [] (17228) Provided verbal/tactile cueing for activities related to strengthening, flexibility, endurance, ROM for improvements in LE, proximal hip, and core control with self-care, mobility, lifting, ambulation. NMR:  [] (62624) Provided verbal/tactile cueing for activities related to improving balance, coordination, kinesthetic sense, posture, motor skill, proprioception to assist with LE, proximal hip, and core control in self-care, mobility, lifting, ambulation and eccentric single leg control. Therapeutic Activities:    [] (73636) Provided verbal/tactile cueing for activities related to improving balance, coordination, kinesthetic sense, posture, motor skill, proprioception and motor activation to allow for proper function of core, proximal hip and LE with self-care and ADLs and functional mobility. Gait Training:    [] (42209) Provided training and instruction to the patient for proper LE, core and proximal hip recruitment and positioning and eccentric body weight control with ambulation re-education including up and down stairs     Manual Treatments:  PROM / STM / Oscillations-Mobs:  G-I, II, III, IV (PA's, Inf., Post.)  [] (58077) Provided manual therapy to mobilize LE, proximal hip and/or LS spine soft tissue/joints for the purpose of modulating pain, promoting relaxation,  increasing ROM, reducing/eliminating soft tissue swelling/inflammation/restriction, improving soft tissue extensibility and allowing for proper ROM for normal function with self-care, mobility, lifting and ambulation.      Home Exercise Program:    [] (90139) Reviewed/Progressed HEP activities related to strengthening, flexibility, endurance, ROM of core, proximal hip and LE for functional self-care, mobility, lifting and ambulation/stair navigation   [] (30848) Reviewed/Progressed HEP activities related to improving balance, coordination, kinesthetic sense, posture, motor skill, proprioception of core, proximal hip and LE for self-care, mobility, lifting, and ambulation/stair navigation      Modalities:    [] Electric Stimulation:   [] Ultrasound:   [] Other:       Charges:  Timed Code Treatment Minutes: 10 min NM   Total Treatment Minutes: 30 min      [x] EVAL (LOW) 78649 (typically 20 minutes face-to-face)  [] EVAL (MOD) 96738 (typically 30 minutes face-to-face)  [] EVAL (HIGH) 73658 (typically 45 minutes face-to-face)  [] RE-EVAL     [] CN(69821) x       [x] NMR (37377) x    1   [] Manual (53556) x       [] TA (38705) x       [] Gait Training [phone] x       [] ES(attended) (21587)  [] ES (un) (87632)   [] DRY NEEDLE 1 OR 2 MUSCLES  [] DRY NEEDLE 3+ MUSCLES  [] Mech Traction (69434)  [] Ultrasound (45233)  [] Other:      GOALS:    Short Term Goals: To be achieved in: 2 weeks  1. Independent in HEP and progression per patient tolerance, in order to prevent re-injury. []? Progressing: []? Met: []? Not Met: []? Adjusted     Long Term Goals: To be achieved in: 6 weeks  1. BERGERON score of 53/56 demonstrating improved balance to assist with reaching prior level of function. []? Progressing: []? Met: []? Not Met: []? Adjusted  2. Patient will demonstrate increased B hip flexion strength to 4+/5 to allow for proper functional mobility as indicated by patients Functional Deficits. []? Progressing: []? Met: []? Not Met: []? Adjusted  3. Patient will demonstrate an increase strength L knee extension to allow for proper functional mobility as indicated by patients Functional Deficits. []? Progressing: []? Met: []? Not Met: []? Adjusted  4. Patient will return to all functional activities, including washing dishes, without increased symptoms or restriction. []? Progressing: []? Met: []? Not Met: []? Adjusted  5. Pt will report no concern for LOB with functional tasks including turning. []? Progressing: []? Met: []? Not Met: []? Adjusted       ASSESSMENT:  Pt is  77 Y. O  female, presenting with c/o impaired gait and balance. Assessment reveals deficits in hip and LE strength, impaired gait, and impaired balance. Pt will benefit from cont PT to address these deficits and promote return to highest level of functional independence. Treatment/Activity Tolerance:  [x] Patient tolerated treatment well [] Patient limited by fatique  [] Patient limited by pain  [] Patient limited by other medical complications  [] Other:     Overall Progression Towards Functional goals/ Treatment Progress Update:  [] Patient is progressing as expected towards functional goals listed. [] Progression is slowed due to complexities/Impairments listed. [] Progression has been slowed due to co-morbidities. [x] Plan just implemented, too soon to assess goals progression <30days   [] Goals require adjustment due to lack of progress  [] Patient is not progressing as expected and requires additional follow up with physician  [] Other    Prognosis for POC: [x] Good [] Fair  [] Poor    Patient requires continued skilled intervention: [x] Yes  [] No        PLAN: See eval  [] Continue per plan of care [] Alter current plan (see comments)  [x] Plan of care initiated [] Hold pending MD visit [] Discharge    Electronically signed by: James Alcantara PT    Note: If patient does not return for scheduled/recommended follow up visits, this note will serve as a discharge from care along with the most recent update on progress.

## 2021-09-22 NOTE — PROGRESS NOTES
turning, or when carrying heavier items. Pain       Patient reports pain is 0 /10 pain at present and  /10 pain at its worst.  Pain increases with: n/a        Decreases with: n/a    Pain description:    Pt. reports pain with coughing, sneezing and laughing:   []Yes   []No    []NA     Current Functional Limitations:   [x]Yes   []No  Functional Complaints:      PLOF:   [x]No functional limitations   []Pre-exisiting limitations:  Pt's sleep is affected?    []Yes   [x]No         Social support/Environment:    Family/caregiver support:   [x]Yes   []No    Home Environment:   []1 story   [x]>2 story   []ARMIN   []No rail   []R handrail    []L handrail    Bathroom Environment:   []Walk in shower   []Tub   [x]Tub/shower combo     [x]Grab bars   []Shower seat   []Modified toilet   []Hand held shower head    Equipment:    []Rolling walker   []Standard walker   []Rollator   []Neil-walker  []Wheelchair   []Quad cane   []Straight cane  []Bedside commode  []Hospital bed  Other:      Relevant Medical History: lung cancer    Co-morbidities/Complexities (which will affect course of rehabilitation):   []None           Arthritic conditions   []Rheumatoid arthritis (M05.9)  []Osteoarthritis (M19.91)   Cardiovascular conditions   [x]Hypertension (I10)  [x]Hyperlipidemia (E78.5)  []Angina pectoris (I20)  []Atherosclerosis (I70)   Musculoskeletal conditions   []Disc pathology   []Congenital spine pathologies   []Prior surgical intervention  []Osteoporosis (M81.8)  []Osteopenia (M85.8)   Endocrine conditions   []Hypothyroid (E03.9)  []Hyperthyroid Gastrointestinal conditions   []Constipation (K76.61)   Metabolic conditions   []Morbid obesity (E66.01)  []Diabetes type 1(E10.65) or 2 (E11.65)   []Neuropathy (G60.9)     Pulmonary conditions   []Asthma (J45)  []Coughing   []COPD (J44.9)   Psychological Disorders  []Anxiety (F41.9)  []Depression (F32.9)   []Other:   [x]Other:  Lung CA, SHANNA          Occupation/School: retired    Sports/Hobbies/Recreational Activities: reading, cooking    OBJECTIVE:     Functional Scale/Score: BERGERON 48/56    Gait/Steps     []Gait WNL unless otherwise noted below:                             [x]Deviations on a level linoleum surface include:    Ataxic at times  []Steps WNL (reciprocal pattern with 0-1 rail) unless otherwise noted below:    []Deviations include:     Range of Motion/Strength Testing-Myotomes    [x]All ROM WFL except as marked below   [x]All strength WFL (5/5) except as marked below    [x]All myotomes WFL (5/5) except as marked below      Range Tested MMT/ Resisted PROM AROM Comments   *denotes pain Left Right Left Right Left Right    Hip Flexion  (L1-2) 3+ 4-        Hip Extension          Hip Abduction  (L5)          Hip Adduction  (L3)          Hip IR          Hip ER          Knee Flexion  (L5,S1)          Knee Extension  (L3,4) 4 4+        Ankle Dorsiflex  (L4) 4+ 4+        Ankle Plantarflex  (S1,2)          Ankle Inversion          Ankle Eversion          Great Toe Ext  (L5)            Dermatomal Sensation   [x]All dermatomes WNL for light touch except as marked below   Abnormal Dermatome Findings Left Right   Anterior groin, 2-3 inches below ASIS (L1-L2)     Middle third anterior thigh (L3)     Patella and med malleolus (L4)     Fibular head and dorsum of foot (L5)     Lateral side and plantar surface of foot (S1)     Medial aspect of posterior thigh (S2)                 Flexibility     Muscle Abnormal Findings   Hip flexors/Kenyon  []WNL   []Decreased R   []Decreased L      Hamstrings  Degrees in 90/90 []WNL   []Decreased R   []Decreased L     Right:              Left:      Gastrocs []WNL   []Decreased R   []Decreased L      Obers/TFL/ITB []WNL   []Decreased R   []Decreased L      Piriformis  []WNL   []Decreased R   []Decreased L      Other:  []WNL   []Decreased R   []Decreased L        Special Tests- knee and ankle    Special Test Abnormal Findings   Anterior Drawer test []Neg []Pos R   []Pos L      Posterior Drawer test []Neg   []Pos R   []Pos L      Lachman's test []Neg   []Pos R   []Pos L      Varus stress  []Neg   []Pos R   []Pos L      Valgus stress  []Neg   []Pos R   []Pos L      Alexandre's test   []Neg   []Pos R   []Pos L      Apley's Compression []Neg   []Pos R   []Pos L      Apley's Distraction  []Neg   []Pos R   []Pos L      VMO tone []WNL []Dec R   []Dec L      Levelock Ankle Rules []Neg   []Pos R   []Pos L        Palpation     Patient reported tenderness with palpation:  []Yes   [x]No   []NA  Location:    PT notes warmth:  []Yes   [x]No   []NA  Location:   PT notes increased muscle tone:   []Yes   [x]No   []NA  Location:   PT notes crepitus with palpation:   []Yes   [x]No   []NA   Location:   Ligament tenderness/provocation:   []Yes   [x]No   []NA  Location:   PT notes decreased scar mobility:   []Yes   [x]No   []NA  Location:      Appearance    PT notes swelling:   []Yes   [x]No   []NA  Location:     PT notes redness:  []Yes   [x]No   []NA  Location:   PT notes drainage:   []Yes   [x]No   []NA  Location:      Girth Measurements (cm)        Location Left Right Location Left Right   6 superior to superior patellar pole   3\" inferior to inferior patellar pole     3 superior to superior patellar pole    6\" inferior to inferior patellar pole     Superior patellar pole   Malleoli     Inferior patellar pole   Figure 8        Met heads        Specific Joint Mobility Testing/Accessory Motions    []NT  Lumbar:  Hip:  Knee/patella: Ankle:    Deep Tendon Reflexes      Bandages/Dressings/Incisions:                          [x] Patient history, allergies, meds reviewed. Medical chart reviewed. See intake form. Review Of Systems (ROS):  [x]Performed Review of systems (Integumentary, CardioPulmonary, Neurological) by intake and observation. Intake form has been scanned into medical record.  Patient has been instructed to contact their primary care physician regarding ROS issues if not already being addressed at this time. Barriers to/and or personal factors that will affect rehab potential:              []Age  []Sex    []Smoker              []Motivation/Lack of Motivation                        [x]Co-Morbidities              []Cognitive Function, education/learning barriers              []Environmental, home barriers              []profession/work barriers  []past PT/medical experience  []other:  Justification:     Falls Risk Assessment (30 days):   [x] Falls Risk assessed and no intervention required. [] Falls Risk assessed and Patient requires intervention due to being higher risk   TUG score (>12s at risk):     [] Falls education provided, including:         ASSESSMENT: Pt is  77 Y. O  female, presenting with c/o impaired gait and balance. Assessment reveals deficits in hip and LE strength, impaired gait, and impaired balance. Pt will benefit from cont PT to address these deficits and promote return to highest level of functional independence.       Functional Impairments:     []Noted lumbar/proximal hip/LE hypomobility   []Decreased LE functional ROM   [x]Decreased core/proximal hip strength and neuromuscular control   [x]Decreased LE functional strength   [x]Reduced balance/proprioceptive control   []other:      Functional Activity Limitations (from functional questionnaire and intake)   []Reduced ability to tolerate prolonged functional positions   []Reduced ability or difficulty with changes of positions or transfers between positions   []Reduced ability to maintain good posture and demonstrate good body mechanics with sitting, bending, and lifting   []Reduced ability to sleep   []Reduced ability or tolerance with driving and/or computer work   []Reduced ability to perform lifting, carrying tasks   []Reduced ability to squat   []Reduced ability to forward bend   [x]Reduced ability to ambulate prolonged functional periods/distances/surfaces   []Reduced ability to ascend/descend stairs   [x]Reduced ability to run, hop or jump   []other:     Participation Restrictions    []Reduced participation in self-care activities   []Reduced participation in home management activities   []Reduced participation in work activities   [x]Reduced participation in social activities   [x]Reduced participation in sport activities    Classification :    []Signs/symptoms consistent with post-surgical status including decreased ROM, strength and function.    []Signs/symptoms consistent with joint sprain/strain   []Signs/symptoms consistent with patella-femoral syndrome   []Signs/symptoms consistent with knee OA/hip OA   []Signs/symptoms consistent with internal derangement of knee/Hip   []Signs/symptoms consistent with functional hip weakness/NMR control      []Signs/symptoms consistent with tendinitis/tendinosis    []signs/symptoms consistent with pathology which may benefit from Dry needling      Prognosis/Rehab Potential:      []Excellent   [x]Good    []Fair   []Poor    Tolerance of evaluation/treatment:    []Excellent   [x]Good    []Fair   []Poor     Physical Therapy Evaluation Complexity Justification  [x] A history of present problem with:  [] no personal factors and/or comorbidities that impact the plan of care;  [x]1-2 personal factors and/or comorbidities that impact the plan of care  []3 personal factors and/or comorbidities that impact the plan of care  [x] An examination of body systems using standardized tests and measures addressing any of the following: body structures and functions (impairments), activity limitations, and/or participation restrictions;:  [x] a total of 1-2 or more elements   [] a total of 3 or more elements   [] a total of 4 or more elements   [x] A clinical presentation with:  [x] stable and/or uncomplicated characteristics   [] evolving clinical presentation with changing characteristics  [] unstable and unpredictable characteristics;   [x] Clinical decision making of [x] low, [] moderate, [] high complexity using standardized patient assessment instrument and/or measurable assessment of functional outcome. [x] EVAL (LOW) 69819 (typically 20 minutes face-to-face)  [] EVAL (MOD) 29586 (typically 30 minutes face-to-face)  [] EVAL (HIGH) 16958 (typically 45 minutes face-to-face)  [] RE-EVAL    PLAN:  Frequency/Duration:  2 days per week for 6 Weeks:  Interventions:  [x]  Therapeutic exercise including: strength training, ROM, for Lower extremity and core   [x]  NMR activation and proprioception for LE, Glutes and Core. [x]  Manual therapy as indicated for LE, Hip and spine to include: Dry Needling/IASTM, STM, PROM, Gr I-IV mobilizations, manipulation. [x] Therapeutic activities for LE and core. [x] Gait Training including gait normalization and reducing fall risk. [x] Modalities as needed that may include: thermal agents, E-stim, Biofeedback, US, iontophoresis as indicated  [x] Patient education on joint protection, postural re-education, activity modification, progression of HEP     HEP instruction: (see scanned forms)    GOALS:  Patient stated goal: Improved balance  [] Progressing: [] Met: [] Not Met: [] Adjusted    Therapist goals for Patient:   Short Term Goals: To be achieved in: 2 weeks  1. Independent in HEP and progression per patient tolerance, in order to prevent re-injury. [] Progressing: [] Met: [] Not Met: [] Adjusted    Long Term Goals: To be achieved in: 6 weeks  1. BERGERON score of 53/56 demonstrating improved balance to assist with reaching prior level of function. [] Progressing: [] Met: [] Not Met: [] Adjusted  2. Patient will demonstrate increased B hip flexion strength to 4+/5 to allow for proper functional mobility as indicated by patients Functional Deficits. [] Progressing: [] Met: [] Not Met: [] Adjusted  3. Patient will demonstrate an increase strength L knee extension to allow for proper functional mobility as indicated by patients Functional Deficits. [] Progressing: [] Met: [] Not Met: [] Adjusted  4. Patient will return to all functional activities, including washing dishes, without increased symptoms or restriction. [] Progressing: [] Met: [] Not Met: [] Adjusted  5. Pt will report no concern for LOB with functional tasks including turning.   [] Progressing: [] Met: [] Not Met: [] Adjusted    Electronically signed by:  Claudetta Grant, PT

## 2021-10-07 ENCOUNTER — TELEPHONE (OUTPATIENT)
Dept: SLEEP CENTER | Age: 76
End: 2021-10-07

## 2021-10-12 ENCOUNTER — HOSPITAL ENCOUNTER (OUTPATIENT)
Dept: SLEEP CENTER | Age: 76
Discharge: HOME | End: 2021-10-12
Payer: MEDICARE

## 2021-10-12 DIAGNOSIS — G47.33 OSA (OBSTRUCTIVE SLEEP APNEA): ICD-10-CM

## 2021-10-12 PROCEDURE — 95811 POLYSOM 6/>YRS CPAP 4/> PARM: CPT

## 2021-10-16 PROCEDURE — 95811 POLYSOM 6/>YRS CPAP 4/> PARM: CPT | Performed by: INTERNAL MEDICINE

## 2021-10-17 NOTE — PROGRESS NOTES
Diagnosis: [x] SHANNA (G47.33) [] CSA (G47.31) [] Apnea (G47.30)   Length of Need: [x] 15 Months [] 99 Months [] Other:   Machine (JANIE!): [] Respironics Dream Station      Auto [x] ResMed AirSense     Auto [] Other:     []  CPAP () [x] Bilevel ()   Mode: [] Auto [] Spontaneous    Mode: [x] Auto [] Spontaneous              IPAP Max: 25  EPAP Min: 13  PS: 4           Comfort Settings: Ramp Pressure: 5 cmH2O  Ramp time: 15 min  Flex/EPR - 3 full time                                  For ResMed Bileve (TiMax-4 sec   TiMin- 0.2 sec)     Humidifier: [x] Heated ()        [x] Water chamber replacement ()/ 1 per 6 months        Mask:   [] Nasal () /1 per 3 months [x] Full Face () /1 per 3 months   [] Patient choice -Size and fit mask [x] Patient Choice - Size and fit mask   [] Dispense: [] Dispense:   [] Headgear () / 1 per 3 months [x] Headgear () / 1 per 3 months   [] Replacement Nasal Cushion ()/2 per month [x] Interface Replacement ()/1 per month   [] Replacement Nasal Pillows ()/2 per month         Tubing: [x] Heated ()/1 per 3 months    [] Standard ()/1 per 3 months [] Other:           Filters: [x] Non-disposable ()/1 per 6 months     [x] Ultra-Fine, Disposable ()/2 per month        Miscellaneous: [] Chin Strap ()/ 1 per 6 months [] O2 bleed-in:        LPM   [] Oxymetry on CPAP/Bilevel []  Other:         Start Order Date: 10/17/21    MEDICAL JUSTIFICATION:  I, the undersigned, certify that the above prescribed supplies are medically necessary for this patients wellbeing. In my opinion, the supplies are both reasonable and necessary in reference to accepted standards of medicalpractice in treatment of this patients condition.     Cherisse Friday NP    NPI: [id number]       Order Signed Date: 10/17/21  [facility]  Pulmonary, Sleep, and Critical Care    Pulmonary, Sleep, and Critical [address] [place], [facility] , [address]                                    [employer], [facility]  Phone: [phone]    Fax: [facility]  1945  [address] [place] [address]  [phone] (home)   [phone] (mobile)      Insurance Info (confirm with patient if correct):  Payor/Plan Subscr  Sex Relation Sub.  Ins. ID Effective Group Num

## 2021-11-08 ENCOUNTER — TELEPHONE (OUTPATIENT)
Dept: INTERNAL MEDICINE CLINIC | Age: 76
End: 2021-11-08

## 2021-11-10 ENCOUNTER — TELEPHONE (OUTPATIENT)
Dept: PULMONOLOGY | Age: 76
End: 2021-11-10

## 2021-11-10 NOTE — TELEPHONE ENCOUNTER
Spoke with pt and she is stating that she is unable to use the APAP unit. Auto Bipap prescription sent today . F/U scheduled.

## 2021-11-17 ENCOUNTER — TELEPHONE (OUTPATIENT)
Dept: INTERNAL MEDICINE CLINIC | Age: 76
End: 2021-11-17

## 2021-11-23 ENCOUNTER — TELEPHONE (OUTPATIENT)
Dept: PULMONOLOGY | Age: 76
End: 2021-11-23

## 2021-12-10 ENCOUNTER — HOSPITAL ENCOUNTER (OUTPATIENT)
Dept: CT IMAGING | Age: 76
Discharge: HOME | End: 2021-12-10
Payer: MEDICARE

## 2021-12-10 DIAGNOSIS — C78.7 SECONDARY MALIGNANT NEOPLASM OF LIVER (HCC): ICD-10-CM

## 2021-12-10 DIAGNOSIS — C79.72 MALIGNANT NEOPLASM METASTATIC TO LEFT ADRENAL GLAND (HCC): ICD-10-CM

## 2021-12-10 DIAGNOSIS — C77.1 SECONDARY MALIGNANT NEOPLASM OF MEDIASTINAL LYMPH NODES (HCC): ICD-10-CM

## 2021-12-10 DIAGNOSIS — C34.11 PRIMARY MALIGNANT NEOPLASM OF RIGHT UPPER LOBE OF LUNG (HCC): ICD-10-CM

## 2021-12-10 PROCEDURE — 82565 ASSAY OF CREATININE: CPT

## 2021-12-10 PROCEDURE — 6360000004 HC RX CONTRAST MEDICATION: Performed by: INTERNAL MEDICINE

## 2021-12-10 PROCEDURE — 74177 CT ABD & PELVIS W/CONTRAST: CPT

## 2021-12-10 RX ADMIN — IOHEXOL 50 ML: 240 INJECTION, SOLUTION INTRATHECAL; INTRAVASCULAR; INTRAVENOUS; ORAL at 12:55

## 2021-12-10 RX ADMIN — IOPAMIDOL 80 ML: 755 INJECTION, SOLUTION INTRAVENOUS at 12:56

## 2021-12-15 ENCOUNTER — VIRTUAL VISIT (OUTPATIENT)
Dept: INTERNAL MEDICINE CLINIC | Age: 76
End: 2021-12-15
Payer: MEDICARE

## 2021-12-15 DIAGNOSIS — E78.2 MIXED HYPERLIPIDEMIA: ICD-10-CM

## 2021-12-15 DIAGNOSIS — I10 PRIMARY HYPERTENSION: ICD-10-CM

## 2021-12-15 DIAGNOSIS — R06.02 SOB (SHORTNESS OF BREATH): Primary | ICD-10-CM

## 2021-12-15 PROCEDURE — 1036F TOBACCO NON-USER: CPT | Performed by: INTERNAL MEDICINE

## 2021-12-15 PROCEDURE — G8417 CALC BMI ABV UP PARAM F/U: HCPCS | Performed by: INTERNAL MEDICINE

## 2021-12-15 PROCEDURE — 3017F COLORECTAL CA SCREEN DOC REV: CPT | Performed by: INTERNAL MEDICINE

## 2021-12-15 PROCEDURE — 1090F PRES/ABSN URINE INCON ASSESS: CPT | Performed by: INTERNAL MEDICINE

## 2021-12-15 PROCEDURE — 1123F ACP DISCUSS/DSCN MKR DOCD: CPT | Performed by: INTERNAL MEDICINE

## 2021-12-15 PROCEDURE — G8484 FLU IMMUNIZE NO ADMIN: HCPCS | Performed by: INTERNAL MEDICINE

## 2021-12-15 PROCEDURE — 4040F PNEUMOC VAC/ADMIN/RCVD: CPT | Performed by: INTERNAL MEDICINE

## 2021-12-15 PROCEDURE — 99214 OFFICE O/P EST MOD 30 MIN: CPT | Performed by: INTERNAL MEDICINE

## 2021-12-15 PROCEDURE — G8427 DOCREV CUR MEDS BY ELIG CLIN: HCPCS | Performed by: INTERNAL MEDICINE

## 2021-12-15 PROCEDURE — G8400 PT W/DXA NO RESULTS DOC: HCPCS | Performed by: INTERNAL MEDICINE

## 2021-12-15 ASSESSMENT — PATIENT HEALTH QUESTIONNAIRE - PHQ9
SUM OF ALL RESPONSES TO PHQ QUESTIONS 1-9: 0
2. FEELING DOWN, DEPRESSED OR HOPELESS: 0
SUM OF ALL RESPONSES TO PHQ QUESTIONS 1-9: 0
SUM OF ALL RESPONSES TO PHQ QUESTIONS 1-9: 0
SUM OF ALL RESPONSES TO PHQ9 QUESTIONS 1 & 2: 0
1. LITTLE INTEREST OR PLEASURE IN DOING THINGS: 0

## 2021-12-17 ENCOUNTER — TELEPHONE (OUTPATIENT)
Dept: INTERNAL MEDICINE CLINIC | Age: 76
End: 2021-12-17

## 2021-12-17 NOTE — TELEPHONE ENCOUNTER
----- Message from Jorje Wright sent at 12/16/2021  4:49 PM EST -----  Subject: Appointment Request    Reason for Call: Flu Shot    QUESTIONS  Type of Appointment? Established Patient  Reason for appointment request? Other - ecc can not book with provider  Additional Information for Provider? unable to wt Pt would like to   schedule her flu shot. Please call pt back  ---------------------------------------------------------------------------  --------------  CALL BACK INFO  What is the best way for the office to contact you? OK to leave message on   voicemail  Preferred Call Back Phone Number? [phone]  ---------------------------------------------------------------------------  --------------  SCRIPT ANSWERS  Relationship to Patient? Self   Is the Adult patient requesting a Flu Shot? Yes  Does the patient have a question for their provider regarding the flu   shot? No  Have you been diagnosed with, awaiting test results for, or told that you   are suspected of having COVID-19 (Coronavirus)? (If patient has tested   negative or was tested as a requirement for work, school, or travel and   not based on symptoms, answer no)? No  Within the past two weeks have you developed any of the following symptoms   (answer no if symptoms have been present longer than 2 weeks or began   more than 2 weeks ago)? Fever or Chills, Cough, Shortness of breath or   difficulty breathing, Loss of taste or smell, Sore throat, Nasal   congestion, Sneezing or runny nose, Fatigue or generalized body aches   (answer no if pain is specific to a body part e.g. back pain), Diarrhea,   Headache? No  Have you had close contact with someone with COVID-19 in the last 14 days? No  (Service Expert  click yes below to proceed with [employer] As Usual   Scheduling)?  Yes

## 2022-01-03 ASSESSMENT — ENCOUNTER SYMPTOMS
EYES NEGATIVE: 1
GASTROINTESTINAL NEGATIVE: 1

## 2022-01-03 NOTE — PROGRESS NOTES
12/15/2021    TELEHEALTH EVALUATION -- Audio/Visual (During BKVTP-16 public health emergency)    HPI:    Emilia Jones (:  1945) has requested an audio/video evaluation for the following concern(s):    Hypertension: taking medication as prescribed. Treated with B blker. Monitors sodium intake. Compliant with regimen. Asthma: uses maintenance inhaler, and rescue, with good benefit. Hyperlipidemia: treated with statin. LDL 51. Eats a heart healthy male plan. Review of Systems   Constitutional: Negative. HENT: Negative. Eyes: Negative. Respiratory:        Asthma, see HPI. Cardiovascular:        Hypertension, treated with B blker. Gastrointestinal: Negative. Endocrine:        Hyperlipidemia, see HPI. Genitourinary: Negative. Musculoskeletal: Negative. Skin: Negative. Neurological: Negative. Psychiatric/Behavioral: Negative. Prior to Visit Medications    Medication Sig Taking?  Authorizing Provider   atorvastatin (LIPITOR) 10 MG tablet TAKE ONE TABLET BY MOUTH DAILY Yes Divina Palma MD   metoprolol succinate (TOPROL XL) 25 MG extended release tablet Take 2 tablets by mouth daily  Patient taking differently: Take 25 mg by mouth daily  Yes Lucia Barton, APRN - CNP   ASPIRIN ADULT LOW STRENGTH 81 MG EC tablet TAKE ONE TABLET BY MOUTH DAILY Yes Divina Palma MD   omeprazole (PRILOSEC) 40 MG delayed release capsule TAKE ONE CAPSULE BY MOUTH EVERY MORNING BEFORE BREAKFAST  Divina Palma MD   albuterol sulfate  (90 Base) MCG/ACT inhaler INHALE TWO PUFFS BY MOUTH EVERY 6 HOURS AS NEEDED FOR WHEEZING  Divina Palma MD   budesonide-formoterol (SYMBICORT) 160-4.5 MCG/ACT AERO INHALE TWO PUFFS BY MOUTH TWICE A DAY  Divina Palma MD   vitamin D (ERGOCALCIFEROL) 1.25 MG (66501 UT) CAPS capsule Take 1 capsule by mouth once a week  Patient not taking: Reported on 12/15/2021  George Mack MD   TAFINLAR 76 MG CAPS   Historical Provider, MD   MEKINIST 2 MG TABS   Historical Provider, MD   ibuprofen (ADVIL;MOTRIN) 800 MG tablet Take 1 tablet by mouth 2 times daily as needed for Pain  Patient not taking: Reported on 12/15/2021  Juliette Oliveira MD       Social History     Tobacco Use    Smoking status: Former Smoker     Packs/day: 0.00     Years: 0.00     Pack years: 0.00     Quit date: 3/10/2012     Years since quittin.8    Smokeless tobacco: Never Used   Vaping Use    Vaping Use: Never used   Substance Use Topics    Alcohol use: Yes     Comment: 1-2 X WEEKLY    Drug use: No            PHYSICAL EXAMINATION:  [ INSTRUCTIONS:  \"[x]\" Indicates a positive item  \"[]\" Indicates a negative item  -- DELETE ALL ITEMS NOT EXAMINED]  Vital Signs: (As obtained by patient/caregiver or practitioner observation)    Blood pressure-  Heart rate-    Respiratory rate-    Temperature-  Pulse oximetry-     Constitutional: [x] Appears well-developed and well-nourished [x] No apparent distress      [] Abnormal-   Mental status  [x] Alert and awake  [x] Oriented to person/place/time [x]Able to follow commands      Eyes:  EOM    [x]  Normal  [] Abnormal-  Sclera  [x]  Normal  [] Abnormal -         Discharge [x]  None visible  [] Abnormal -    HENT:   [x] Normocephalic, atraumatic.   [] Abnormal   [x] Mouth/Throat: Mucous membranes are moist.     External Ears [x] Normal  [] Abnormal-     Neck: [x] No visualized mass     Pulmonary/Chest: [x] Respiratory effort normal.  [] No visualized signs of difficulty breathing or respiratory distress        [] Abnormal-      Musculoskeletal:   [x] Normal gait with no signs of ataxia         [x] Normal range of motion of neck        [] Abnormal-       Neurological:        [x] No Facial Asymmetry (Cranial nerve 7 motor function) (limited exam to video visit)          [x] No gaze palsy        [] Abnormal-         Skin:        [x] No significant exanthematous lesions or discoloration noted on facial skin         [] Abnormal-            Psychiatric: [x] Normal Affect [] No Hallucinations        [] Abnormal-     Other pertinent observable physical exam findings-     ASSESSMENT/PLAN:   Diagnosis Orders   1. SOB (shortness of breath)  Asthma. Continue maintenance and rescue inhaler. Full PFT Study With Bronchodilator   2. Primary hypertension  Continue B blker. DASH and low sodium diet discussed. 3. Hyperlipidemia  Heart healthy diet and statin compliance discussed. Roger Darnell, was evaluated through a synchronous (real-time) audio-video encounter. The patient (or guardian if applicable) is aware that this is a billable service. Verbal consent to proceed has been obtained within the past 12 months. The visit was conducted pursuant to the emergency declaration under the [address] [place] [address] authority and the [employer] General Act. Patient identification was verified, and a caregiver was present when appropriate. The patient was located in a state where the provider was credentialed to provide care. Total time spent on this encounter: Not billed by time    --Valdemar Ramsey MD on 1/3/2022 at 3:57 PM    An electronic signature was used to authenticate this note.

## 2022-01-12 DIAGNOSIS — R00.0 TACHYCARDIA: ICD-10-CM

## 2022-01-12 RX ORDER — METOPROLOL SUCCINATE 25 MG/1
25 TABLET, EXTENDED RELEASE ORAL DAILY
Qty: 90 TABLET | Refills: 3 | Status: SHIPPED | OUTPATIENT
Start: 2022-01-12 | End: 2022-02-16 | Stop reason: ALTCHOICE

## 2022-01-12 NOTE — TELEPHONE ENCOUNTER
Last appt 8-2021  Next appt  2-16-22     metoprolol succinate (TOPROL XL) 25 MG extended release tablet  Take 2 tablets by mouth daily, Disp-90 tablet, R-3  Normal  Patient taking differently: 25 mg Oral DAILY, Reported on 8/18/2021 Last Dose: Not Recorded  Refills:3 ordered     [facility]acy:[facility] [address], [place] [address] [phone]

## 2022-02-16 ENCOUNTER — OFFICE VISIT (OUTPATIENT)
Dept: CARDIOLOGY CLINIC | Age: 77
End: 2022-02-16
Payer: MEDICARE

## 2022-02-16 VITALS
DIASTOLIC BLOOD PRESSURE: 100 MMHG | SYSTOLIC BLOOD PRESSURE: 160 MMHG | HEART RATE: 94 BPM | WEIGHT: 152 LBS | HEIGHT: 67 IN | BODY MASS INDEX: 23.86 KG/M2

## 2022-02-16 DIAGNOSIS — R00.0 TACHYCARDIA: ICD-10-CM

## 2022-02-16 DIAGNOSIS — R07.9 CHEST PAIN, UNSPECIFIED TYPE: Primary | ICD-10-CM

## 2022-02-16 PROCEDURE — 1090F PRES/ABSN URINE INCON ASSESS: CPT | Performed by: INTERNAL MEDICINE

## 2022-02-16 PROCEDURE — G8427 DOCREV CUR MEDS BY ELIG CLIN: HCPCS | Performed by: INTERNAL MEDICINE

## 2022-02-16 PROCEDURE — G8400 PT W/DXA NO RESULTS DOC: HCPCS | Performed by: INTERNAL MEDICINE

## 2022-02-16 PROCEDURE — 1036F TOBACCO NON-USER: CPT | Performed by: INTERNAL MEDICINE

## 2022-02-16 PROCEDURE — 4040F PNEUMOC VAC/ADMIN/RCVD: CPT | Performed by: INTERNAL MEDICINE

## 2022-02-16 PROCEDURE — 99214 OFFICE O/P EST MOD 30 MIN: CPT | Performed by: INTERNAL MEDICINE

## 2022-02-16 PROCEDURE — 3017F COLORECTAL CA SCREEN DOC REV: CPT | Performed by: INTERNAL MEDICINE

## 2022-02-16 PROCEDURE — G8420 CALC BMI NORM PARAMETERS: HCPCS | Performed by: INTERNAL MEDICINE

## 2022-02-16 PROCEDURE — G8484 FLU IMMUNIZE NO ADMIN: HCPCS | Performed by: INTERNAL MEDICINE

## 2022-02-16 PROCEDURE — 93000 ELECTROCARDIOGRAM COMPLETE: CPT | Performed by: INTERNAL MEDICINE

## 2022-02-16 PROCEDURE — 1123F ACP DISCUSS/DSCN MKR DOCD: CPT | Performed by: INTERNAL MEDICINE

## 2022-02-16 RX ORDER — METOPROLOL SUCCINATE 50 MG/1
50 TABLET, EXTENDED RELEASE ORAL DAILY
Qty: 90 TABLET | Refills: 3 | Status: SHIPPED | OUTPATIENT
Start: 2022-02-16

## 2022-02-16 NOTE — PROGRESS NOTES
[facility]   Dr Veda Farmer. Leatha Lanes MD, [facility]    Outpatient Follow Up Note    2022,11:22 AM  Subjective:   CHIEF COMPLAINT / HPI:  Follow Up secondary to {hypertension hyperlipidemia SVT     Cely Gray is 77 y.o. female who presents today for a routine follow up. Is generally stable not having any chest pains or shortness of breath. She still on long-term chemotherapy relative to her lung cancer. Appetite's been poor and she has lost about 7 pounds. No chest pains and she did have a CAT scan in December 10, 2021 showing that she has bullous lung disease which is stable. Her lung cancer status apparently is stable. The aortic root dilatation is stable. Coronavirus vaccine x2 Shawn Perez and it did have booster    Obstructive sleep apnea with CPAP   Lung right cancer Dr. Sarah Lynn  rx with chemo and rad. 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  Past Medical History:    Past Medical History:   Diagnosis Date    Arthritis     Cancer ([address]     lung     Hyperlipidemia     Hypertension                                                                                                                                Past Surgical History  Past Surgical History:   Procedure Laterality Date    BRONCHOSCOPY  2017    brochoscopy-EBUS    OTHER SURGICAL HISTORY Left 2017    LEFT SUBCLAVIAN PORT- CATH INSERTION      TUBAL LIGATION       Social History:       Social History     Tobacco Use   Smoking Status Former Smoker    Packs/day: 0.00    Years: 0.00    Pack years: 0.00    Quit date: 3/10/2012    Years since quittin.9   Smokeless Tobacco Never Used     Current Medications:  Prior to Visit Medications    Medication Sig Taking?  Authorizing Provider   metoprolol succinate (TOPROL XL) 25 MG extended release tablet Take 1 tablet by mouth daily Yes Martín Alvarado MD   omeprazole (PRILOSEC) 40 MG delayed release capsule TAKE ONE CAPSULE BY MOUTH EVERY MORNING BEFORE BREAKFAST Yes Juliette Oliveira MD   albuterol sulfate  (90 Base) MCG/ACT inhaler INHALE TWO PUFFS BY MOUTH EVERY 6 HOURS AS NEEDED FOR WHEEZING Yes Juliette Oliveira MD   budesonide-formoterol (SYMBICORT) 160-4.5 MCG/ACT AERO INHALE TWO PUFFS BY MOUTH TWICE A DAY Yes Julitete Oliveira MD   atorvastatin (LIPITOR) 10 MG tablet TAKE ONE TABLET BY MOUTH DAILY Yes Juliette Oliveira MD   TAFINLAR 75 MG CAPS  Yes Juan Jose Lopez MD   ASPIRIN ADULT LOW STRENGTH 81 MG EC tablet TAKE ONE TABLET BY MOUTH DAILY Yes Juliette Oliveira MD     Family History  No family history on file. Current Medications  Current Outpatient Medications   Medication Sig Dispense Refill    metoprolol succinate (TOPROL XL) 25 MG extended release tablet Take 1 tablet by mouth daily 90 tablet 3    omeprazole (PRILOSEC) 40 MG delayed release capsule TAKE ONE CAPSULE BY MOUTH EVERY MORNING BEFORE BREAKFAST 60 capsule 2    albuterol sulfate  (90 Base) MCG/ACT inhaler INHALE TWO PUFFS BY MOUTH EVERY 6 HOURS AS NEEDED FOR WHEEZING 18 g 2    budesonide-formoterol (SYMBICORT) 160-4.5 MCG/ACT AERO INHALE TWO PUFFS BY MOUTH TWICE A DAY 10.2 g 2    atorvastatin (LIPITOR) 10 MG tablet TAKE ONE TABLET BY MOUTH DAILY 90 tablet 2    TAFINLAR 75 MG CAPS       ASPIRIN ADULT LOW STRENGTH 81 MG EC tablet TAKE ONE TABLET BY MOUTH DAILY 30 tablet 5     No current facility-administered medications for this visit. REVIEW OF SYSTEMS:    CONSTITUTIONAL: No major weight gain or loss, fatigue, weakness, night sweats or fever. HEENT: No new vision difficulties or ringing in the ears. RESPIRATORY: No new SOB, PND, orthopnea or cough. CARDIOVASCULAR: See HPI  GI: No nausea, vomiting, diarrhea, constipation, abdominal pain or changes in bowel habits. : No urinary frequency, urgency, incontinence hematuria or dysuria.   SKIN: No cyanosis or skin lesions. MUSCULOSKELETAL: No new muscle or joint pain. NEUROLOGICAL: No syncope or TIA-like symptoms. PSYCHIATRIC: No anxiety, pain, insomnia or depression    Objective:   PHYSICAL EXAM:        VITALS:    Wt Readings from Last 3 Encounters:   02/16/22 152 lb (68.9 kg)   08/18/21 159 lb (72.1 kg)   07/28/21 167 lb 5.3 oz (75.9 kg)     BP Readings from Last 3 Encounters:   02/16/22 (!) 160/100   08/18/21 (!) 142/88   07/30/21 113/74     Pulse Readings from Last 3 Encounters:   02/16/22 94   08/18/21 60   07/30/21 70       CONSTITUTIONAL: Cooperative, no apparent distress, and appears well nourished / developed  NEUROLOGIC:  Awake and orientated to person, place and time. PSYCH: Calm affect. SKIN: Warm and dry. HEENT: Sclera non-icteric, normocephalic, neck supple, no elevation of JVP, normal carotid pulses with no bruits and thyroid normal size. LUNGS:  No increased work of breathing and clear to auscultation, no crackles or wheezing  CARDIOVASCULAR:  Regular rate and rhythm with no murmurs, gallops, rubs, or abnormal heart sounds, normal PMI. The apical impulses not displaced  Heart tones are crisp and normal  Cervical veins are not engorged  The carotid upstroke is normal in amplitude and contour without delay or bruit  JVP is not elevated  ABDOMEN:  Normal bowel sounds, non-distended and non-tender to palpation  EXT: No edema, no calf tenderness. Pulses are present bilaterally.     DATA:    Lab Results   Component Value Date    ALT 11 07/28/2021    AST 26 07/28/2021    ALKPHOS 76 07/28/2021    BILITOT 0.3 07/28/2021     Lab Results   Component Value Date    CREATININE 0.5 (L) 12/10/2021    BUN 2 (L) 07/30/2021     (L) 07/30/2021    K 3.6 07/30/2021    CL 98 (L) 07/30/2021    CO2 26 07/30/2021     Lab Results   Component Value Date    TSH 0.236 (L) 06/29/2017     Lab Results   Component Value Date    WBC 4.7 09/02/2021    HGB 11.6 (L) 09/02/2021    HCT 36.9 09/02/2021    MCV 76.9 (L) 09/02/2021     09/02/2021     No components found for: CHLPL  Lab Results   Component Value Date    TRIG 56 05/11/2021    TRIG 86 06/05/2019    TRIG 68 04/26/2018     Lab Results   Component Value Date    HDL 68 (H) 05/11/2021    HDL 70 (H) 06/05/2019    HDL 60 04/26/2018     Lab Results   Component Value Date    LDLCALC 51 05/11/2021    LDLCALC 61 06/05/2019    LDLCALC 50 04/26/2018     Lab Results   Component Value Date    LABVLDL 11 05/11/2021    LABVLDL 17 06/05/2019    LABVLDL 14 04/26/2018     Radiology Review:  Pertinent images / reports were reviewed as a part of this visit and reveals the following:  CHEST: 12/10/21       1. Radiation changes with perivascular and peribronchial soft tissue and traction bronchiectasis along the right hilum. 2. Stable right upper lobe large bulla and adjacent traction bronchiectasis   3. Stable right lower lobe pleural effusion and overall volume loss of the right hemithorax.     4. Stable prevascular and paratracheal and hilar adenopathy   5. Peripheral honeycomb changes of fibrosis in the left lung are redemonstrated, unchanged       Echo:Summary1/28/21   Normal left ventricular size. Mild septal hypertrophy. Mildly decreased left ventricular systolic function with an estimated   ejection fraction of 45%. Normal diastolic function. The aortic root is dilated measuring 3.8 cm. The ascending aorta is dilated measuring 4.3 cm. Trivial tricuspid regurgitation. Stress Test / Angiogram:    ECG:Sinus  Tachycardia 1/15/21  EKG on 2/16/2022 sinus rhythm 94/min. Essentially normal otherwise. WITHIN NORMAL LIMITS  This patient was educated using the patient point room wall mount device. Absence from smokers and smoking and diet and exercising are important. Assessment:   Dilated aortic root and hypertension and SVT. Plan: At this time seems stable from a cardiac perspective.   I am concerned that her blood pressure is trending high and she does still have a resting heart rate of 94. I will increase her metoprolol from 25 to 50 mg. Return to see us in 4 months. Please call if we can assist further [phone]. Martha Andrews.  Donato AMADO, [facility]      This note was likely completed using voice  recognition technology and may contain unintended errors

## 2022-03-14 ENCOUNTER — TELEPHONE (OUTPATIENT)
Dept: INTERNAL MEDICINE CLINIC | Age: 77
End: 2022-03-14

## 2022-03-14 DIAGNOSIS — J01.00 ACUTE NON-RECURRENT MAXILLARY SINUSITIS: Primary | ICD-10-CM

## 2022-03-14 RX ORDER — LEVOFLOXACIN 500 MG/1
500 TABLET, FILM COATED ORAL DAILY
Qty: 7 TABLET | Refills: 0 | Status: SHIPPED | OUTPATIENT
Start: 2022-03-14 | End: 2022-08-15

## 2022-03-14 NOTE — TELEPHONE ENCOUNTER
----- Message from Nadene Apley sent at 3/14/2022 10:44 AM EDT -----  Subject: Message to Provider    QUESTIONS  Information for Provider? pt would like an antibiotic called in for   possible sinus infection.  ---------------------------------------------------------------------------  --------------  CALL BACK INFO  What is the best way for the office to contact you? OK to leave message on   voicemail  Preferred Call Back Phone Number? [phone]  ---------------------------------------------------------------------------  --------------  SCRIPT ANSWERS  Relationship to Patient?  Self

## 2022-03-22 ENCOUNTER — TELEMEDICINE (OUTPATIENT)
Dept: PULMONOLOGY | Age: 77
End: 2022-03-22
Payer: MEDICARE

## 2022-03-22 DIAGNOSIS — I10 PRIMARY HYPERTENSION: Chronic | ICD-10-CM

## 2022-03-22 DIAGNOSIS — G47.33 OSA (OBSTRUCTIVE SLEEP APNEA): Primary | ICD-10-CM

## 2022-03-22 DIAGNOSIS — C34.91 PRIMARY LUNG ADENOCARCINOMA, RIGHT (HCC): ICD-10-CM

## 2022-03-22 PROCEDURE — G8427 DOCREV CUR MEDS BY ELIG CLIN: HCPCS | Performed by: NURSE PRACTITIONER

## 2022-03-22 PROCEDURE — 4040F PNEUMOC VAC/ADMIN/RCVD: CPT | Performed by: NURSE PRACTITIONER

## 2022-03-22 PROCEDURE — G8400 PT W/DXA NO RESULTS DOC: HCPCS | Performed by: NURSE PRACTITIONER

## 2022-03-22 PROCEDURE — 3017F COLORECTAL CA SCREEN DOC REV: CPT | Performed by: NURSE PRACTITIONER

## 2022-03-22 PROCEDURE — 99214 OFFICE O/P EST MOD 30 MIN: CPT | Performed by: NURSE PRACTITIONER

## 2022-03-22 PROCEDURE — 1090F PRES/ABSN URINE INCON ASSESS: CPT | Performed by: NURSE PRACTITIONER

## 2022-03-22 PROCEDURE — 1123F ACP DISCUSS/DSCN MKR DOCD: CPT | Performed by: NURSE PRACTITIONER

## 2022-03-22 RX ORDER — TRAMETINIB 2 MG/1
TABLET, FILM COATED ORAL
COMMUNITY
Start: 2022-03-08

## 2022-03-22 ASSESSMENT — SLEEP AND FATIGUE QUESTIONNAIRES
HOW LIKELY ARE YOU TO NOD OFF OR FALL ASLEEP WHILE SITTING INACTIVE IN A PUBLIC PLACE: 0
HOW LIKELY ARE YOU TO NOD OFF OR FALL ASLEEP WHILE SITTING AND READING: 0
HOW LIKELY ARE YOU TO NOD OFF OR FALL ASLEEP WHILE LYING DOWN TO REST IN THE AFTERNOON WHEN CIRCUMSTANCES PERMIT: 2
HOW LIKELY ARE YOU TO NOD OFF OR FALL ASLEEP IN A CAR, WHILE STOPPED FOR A FEW MINUTES IN TRAFFIC: 0
ESS TOTAL SCORE: 2
HOW LIKELY ARE YOU TO NOD OFF OR FALL ASLEEP WHILE SITTING AND TALKING TO SOMEONE: 0
HOW LIKELY ARE YOU TO NOD OFF OR FALL ASLEEP WHILE SITTING QUIETLY AFTER LUNCH WITHOUT ALCOHOL: 0
HOW LIKELY ARE YOU TO NOD OFF OR FALL ASLEEP WHILE WATCHING TV: 0
HOW LIKELY ARE YOU TO NOD OFF OR FALL ASLEEP WHEN YOU ARE A PASSENGER IN A CAR FOR AN HOUR WITHOUT A BREAK: 0

## 2022-03-22 NOTE — PROGRESS NOTES
Coy Bueno MD, [facility], [facility]  Nilay Read [address]  [address] [facility], [address] [phone]   [facility] [address]  Thong Parekh. [address] [place] [address]. Christi Polo [address] [phone]     Video Visit- Follow up    Dorothy Preciado, was evaluated through a synchronous (real-time) audio-video  encounter. The patient (or guardian if applicable) is aware that this is a billable  service, which includes applicable co-pays. This Virtual Visit was conducted with  patient's (and/or legal guardian's) consent. The visit was conducted pursuant to  the emergency declaration under the [address] [place] [address] [facility] authority and the [employer] and  [employer] General Act. Patient identification was verified,  and a caregiver was present when appropriate. The patient was located in a  state where the provider was licensed to provide care. Assessment/Plan:      1. SHANNA (obstructive sleep apnea)  Assessment & Plan:  Chronic-Improving: Reviewed results of titration with patient. Reviewed and analyzed results of physiologic download from patient's machine and reviewed with patient. Supplies and parts as needed for her machine. These are medically necessary. Limit caffeine use after 3pm. Based on the analyzed data will change following settings: Humidity increased to 5. Machine changes sent via machines modem. Will see her back in 6 months or sooner if issues arise. 2. Primary hypertension  Assessment & Plan:   Chronic- Stable. Discussed the importance of treating obstructive sleep apnea as part of the management of this disorder. Cont any meds per PCP and other physicians. 3. Primary lung adenocarcinoma, right ([facility] [address]  Assessment & Plan:  Chronic- Stable. Discussed the importance of treating obstructive sleep apnea as part of the management of this disorder. Cont any meds per PCP and other physicians.       Reviewed, analyzed, and documented physiologic data from patient's PAP machine. This information was analyzed to assess complexity and medical decision making in regards to further testing and management. The primary encounter diagnosis was SHANNA (obstructive sleep apnea). Diagnoses of Primary hypertension and Primary lung adenocarcinoma, right [facility]) were also pertinent to this visit. The chronic medical conditions listed are directly related to the primary diagnosis listed above. The management of the primary diagnosis affects the secondary diagnosis and vice versa. Subjective:     Patient ID: Vania Wright is a 77 y.o. female. Chief Complaint   Patient presents with    Sleep Apnea     Subjective   HPI:    Machine Modem/Download Info:  Compliance (hours/night): 5.18 hrs/night  % of nights >= 4 hrs: 87 %  Download AHI (/hour): 0.9 /HR   Average IPAP Pressure: 17.4 cmH2O  Average EPAP Pressure: 13.4 cmH2O               AUTO BILEVEL - Settings (ResMed)  IPAP Max: 25 cmH2O  EPAP Min: 13 cmH2O  Pressure Support: 4         PAP Mask  Mask Type: Full Face mask     Vania Wright reports she is doing well acclimating to her new bilevel machine. Since changing from the CPAP machine over to bilevel she feels that she is sleeping better during the night and her energy is returning during the day. She reports the pressure on the machine feels good. She is using a full facemask but is been experiencing a dry mouth. She has not adjusted the pressure settings on her machine. she denies headaches, congestion, nosebleeds,aerophagia, or drowsiness while driving. Had titration done on 10/12/21: The study showed uncontrolled sleep-related breathing disorder with several CPAP pressures. Patient failed CPAP and needs changed to bilevel. Recommend auto bilevel settings IPAP max: 25, EPAP min: 13, pressure support: 4.     [address]    [place] - Total score: 2    Current Outpatient Medications   Medication Instructions

## 2022-03-22 NOTE — ASSESSMENT & PLAN NOTE
Chronic-Improving: Reviewed results of titration with patient. Reviewed and analyzed results of physiologic download from patient's machine and reviewed with patient. Supplies and parts as needed for her machine. These are medically necessary. Limit caffeine use after 3pm. Based on the analyzed data will change following settings: Humidity increased to 5. Machine changes sent via machines modem. Will see her back in 6 months or sooner if issues arise.

## 2022-03-22 NOTE — LETTER
[facility]  [address] [facility]  [address] [address]  Phone: [phone]  Fax: [phone]    March 22, 2022       Patient: Tracey Rooney   MR Number: [id number]   YOB: 1945   Date of Visit: 3/22/2022       Dontrell Teague was seen for a follow up visit today. Here is my assessment and plan as well as an attached copy of her visit today:    Hypertension   Chronic- Stable. Discussed the importance of treating obstructive sleep apnea as part of the management of this disorder. Cont any meds per PCP and other physicians. SHANNA (obstructive sleep apnea)  Chronic-Improving: Reviewed results of titration with patient. Reviewed and analyzed results of physiologic download from patient's machine and reviewed with patient. Supplies and parts as needed for her machine. These are medically necessary. Limit caffeine use after 3pm. Based on the analyzed data will change following settings: Humidity increased to 5. Machine changes sent via machines modem. Will see her back in 6 months or sooner if issues arise. Primary lung adenocarcinoma, right (HCC)  Chronic- Stable. Discussed the importance of treating obstructive sleep apnea as part of the management of this disorder. Cont any meds per PCP and other physicians. If you have questions or concerns, please do not hesitate to call me. I look forward to following Delvin Burnette along with you.     Sincerely,    RICHELLE Del Rio providers:  Tressa Sanchez MD  [address]

## 2022-03-26 DIAGNOSIS — E78.49 OTHER HYPERLIPIDEMIA: ICD-10-CM

## 2022-03-28 RX ORDER — ATORVASTATIN CALCIUM 10 MG/1
TABLET, FILM COATED ORAL
Qty: 90 TABLET | Refills: 3 | Status: SHIPPED | OUTPATIENT
Start: 2022-03-28 | End: 2022-10-14 | Stop reason: SDUPTHER

## 2022-04-05 ENCOUNTER — TELEPHONE (OUTPATIENT)
Dept: CARDIOLOGY CLINIC | Age: 77
End: 2022-04-05

## 2022-04-05 RX ORDER — FUROSEMIDE 20 MG/1
20 TABLET ORAL DAILY
Qty: 30 TABLET | Refills: 0 | Status: SHIPPED | OUTPATIENT
Start: 2022-04-05 | End: 2022-04-05 | Stop reason: SDUPTHER

## 2022-04-05 RX ORDER — FUROSEMIDE 20 MG/1
20 TABLET ORAL DAILY PRN
Qty: 30 TABLET | Refills: 0 | Status: SHIPPED | OUTPATIENT
Start: 2022-04-05 | End: 2022-08-22 | Stop reason: SDUPTHER

## 2022-04-05 NOTE — TELEPHONE ENCOUNTER
Called pt  Discussed her swelling elevate legs  Low salt diet    Lasix 20mg PRN daily with destiny FRIEDMAN

## 2022-04-25 ENCOUNTER — TELEPHONE (OUTPATIENT)
Dept: INTERNAL MEDICINE CLINIC | Age: 77
End: 2022-04-25

## 2022-04-25 NOTE — TELEPHONE ENCOUNTER
Patient appointment was rescheduled to May 25 th at 9 am and wants to know if she could add a flu shot to this visit.   Please advise

## 2022-05-25 ENCOUNTER — OFFICE VISIT (OUTPATIENT)
Dept: INTERNAL MEDICINE CLINIC | Age: 77
End: 2022-05-25
Payer: MEDICARE

## 2022-05-25 VITALS
OXYGEN SATURATION: 96 % | WEIGHT: 144.8 LBS | HEART RATE: 91 BPM | HEIGHT: 66 IN | BODY MASS INDEX: 23.27 KG/M2 | DIASTOLIC BLOOD PRESSURE: 82 MMHG | SYSTOLIC BLOOD PRESSURE: 136 MMHG

## 2022-05-25 DIAGNOSIS — R60.0 LEG EDEMA, LEFT: Primary | ICD-10-CM

## 2022-05-25 DIAGNOSIS — Z00.00 MEDICARE ANNUAL WELLNESS VISIT, SUBSEQUENT: ICD-10-CM

## 2022-05-25 DIAGNOSIS — I10 PRIMARY HYPERTENSION: ICD-10-CM

## 2022-05-25 PROBLEM — I50.22 CHRONIC SYSTOLIC (CONGESTIVE) HEART FAILURE (HCC): Status: ACTIVE | Noted: 2022-05-25

## 2022-05-25 PROCEDURE — 1123F ACP DISCUSS/DSCN MKR DOCD: CPT | Performed by: INTERNAL MEDICINE

## 2022-05-25 PROCEDURE — G0439 PPPS, SUBSEQ VISIT: HCPCS | Performed by: INTERNAL MEDICINE

## 2022-05-25 ASSESSMENT — PATIENT HEALTH QUESTIONNAIRE - PHQ9
SUM OF ALL RESPONSES TO PHQ QUESTIONS 1-9: 0
SUM OF ALL RESPONSES TO PHQ9 QUESTIONS 1 & 2: 0
2. FEELING DOWN, DEPRESSED OR HOPELESS: 0
1. LITTLE INTEREST OR PLEASURE IN DOING THINGS: 0
SUM OF ALL RESPONSES TO PHQ QUESTIONS 1-9: 0

## 2022-05-25 ASSESSMENT — LIFESTYLE VARIABLES
HOW OFTEN DO YOU HAVE A DRINK CONTAINING ALCOHOL: MONTHLY OR LESS
HOW MANY STANDARD DRINKS CONTAINING ALCOHOL DO YOU HAVE ON A TYPICAL DAY: 1 OR 2

## 2022-05-25 NOTE — PATIENT INSTRUCTIONS
[facility] Locations - No appointment necessary. @ indicates the location is open Saturdays in addition to Monday through Friday. Call your preferred location for test preparation, business hours and other information you need. [employer] accepts [employer]. [facility]    @ [place] [facility] [address]   Ph: [phone] [facility]. [address]   Ph: [phone]  @ [facility]. [address] [facility]   Ph: [phone]    [facility] S[facility]. [address]iquita [address]   Ph: [phone] @ [place] [facility] [address]  Ph: [phone] @ [facility] S[facility]. [address] [facility]. Ke Manriquez [place] [address]   Ph: [phone]    [place]   @ [facility]. [address] [place], [place] [address]   Ph: [phone] [facility]. [address] [facility] [address]  Ph: [address] Lab Svcs. 54 [facility]   Ph: [phone] [facility]. Lab Svcs. [address] [place], [place] [address]   Ph: [phone]               Advance Directives: Care Instructions  Overview  An advance directive is a legal way to state your wishes at the end of your life. It tells your family and your doctor what to do if you can't say what youwant. There are two main types of advance directives. You can change them any timeyour wishes change. Living will. This form tells your family and your doctor your wishes about life support and other treatment. The form is also called a declaration. Medical power of . This form lets you name a person to make treatment decisions for you when you can't speak for yourself.  This person is called a health care agent (health care proxy, health care surrogate). The form is also called a durable power of  for health care. If you do not have an advance directive, decisions about your medical care maybe made by a family member, or by a doctor or a  who doesn't know you. It may help to think of an advance directive as a gift to the people who carefor you. If you have one, they won't have to make tough decisions by themselves. Follow-up care is a key part of your treatment and safety. Be sure to make and go to all appointments, and call your doctor if you are having problems. It's also a good idea to know your test results and keep alist of the medicines you take. What should you include in an advance directive? Many states have a unique advance directive form. (It may ask you to address specific issues.) Or you might use a universal form that's approved by manystates. If your form doesn't tell you what to address, it may be hard to know what to include in your advance directive. Use the questions below to help you getstarted.  Who do you want to make decisions about your medical care if you are not able to?  What life-support measures do you want if you have a serious illness that gets worse over time or can't be cured?  What are you most afraid of that might happen? (Maybe you're afraid of having pain, losing your independence, or being kept alive by machines.)   Where would you prefer to die? (Your home? A hospital? A nursing home?)   Do you want to donate your organs when you die?  Do you want certain Gnosticism practices performed before you die? When should you call for help? Be sure to contact your doctor if you have any questions. Where can you learn more? Go to [email or web address]://[email or web address]. org and sign in to your videoNEXT account. Enter R264 in the fitaborate box to learn more about \"Advance Directives: Care Instructions. \"     If you do not have an account, please click on the \"Sign Up Now\" link.   Current as of: October 18, 2021               Content Version: 13.2  © 9328-9795 Healthwise, [employer]ISE[employer]. Care instructions adapted under license by [facility] ([facility]). If you have questions about a medical condition or this instruction, always ask your healthcare professional. [address] any warranty or liability for your use of this information. Learning About Medical Power of   What is a medical power of ? A medical power of , also called a durable power of  for health care, is one type of the legal forms called advance directives. It lets you name the person you want to make treatment decisions for you if you can't speak or decide for yourself. The person you choose is called your health care agent. This person is also called a health care proxy or health care surrogate. A medical power of  may be called something else in your state. How do you choose a health care agent? Choose your health care agent carefully. This person may or may not be a familymember. Talk to the person before you make your final decision. Make sure he or she iscomfortable with this responsibility. It's a good idea to choose someone who:   Is at least 25years old.  Knows you well and understands what makes life meaningful for you.  Understands your Christianity and moral values.  Will do what you want, not what he or she wants.  Will be able to make difficult choices at a stressful time.  Will be able to refuse or stop treatment, if that is what you would want, even if you could die.  Will be firm and confident with health professionals if needed.  Will ask questions to get needed information.  Lives near you or agrees to travel to you if needed. Your family may help you make medical decisions while you can still be part of that process.  But it's important to choose one person to be your health careagent in case you aren't able to make decisions for Bertha  What is a living will? A living will, also called a declaration, is a legal form. It tells your family and your doctor your wishes when you can't speak for yourself. It's used by the health professionals who will treat you as you near the end of your life or ifyou get seriously hurt or ill. If you put your wishes in writing, your loved ones and others will know what kind of care you want. They won't need to guess. This can ease your mind and behelpful to others. And you can change or cancel your living will at any time. A living will is not the same as an estate or property will. An estate willexplains what you want to happen with your money and property after you die. How do you use it? Keep these facts in mind about how a living will is used.  Your living will is used only if you can't speak or make decisions for yourself. Most often, one or more doctors must certify that you can't speak or decide for yourself before your living will takes effect.  If you get better and can speak for yourself again, you can accept or refuse any treatment. It doesn't matter what you said in your living will.  Some states may limit your right to refuse treatment in certain cases. For example, you may need to clearly state in your living will that you don't want artificial hydration and nutrition, such as being fed through a tube. Is a living will a legal document? A living will is a legal document. Each state has its own laws about livingwills. And a living will may be called something else in your state. Here are some things to know about living meade.  You don't need an  to complete a living will. But legal advice can be helpful if your state's laws are unclear. It can also help if your health history is complicated or your family can't agree on what should be in your living will.  You can change your living will at any time.  Some people find that their wishes about end-of-life care change as their health changes. If you make big changes to your living will, complete a new form.  If you move to another state, make sure that your living will is legal in the state where you now live. In most cases, doctors will respect your wishes even if you have a form from a different state.  You might use a universal form that has been approved by many states. This kind of form can sometimes be filled out and stored online. Your digital copy will then be available wherever you have a connection to the internet. The doctors and nurses who need to treat you can find it right away.  Your state may offer an online registry. This is another place where you can store your living will online.  It's a good idea to get your living will notarized. This means using a person called a NovoDynamics to watch two people sign, or witness, your living will. What should you know when you create a living will? Here are some questions to ask yourself as you make your living will.  Do you know enough about life support methods that might be used? If not, talk to your doctor so you know what might be done if you can't breathe on your own, your heart stops, or you can't swallow.  What things would you still want to be able to do after you receive life-support methods? Would you want to be able to walk? To speak? To eat on your own? To live without the help of machines?  Do you want certain Bahai practices performed if you become very ill?  If you have a choice, where do you want to be cared for? In your home? At a hospital or nursing home?  If you have a choice at the end of your life, where would you prefer to die? At home? In a hospital or nursing home? Somewhere else?  Would you prefer to be buried or cremated?  Do you want your organs to be donated after you die? What should you do with your living will?    Make sure that your family members and your health care agent have copies of your living will (also called a declaration).  Give your doctor a copy of your living will. Ask to have it kept as part of your medical record. If you have more than one doctor, make sure that each one has a copy.  Put a copy of your living will where it can be easily found. For example, some people may put a copy on their refrigerator door. If you are using a digital copy, be sure your doctor, family members, and health care agent know how to find and access it. Where can you learn more? Go to [email or web address] and sign in to your Mengero account. Enter Q968 in the Applied BioCode box to learn more about \"Learning About Living Perroy. \"     If you do not have an account, please click on the \"Sign Up Now\" link. Current as of: October 18, 2021               Content Version: 13.2  © 0324-5279 Healthwise, Magiq. Care instructions adapted under license by [facility] ([facility]). If you have questions about a medical condition or this instruction, always ask your healthcare professional. [place] [address] any warranty or liability for your use of this information. Personalized Preventive Plan for Gene Abbasi - 5/25/2022  Medicare offers a range of preventive health benefits. Some of the tests and screenings are paid in full while other may be subject to a deductible, co-insurance, and/or copay. Some of these benefits include a comprehensive review of your medical history including lifestyle, illnesses that may run in your family, and various assessments and screenings as appropriate. After reviewing your medical record and screening and assessments performed today your provider may have ordered immunizations, labs, imaging, and/or referrals for you. A list of these orders (if applicable) as well as your Preventive Care list are included within your After Visit Summary for your review.     Other Preventive Recommendations:    · A preventive eye exam performed by an eye specialist is recommended every 1-2 years to screen for glaucoma; cataracts, macular degeneration, and other eye disorders. · A preventive dental visit is recommended every 6 months. · Try to get at least 150 minutes of exercise per week or 10,000 steps per day on a pedometer . · Order or download the FREE \"Exercise & Physical Activity: Your Everyday Guide\" from [employer]. Call 7-169.438.9421 or search [employer] online. · You need 0561-1377 mg of calcium and 5117-3858 IU of vitamin D per day. It is possible to meet your calcium requirement with diet alone, but a vitamin D supplement is usually necessary to meet this goal.  · When exposed to the sun, use a sunscreen that protects against both UVA and UVB radiation with an SPF of 30 or greater. Reapply every 2 to 3 hours or after sweating, drying off with a towel, or swimming. · Always wear a seat belt when traveling in a car. Always wear a helmet when riding a bicycle or motorcycle.

## 2022-05-25 NOTE — PROGRESS NOTES
Medicare Annual Wellness Visit    Rian Parks is here for Medicare AWV    Assessment & Plan      HTN: DASH and low sodium diet stressed. Left leg edema: rule DVT. Venous doppler. Recommendations for Preventive Services Due: see orders and patient instructions/AVS.  Recommended screening schedule for the next 5-10 years is provided to the patient in written form: see Patient Instructions/AVS.     No follow-ups on file. Subjective     Concerned about left leg swelling. Down in am. No trauma history. Had tetanus. Shingles plans to do. Received 2nd covid booster. To modify living will. Patient's complete Health Risk Assessment and screening values have been reviewed and are found in Flowsheets. The following problems were reviewed today and where indicated follow up appointments were made and/or referrals ordered. Positive Risk Factor Screenings with Interventions:             General Health and ACP:  General  In general, how would you say your health is?: Good  In the past 7 days, have you experienced any of the following: New or Increased Pain, New or Increased Fatigue, Loneliness, Social Isolation, Stress or Anger?: No  Do you get the social and emotional support that you need?: (!) No  Do you have a Living Will?: Yes    Advance Directives     Power of  Living Will ACP-Advance Directive ACP-Power of     Not on File Filed on 04/30/18 Filed Not on File      General Health Risk Interventions:  · Discussion. · Suggestions. · Questions answered. · Literature provided. Health Habits/Nutrition:     Physical Activity: Inactive    Days of Exercise per Week: 0 days    Minutes of Exercise per Session: 0 min     Have you lost any weight without trying in the past 3 months?: No  Body mass index: 23.37  Have you seen the dentist within the past year?: Yes    Health Habits/Nutrition Interventions:  · Health and wellness advice given. · Questions answered.   · Literature provided. ADLs:  In the past 7 days, did you need help from others to perform any of the following everyday activities: Eating, dressing, grooming, bathing, toileting, or walking/balance?: No  In the past 7 days, did you need help from others to take care of any of the following: Laundry, housekeeping, banking/finances, shopping, telephone use, food preparation, transportation, or taking medications?: (!) Yes  Select all that apply: (!) Laundry,Housekeeping,Shopping    ADL Interventions:  · Discussions. · Suggestions. · Questions. · Literature provided. Objective   Vitals:    05/25/22 0924   BP: 136/82   Site: Left Upper Arm   Position: Sitting   Pulse: 91   SpO2: 96%   Weight: 144 lb 12.8 oz (65.7 kg)   Height: 5' 6\" (1.676 m)      Body mass index is 23.37 kg/m². Allergies   Allergen Reactions    Codeine Itching and Other (See Comments)     LIP SWELLING    Penicillins Other (See Comments)     UNKNOWN     Prior to Visit Medications    Medication Sig Taking?  Authorizing Provider   furosemide (LASIX) 20 MG tablet Take 1 tablet by mouth daily as needed (wt gain sob edema) Wt gain Yes Alex Felder, APRN - CNP   atorvastatin (LIPITOR) 10 MG tablet TAKE ONE TABLET BY MOUTH DAILY Yes Marjan Terrazas MD   MEKINIST 2 MG TABS  Yes Historical Provider, MD   metoprolol succinate (TOPROL XL) 50 MG extended release tablet Take 1 tablet by mouth daily Yes Philip Mosquera MD   omeprazole (PRILOSEC) 40 MG delayed release capsule TAKE ONE CAPSULE BY MOUTH EVERY MORNING BEFORE BREAKFAST Yes Marjan Terrazas MD   albuterol sulfate  (90 Base) MCG/ACT inhaler INHALE TWO PUFFS BY MOUTH EVERY 6 HOURS AS NEEDED FOR WHEEZING Yes Marjan Terrazas MD   budesonide-formoterol (SYMBICORT) 160-4.5 MCG/ACT AERO INHALE TWO PUFFS BY MOUTH TWICE A DAY Yes Marjan Terrazas MD   TAFINLAR 75 MG CAPS  Yes Historical Provider, MD   ASPIRIN ADULT LOW STRENGTH 81 MG EC tablet TAKE ONE TABLET BY MOUTH DAILY Yes Thong Daisha Chicho Rodriguez MD       [facility] (Including outside providers/suppliers regularly involved in providing care):   Patient Care Team:  Suad Zapata MD as PCP - Servando Hassan MD as PCP - Hematology/Oncology (Hematology and Oncology)  Suad Zapata MD as PCP - [facility] Empaneled Provider  Janina Burns MD as Consulting Physician (Pulmonology)  Lisa Arroyo MD as Surgeon (General Surgery)     Reviewed and updated this visit:  Tobacco  Allergies  Meds  Med Hx  Surg Hx  Soc Hx  Fam Hx                Advance Care Planning   Advanced Care Planning: Discussed the patients choices for care and treatment in case of a health event that adversely affects decision-making abilities. Also discussed the patients long-term treatment options. Reviewed with the patient the appropriate state-specific advance directive documents. Reviewed the process of designating a competent adult as an Agent (or -in-fact) that could take make health care decisions for the patient if incompetent. Patient was asked to complete the declaration forms, either acknowledge the forms by a public notary or an eligible witness and provide a signed copy to the practice office. Time spent (minutes): 5 minutes. Cardiovascular Disease Risk Counseling: Assessed the patient's risk to develop cardiovascular disease and reviewed main risk factors.    Reviewed steps to reduce disease risk including:   · Quitting tobacco use, reducing amount smoked, or not starting the habit  · Making healthy food choices  · Being physically active and gradualy increasing activity levels   · Reduce weight and determine a healthy BMI goal  · Monitor blood pressure and treat if higher than 140/90 mmHg  · Maintain blood total cholesterol levels under 5 mmol/l or 190 mg/dl  · Maintain LDL cholesterol levels under 3.0 mmol/l or 115 mg/dl   · Control blood glucose levels  · Consider taking aspirin (75 mg daily), once blood pressure is controlled   Provided a follow up plan. Time spent (minutes): 5 minutes.

## 2022-05-27 ENCOUNTER — HOSPITAL ENCOUNTER (OUTPATIENT)
Dept: CT IMAGING | Age: 77
Discharge: HOME | End: 2022-05-27
Payer: MEDICARE

## 2022-05-27 DIAGNOSIS — C34.11 MALIGNANT NEOPLASM OF BRONCHUS OF UPPER LOBE, RIGHT (HCC): ICD-10-CM

## 2022-05-27 DIAGNOSIS — C79.72: ICD-10-CM

## 2022-05-27 DIAGNOSIS — C22.8: ICD-10-CM

## 2022-05-27 DIAGNOSIS — C77.1: ICD-10-CM

## 2022-05-27 DIAGNOSIS — C78.7 SECONDARY MALIGNANT NEOPLASM OF LIVER (HCC): ICD-10-CM

## 2022-05-27 DIAGNOSIS — C61: ICD-10-CM

## 2022-05-27 PROCEDURE — 6360000004 HC RX CONTRAST MEDICATION: Performed by: INTERNAL MEDICINE

## 2022-05-27 PROCEDURE — 74177 CT ABD & PELVIS W/CONTRAST: CPT

## 2022-05-27 RX ORDER — OMEPRAZOLE 40 MG/1
CAPSULE, DELAYED RELEASE ORAL
Qty: 60 CAPSULE | Refills: 2 | Status: SHIPPED | OUTPATIENT
Start: 2022-05-27

## 2022-05-27 RX ADMIN — IOPAMIDOL 80 ML: 755 INJECTION, SOLUTION INTRAVENOUS at 10:20

## 2022-07-21 DIAGNOSIS — C78.7 SECONDARY MALIGNANT NEOPLASM OF LIVER (HCC): ICD-10-CM

## 2022-07-21 DIAGNOSIS — C77.1 SECONDARY MALIGNANT NEOPLASM OF MEDIASTINAL LYMPH NODES (HCC): ICD-10-CM

## 2022-07-21 DIAGNOSIS — C79.72 MALIGNANT NEOPLASM METASTATIC TO LEFT ADRENAL GLAND (HCC): ICD-10-CM

## 2022-07-21 DIAGNOSIS — C34.11 PRIMARY MALIGNANT NEOPLASM OF RIGHT UPPER LOBE OF LUNG (HCC): ICD-10-CM

## 2022-08-15 DIAGNOSIS — J01.00 ACUTE NON-RECURRENT MAXILLARY SINUSITIS: ICD-10-CM

## 2022-08-15 DIAGNOSIS — J45.20 MILD INTERMITTENT ASTHMA WITHOUT COMPLICATION: ICD-10-CM

## 2022-08-15 RX ORDER — ALBUTEROL SULFATE 90 UG/1
AEROSOL, METERED RESPIRATORY (INHALATION)
Qty: 18 G | Refills: 2 | Status: SHIPPED | OUTPATIENT
Start: 2022-08-15 | End: 2022-10-14 | Stop reason: SDUPTHER

## 2022-08-15 RX ORDER — LEVOFLOXACIN 500 MG/1
TABLET, FILM COATED ORAL
Qty: 7 TABLET | Refills: 0 | Status: SHIPPED | OUTPATIENT
Start: 2022-08-15 | End: 2022-09-27

## 2022-08-22 RX ORDER — FUROSEMIDE 20 MG/1
20 TABLET ORAL DAILY PRN
Qty: 30 TABLET | Refills: 0 | Status: SHIPPED | OUTPATIENT
Start: 2022-08-22

## 2022-09-26 ENCOUNTER — TELEPHONE (OUTPATIENT)
Dept: PULMONOLOGY | Age: 77
End: 2022-09-26

## 2022-09-26 NOTE — TELEPHONE ENCOUNTER
Patient scheduled for VV 9/27, but no data since May. Called patient - she has been having some health issues and has not been using her machine, but she intends to begin again as she is tired all the time. Confirmed vv appt.

## 2022-09-27 ENCOUNTER — TELEMEDICINE (OUTPATIENT)
Dept: PULMONOLOGY | Age: 77
End: 2022-09-27
Payer: MEDICARE

## 2022-09-27 DIAGNOSIS — G47.33 OSA (OBSTRUCTIVE SLEEP APNEA): Primary | ICD-10-CM

## 2022-09-27 DIAGNOSIS — I10 PRIMARY HYPERTENSION: Chronic | ICD-10-CM

## 2022-09-27 DIAGNOSIS — I50.22 CHRONIC SYSTOLIC (CONGESTIVE) HEART FAILURE (HCC): ICD-10-CM

## 2022-09-27 PROCEDURE — 99441 PR PHYS/QHP TELEPHONE EVALUATION 5-10 MIN: CPT | Performed by: NURSE PRACTITIONER

## 2022-09-27 ASSESSMENT — SLEEP AND FATIGUE QUESTIONNAIRES
HOW LIKELY ARE YOU TO NOD OFF OR FALL ASLEEP WHILE LYING DOWN TO REST IN THE AFTERNOON WHEN CIRCUMSTANCES PERMIT: 1
HOW LIKELY ARE YOU TO NOD OFF OR FALL ASLEEP WHILE SITTING AND TALKING TO SOMEONE: 0
ESS TOTAL SCORE: 3
HOW LIKELY ARE YOU TO NOD OFF OR FALL ASLEEP WHILE WATCHING TV: 0
HOW LIKELY ARE YOU TO NOD OFF OR FALL ASLEEP WHILE SITTING AND READING: 1
HOW LIKELY ARE YOU TO NOD OFF OR FALL ASLEEP IN A CAR, WHILE STOPPED FOR A FEW MINUTES IN TRAFFIC: 0
HOW LIKELY ARE YOU TO NOD OFF OR FALL ASLEEP WHEN YOU ARE A PASSENGER IN A CAR FOR AN HOUR WITHOUT A BREAK: 1
HOW LIKELY ARE YOU TO NOD OFF OR FALL ASLEEP WHILE SITTING QUIETLY AFTER LUNCH WITHOUT ALCOHOL: 0
HOW LIKELY ARE YOU TO NOD OFF OR FALL ASLEEP WHILE SITTING INACTIVE IN A PUBLIC PLACE: 0

## 2022-09-27 NOTE — PROGRESS NOTES
Diagnosis: [x] SHANNA (G47.33) [] CSA (G47.31) [] Apnea (G47.30)   Length of Need: [x] 15 Months [] 99 Months [] Other:   Machine (JANIE!): [] Respironics Dream Station      Auto [] ResMed AirSense     Auto [] Other:     []  CPAP () [] Bilevel ()   Mode: [] Auto [] Spontaneous    Mode: [] Auto [] Spontaneous             Comfort Settings:      Humidifier: [] Heated ()        [x] Water chamber replacement ()/ 1 per 6 months        Mask:   [] Nasal () /1 per 3 months [x] Full Face () /1 per 3 months   [] Patient choice -Size and fit mask [x] Patient Choice - Size and fit mask   [] Dispense: [] Dispense:   [] Headgear () / 1 per 3 months [x] Headgear () / 1 per 3 months   [] Replacement Nasal Cushion ()/2 per month [x] Interface Replacement ()/1 per month   [] Replacement Nasal Pillows ()/2 per month         Tubing: [x] Heated ()/1 per 3 months    [] Standard ()/1 per 3 months [] Other:           Filters: [x] Non-disposable ()/1 per 6 months     [x] Ultra-Fine, Disposable ()/2 per month        Miscellaneous: [] Chin Strap ()/ 1 per 6 months [] O2 bleed-in:        LPM   [] Oxymetry on CPAP/Bilevel []  Other:         Start Order Date: 09/27/22    MEDICAL JUSTIFICATION:  I, the undersigned, certify that the above prescribed supplies are medically necessary for this patients wellbeing. In my opinion, the supplies are both reasonable and necessary in reference to accepted standards of medicalpractice in treatment of this patients condition. Vriidiana Cano NP    NPI: [id number]       Order Signed Date: 09/27/22  [facility]  Pulmonary, Sleep, and Critical Care    Pulmonary, Sleep, and Critical Care  [address] [place] [address].  [facility], [address] [facility] , [facility]                                    [facility]  Phone: [phone]    Fax: 201 [address] [place] [address] Robinson Tenorio  1945  [address]  [phone] (home)   [phone] (mobile)      Insurance Info (confirm with patient if correct):  Payer/Plan Subscr  Sex Relation Sub.  Ins. ID Effective Group Num

## 2022-09-27 NOTE — ASSESSMENT & PLAN NOTE
Chronic-with progression/exacerbation: Reviewed and analyzed results of physiologic download from patient's machine and reviewed with patient. Supplies and parts as needed for her machine. These are medically necessary. Limit caffeine use after 3pm. Based on the analyzed data will continue with current settings. Encouraged her to restart her machine. Encouraged consistent use of her machine each night, all night. Discussed the importance of treating SHANNA from a physiological standpoint. Instructed not to drive unless had 4 hrs of effective therapy for her SHANNA the night before. Did review the risks of under or untreated SHANNA including, but not limited to, higher risks of motor vehicle accidents, stroke, heart attacks, and death. She understands and accepts all these risks. Will see her back in 3 months. Encouraged her to contact the office with any questions or concerns.

## 2022-09-27 NOTE — LETTER
[facility]  [address] [facility]  [address] [address]  Phone: [phone]  Fax: [phone]    September 27, 2022       Patient: Crescencio Whaley   MR Number: [id number]   YOB: 1945   Date of Visit: 9/27/2022       Mitch Miranda was seen for a follow up visit today. Here is my assessment and plan as well as an attached copy of her visit today:    Chronic systolic (congestive) heart failure   Chronic- Stable. Discussed the importance of treating obstructive sleep apnea as part of the management of this disorder. Cont any meds per PCP and other physicians. Hypertension   Chronic- Stable. Discussed the importance of treating obstructive sleep apnea as part of the management of this disorder. Cont any meds per PCP and other physicians. SHANNA (obstructive sleep apnea)  Chronic-with progression/exacerbation: Reviewed and analyzed results of physiologic download from patient's machine and reviewed with patient. Supplies and parts as needed for her machine. These are medically necessary. Limit caffeine use after 3pm. Based on the analyzed data will continue with current settings. Encouraged her to restart her machine. Encouraged consistent use of her machine each night, all night. Discussed the importance of treating SHANNA from a physiological standpoint. Instructed not to drive unless had 4 hrs of effective therapy for her SHANNA the night before. Did review the risks of under or untreated SHANNA including, but not limited to, higher risks of motor vehicle accidents, stroke, heart attacks, and death. She understands and accepts all these risks. Will see her back in 3 months. Encouraged her to contact the office with any questions or concerns. If you have questions or concerns, please do not hesitate to call me. I look forward to following Marlon Hernandez along with you.     Sincerely,    RICHELLE Woodward    CC providers:  Gini Norton MD  [id number] [facility] [address] [facility]  [address]

## 2022-09-27 NOTE — PROGRESS NOTES
Sheldon Hassan MD, Martha Estes, [facility]  Candace Mcguirehali Chawlajulianne CNP [address]  [address] [facility], [address] [facility]- [phone]   [facility] [address] [facility]. [address] [phone]         Sleep Medicine Telephone Visit    Pursuant to the emergency declaration under the [address], [address] [place] [address] [facility] authority and the Coronavirus Preparedness and [employer] Act this Telephone Visit was insisted, with patient's consent, to reduce the patient's risk of exposure to COVID-19 and provide continuity of care for an established patient. Services were provided through a synchronous discussion over a telephone to substitute for in-person clinic visit. Patient was located in their house. Assessment/Plan:     1. SHANNA (obstructive sleep apnea)  Assessment & Plan:  Chronic-with progression/exacerbation: Reviewed and analyzed results of physiologic download from patient's machine and reviewed with patient. Supplies and parts as needed for her machine. These are medically necessary. Limit caffeine use after 3pm. Based on the analyzed data will continue with current settings. Encouraged her to restart her machine. Encouraged consistent use of her machine each night, all night. Discussed the importance of treating SHANNA from a physiological standpoint. Instructed not to drive unless had 4 hrs of effective therapy for her SHANNA the night before. Did review the risks of under or untreated SHANNA including, but not limited to, higher risks of motor vehicle accidents, stroke, heart attacks, and death. She understands and accepts all these risks. Will see her back in 3 months. Encouraged her to contact the office with any questions or concerns. 2. Chronic systolic (congestive) heart failure  Assessment & Plan:   Chronic- Stable.   Discussed the importance of treating obstructive sleep apnea as part of the management of this right upper lobe of lung ([facility] [address] [address]    Encounter for chemotherapy management    Patient in cancer related research study    Diplopia    Primary lung adenocarcinoma, right ([address]    Weakness    DNR (do not resuscitate) discussion    Mixed hyperlipidemia    Tachycardia    Hyponatremia    SHANNA (obstructive sleep apnea)    Thrombocytopenia, unspecified    Chronic systolic (congestive) heart failure       Past Medical History:   Diagnosis Date    Arthritis     Cancer ([facility] [address]     lung     Hyperlipidemia     Hypertension        Past Surgical History:   Procedure Laterality Date    BRONCHOSCOPY  03/07/2017    brochoscopy-EBUS    OTHER SURGICAL HISTORY Left 03/29/2017    LEFT SUBCLAVIAN PORT- CATH INSERTION      TUBAL LIGATION         History reviewed. No pertinent family history.         Electronically signed by RICHELLE Vaughn on 9/27/2022 at 11:50 AM

## 2022-10-14 ENCOUNTER — OFFICE VISIT (OUTPATIENT)
Dept: INTERNAL MEDICINE CLINIC | Age: 77
End: 2022-10-14
Payer: MEDICARE

## 2022-10-14 DIAGNOSIS — K21.9 GASTROESOPHAGEAL REFLUX DISEASE, UNSPECIFIED WHETHER ESOPHAGITIS PRESENT: ICD-10-CM

## 2022-10-14 DIAGNOSIS — C79.72 MALIGNANT NEOPLASM METASTATIC TO LEFT ADRENAL GLAND (HCC): ICD-10-CM

## 2022-10-14 DIAGNOSIS — N30.00 ACUTE CYSTITIS WITHOUT HEMATURIA: ICD-10-CM

## 2022-10-14 DIAGNOSIS — E78.2 MIXED HYPERLIPIDEMIA: ICD-10-CM

## 2022-10-14 DIAGNOSIS — Z23 FLU VACCINE NEED: ICD-10-CM

## 2022-10-14 DIAGNOSIS — J45.20 MILD INTERMITTENT ASTHMA WITHOUT COMPLICATION: ICD-10-CM

## 2022-10-14 DIAGNOSIS — R00.0 TACHYCARDIA: Primary | ICD-10-CM

## 2022-10-14 DIAGNOSIS — R53.83 LOW ENERGY: ICD-10-CM

## 2022-10-14 DIAGNOSIS — I10 PRIMARY HYPERTENSION: ICD-10-CM

## 2022-10-14 DIAGNOSIS — D69.6 THROMBOCYTOPENIA, UNSPECIFIED (HCC): ICD-10-CM

## 2022-10-14 PROCEDURE — G8484 FLU IMMUNIZE NO ADMIN: HCPCS | Performed by: INTERNAL MEDICINE

## 2022-10-14 PROCEDURE — G8427 DOCREV CUR MEDS BY ELIG CLIN: HCPCS | Performed by: INTERNAL MEDICINE

## 2022-10-14 PROCEDURE — 1036F TOBACCO NON-USER: CPT | Performed by: INTERNAL MEDICINE

## 2022-10-14 PROCEDURE — G8420 CALC BMI NORM PARAMETERS: HCPCS | Performed by: INTERNAL MEDICINE

## 2022-10-14 PROCEDURE — 99214 OFFICE O/P EST MOD 30 MIN: CPT | Performed by: INTERNAL MEDICINE

## 2022-10-14 PROCEDURE — 1123F ACP DISCUSS/DSCN MKR DOCD: CPT | Performed by: INTERNAL MEDICINE

## 2022-10-14 PROCEDURE — G0008 ADMIN INFLUENZA VIRUS VAC: HCPCS | Performed by: INTERNAL MEDICINE

## 2022-10-14 PROCEDURE — 1090F PRES/ABSN URINE INCON ASSESS: CPT | Performed by: INTERNAL MEDICINE

## 2022-10-14 PROCEDURE — 90694 VACC AIIV4 NO PRSRV 0.5ML IM: CPT | Performed by: INTERNAL MEDICINE

## 2022-10-14 PROCEDURE — G8400 PT W/DXA NO RESULTS DOC: HCPCS | Performed by: INTERNAL MEDICINE

## 2022-10-14 RX ORDER — ALBUTEROL SULFATE 90 UG/1
AEROSOL, METERED RESPIRATORY (INHALATION)
Qty: 18 G | Refills: 2 | Status: SHIPPED | OUTPATIENT
Start: 2022-10-14

## 2022-10-14 RX ORDER — ATORVASTATIN CALCIUM 10 MG/1
10 TABLET, FILM COATED ORAL DAILY
Qty: 90 TABLET | Refills: 3 | Status: SHIPPED | OUTPATIENT
Start: 2022-10-14

## 2022-10-14 SDOH — ECONOMIC STABILITY: FOOD INSECURITY: WITHIN THE PAST 12 MONTHS, THE FOOD YOU BOUGHT JUST DIDN'T LAST AND YOU DIDN'T HAVE MONEY TO GET MORE.: NEVER TRUE

## 2022-10-14 SDOH — ECONOMIC STABILITY: FOOD INSECURITY: WITHIN THE PAST 12 MONTHS, YOU WORRIED THAT YOUR FOOD WOULD RUN OUT BEFORE YOU GOT MONEY TO BUY MORE.: NEVER TRUE

## 2022-10-14 ASSESSMENT — SOCIAL DETERMINANTS OF HEALTH (SDOH): HOW HARD IS IT FOR YOU TO PAY FOR THE VERY BASICS LIKE FOOD, HOUSING, MEDICAL CARE, AND HEATING?: NOT HARD AT ALL

## 2022-10-14 NOTE — PROGRESS NOTES
Patient: Barbara Sandy is a 69 y.o. female who presents today with the following Chief Complaint(s):    Chief Complaint   Patient presents with    Follow-up    Medication Refill         HPIenergy concern upped calories. Acid reflux. Not every night. Last 15 to 20 minutes. Changed diet. EGD. Omeprazole. in change to dinner. 127-128/86 pulse 96. Current Outpatient Medications   Medication Sig Dispense Refill    furosemide (LASIX) 20 MG tablet Take 1 tablet by mouth daily as needed (wt gain sob edema) Wt gain 30 tablet 0    albuterol sulfate HFA (PROVENTIL;VENTOLIN;PROAIR) 108 (90 Base) MCG/ACT inhaler INHALE 2 PUFFS BY MOUTH EVERY 6 HOURS AS NEEDED FOR WHEEZING 18 g 2    omeprazole (PRILOSEC) 40 MG delayed release capsule TAKE ONE CAPSULE BY MOUTH EVERY MORNING BEFORE BREAKFAST 60 capsule 2    atorvastatin (LIPITOR) 10 MG tablet TAKE ONE TABLET BY MOUTH DAILY 90 tablet 3    MEKINIST 2 MG TABS       metoprolol succinate (TOPROL XL) 50 MG extended release tablet Take 1 tablet by mouth daily 90 tablet 3    budesonide-formoterol (SYMBICORT) 160-4.5 MCG/ACT AERO INHALE TWO PUFFS BY MOUTH TWICE A DAY (Patient taking differently: as needed INHALE TWO PUFFS BY MOUTH TWICE A DAY) 10.2 g 2    TAFINLAR 75 MG CAPS       ASPIRIN ADULT LOW STRENGTH 81 MG EC tablet TAKE ONE TABLET BY MOUTH DAILY (Patient taking differently: every other day) 30 tablet 5     No current facility-administered medications for this visit. Patient's past medical history, surgical history, family history, medications,and allergies  were all reviewed and updated as appropriate today. Review of Systems      Physical Exam    Vitals:    10/14/22 0956   BP: (!) 158/90   Pulse:    SpO2:        Assessment:  Encounter Diagnoses   Name Primary? Mild intermittent asthma without complication     Other hyperlipidemia        Plan:  1.  Mild intermittent asthma without complication  ***    2. Other hyperlipidemia  ***

## 2022-10-14 NOTE — PATIENT INSTRUCTIONS
[facility] Laboratory Locations - No appointment necessary. @ indicates the location is open Saturdays in addition to Monday through Friday. Call your preferred location for test preparation, business hours and other information you need. [employer] accepts [employer]. [facility]    @ [place] [facility]. [facility] [place], [address]   Ph: [phone] [facility] Svcs. [address]   Ph: [phone]  @ [facility] Sv[facility]. [address] [facility]   Ph: [phone]    [facility] Svcs. [address]   Ph: [phone] @ [place] [facility]. [address]  Ph: [phone] @ [facility] Sv[facility]. [address] [place] [address]. [address]   Ph: [phone]     [facility] Svcs. [address]  Ph: [phone]    [place]   @ [place] [facility] Sv[facility]. [address] [place], [place] [address]   Ph: [phone] [facility]. Office [address]. [address] [place] [address]  Ph: [phone]:  [address] Lab Svcs[address] [facility]   Ph: [phone] [facility]. Lab Svcs. [address]   Ph: [phone]    Dr. Trent ray. Jenna,   [employer]. Web site. Cooking for your heart and soul.

## 2022-10-19 ENCOUNTER — OFFICE VISIT (OUTPATIENT)
Dept: CARDIOLOGY CLINIC | Age: 77
End: 2022-10-19
Payer: MEDICARE

## 2022-10-19 VITALS
HEART RATE: 88 BPM | BODY MASS INDEX: 21.66 KG/M2 | DIASTOLIC BLOOD PRESSURE: 84 MMHG | SYSTOLIC BLOOD PRESSURE: 120 MMHG | WEIGHT: 134.2 LBS

## 2022-10-19 DIAGNOSIS — I10 HYPERTENSION, UNSPECIFIED TYPE: ICD-10-CM

## 2022-10-19 DIAGNOSIS — E78.2 MIXED HYPERLIPIDEMIA: ICD-10-CM

## 2022-10-19 DIAGNOSIS — C79.72 MALIGNANT NEOPLASM METASTATIC TO LEFT ADRENAL GLAND (HCC): Primary | ICD-10-CM

## 2022-10-19 DIAGNOSIS — I10 PRIMARY HYPERTENSION: ICD-10-CM

## 2022-10-19 PROCEDURE — 1123F ACP DISCUSS/DSCN MKR DOCD: CPT | Performed by: INTERNAL MEDICINE

## 2022-10-19 PROCEDURE — 1036F TOBACCO NON-USER: CPT | Performed by: INTERNAL MEDICINE

## 2022-10-19 PROCEDURE — G8420 CALC BMI NORM PARAMETERS: HCPCS | Performed by: INTERNAL MEDICINE

## 2022-10-19 PROCEDURE — G8400 PT W/DXA NO RESULTS DOC: HCPCS | Performed by: INTERNAL MEDICINE

## 2022-10-19 PROCEDURE — 1090F PRES/ABSN URINE INCON ASSESS: CPT | Performed by: INTERNAL MEDICINE

## 2022-10-19 PROCEDURE — G8484 FLU IMMUNIZE NO ADMIN: HCPCS | Performed by: INTERNAL MEDICINE

## 2022-10-19 PROCEDURE — G8427 DOCREV CUR MEDS BY ELIG CLIN: HCPCS | Performed by: INTERNAL MEDICINE

## 2022-10-19 PROCEDURE — 99214 OFFICE O/P EST MOD 30 MIN: CPT | Performed by: INTERNAL MEDICINE

## 2022-10-19 NOTE — PROGRESS NOTES
[address]   Dr Fabio Thompson. Donato AMADO, [address] [facility]    Outpatient Follow Up Note    10/19/2022,10:10 AM  Subjective:   CHIEF COMPLAINT / HPI:  Follow Up secondary to {hypertension hyperlipidemia SVT     Dharmesh Crenshaw is 69 y.o. female who presents today for a routine follow up. Is generally stable not having any chest pains or shortness of breath. She still on long-term chemotherapy relative to her lung cancer. Here today for follow-up and has no current cardiac issues. Going downstairs for labs and she has aortic root dilatation and is to have a CAT scan in a couple months. 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 We did have a discussion about getting a flu shot which she apparently just did recently have. Continue current therapy. We will get a fasting lipid and CMP today with labs already ordered from Dr. Jordana Interiano. Coronavirus vaccine x2 [place] [employer] and it did have booster    Obstructive sleep apnea with CPAP   Lung right cancer Dr. Ruthie Henson  rx with chemo and rad.  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  Past Medical History:    Past Medical History:   Diagnosis Date    Arthritis     Cancer ([address]     lung     Hyperlipidemia     Hypertension                                                                                                                                Past Surgical History  Past Surgical History:   Procedure Laterality Date    BRONCHOSCOPY  03/07/2017    brochoscopy-EBUS    OTHER SURGICAL HISTORY Left 03/29/2017    LEFT SUBCLAVIAN PORT- CATH INSERTION      TUBAL LIGATION       Social History:       Social History     Tobacco Use   Smoking Status Former    Packs/day: 0.00    Years: 0.00    Pack years: 0.00    Types: Cigarettes    Quit date: 3/10/2012    Years since quitting: 10.6   Smokeless Tobacco Never Current Medications:  Prior to Visit Medications    Medication Sig Taking? Authorizing Provider   albuterol sulfate HFA (PROVENTIL;VENTOLIN;PROAIR) 108 (90 Base) MCG/ACT inhaler INHALE 2 PUFFS BY MOUTH EVERY 6 HOURS AS NEEDED FOR WHEEZING Yes Stefano Campbell MD   atorvastatin (LIPITOR) 10 MG tablet Take 1 tablet by mouth daily Yes Stefano Campbell MD   furosemide (LASIX) 20 MG tablet Take 1 tablet by mouth daily as needed (wt gain sob edema) Wt gain Yes Danni Bee, APRN - CNP   omeprazole (PRILOSEC) 40 MG delayed release capsule TAKE ONE CAPSULE BY MOUTH EVERY MORNING BEFORE BREAKFAST Yes Stefano Campbell MD   MEKINIST 2 MG TABS  Yes Historical Provider, MD   metoprolol succinate (TOPROL XL) 50 MG extended release tablet Take 1 tablet by mouth daily Yes Liz Roper MD   budesonide-formoterol (SYMBICORT) 160-4.5 MCG/ACT AERO INHALE TWO PUFFS BY MOUTH TWICE A DAY  Patient taking differently: as needed INHALE TWO PUFFS BY MOUTH TWICE A DAY Yes Stefano Campbell MD   TAFINLAR 75 MG CAPS  Yes Historical Provider, MD   ASPIRIN ADULT LOW STRENGTH 81 MG EC tablet TAKE ONE TABLET BY MOUTH DAILY  Patient taking differently: every other day Yes Stefano Campbell MD     Family History  No family history on file.     Current Medications  Current Outpatient Medications   Medication Sig Dispense Refill    albuterol sulfate HFA (PROVENTIL;VENTOLIN;PROAIR) 108 (90 Base) MCG/ACT inhaler INHALE 2 PUFFS BY MOUTH EVERY 6 HOURS AS NEEDED FOR WHEEZING 18 g 2    atorvastatin (LIPITOR) 10 MG tablet Take 1 tablet by mouth daily 90 tablet 3    furosemide (LASIX) 20 MG tablet Take 1 tablet by mouth daily as needed (wt gain sob edema) Wt gain 30 tablet 0    omeprazole (PRILOSEC) 40 MG delayed release capsule TAKE ONE CAPSULE BY MOUTH EVERY MORNING BEFORE BREAKFAST 60 capsule 2    MEKINIST 2 MG TABS       metoprolol succinate (TOPROL XL) 50 MG extended release tablet Take 1 tablet by mouth daily 90 tablet 3    budesonide-formoterol (SYMBICORT) 160-4.5 MCG/ACT AERO INHALE TWO PUFFS BY MOUTH TWICE A DAY (Patient taking differently: as needed INHALE TWO PUFFS BY MOUTH TWICE A DAY) 10.2 g 2    TAFINLAR 75 MG CAPS       ASPIRIN ADULT LOW STRENGTH 81 MG EC tablet TAKE ONE TABLET BY MOUTH DAILY (Patient taking differently: every other day) 30 tablet 5     No current facility-administered medications for this visit. REVIEW OF SYSTEMS:    CONSTITUTIONAL: No major weight gain or loss, fatigue, weakness, night sweats or fever. HEENT: No new vision difficulties or ringing in the ears. RESPIRATORY: No new SOB, PND, orthopnea or cough. CARDIOVASCULAR: See HPI  GI: No nausea, vomiting, diarrhea, constipation, abdominal pain or changes in bowel habits. : No urinary frequency, urgency, incontinence hematuria or dysuria. SKIN: No cyanosis or skin lesions. MUSCULOSKELETAL: No new muscle or joint pain. NEUROLOGICAL: No syncope or TIA-like symptoms. PSYCHIATRIC: No anxiety, pain, insomnia or depression    Objective:   PHYSICAL EXAM:        VITALS:    Wt Readings from Last 3 Encounters:   10/19/22 134 lb 3.2 oz (60.9 kg)   10/14/22 137 lb 12.8 oz (62.5 kg)   05/25/22 144 lb 12.8 oz (65.7 kg)     BP Readings from Last 3 Encounters:   10/19/22 120/84   10/14/22 (!) 158/90   05/25/22 136/82     Pulse Readings from Last 3 Encounters:   10/19/22 88   10/14/22 86   05/25/22 91       CONSTITUTIONAL: Cooperative, no apparent distress, and appears well nourished / developed  NEUROLOGIC:  Awake and orientated to person, place and time. PSYCH: Calm affect. SKIN: Warm and dry. HEENT: Sclera non-icteric, normocephalic, neck supple, no elevation of JVP, normal carotid pulses with no bruits and thyroid normal size. LUNGS:  No increased work of breathing and clear to auscultation, no crackles or wheezing  CARDIOVASCULAR:  Regular rate and rhythm with no murmurs, gallops, rubs, or abnormal heart sounds, normal PMI. The apical impulses not displaced  Heart tones are crisp and normal  Cervical veins are not engorged  The carotid upstroke is normal in amplitude and contour without delay or bruit  JVP is not elevated  ABDOMEN:  Normal bowel sounds, non-distended and non-tender to palpation  EXT: No edema, no calf tenderness. Pulses are present bilaterally. DATA:    Lab Results   Component Value Date    ALT 11 07/28/2021    AST 26 07/28/2021    ALKPHOS 76 07/28/2021    BILITOT 0.3 07/28/2021     Lab Results   Component Value Date    CREATININE 0.5 (L) 12/10/2021    BUN 2 (L) 07/30/2021     (L) 07/30/2021    K 3.6 07/30/2021    CL 98 (L) 07/30/2021    CO2 26 07/30/2021     Lab Results   Component Value Date    TSH 0.236 (L) 06/29/2017     Lab Results   Component Value Date    WBC 4.7 09/02/2021    HGB 11.6 (L) 09/02/2021    HCT 36.9 09/02/2021    MCV 76.9 (L) 09/02/2021     09/02/2021     No components found for: CHLPL  Lab Results   Component Value Date    TRIG 56 05/11/2021    TRIG 86 06/05/2019    TRIG 68 04/26/2018     Lab Results   Component Value Date    HDL 68 (H) 05/11/2021    HDL 70 (H) 06/05/2019    HDL 60 04/26/2018     Lab Results   Component Value Date    LDLCALC 51 05/11/2021    LDLCALC 61 06/05/2019    LDLCALC 50 04/26/2018     Lab Results   Component Value Date    LABVLDL 11 05/11/2021    LABVLDL 17 06/05/2019    LABVLDL 14 04/26/2018     Radiology Review:  Pertinent images / reports were reviewed as a part of this visit and reveals the following:  CHEST: 12/10/21       1. Radiation changes with perivascular and peribronchial soft tissue and traction bronchiectasis along the right hilum. 2. Stable right upper lobe large bulla and adjacent traction bronchiectasis   3. Stable right lower lobe pleural effusion and overall volume loss of the right hemithorax. 4. Stable prevascular and paratracheal and hilar adenopathy   5.  Peripheral honeycomb changes of fibrosis in the left lung are redemonstrated, unchanged       Echo:Summary1/28/21   Normal left ventricular size. Mild septal hypertrophy. Mildly decreased left ventricular systolic function with an estimated   ejection fraction of 45%. Normal diastolic function. The aortic root is dilated measuring 3.8 cm. The ascending aorta is dilated measuring 4.3 cm. Trivial tricuspid regurgitation. Stress Test / Angiogram:    ECG:Sinus  Tachycardia 1/15/21  EKG on 2/16/2022 sinus rhythm 94/min. Essentially normal otherwise. WITHIN NORMAL LIMITS  This patient was educated using the patient point room wall mount device. Absence from smokers and smoking and diet and exercising are important. Assessment:   Dilated aortic root and hypertension and SVT. Plan:   Fasting lipid and CMP today. Continue current therapy. Return to see me in 6 months. Please call if we can assist further [phone]. Davin Maynard.  Donato AMADO, [facility]      This note was likely completed using voice  recognition technology and may contain unintended errors

## 2022-10-20 LAB
A/G RATIO: 1.4 (ref 1.1–2.2)
ALBUMIN SERPL-MCNC: 4.2 G/DL (ref 3.4–5)
ALP BLD-CCNC: 107 U/L (ref 40–129)
ALT SERPL-CCNC: 9 U/L (ref 10–40)
ANION GAP SERPL CALCULATED.3IONS-SCNC: 16 MMOL/L (ref 3–16)
AST SERPL-CCNC: 19 U/L (ref 15–37)
BACTERIA: ABNORMAL /HPF
BILIRUB SERPL-MCNC: 0.5 MG/DL (ref 0–1)
BILIRUBIN URINE: NEGATIVE
BLOOD, URINE: NEGATIVE
BUN BLDV-MCNC: 8 MG/DL (ref 7–20)
CALCIUM SERPL-MCNC: 9.9 MG/DL (ref 8.3–10.6)
CHLORIDE BLD-SCNC: 90 MMOL/L (ref 99–110)
CHOLESTEROL, TOTAL: 152 MG/DL (ref 0–199)
CLARITY: CLEAR
CO2: 27 MMOL/L (ref 21–32)
COLOR: ABNORMAL
CREAT SERPL-MCNC: 0.5 MG/DL (ref 0.6–1.2)
EPITHELIAL CELLS, UA: 4 /HPF (ref 0–5)
GFR SERPL CREATININE-BSD FRML MDRD: >60 ML/MIN/{1.73_M2}
GLUCOSE BLD-MCNC: 88 MG/DL (ref 70–99)
GLUCOSE URINE: NEGATIVE MG/DL
HDLC SERPL-MCNC: 65 MG/DL (ref 40–60)
HYALINE CASTS: 0 /LPF (ref 0–8)
KETONES, URINE: 15 MG/DL
LDL CHOLESTEROL CALCULATED: 75 MG/DL
LEUKOCYTE ESTERASE, URINE: ABNORMAL
MICROSCOPIC EXAMINATION: YES
NITRITE, URINE: NEGATIVE
PH UA: 7 (ref 5–8)
POTASSIUM SERPL-SCNC: 4.2 MMOL/L (ref 3.5–5.1)
PROTEIN UA: ABNORMAL MG/DL
RBC UA: 5 /HPF (ref 0–4)
SODIUM BLD-SCNC: 133 MMOL/L (ref 136–145)
SPECIFIC GRAVITY UA: 1.02 (ref 1–1.03)
TOTAL PROTEIN: 7.2 G/DL (ref 6.4–8.2)
TRIGL SERPL-MCNC: 60 MG/DL (ref 0–150)
TSH REFLEX: 0.9 UIU/ML (ref 0.27–4.2)
URINE TYPE: ABNORMAL
UROBILINOGEN, URINE: 1 E.U./DL
VLDLC SERPL CALC-MCNC: 12 MG/DL
WBC UA: 2 /HPF (ref 0–5)

## 2022-10-24 VITALS
SYSTOLIC BLOOD PRESSURE: 128 MMHG | DIASTOLIC BLOOD PRESSURE: 86 MMHG | BODY MASS INDEX: 22.14 KG/M2 | HEIGHT: 66 IN | OXYGEN SATURATION: 95 % | WEIGHT: 137.8 LBS | HEART RATE: 86 BPM

## 2022-10-24 ASSESSMENT — ENCOUNTER SYMPTOMS: EYES NEGATIVE: 1

## 2022-10-24 NOTE — PROGRESS NOTES
Patient: Kip Pressley is a 69 y.o. female who presents today with the following Chief Complaint(s):    Chief Complaint   Patient presents with    Follow-up    Medication Refill         ATTILAShe is here for a check up and f/u on hypertension, hyperlipidemia. She is taking medication as prescribed, eating a balanced meal plan and exercising including riding her bike. H/O tachycardia, treated with metoprolol per cardiology. Stable. H/O metastatic lung cancer. Last CT of chest and abdomen showed stable findings. She is concerned about her energy. Upped calories and will reassess. GERD symptoms at times. Associates with timing of eating. Symptoms last 15 to 20 minutes. Plans to change timing. Current Outpatient Medications   Medication Sig Dispense Refill    albuterol sulfate HFA (PROVENTIL;VENTOLIN;PROAIR) 108 (90 Base) MCG/ACT inhaler INHALE 2 PUFFS BY MOUTH EVERY 6 HOURS AS NEEDED FOR WHEEZING 18 g 2    atorvastatin (LIPITOR) 10 MG tablet Take 1 tablet by mouth daily 90 tablet 3    furosemide (LASIX) 20 MG tablet Take 1 tablet by mouth daily as needed (wt gain sob edema) Wt gain 30 tablet 0    omeprazole (PRILOSEC) 40 MG delayed release capsule TAKE ONE CAPSULE BY MOUTH EVERY MORNING BEFORE BREAKFAST 60 capsule 2    MEKINIST 2 MG TABS       metoprolol succinate (TOPROL XL) 50 MG extended release tablet Take 1 tablet by mouth daily 90 tablet 3    budesonide-formoterol (SYMBICORT) 160-4.5 MCG/ACT AERO INHALE TWO PUFFS BY MOUTH TWICE A DAY (Patient taking differently: as needed INHALE TWO PUFFS BY MOUTH TWICE A DAY) 10.2 g 2    TAFINLAR 75 MG CAPS       ASPIRIN ADULT LOW STRENGTH 81 MG EC tablet TAKE ONE TABLET BY MOUTH DAILY (Patient taking differently: every other day) 30 tablet 5     No current facility-administered medications for this visit.        Patient's past medical history, surgical history, family history, medications,and allergies  were all reviewed and updated as appropriate today. Review of Systems   Constitutional:         Lower energy see HPI. HENT: Negative. Eyes: Negative. Respiratory:          See HPI. Cardiovascular:         Hypertension, treated with Ca and B blker, diuretic. Tachycardia, see HPI. Gastrointestinal:         See HPI. Endocrine:        Hyperlipidemia, treated with statin. LDL 51. Genitourinary:         Change in urine color. No blood. No dysuria. H/o left adrenal gland abnormality. ? Cancer. Subsequent studies not noted. Musculoskeletal: Negative. Skin: Negative. Neurological: Negative. Hematological:         H/O thrombocytopenia. Resolved. Psychiatric/Behavioral: Negative. Physical Exam  Constitutional:       General: She is not in acute distress. Appearance: She is well-developed. HENT:      Head: Normocephalic and atraumatic. Right Ear: External ear normal.      Left Ear: External ear normal.      Nose: Nose normal.   Eyes:      General: No scleral icterus. Conjunctiva/sclera: Conjunctivae normal.      Pupils: Pupils are equal, round, and reactive to light. Neck:      Thyroid: No thyromegaly. Cardiovascular:      Rate and Rhythm: Normal rate and regular rhythm. Heart sounds: Normal heart sounds. Comments: Tachycardic. Pulse 100. Pulmonary:      Effort: Pulmonary effort is normal.      Breath sounds: Normal breath sounds. Abdominal:      General: Bowel sounds are normal.      Palpations: Abdomen is soft. There is no mass. Musculoskeletal:      Cervical back: Normal range of motion and neck supple. Lymphadenopathy:      Cervical: No cervical adenopathy. Skin:     General: Skin is warm and dry. Neurological:      Mental Status: She is alert and oriented to person, place, and time. Deep Tendon Reflexes: Reflexes are normal and symmetric. Psychiatric:         Behavior: Behavior normal.         Thought Content:  Thought content normal.         Judgment: Judgment normal.       Vitals:    10/14/22 0956   BP: (!) 158/90   Pulse:    SpO2:        Assessment:   Diagnosis Orders   1. Essential hypertension  Continue Ca and B Blker,   DASH and low sodium diet discussed. 2. Mixed hyperlipidemia  Heart healthy diet and statin compliance discussed. 3. Tachycardia  Continue B Blker. Heart rate in the 80s. Acceptable. Will monitor. 4.     GERD: diet and timing of eating discussed. Omeprazole, short course of treatment. Reassess symptoms. Consider EGD if persist.   5.     Flu vaccine   6. H/O adrenal tumor. Resolved. 7.     Asthma: stable. Use of maintenance and rescue inhaler. 8.     Low energy: vitamins, increase calories. Proper rest, regular physical activity. Reassess. 9.     Acute cystitis: check u/a and urine culture. Treat as culture dictates. 10.   Thrombocytopenia: resolved. Plan:  See plans above.

## 2022-12-02 ENCOUNTER — HOSPITAL ENCOUNTER (OUTPATIENT)
Dept: CT IMAGING | Age: 77
Discharge: HOME | End: 2022-12-02
Payer: MEDICARE

## 2022-12-02 DIAGNOSIS — C34.11 PRIMARY MALIGNANT NEOPLASM OF RIGHT UPPER LOBE OF LUNG (HCC): ICD-10-CM

## 2022-12-02 LAB
GFR SERPL CREATININE-BSD FRML MDRD: >60 ML/MIN/{1.73_M2}
PERFORMED ON: ABNORMAL
POC CREATININE: 0.5 MG/DL (ref 0.6–1.2)
POC SAMPLE TYPE: ABNORMAL

## 2022-12-02 PROCEDURE — 82565 ASSAY OF CREATININE: CPT

## 2022-12-02 PROCEDURE — A4641 RADIOPHARM DX AGENT NOC: HCPCS | Performed by: INTERNAL MEDICINE

## 2022-12-02 PROCEDURE — 74177 CT ABD & PELVIS W/CONTRAST: CPT

## 2022-12-02 PROCEDURE — 6360000004 HC RX CONTRAST MEDICATION: Performed by: INTERNAL MEDICINE

## 2022-12-02 RX ADMIN — BARIUM SULFATE 900 ML: 21 SUSPENSION ORAL at 11:41

## 2022-12-02 RX ADMIN — IOPAMIDOL 80 ML: 755 INJECTION, SOLUTION INTRAVENOUS at 11:41

## 2022-12-05 RX ORDER — OMEPRAZOLE 40 MG/1
CAPSULE, DELAYED RELEASE ORAL
Qty: 60 CAPSULE | Refills: 2 | Status: SHIPPED | OUTPATIENT
Start: 2022-12-05

## 2022-12-05 NOTE — TELEPHONE ENCOUNTER
Medication:   Requested Prescriptions     Pending Prescriptions Disp Refills    omeprazole (PRILOSEC) 40 MG delayed release capsule [Pharmacy Med Name: OMEPRAZOLE DR 40 MG CAPSULE] 60 capsule 2     Sig: TAKE ONE CAPSULE BY MOUTH EVERY MORNING BEFORE BREAKFAST        Last Filled: 5/27/22    Patient Phone Number: [phone] (home)     Last appt: 10/14/2022   Next appt: 1/17/2023

## 2022-12-08 NOTE — TELEPHONE ENCOUNTER
Trying to return pt call LM Information: This plan will allow you to select a body location and will not render the procedure on the note output. It will note the location you select in the morphology. Detail Level: Simple

## 2022-12-19 ENCOUNTER — HOSPITAL ENCOUNTER (OUTPATIENT)
Dept: MRI IMAGING | Age: 77
Discharge: HOME | End: 2022-12-19
Payer: MEDICARE

## 2022-12-19 DIAGNOSIS — C78.7 SECONDARY MALIGNANT NEOPLASM OF LIVER AND INTRAHEPATIC BILE DUCT (HCC): ICD-10-CM

## 2022-12-19 DIAGNOSIS — C34.11 MALIGNANT NEOPLASM OF UPPER LOBE, RIGHT BRONCHUS OR LUNG (HCC): ICD-10-CM

## 2022-12-19 DIAGNOSIS — C79.72 SECONDARY MALIGNANT NEOPLASM OF LEFT ADRENAL GLAND (HCC): ICD-10-CM

## 2022-12-19 DIAGNOSIS — C77.1 SECONDARY AND UNSPECIFIED MALIGNANT NEOPLASM OF INTRATHORACIC LYMPH NODES (HCC): ICD-10-CM

## 2022-12-19 PROCEDURE — A9581 GADOXETATE DISODIUM INJ: HCPCS | Performed by: INTERNAL MEDICINE

## 2022-12-19 PROCEDURE — 6360000004 HC RX CONTRAST MEDICATION: Performed by: INTERNAL MEDICINE

## 2022-12-19 PROCEDURE — 74183 MRI ABD W/O CNTR FLWD CNTR: CPT

## 2022-12-19 RX ADMIN — GADOXETATE DISODIUM 6 ML: 181.43 INJECTION, SOLUTION INTRAVENOUS at 15:56

## 2023-01-10 DIAGNOSIS — E78.2 MIXED HYPERLIPIDEMIA: ICD-10-CM

## 2023-01-10 DIAGNOSIS — J45.20 MILD INTERMITTENT ASTHMA WITHOUT COMPLICATION: ICD-10-CM

## 2023-01-10 RX ORDER — FUROSEMIDE 20 MG/1
20 TABLET ORAL DAILY PRN
Qty: 30 TABLET | Refills: 0 | Status: SHIPPED | OUTPATIENT
Start: 2023-01-10

## 2023-01-10 NOTE — TELEPHONE ENCOUNTER
Medication:   Requested Prescriptions     Pending Prescriptions Disp Refills    levoFLOXacin (LEVAQUIN) 500 MG tablet [Pharmacy Med Name: levoFLOXacin 500 MG TABLET] 7 tablet      Sig: TAKE ONE TABLET BY MOUTH DAILY FOR 7 DAYS        Last Filled:      Patient Phone Number: [phone] (home)     Last appt: 10/14/2022   Next appt: 1/17/2023    Last OARRS: No flowsheet data found.

## 2023-01-11 RX ORDER — LEVOFLOXACIN 500 MG/1
TABLET, FILM COATED ORAL
Qty: 7 TABLET | OUTPATIENT
Start: 2023-01-11

## 2023-01-11 RX ORDER — ATORVASTATIN CALCIUM 10 MG/1
10 TABLET, FILM COATED ORAL DAILY
Qty: 90 TABLET | Refills: 3 | Status: SHIPPED | OUTPATIENT
Start: 2023-01-11

## 2023-01-11 RX ORDER — ALBUTEROL SULFATE 90 UG/1
AEROSOL, METERED RESPIRATORY (INHALATION)
Qty: 18 G | Refills: 3 | Status: SHIPPED | OUTPATIENT
Start: 2023-01-11 | End: 2023-03-07 | Stop reason: SDUPTHER

## 2023-02-07 ENCOUNTER — TELEMEDICINE (OUTPATIENT)
Dept: PULMONOLOGY | Age: 78
End: 2023-02-07
Payer: MEDICARE

## 2023-02-07 DIAGNOSIS — I10 PRIMARY HYPERTENSION: Chronic | ICD-10-CM

## 2023-02-07 DIAGNOSIS — G47.33 OSA (OBSTRUCTIVE SLEEP APNEA): Primary | ICD-10-CM

## 2023-02-07 DIAGNOSIS — I50.22 CHRONIC SYSTOLIC (CONGESTIVE) HEART FAILURE (HCC): ICD-10-CM

## 2023-02-07 PROCEDURE — 99442 PR PHYS/QHP TELEPHONE EVALUATION 11-20 MIN: CPT | Performed by: NURSE PRACTITIONER

## 2023-02-07 ASSESSMENT — SLEEP AND FATIGUE QUESTIONNAIRES
HOW LIKELY ARE YOU TO NOD OFF OR FALL ASLEEP WHILE SITTING QUIETLY AFTER LUNCH WITHOUT ALCOHOL: 0
HOW LIKELY ARE YOU TO NOD OFF OR FALL ASLEEP IN A CAR, WHILE STOPPED FOR A FEW MINUTES IN TRAFFIC: 0
ESS TOTAL SCORE: 4
HOW LIKELY ARE YOU TO NOD OFF OR FALL ASLEEP WHEN YOU ARE A PASSENGER IN A CAR FOR AN HOUR WITHOUT A BREAK: 0
HOW LIKELY ARE YOU TO NOD OFF OR FALL ASLEEP WHILE LYING DOWN TO REST IN THE AFTERNOON WHEN CIRCUMSTANCES PERMIT: 0
HOW LIKELY ARE YOU TO NOD OFF OR FALL ASLEEP WHILE SITTING AND TALKING TO SOMEONE: 0
HOW LIKELY ARE YOU TO NOD OFF OR FALL ASLEEP WHILE SITTING AND READING: 2
HOW LIKELY ARE YOU TO NOD OFF OR FALL ASLEEP WHILE SITTING INACTIVE IN A PUBLIC PLACE: 0
HOW LIKELY ARE YOU TO NOD OFF OR FALL ASLEEP WHILE WATCHING TV: 2

## 2023-02-07 NOTE — PROGRESS NOTES
Diagnosis: [x] SHANNA (G47.33) [] CSA (G47.31) [] Apnea (G47.30)   Length of Need: [x] 15 Months [] 99 Months [] Other:   Machine (JANIE!): [] Respironics Dream Station      Auto [] ResMed AirSense     Auto [] Other:     []  CPAP () [] Bilevel ()   Mode: [] Auto [] Spontaneous    Mode: [] Auto [] Spontaneous             Comfort Settings:      Humidifier: [] Heated ()        [x] Water chamber replacement ()/ 1 per 6 months        Mask:   [] Nasal () /1 per 3 months [x] Full Face () /1 per 3 months   [] Patient choice -Size and fit mask [x] Patient Choice - Size and fit mask   [] Dispense: [] Dispense:   [] Headgear () / 1 per 3 months [x] Headgear () / 1 per 3 months   [] Replacement Nasal Cushion ()/2 per month [x] Interface Replacement ()/1 per month   [] Replacement Nasal Pillows ()/2 per month         Tubing: [x] Heated ()/1 per 3 months    [] Standard ()/1 per 3 months [] Other:           Filters: [x] Non-disposable ()/1 per 6 months     [x] Ultra-Fine, Disposable ()/2 per month        Miscellaneous: [] Chin Strap ()/ 1 per 6 months [] O2 bleed-in:        LPM   [] Oxymetry on CPAP/Bilevel []  Other:         Start Order Date: 02/07/23    MEDICAL JUSTIFICATION:  I, the undersigned, certify that the above prescribed supplies are medically necessary for this patients wellbeing. In my opinion, the supplies are both reasonable and necessary in reference to accepted standards of medicalpractice in treatment of this patients condition. Telma Jenkins NP    NPI: [id number]       Order Signed Date: 02/07/23  [facility]  Pulmonary, Sleep, and Critical Care    Pulmonary, Sleep, and Critical Care  [facility] [address] [place] [address].  [address] [facility], [facility] , [address] [facility]  Phone: [phone]    Fax: 201 [address] [place] [address]  1945  [place] [address]  [phone] (home)   [phone] (mobile)      Insurance Info (confirm with patient if correct):  Payer/Plan Subscr  Sex Relation Sub.  Ins. ID Effective Group Num

## 2023-02-07 NOTE — LETTER
[facility] Sleep Medicine  [address] [facility]  [place] Suarez  [address]  Phone: [phone]  Fax: [phone]    February 7, 2023       Patient: Familia Tolbert   MR Number: [id number]   YOB: 1945   Date of Visit: 2/7/2023       Adonis Brown was seen for a follow up visit today. Here is my assessment and plan as well as an attached copy of her visit today:    Hypertension   Chronic- Stable. Discussed the importance of treating obstructive sleep apnea as part of the management of this disorder. Cont any meds per PCP and other physicians. Chronic systolic (congestive) heart failure   Chronic- Stable. Discussed the importance of treating obstructive sleep apnea as part of the management of this disorder. Cont any meds per PCP and other physicians. SHANNA (obstructive sleep apnea)  Chronic-with progression/exacerbation:  Reviewed and analyzed results of physiologic download from patient's machine and reviewed with patient. Supplies and parts as needed for her machine. These are medically necessary. Limit caffeine use after 3pm. Based on the analyzed data will continue with current settings. Encouraged her to get back to using her machine each night, all night. Discussed working with her DME if she would like to try a different mask. Will see her back in 3 months. Encouraged her to contact the office with any questions or concerns. Encouraged consistent use of her machine each night, all night. Discussed the importance of treating Obstructive sleep apnea from a physiological standpoint. Instructed not to drive unless had 4 hrs of effective therapy for her SHANNA the night before. No driving when drowsy. Did review the risks of under or untreated SHANNA including, but not limited to, higher risks of motor vehicle accidents, stroke, heart attacks, and death. She understands and accepts all these risks. If you have questions or concerns, please do not hesitate to call me.  I look forward to following Raymond Neighbors along with you.     Sincerely,    RICHELLE Mead providers:  Yolanda Magdaleno MD  [address] [facility]  [address]

## 2023-02-07 NOTE — ASSESSMENT & PLAN NOTE
Chronic-with progression/exacerbation:  Reviewed and analyzed results of physiologic download from patient's machine and reviewed with patient.  Supplies and parts as needed for her machine.  These are medically necessary.  Limit caffeine use after 3pm. Based on the analyzed data will continue with current settings. Encouraged her to get back to using her machine each night, all night. Discussed working with her DME if she would like to try a different mask. Will see her back in 3 months. Encouraged her to contact the office with any questions or concerns.    Encouraged consistent use of her machine each night, all night. Discussed the importance of treating Obstructive sleep apnea from a physiological standpoint. Instructed not to drive unless had 4 hrs of effective therapy for her SHANNA the night before. No driving when drowsy.  Did review the risks of under or untreated SHANNA including, but not limited to, higher risks of motor vehicle accidents, stroke, heart attacks, and death.  She understands and accepts all these risks.

## 2023-02-07 NOTE — PROGRESS NOTES
Scooter Leal MD, [facility]  [facility] HEART AND SURGICAL [facility] CNP  Roxie Davison CNP [place] [address]  [facility] [address] [facility], [address] [facility]  [address] [facility] [phone] [address] [facility]  [address], [address] [phone]         Sleep Medicine Telephone Visit    Pursuant to the emergency declaration under the [address] [facility], [address] [facility] authority and the [employer] and [employer] General Act this Telephone Visit was insisted, with patient's consent, to reduce the patient's risk of exposure to COVID-19 and provide continuity of care for an established patient. Services were provided through a synchronous discussion over a telephone to substitute for in-person clinic visit. Patient was located in their house. Patient was unable to connect to virtual visit and was changed to a telephone visit. Assessment/Plan:     1. SHANNA (obstructive sleep apnea)  Assessment & Plan:  Chronic-with progression/exacerbation:  Reviewed and analyzed results of physiologic download from patient's machine and reviewed with patient. Supplies and parts as needed for her machine. These are medically necessary. Limit caffeine use after 3pm. Based on the analyzed data will continue with current settings. Encouraged her to get back to using her machine each night, all night. Discussed working with her DME if she would like to try a different mask. Will see her back in 3 months. Encouraged her to contact the office with any questions or concerns. Encouraged consistent use of her machine each night, all night. Discussed the importance of treating Obstructive sleep apnea from a physiological standpoint. Instructed not to drive unless had 4 hrs of effective therapy for her SHANNA the night before. No driving when drowsy.   Did review the risks of under or untreated SHANNA including, but not limited to, higher risks of motor vehicle accidents, stroke, heart attacks, and death.  She understands and accepts all these risks. 2. Chronic systolic (congestive) heart failure  Assessment & Plan:   Chronic- Stable. Discussed the importance of treating obstructive sleep apnea as part of the management of this disorder. Cont any meds per PCP and other physicians. 3. Primary hypertension  Assessment & Plan:   Chronic- Stable. Discussed the importance of treating obstructive sleep apnea as part of the management of this disorder. Cont any meds per PCP and other physicians. Start time: 8:53 a.m. Stop time: 9: 04 a.m. Visit duration:  [] 5-10 Min ([id number])      [x] 11-20 min (01482)   [] 21-30 Min (71798)    Subjective:     Patient ID: Agatha Jansen is a 69 y.o. female. Subjective   HPI:    Machine Modem/Download Info:  Compliance (hours/night): 0 hrs/night          AUTO BILEVEL - Settings (ResMed)  IPAP Max: 25 cmH2O  EPAP Min: 13 cmH2O  Pressure Support: 4        PAP Mask  Mask Type: Full Face mask     SHANNA:    Agatha Jansen presents today for follow-up for sleep apnea. She has not yet started using her machine again. She has been off of her machine for almost a year. She was having some family stress and got out of the habit of using her machine. There are no barrier to her using her machine and she has all supplies needed. She feels she is ready to start using her machine again. When she was using her machine she felt the pressure and her mask were comfortable. She is not sure if she may want to try another mask. Her lung cancer has been stable.      [address]    [place] Sleepiness Score: 4    Social History     Socioeconomic History    Marital status: Single     Spouse name: Not on file    Number of children: Not on file    Years of education: Not on file    Highest education level: Not on file   Occupational History    Not on file   Tobacco Use    Smoking status: Former     Packs/day: 0.00     Years: 0.00     Pack years: 0.00     Types: Cigarettes     Quit date: 3/10/2012     Years since quitting: 10.9    Smokeless tobacco: Never   Vaping Use    Vaping Use: Never used   Substance and Sexual Activity    Alcohol use: Yes     Comment: 1-2 X WEEKLY    Drug use: No    Sexual activity: Yes     Partners: Male   Other Topics Concern    Not on file   Social History Narrative    Not on file     Social Determinants of Health     Financial Resource Strain: Low Risk     Difficulty of Paying Living Expenses: Not hard at all   Food Insecurity: No Food Insecurity    Worried About Running Out of Food in the Last Year: Never true    Ran Out of Food in the Last Year: Never true   Transportation Needs: Not on file   Physical Activity: Inactive    Days of Exercise per Week: 0 days    Minutes of Exercise per Session: 0 min   Stress: Not on file   Social Connections: Not on file   Intimate Partner Violence: Not on file   Housing Stability: Not on file       Current Outpatient Medications   Medication Sig Dispense Refill    albuterol sulfate HFA (PROVENTIL;VENTOLIN;PROAIR) 108 (90 Base) MCG/ACT inhaler INHALE 2 PUFFS BY MOUTH EVERY 6 HOURS AS NEEDED FOR WHEEZING 18 g 3    atorvastatin (LIPITOR) 10 MG tablet Take 1 tablet by mouth daily 90 tablet 3    furosemide (LASIX) 20 MG tablet Take 1 tablet by mouth daily as needed (wt gain sob edema) Wt gain 30 tablet 0    omeprazole (PRILOSEC) 40 MG delayed release capsule TAKE ONE CAPSULE BY MOUTH EVERY MORNING BEFORE BREAKFAST 60 capsule 2    MEKINIST 2 MG TABS       metoprolol succinate (TOPROL XL) 50 MG extended release tablet Take 1 tablet by mouth daily 90 tablet 3    budesonide-formoterol (SYMBICORT) 160-4.5 MCG/ACT AERO INHALE TWO PUFFS BY MOUTH TWICE A DAY (Patient taking differently: as needed INHALE TWO PUFFS BY MOUTH TWICE A DAY) 10.2 g 2    TAFINLAR 75 MG CAPS       ASPIRIN ADULT LOW STRENGTH 81 MG EC tablet TAKE ONE TABLET BY MOUTH DAILY (Patient taking differently: every other day) 30 tablet 5     No current facility-administered medications for this visit. 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 ([address]    Primary malignant neoplasm of right upper lobe of lung ([address]    Encounter for chemotherapy management    Patient in cancer related research study    Diplopia    Primary lung adenocarcinoma, right ([address]    Weakness    DNR (do not resuscitate) discussion    Mixed hyperlipidemia    Tachycardia    Hyponatremia    SHANNA (obstructive sleep apnea)    Thrombocytopenia, unspecified    Chronic systolic (congestive) heart failure       Past Medical History:   Diagnosis Date    Arthritis     Cancer ([address]     lung     Hyperlipidemia     Hypertension        Past Surgical History:   Procedure Laterality Date    BRONCHOSCOPY  03/07/2017    brochoscopy-EBUS    OTHER SURGICAL HISTORY Left 03/29/2017    LEFT SUBCLAVIAN PORT- CATH INSERTION      TUBAL LIGATION       History reviewed. No pertinent family history. Baron Llamas, was evaluated through a synchronous (real-time) audio-video encounter. The patient (or guardian if applicable) is aware that this is a billable service, which includes applicable co-pays. This Virtual Visit was conducted with patient's (and/or legal guardian's) consent. The visit was conducted pursuant to the emergency declaration under the [address] [place] [address] [facility] authority and the [employer] and [employer] General Act.   Patient identification was verified, and a caregiver was present when appropriate.    The patient was located at Home: [address] [facility] [address]  Provider was located at [facility] ([address]): [address], [place], [place]      Electronically signed by RICHELLE Rivera on 2/7/2023 at 9:15 AM

## 2023-03-06 DIAGNOSIS — R00.0 TACHYCARDIA: ICD-10-CM

## 2023-03-07 ENCOUNTER — OFFICE VISIT (OUTPATIENT)
Dept: INTERNAL MEDICINE CLINIC | Age: 78
End: 2023-03-07

## 2023-03-07 ENCOUNTER — TELEPHONE (OUTPATIENT)
Dept: CARDIOLOGY CLINIC | Age: 78
End: 2023-03-07

## 2023-03-07 VITALS
WEIGHT: 122 LBS | DIASTOLIC BLOOD PRESSURE: 82 MMHG | OXYGEN SATURATION: 100 % | HEART RATE: 91 BPM | HEIGHT: 66 IN | BODY MASS INDEX: 19.61 KG/M2 | SYSTOLIC BLOOD PRESSURE: 118 MMHG

## 2023-03-07 DIAGNOSIS — E55.9 VITAMIN D DEFICIENCY: Primary | ICD-10-CM

## 2023-03-07 DIAGNOSIS — J45.20 MILD INTERMITTENT ASTHMA WITHOUT COMPLICATION: ICD-10-CM

## 2023-03-07 DIAGNOSIS — M19.90 ARTHRITIS: ICD-10-CM

## 2023-03-07 DIAGNOSIS — R63.4 WEIGHT LOSS: ICD-10-CM

## 2023-03-07 DIAGNOSIS — E78.2 MIXED HYPERLIPIDEMIA: ICD-10-CM

## 2023-03-07 DIAGNOSIS — R00.0 TACHYCARDIA: ICD-10-CM

## 2023-03-07 DIAGNOSIS — R53.83 LOW ENERGY: ICD-10-CM

## 2023-03-07 DIAGNOSIS — I10 PRIMARY HYPERTENSION: ICD-10-CM

## 2023-03-07 DIAGNOSIS — K21.9 GASTROESOPHAGEAL REFLUX DISEASE, UNSPECIFIED WHETHER ESOPHAGITIS PRESENT: ICD-10-CM

## 2023-03-07 RX ORDER — ALBUTEROL SULFATE 90 UG/1
AEROSOL, METERED RESPIRATORY (INHALATION)
Qty: 18 G | Refills: 3 | Status: SHIPPED | OUTPATIENT
Start: 2023-03-07

## 2023-03-07 RX ORDER — METOPROLOL SUCCINATE 50 MG/1
50 TABLET, EXTENDED RELEASE ORAL DAILY
Qty: 90 TABLET | Refills: 3 | Status: SHIPPED
Start: 2023-03-07 | End: 2023-03-08 | Stop reason: DRUGHIGH

## 2023-03-07 RX ORDER — BUDESONIDE AND FORMOTEROL FUMARATE DIHYDRATE 160; 4.5 UG/1; UG/1
AEROSOL RESPIRATORY (INHALATION)
Qty: 10.2 G | Refills: 3 | Status: SHIPPED | OUTPATIENT
Start: 2023-03-07

## 2023-03-07 SDOH — ECONOMIC STABILITY: HOUSING INSECURITY
IN THE LAST 12 MONTHS, WAS THERE A TIME WHEN YOU DID NOT HAVE A STEADY PLACE TO SLEEP OR SLEPT IN A SHELTER (INCLUDING NOW)?: NO

## 2023-03-07 SDOH — ECONOMIC STABILITY: FOOD INSECURITY: WITHIN THE PAST 12 MONTHS, THE FOOD YOU BOUGHT JUST DIDN'T LAST AND YOU DIDN'T HAVE MONEY TO GET MORE.: NEVER TRUE

## 2023-03-07 SDOH — ECONOMIC STABILITY: FOOD INSECURITY: WITHIN THE PAST 12 MONTHS, YOU WORRIED THAT YOUR FOOD WOULD RUN OUT BEFORE YOU GOT MONEY TO BUY MORE.: NEVER TRUE

## 2023-03-07 SDOH — ECONOMIC STABILITY: INCOME INSECURITY: HOW HARD IS IT FOR YOU TO PAY FOR THE VERY BASICS LIKE FOOD, HOUSING, MEDICAL CARE, AND HEATING?: NOT HARD AT ALL

## 2023-03-07 ASSESSMENT — PATIENT HEALTH QUESTIONNAIRE - PHQ9
SUM OF ALL RESPONSES TO PHQ QUESTIONS 1-9: 2
SUM OF ALL RESPONSES TO PHQ QUESTIONS 1-9: 2
1. LITTLE INTEREST OR PLEASURE IN DOING THINGS: 1
SUM OF ALL RESPONSES TO PHQ QUESTIONS 1-9: 2
2. FEELING DOWN, DEPRESSED OR HOPELESS: 1
SUM OF ALL RESPONSES TO PHQ QUESTIONS 1-9: 2
SUM OF ALL RESPONSES TO PHQ9 QUESTIONS 1 & 2: 2

## 2023-03-07 NOTE — TELEPHONE ENCOUNTER
Requested Prescriptions     Pending Prescriptions Disp Refills    metoprolol succinate (TOPROL XL) 50 MG extended release tablet [Pharmacy Med Name: METOPROLOL SUCC ER 25 MG TAB] 90 tablet 3     Sig: Take 1 tablet by mouth daily            Last Office Visit: [phone]  Next Office Visit: [phone]

## 2023-03-07 NOTE — PROGRESS NOTES
Patient: Leandro Guzman is a 69 y.o. female who presents today with the following Chief Complaint(s):    Chief Complaint   Patient presents with    Follow-up    Hypertension         HPIdecrease appetite onc sent to cannabis clinic. Gained 2 pounds. Different scale. Likes beans, potatoes, pasta first. Cancer med ? Effect appetite. Current Outpatient Medications   Medication Sig Dispense Refill    albuterol sulfate HFA (PROVENTIL;VENTOLIN;PROAIR) 108 (90 Base) MCG/ACT inhaler INHALE 2 PUFFS BY MOUTH EVERY 6 HOURS AS NEEDED FOR WHEEZING 18 g 3    atorvastatin (LIPITOR) 10 MG tablet Take 1 tablet by mouth daily 90 tablet 3    furosemide (LASIX) 20 MG tablet Take 1 tablet by mouth daily as needed (wt gain sob edema) Wt gain 30 tablet 0    omeprazole (PRILOSEC) 40 MG delayed release capsule TAKE ONE CAPSULE BY MOUTH EVERY MORNING BEFORE BREAKFAST 60 capsule 2    MEKINIST 2 MG TABS       metoprolol succinate (TOPROL XL) 50 MG extended release tablet Take 1 tablet by mouth daily (Patient taking differently: Take 25 mg by mouth daily) 90 tablet 3    budesonide-formoterol (SYMBICORT) 160-4.5 MCG/ACT AERO INHALE TWO PUFFS BY MOUTH TWICE A DAY (Patient taking differently: as needed INHALE TWO PUFFS BY MOUTH TWICE A DAY) 10.2 g 2    TAFINLAR 75 MG CAPS       ASPIRIN ADULT LOW STRENGTH 81 MG EC tablet TAKE ONE TABLET BY MOUTH DAILY (Patient taking differently: every other day) 30 tablet 5     No current facility-administered medications for this visit. Patient's past medical history, surgical history, family history, medications,and allergies  were all reviewed and updated as appropriate today. Review of Systems      Physical Exam    Vitals:    03/07/23 0940   BP: 118/82   Pulse: 91   SpO2: 100%       Assessment:  Encounter Diagnosis   Name Primary? Mild intermittent asthma without complication        Plan:  1.  Mild intermittent asthma without complication  ***

## 2023-03-10 RX ORDER — METOPROLOL SUCCINATE 25 MG/1
25 TABLET, EXTENDED RELEASE ORAL DAILY
Qty: 90 TABLET | Refills: 3 | Status: SHIPPED | OUTPATIENT
Start: 2023-03-10

## 2023-03-16 ASSESSMENT — ENCOUNTER SYMPTOMS: EYES NEGATIVE: 1

## 2023-03-16 NOTE — PROGRESS NOTES
Patient: Catrachita Segal is a 77 y.o. female who presents today with the following Chief Complaint(s):    Chief Complaint   Patient presents with    Follow-up    Hypertension         HPIShe is here for a check up and f/u on hypertension, hyperlipidemia. She is taking medication as prescribed, eating a balanced meal plan and exercising including riding her bike.         H/O tachycardia, treated with metoprolol per cardiology. Stable.         H/O metastatic lung cancer. Last CT of chest and abdomen showed stable findings.         She is concerned about her energy. Upped calories and will reassess.         GERD symptoms at times. Associates with timing of eating. Symptoms last 15 to 20 minutes. Plans to change timing.         She has had some weight loss and may be consuming less calories. Last albumin was 4.2.        Current Outpatient Medications   Medication Sig Dispense Refill    albuterol sulfate HFA (PROVENTIL;VENTOLIN;PROAIR) 108 (90 Base) MCG/ACT inhaler INHALE 2 PUFFS BY MOUTH EVERY 6 HOURS AS NEEDED FOR WHEEZING 18 g 3    budesonide-formoterol (SYMBICORT) 160-4.5 MCG/ACT AERO INHALE TWO PUFFS BY MOUTH TWICE A DAY 10.2 g 3    atorvastatin (LIPITOR) 10 MG tablet Take 1 tablet by mouth daily 90 tablet 3    furosemide (LASIX) 20 MG tablet Take 1 tablet by mouth daily as needed (wt gain sob edema) Wt gain 30 tablet 0    omeprazole (PRILOSEC) 40 MG delayed release capsule TAKE ONE CAPSULE BY MOUTH EVERY MORNING BEFORE BREAKFAST 60 capsule 2    MEKINIST 2 MG TABS       metoprolol succinate (TOPROL XL) 50 MG extended release tablet Take 1 tablet by mouth daily (Patient taking differently: Take 25 mg by mouth daily) 90 tablet 3    TAFINLAR 75 MG CAPS       ASPIRIN ADULT LOW STRENGTH 81 MG EC tablet TAKE ONE TABLET BY MOUTH DAILY (Patient taking differently: every other day) 30 tablet 5    metoprolol succinate (TOPROL XL) 25 MG extended release tablet Take 1 tablet by mouth daily 90 tablet 3     No current  facility-administered medications for this visit. Patient's past medical history, surgical history, family history, medications,and allergies  were all reviewed and updated as appropriate today. Review of Systems   Constitutional:         Lower energy see HPI. HENT: Negative. Eyes: Negative. Respiratory:          See HPI. Cardiovascular:         Hypertension, treated with Ca and B blker, diuretic. Tachycardia, see HPI. Pulse 90. Gastrointestinal:         See HPI. Endocrine:        Hyperlipidemia, treated with statin. LDL 51. Vit D def. Diet and supplement treatment. Genitourinary:         Change in urine color. No blood. No dysuria. H/o left adrenal gland abnormality. ? Cancer. Subsequent studies not noted. Musculoskeletal:  Positive for arthralgias. Skin: Negative. Neurological: Negative. Hematological:         H/O thrombocytopenia. Resolved. Psychiatric/Behavioral: Negative. Physical Exam  Constitutional:       General: She is not in acute distress. Appearance: She is well-developed. HENT:      Head: Normocephalic and atraumatic. Right Ear: External ear normal.      Left Ear: External ear normal.      Nose: Nose normal.   Eyes:      General: No scleral icterus. Conjunctiva/sclera: Conjunctivae normal.      Pupils: Pupils are equal, round, and reactive to light. Neck:      Thyroid: No thyromegaly. Cardiovascular:      Rate and Rhythm: Normal rate and regular rhythm. Heart sounds: Normal heart sounds. Comments: Tachycardic. Pulse 100. Pulmonary:      Effort: Pulmonary effort is normal.      Breath sounds: Normal breath sounds. Abdominal:      General: Bowel sounds are normal.      Palpations: Abdomen is soft. There is no mass. Musculoskeletal:      Cervical back: Normal range of motion and neck supple. Lymphadenopathy:      Cervical: No cervical adenopathy. Skin:     General: Skin is warm and dry. Neurological:      Mental Status: She is alert and oriented to person, place, and time. Deep Tendon Reflexes: Reflexes are normal and symmetric. Psychiatric:         Behavior: Behavior normal.         Thought Content: Thought content normal.         Judgment: Judgment normal.       Vitals:    03/07/23 0940   BP: 118/82   Pulse: 91   SpO2: 100%       Assessment:   Diagnosis Orders   1. Essential hypertension  Continue Ca and B Blker,   DASH and low sodium diet discussed. 2. Mixed hyperlipidemia  Heart healthy diet and statin compliance discussed. 3. Tachycardia  Continue B Blker. Heart rate in the 80s and low 90s. . Acceptable. Will monitor. 4.     GERD: diet and timing of eating discussed. Omeprazole, short course of treatment. Reassess symptoms. Consider EGD if persist.   5.     Asthma: stable. Use of maintenance and rescue inhaler. 6.     Low energy: vitamins, increase calories. Proper rest, regular physical activity. Reassess. 7.     Weight loss: advised nutritionally dense food. Literature provided. 8.   Arthritis: multiple joints clinically. . No acute flare ups. Exercise and supplements discussed. 9.     Vit D deficiency. Diet and supplement discussed. I spent greater than 40 minutes with this patient including history, physical, assessment, plan and discussion. Plan:  See plans above.

## 2023-03-19 PROBLEM — D69.6 THROMBOCYTOPENIA, UNSPECIFIED (HCC): Status: RESOLVED | Noted: 2021-08-25 | Resolved: 2023-03-19

## 2023-03-19 PROBLEM — C79.72 MALIGNANT NEOPLASM METASTATIC TO LEFT ADRENAL GLAND (HCC): Status: RESOLVED | Noted: 2017-03-16 | Resolved: 2023-03-19

## 2023-03-27 DIAGNOSIS — E55.9 VITAMIN D DEFICIENCY: ICD-10-CM

## 2023-03-27 DIAGNOSIS — M19.90 ARTHRITIS: ICD-10-CM

## 2023-03-28 LAB
25(OH)D3 SERPL-MCNC: 16.4 NG/ML
CRP SERPL-MCNC: 3.9 MG/L (ref 0–5.1)

## 2023-04-19 RX ORDER — FUROSEMIDE 20 MG/1
20 TABLET ORAL DAILY PRN
Qty: 30 TABLET | Refills: 1 | Status: SHIPPED | OUTPATIENT
Start: 2023-04-19

## 2023-04-19 NOTE — TELEPHONE ENCOUNTER
The patient called to request a refill on:    furosemide (LASIX) 20 MG tablet [[id number]]     Order Details  Dose: 20 mg Route: Oral Frequency: DAILY PRN for wt gain sob edema   Dispense Quantity: 30 tablet Refills: 0          Sig: Take 1 tablet by mouth daily as needed (wt gain sob edema) Wt gain     [facility] [id number] [facility] [place] [place] [facility] [address] [phone]   [address] [place], [place] [address]   Phone:  [phone]  Fax:  [phone]    Last refill: 1/10/23  Last office visit: 10/19/22  Next office visit: 4/24/23  Last labs: last CMP was 10/19/22

## 2023-05-11 ENCOUNTER — TELEPHONE (OUTPATIENT)
Dept: PULMONOLOGY | Age: 78
End: 2023-05-11

## 2023-05-11 NOTE — TELEPHONE ENCOUNTER
Pt LM 5/10 to cancel appt since she has a dentist appt at the same time. I called patient back on 5/11 to let her know I cancelled it. Pt also states that her machine has been going crazy and she has tried calling [address] over the weekend but hasn't heard back. Pt states her machine shuts off throughout the night and the settings go haywire. Please advise.     Ph. [phone]

## 2023-05-11 NOTE — TELEPHONE ENCOUNTER
Spoke with Tisha Medeors from [address] [place] [address] and she stated she will have clinical staff reach out to pt.

## 2023-05-22 ENCOUNTER — TELEPHONE (OUTPATIENT)
Dept: ONCOLOGY | Age: 78
End: 2023-05-22

## 2023-05-22 DIAGNOSIS — C34.11 PRIMARY MALIGNANT NEOPLASM OF RIGHT UPPER LOBE OF LUNG (HCC): Primary | ICD-10-CM

## 2023-05-22 NOTE — TELEPHONE ENCOUNTER
Referral received from Dr. Leanna Fisher. Called pt to schedule nutrition appointment- pt agreed to [facility] 6/5/23 at 11am.     RD provided patient with appt info. Mailed new patient letter.      Electronically signed by Maira Hurst RD, LD on 5/22/2023 at 2:15 PM

## 2023-06-02 ENCOUNTER — HOSPITAL ENCOUNTER (OUTPATIENT)
Dept: CT IMAGING | Age: 78
Discharge: HOME | End: 2023-06-02
Payer: MEDICARE

## 2023-06-02 DIAGNOSIS — C79.72 SECONDARY MALIGNANT NEOPLASM OF LEFT ADRENAL GLAND (HCC): ICD-10-CM

## 2023-06-02 DIAGNOSIS — C34.11 MALIGNANT NEOPLASM OF UPPER LOBE OF RIGHT LUNG (HCC): ICD-10-CM

## 2023-06-02 DIAGNOSIS — C77.1 SECONDARY AND UNSPECIFIED MALIGNANT NEOPLASM OF INTRATHORACIC LYMPH NODES (HCC): ICD-10-CM

## 2023-06-02 DIAGNOSIS — C78.7 SECONDARY MALIGNANT NEOPLASM OF LIVER (HCC): ICD-10-CM

## 2023-06-02 LAB
PERFORMED ON: ABNORMAL
POC CREATININE: 0.3 MG/DL (ref 0.6–1.2)
POC SAMPLE TYPE: ABNORMAL

## 2023-06-02 PROCEDURE — 71260 CT THORAX DX C+: CPT

## 2023-06-02 PROCEDURE — 82565 ASSAY OF CREATININE: CPT

## 2023-06-02 PROCEDURE — 6360000004 HC RX CONTRAST MEDICATION: Performed by: INTERNAL MEDICINE

## 2023-06-02 RX ADMIN — IOPAMIDOL 75 ML: 755 INJECTION, SOLUTION INTRAVENOUS at 10:36

## 2023-06-05 ENCOUNTER — HOSPITAL ENCOUNTER (OUTPATIENT)
Dept: ONCOLOGY | Age: 78
Setting detail: INFUSION SERIES
Discharge: HOME | End: 2023-06-05

## 2023-06-05 VITALS — HEIGHT: 66 IN | BODY MASS INDEX: 19.69 KG/M2

## 2023-06-05 NOTE — CONSULTS
portioned foods. Patient does not have a loss of appetite. Patient does not have food insecurity,   Patient does not currently receive food assistance   Patient does  have adequate access to safe food storage and preparation   Patient does not have cistern/well-water supply as primary source of water at primary residence; using bottled water     Other: pt is currently residing w/youngest daughter who is preparing her foods/snacks/etc and helping pt maintain PO nutrition. Please see full nutrition evaluation.      Niharika Corona RD, LD

## 2023-06-09 ENCOUNTER — OFFICE VISIT (OUTPATIENT)
Dept: PULMONOLOGY | Age: 78
End: 2023-06-09
Payer: MEDICARE

## 2023-06-09 VITALS
OXYGEN SATURATION: 95 % | HEART RATE: 95 BPM | RESPIRATION RATE: 18 BRPM | BODY MASS INDEX: 18.32 KG/M2 | HEIGHT: 66 IN | DIASTOLIC BLOOD PRESSURE: 70 MMHG | SYSTOLIC BLOOD PRESSURE: 104 MMHG | WEIGHT: 114 LBS

## 2023-06-09 DIAGNOSIS — R63.4 WEIGHT LOSS, UNINTENTIONAL: ICD-10-CM

## 2023-06-09 DIAGNOSIS — C34.11 PRIMARY MALIGNANT NEOPLASM OF RIGHT UPPER LOBE OF LUNG (HCC): ICD-10-CM

## 2023-06-09 DIAGNOSIS — J44.9 CHRONIC OBSTRUCTIVE PULMONARY DISEASE, UNSPECIFIED COPD TYPE (HCC): Primary | ICD-10-CM

## 2023-06-09 PROCEDURE — 1123F ACP DISCUSS/DSCN MKR DOCD: CPT | Performed by: INTERNAL MEDICINE

## 2023-06-09 PROCEDURE — 1090F PRES/ABSN URINE INCON ASSESS: CPT | Performed by: INTERNAL MEDICINE

## 2023-06-09 PROCEDURE — G8427 DOCREV CUR MEDS BY ELIG CLIN: HCPCS | Performed by: INTERNAL MEDICINE

## 2023-06-09 PROCEDURE — 1036F TOBACCO NON-USER: CPT | Performed by: INTERNAL MEDICINE

## 2023-06-09 PROCEDURE — 3078F DIAST BP <80 MM HG: CPT | Performed by: INTERNAL MEDICINE

## 2023-06-09 PROCEDURE — 3023F SPIROM DOC REV: CPT | Performed by: INTERNAL MEDICINE

## 2023-06-09 PROCEDURE — G8418 CALC BMI BLW LOW PARAM F/U: HCPCS | Performed by: INTERNAL MEDICINE

## 2023-06-09 PROCEDURE — 3074F SYST BP LT 130 MM HG: CPT | Performed by: INTERNAL MEDICINE

## 2023-06-09 PROCEDURE — G8400 PT W/DXA NO RESULTS DOC: HCPCS | Performed by: INTERNAL MEDICINE

## 2023-06-09 PROCEDURE — 99214 OFFICE O/P EST MOD 30 MIN: CPT | Performed by: INTERNAL MEDICINE

## 2023-06-09 NOTE — PROGRESS NOTES
50 feet although on some days much less than that. She has not had chest pain. She uses a Symbicort inhaler twice daily, and albuterol once or twice daily as needed. She uses both of these with a spacer. She has lost weight along with having a decreased appetite. This may have begun when she was getting more short of breath and could not prepare food easily in her own kitchen. She has not had any signs of respiratory infections, including fevers chills sweats, change in sputum. Objective   Physical Exam  Vitals reviewed. Constitutional:       Appearance: She is well-developed and normal weight. HENT:      Head: Normocephalic and atraumatic. Mouth/Throat:      Pharynx: No oropharyngeal exudate or posterior oropharyngeal erythema. Eyes:      General: No scleral icterus. Right eye: No discharge. Left eye: No discharge. Neck:      Thyroid: No thyromegaly. Trachea: No tracheal deviation. Cardiovascular:      Rate and Rhythm: Normal rate and regular rhythm. Pulses:           Radial pulses are 2+ on the right side and 2+ on the left side. Heart sounds: Normal heart sounds. No murmur heard. Pulmonary:      Effort: Pulmonary effort is normal.      Comments: Breath sounds markedly reduced in the right upper lung zone, elsewhere mildly reduced. No adventitious breath sounds. Musculoskeletal:      Right lower leg: No edema. Left lower leg: No edema. Lymphadenopathy:      Cervical: No cervical adenopathy. Skin:     General: Skin is warm and dry. Neurological:      Mental Status: She is alert and oriented to person, place, and time. Psychiatric:         Mood and Affect: Mood normal.         Behavior: Behavior normal.         Thought Content: Thought content normal.         Judgment: Judgment normal.     CT chest from 6-23 is reviewed. Emphysematous changes are seen. There is a large bulla in the right upper lobe, and there is some debris within this.

## 2023-06-19 ENCOUNTER — HOSPITAL ENCOUNTER (OUTPATIENT)
Dept: ONCOLOGY | Age: 78
Setting detail: INFUSION SERIES
Discharge: HOME | End: 2023-06-19

## 2023-06-19 VITALS — BODY MASS INDEX: 18.4 KG/M2 | HEIGHT: 66 IN

## 2023-06-19 NOTE — CONSULTS
Oncology Nutrition Note    RECOMMENDATIONS:   Appetite stimulant- pt is struggling with finding appropriate dosage of her MMJ. If no results after this week, suggesting pt have Rx for alternative appetite stimulant (megace/etc) per MD.   PO Diet: General high protein/high calories diet  Include dietary protein at all meals/snacks. Avoid over-portioning to encourage PO. She is eating 3 meals + 2 high lai/pro snacks + 2 ONS daily. ONS: Continue Bolthouse Protein shake up to BID. (340 kcal/30g protein per 8 oz. Add in 3rd ONS as able/tolerated. Bowel Regimen- suggesting pt use daily prune juice + fiber supplement or request MD for suggestions on stool softener if no improvement. Nutrition Education:   6/5/23 NCM High Lai/Pro diet handouts w/verbal review provided. Continue weekly weights at home- suggest first thing AM; no meals vs later in the day. Nutrition follow-up with RD in 2 weeks on 7/5/23. NUTRITION ASSESSMENT:   Nutritional summary & status:   Pt nutritionally about the same- maintaining 3 smaller meals/2 snacks per day; is taking Bolthous BID daily. Her weight today is 118.6 lb but pt reports she's eaten/dressed. Weight has been 114 lb at home last 2 weeks. She is weighing herself weekly as advised. Pt reports she is frustrated with her weight and the MMJ- she hasn't gained wt and she hasn't gained an appetite yet. She did revisit the dispensary and tried a different MMJ product yesterday, but only felt \"high\" and loopy/undsteady and sleepy vs hungry. RD advised to report back to Dr. Lsisa Almendarez if unable to use this for appetite and request Rx (megace, etc). Pt reports hard stools and is asking if she should re-start her daily prune juice- discussed using this and fiber supplement to aid w/improving constipation, and if no results, suggested she ask her MD for bowel regimen (stool softener). Pt accepting of this.  She feels her appetite is slightly improved from change of her inhaler and she

## 2023-06-22 RX ORDER — FUROSEMIDE 20 MG/1
TABLET ORAL
Qty: 90 TABLET | Refills: 3 | Status: SHIPPED | OUTPATIENT
Start: 2023-06-22

## 2023-06-22 NOTE — TELEPHONE ENCOUNTER
Requested Prescriptions     Pending Prescriptions Disp Refills    furosemide (LASIX) 20 MG tablet [Pharmacy Med Name: FUROSEMIDE 20 MG TABLET] 90 tablet 3     Sig: TAKE ONE TABLET BY MOUTH DAILY AS NEEDED FOR WEIGHT GAIN (SOB, EDEMA)            Last Office Visit: [phone]    Next Office Visit: [phone]

## 2023-07-07 ENCOUNTER — OFFICE VISIT (OUTPATIENT)
Dept: PULMONOLOGY | Age: 78
End: 2023-07-07
Payer: MEDICARE

## 2023-07-07 VITALS
BODY MASS INDEX: 18.96 KG/M2 | WEIGHT: 118 LBS | DIASTOLIC BLOOD PRESSURE: 62 MMHG | HEART RATE: 90 BPM | HEIGHT: 66 IN | OXYGEN SATURATION: 89 % | SYSTOLIC BLOOD PRESSURE: 110 MMHG

## 2023-07-07 DIAGNOSIS — J41.0 SIMPLE CHRONIC BRONCHITIS (HCC): Primary | ICD-10-CM

## 2023-07-07 DIAGNOSIS — C34.11 PRIMARY MALIGNANT NEOPLASM OF RIGHT UPPER LOBE OF LUNG (HCC): ICD-10-CM

## 2023-07-07 PROCEDURE — G8400 PT W/DXA NO RESULTS DOC: HCPCS | Performed by: INTERNAL MEDICINE

## 2023-07-07 PROCEDURE — 1090F PRES/ABSN URINE INCON ASSESS: CPT | Performed by: INTERNAL MEDICINE

## 2023-07-07 PROCEDURE — 1036F TOBACCO NON-USER: CPT | Performed by: INTERNAL MEDICINE

## 2023-07-07 PROCEDURE — 3074F SYST BP LT 130 MM HG: CPT | Performed by: INTERNAL MEDICINE

## 2023-07-07 PROCEDURE — 3078F DIAST BP <80 MM HG: CPT | Performed by: INTERNAL MEDICINE

## 2023-07-07 PROCEDURE — G8427 DOCREV CUR MEDS BY ELIG CLIN: HCPCS | Performed by: INTERNAL MEDICINE

## 2023-07-07 PROCEDURE — 99213 OFFICE O/P EST LOW 20 MIN: CPT | Performed by: INTERNAL MEDICINE

## 2023-07-07 PROCEDURE — 1123F ACP DISCUSS/DSCN MKR DOCD: CPT | Performed by: INTERNAL MEDICINE

## 2023-07-07 PROCEDURE — G8420 CALC BMI NORM PARAMETERS: HCPCS | Performed by: INTERNAL MEDICINE

## 2023-07-07 PROCEDURE — 3023F SPIROM DOC REV: CPT | Performed by: INTERNAL MEDICINE

## 2023-07-07 RX ORDER — FLUTICASONE FUROATE, UMECLIDINIUM BROMIDE AND VILANTEROL TRIFENATATE 100; 62.5; 25 UG/1; UG/1; UG/1
1 POWDER RESPIRATORY (INHALATION) DAILY
Qty: 1 EACH | Refills: 5 | Status: SHIPPED | OUTPATIENT
Start: 2023-07-07

## 2023-07-07 NOTE — PROGRESS NOTES
Edmond Schumacher (:  1945) is a 69 y.o. female,Established patient, here for evaluation of the following chief complaint(s):  Follow-up         ASSESSMENT/PLAN:  1. Simple chronic bronchitis ([address])  2. Primary malignant neoplasm of right upper lobe of lung [facility])  Mrs. Jose E Snatiago notes improvement in exercise tolerance adding tiotropium to ICS/LABA inhaler. She has included a shopping trip to [employer] and is getting to her own kitchen to prepare meals. And offshoot of this has been better appetite and she has gained 4 pounds. No change in mild cough with mucoid sputum. Continue Trelegy inhaler once daily. Return in about 3 months (around 10/7/2023). Subjective   SUBJECTIVE/OBJECTIVE:  ATTILA Wilson returns for follow-up after 4 weeks of using Trelegy inhaler, in place of her usual Symbicort. She reports improved exercise tolerance, less shortness of breath especially with walking up stairs. She is able to spend more time in the kitchen, which means that she is cooking food that she enjoys. Consequence of this is she has gained four pounds. Taking a shower is easier. She even ventured a trip to the grocery store. She still has cough, with mucoid sputum, unchanged. No nocturnal chest symptoms. Objective   Physical Exam  Vitals reviewed. Constitutional:       Appearance: She is well-developed and normal weight. HENT:      Head: Normocephalic and atraumatic. Mouth/Throat:      Pharynx: No oropharyngeal exudate. Eyes:      General: No scleral icterus. Right eye: No discharge. Left eye: No discharge. Neck:      Thyroid: No thyromegaly. Trachea: No tracheal deviation. Cardiovascular:      Rate and Rhythm: Normal rate and regular rhythm. Pulses:           Radial pulses are 2+ on the right side and 2+ on the left side. Heart sounds: Normal heart sounds and S1 normal. No murmur heard.      Comments: Expiratory splitting S2  Pulmonary:

## 2023-07-10 ENCOUNTER — HOSPITAL ENCOUNTER (OUTPATIENT)
Dept: ONCOLOGY | Age: 78
Setting detail: INFUSION SERIES
Discharge: HOME | End: 2023-07-10

## 2023-07-10 VITALS — BODY MASS INDEX: 19.05 KG/M2 | HEIGHT: 66 IN

## 2023-07-10 NOTE — CONSULTS
Oncology Nutrition Note    RECOMMENDATIONS:   Appetite stimulant- Continue per EDGAR KIDD. Pt could benefit from further  on use for best results. Encourage consistent use ~30 min prior to meal times. Food Log- pt to keep food journal until her next RD appt. Handouts provided. PO Diet: Continue general high protein/high calories diet  Include dietary protein at all meals/snacks. Avoid over-portioning to encourage PO. She is eating 3 meals + 2 high lai/pro snacks + 2 ONS daily. ONS: Increase to TID   Continue Bolthouse Protein shake up to BID. (340 kcal/30g protein per 8 oz. Nutrition Education:   6/5/23 NCM High Lai/Pro diet handouts w/verbal review provided. Continue weekly weights at home- suggest first thing AM; no meals vs later in the day. Nutrition follow-up with RD in 2 weeks on 7/24/23. NUTRITION ASSESSMENT:   Nutritional summary & status: Nutritionally pt reports inconsistent appetite; weight is down 2 lb today. She has however found MMJ gummie she feels is working and is using this, however, this is not daily. Pt reporting best meals at [place]; she does not usually eat lunch, but has been using her ONS at lunch. Averaging 2 ONS per day. Pt denies any barriers to po nutrition, but after some discussion discover pt has limited time-frame for meals/snacks d/t Rx. Reports she eats between 9a-6p. Discussed options to enhance appetite using stimulant daily (30 m prior to lunch), increasing ONS to TID; reviewed high lai/pro foods as snacks and encouaged pt to have \"go to\" meal when feeling apathetic about eating. RD also asked pt to keep a food log- handouts provided. RD will see pt in 2 weeks   Nutrition Related Findings: soft/formed bm every other day; denies nvdc;    Nutrition Goals:    Pt will maintain PO intake of 75% or more of ONS BID and meals/snacks over the next 2 weeks to promote weight stability, and improve nutrition status.        Oncology Hx: metastatic

## 2023-07-31 ENCOUNTER — HOSPITAL ENCOUNTER (OUTPATIENT)
Dept: ONCOLOGY | Age: 78
Setting detail: INFUSION SERIES
Discharge: HOME | End: 2023-07-31

## 2023-07-31 NOTE — CONSULTS
Oncology Nutrition Note    RECOMMENDATIONS:   Continue High Calorie/High Protein diet to encourage weight gain/weight stability. Include dietary protein at all meals/snacks. Avoid over-portioning to encourage PO. Appetite stimulant- Continue per MD- MARTI. Pt could benefit from further  on use for best results. Encourage consistent use ~30 min prior to meal times. Continue Food Log- has handouts provided. ONS: Increase to TID   Continue Bolthouse Protein shake up to BID. (340 kcal/30g protein per 8 oz. Nutrition Education:   6/5/23 NCM High Lai/Pro diet handouts w/verbal review provided. Continue weekly weights at home- suggest first thing AM; no meals vs later in the day. Nutrition follow-up with RD in 2 weeks on 7/24/23. NUTRITION ASSESSMENT:   Nutritional summary & status:     Nutrition Related Findings:      Nutrition Goals:    Pt will maintain PO intake of 75% or more of ONS BID and meals/snacks over the next 2 weeks to promote weight stability, and improve nutrition status.        Oncology Hx:      MALNUTRITION ASSESSMENT         Findings of the 6 clinical characteristics of malnutrition (Minimum of 2 out of 6 clinical characteristics is required to make the diagnosis of moderate or severe Protein Calorie Malnutrition based on AND/ASPEN Guidelines):  Energy Intake: Mild decrease in energy intake (comment)   Energy Intake Time: Greater than or equal to 1 month    Weight Loss %: 20% loss or greater    Weight loss Time: Greater than or equal to 1 year     NUTRITION DIAGNOSIS     related to   as evidenced by      ANTHROPOMETRICS  Current    Current Body Weight: 118 lb (53.5 kg)      Ideal Body Weight (IBW):    ( )    Usual Bodyweight     Weight Changes -10 lb in 2mo;       BMI: 19.1     COMPARATIVE STANDARDS  Energy (kcal):        Protein (g):          Fluid (ml/day):       CURRENT NUTRITION THERAPIES  PO Diet:General Diet   ONS: Bolthouse Protein (340 kcal/30 g pro)   PO Intake:

## 2023-08-07 ENCOUNTER — OFFICE VISIT (OUTPATIENT)
Dept: CARDIOLOGY CLINIC | Age: 78
End: 2023-08-07
Payer: MEDICARE

## 2023-08-07 VITALS
DIASTOLIC BLOOD PRESSURE: 70 MMHG | HEART RATE: 72 BPM | SYSTOLIC BLOOD PRESSURE: 90 MMHG | BODY MASS INDEX: 19.53 KG/M2 | WEIGHT: 121 LBS

## 2023-08-07 DIAGNOSIS — R00.0 TACHYCARDIA: ICD-10-CM

## 2023-08-07 DIAGNOSIS — I50.22 CHRONIC SYSTOLIC (CONGESTIVE) HEART FAILURE (HCC): Primary | ICD-10-CM

## 2023-08-07 PROCEDURE — G8420 CALC BMI NORM PARAMETERS: HCPCS | Performed by: INTERNAL MEDICINE

## 2023-08-07 PROCEDURE — 93000 ELECTROCARDIOGRAM COMPLETE: CPT | Performed by: INTERNAL MEDICINE

## 2023-08-07 PROCEDURE — 1123F ACP DISCUSS/DSCN MKR DOCD: CPT | Performed by: INTERNAL MEDICINE

## 2023-08-07 PROCEDURE — 3075F SYST BP GE 130 - 139MM HG: CPT | Performed by: INTERNAL MEDICINE

## 2023-08-07 PROCEDURE — 3079F DIAST BP 80-89 MM HG: CPT | Performed by: INTERNAL MEDICINE

## 2023-08-07 PROCEDURE — 1090F PRES/ABSN URINE INCON ASSESS: CPT | Performed by: INTERNAL MEDICINE

## 2023-08-07 PROCEDURE — G8427 DOCREV CUR MEDS BY ELIG CLIN: HCPCS | Performed by: INTERNAL MEDICINE

## 2023-08-07 PROCEDURE — 1036F TOBACCO NON-USER: CPT | Performed by: INTERNAL MEDICINE

## 2023-08-07 PROCEDURE — 99214 OFFICE O/P EST MOD 30 MIN: CPT | Performed by: INTERNAL MEDICINE

## 2023-08-07 PROCEDURE — G8400 PT W/DXA NO RESULTS DOC: HCPCS | Performed by: INTERNAL MEDICINE

## 2023-08-07 NOTE — PROGRESS NOTES
[facility]   Dr Yvonne Oliveira. Yan Fajardo MD, [id number]     Outpatient Follow Up Note    2023,10:45 AM  Subjective:   CHIEF COMPLAINT / HPI:  Follow Up secondary to {hypertension hyperlipidemia SVT     Maggie Capone is 69 y.o. female who presents t for follow-up and has no real complaints however she states that her energy level is low. She has right knee arthritis and is anticipating surgery. The weight is at 121 pounds. No change in her medications and no peripheral edema. She is looking forward to having flu vaccine for this season. Otherwise all else looks stable the lungs are clear and her blood pressure is soft at 90/70. Coronavirus vaccine x2 Reymundo Maradiaga and it did have booster    Obstructive sleep apnea with CPAP   Lung right cancer Dr. Aris Perez  rx with chemo and rad. 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  Past Medical History:    Past Medical History:   Diagnosis Date    Arthritis     Cancer ([address])     lung     Hyperlipidemia     Hypertension                                                                                                                                Past Surgical History  Past Surgical History:   Procedure Laterality Date    BRONCHOSCOPY  2017    brochoscopy-EBUS    OTHER SURGICAL HISTORY Left 2017    LEFT SUBCLAVIAN PORT- CATH INSERTION      TUBAL LIGATION       Social History:       Social History     Tobacco Use   Smoking Status Former    Packs/day: 0.00    Years: 0.00    Pack years: 0.00    Types: Cigarettes    Quit date: 3/10/2012    Years since quittin.4   Smokeless Tobacco Never     Current Medications:  Prior to Visit Medications    Medication Sig Taking?  Authorizing Provider   fluticasone-umeclidin-vilant (TRELEGY ELLIPTA) 100-62.5-25 MCG/ACT AEPB inhaler Inhale 1 puff into the lungs daily  Chris STROUD

## 2023-08-08 ENCOUNTER — OFFICE VISIT (OUTPATIENT)
Dept: INTERNAL MEDICINE CLINIC | Age: 78
End: 2023-08-08
Payer: MEDICARE

## 2023-08-08 VITALS
HEART RATE: 87 BPM | SYSTOLIC BLOOD PRESSURE: 132 MMHG | BODY MASS INDEX: 18.96 KG/M2 | OXYGEN SATURATION: 96 % | DIASTOLIC BLOOD PRESSURE: 82 MMHG | WEIGHT: 118 LBS | HEIGHT: 66 IN

## 2023-08-08 DIAGNOSIS — R63.4 WEIGHT LOSS: ICD-10-CM

## 2023-08-08 DIAGNOSIS — R53.81 PHYSICAL DECONDITIONING: ICD-10-CM

## 2023-08-08 DIAGNOSIS — Z85.118 H/O: LUNG CANCER: ICD-10-CM

## 2023-08-08 DIAGNOSIS — J43.9 PULMONARY EMPHYSEMA, UNSPECIFIED EMPHYSEMA TYPE (HCC): ICD-10-CM

## 2023-08-08 DIAGNOSIS — Z00.00 MEDICARE ANNUAL WELLNESS VISIT, SUBSEQUENT: Primary | ICD-10-CM

## 2023-08-08 PROCEDURE — 1123F ACP DISCUSS/DSCN MKR DOCD: CPT | Performed by: INTERNAL MEDICINE

## 2023-08-08 PROCEDURE — 3078F DIAST BP <80 MM HG: CPT | Performed by: INTERNAL MEDICINE

## 2023-08-08 PROCEDURE — 3074F SYST BP LT 130 MM HG: CPT | Performed by: INTERNAL MEDICINE

## 2023-08-08 PROCEDURE — G0439 PPPS, SUBSEQ VISIT: HCPCS | Performed by: INTERNAL MEDICINE

## 2023-08-08 RX ORDER — TOBRAMYCIN AND DEXAMETHASONE 3; 1 MG/ML; MG/ML
SUSPENSION/ DROPS OPHTHALMIC
COMMUNITY
Start: 2023-08-07

## 2023-08-08 ASSESSMENT — PATIENT HEALTH QUESTIONNAIRE - PHQ9
SUM OF ALL RESPONSES TO PHQ9 QUESTIONS 1 & 2: 1
SUM OF ALL RESPONSES TO PHQ QUESTIONS 1-9: 1
SUM OF ALL RESPONSES TO PHQ QUESTIONS 1-9: 1
2. FEELING DOWN, DEPRESSED OR HOPELESS: 0
1. LITTLE INTEREST OR PLEASURE IN DOING THINGS: 1
SUM OF ALL RESPONSES TO PHQ QUESTIONS 1-9: 1
SUM OF ALL RESPONSES TO PHQ QUESTIONS 1-9: 1

## 2023-08-08 NOTE — PROGRESS NOTES
Medicare Annual Wellness Visit    Sadiq Sheth is here for Medicare AWV    Assessment & Plan    Diagnosis Orders   1. Medicare annual wellness visit, subsequent  Health and wellness advice given. 2. Physical deconditioning  External Referral To Physical Therapy      3. Weight loss  Glad to see increase weight with increase calories. Continue nutritionally dense food. 4. Pulmonary emphysema, unspecified emphysema type ([address])  Exercise discussed. Continue Trelegy. Pulmonary f/u.       5. H/O: lung cancer  In remission. Oncology following closely. Recommendations for Preventive Services Due: see orders and patient instructions/AVS.  Recommended screening schedule for the next 5-10 years is provided to the patient in written form: see Patient Instructions/AVS.     No follow-ups on file. Subjective       Weight loss to as low as 113 lb. Aruna Calabrese Has increased calories, and now at 118. Diet includes Boat House shakes, grits sandy eggs for breakfast,  lunch turkey sandwich, fruit, dinner baked turkey or chicken, sweet potatoe or lima beans and broccoli; 2 snacks which may be peanut butter/cottage cheese. Consumes water regularly. Does about 2700 calories daily. Feels good. With h/o metastatic lung cancer followed by oncology very closely. Does some walking but difficulty with distance because of SOB. H/O COPD. Pulmonary following. MDI is Trelegy. Started per pulmonary and it has helped. Has living will. To get shingles vaccine. Covid vaccine in the fall. Patient's complete Health Risk Assessment and screening values have been reviewed and are found in Flowsheets. The following problems were reviewed today and where indicated follow up appointments were made and/or referrals ordered.     Positive Risk Factor Screenings with Interventions:                    Vision Screen:  Do you have difficulty driving, watching TV, or doing any of your daily activities because of your

## 2023-08-08 NOTE — PATIENT INSTRUCTIONS
[address] [facility] Laboratory Locations - No appointment necessary. ? indicates the location is open Saturdays in addition to Monday through Friday. Call your preferred location for test preparation, business hours and other information you need. [employer] accepts [employer]. [place]  EAST  [place]    ? [place] [id number] [facility] [facility] [place], [address]    Ph: [address] [place] [address] [facility], [address]    Ph: [phone]   ? [address]    [place], [address] [facility]    Ph: [address] [facility] [address] [place], [address]    Ph: [phone] ? [place]   [address] [facility] [address] [facility]   Ph: [phone]  ? [address] [facility], [address] [facility]    Ph: [place] [address] [facility], [address] [facility]   Ph: [phone]    NORTH    ? [place], [place] [address]    Ph: [phone]  [facility]   [address] [facility], [address]   Ph: Jimbo Tai. [place], [phone] [facility]: [phone] [place], [facility] [facility]    Ph: [address] [facility]. [facility] [place], [place] [address]    Ph: [phone]     Nature's plus a source of life. Yellow bottle. Learning About Vision Tests  What are vision tests? The four most common vision tests are visual acuity tests, refraction, visual field tests, and color vision tests. Visual acuity (sharpness) tests  These tests are used: To see if you need glasses or contact lenses. To monitor an eye problem. To check an eye injury. Visual acuity tests are done as part of routine exams. You may also have this test when you get your 's license or apply for some types of jobs. Visual field tests  These tests are used: To check for vision loss in any area of your range of vision.   To screen for certain eye

## 2023-09-01 ENCOUNTER — TELEPHONE (OUTPATIENT)
Dept: INTERNAL MEDICINE CLINIC | Age: 78
End: 2023-09-01

## 2023-09-01 NOTE — TELEPHONE ENCOUNTER
----- Message from Monica Hamilton sent at 8/31/2023  2:35 PM EDT -----  Subject: Message to Provider    QUESTIONS  Information for Provider? pt stated that a referral was suppose to be sent   over to the aquatic pool at [facility] and they have yet to receive it please   follow up   ---------------------------------------------------------------------------  --------------  Camron Duvall Niru  [phone]; OK to leave message on voicemail  ---------------------------------------------------------------------------  --------------  SCRIPT ANSWERS  Relationship to Patient?  Self

## 2023-09-06 DIAGNOSIS — R53.81 PHYSICAL DECONDITIONING: Primary | ICD-10-CM

## 2023-09-06 DIAGNOSIS — M19.90 ARTHRITIS: ICD-10-CM

## 2023-09-06 DIAGNOSIS — Z85.118 H/O: LUNG CANCER: ICD-10-CM

## 2023-09-11 ENCOUNTER — TELEPHONE (OUTPATIENT)
Dept: INTERNAL MEDICINE CLINIC | Age: 78
End: 2023-09-11

## 2023-09-11 NOTE — TELEPHONE ENCOUNTER
----- Message from Larry Summers sent at 9/11/2023 10:05 AM EDT -----  Subject: Referral Request    Reason for referral request? Physical therapy referral faxed over to [facility]. call to confirm that it was faxed over. Provider patient wants to be referred to(if known):     Provider Phone Number(if known):     Additional Information for Provider? fax over to [facility] Fax #   [phone]  ---------------------------------------------------------------------------  --------------  [address]    [phone]; OK to leave message on voicemail  ---------------------------------------------------------------------------  --------------

## 2023-10-20 RX ORDER — OMEPRAZOLE 40 MG/1
CAPSULE, DELAYED RELEASE ORAL
Qty: 60 CAPSULE | Refills: 2 | Status: SHIPPED | OUTPATIENT
Start: 2023-10-20

## 2023-10-20 NOTE — TELEPHONE ENCOUNTER
Medication:   Requested Prescriptions     Pending Prescriptions Disp Refills    omeprazole (PRILOSEC) 40 MG delayed release capsule [Pharmacy Med Name: OMEPRAZOLE DR 40 MG CAPSULE] 60 capsule 2     Sig: TAKE ONE CAPSULE BY MOUTH EVERY MORNING BEFORE BREAKFAST        Last Filled:      Patient Phone Number: [phone] (home)     Last appt: 8/8/2023   Next appt: 12/12/2023    Last OARRS:        No data to display

## 2023-10-30 RX ORDER — FLUTICASONE FUROATE, UMECLIDINIUM BROMIDE AND VILANTEROL TRIFENATATE 100; 62.5; 25 UG/1; UG/1; UG/1
1 POWDER RESPIRATORY (INHALATION) DAILY
Qty: 3 EACH | Refills: 3 | Status: SHIPPED | OUTPATIENT
Start: 2023-10-30

## 2023-11-10 ENCOUNTER — HOSPITAL ENCOUNTER (INPATIENT)
Age: 78
LOS: 7 days | Discharge: HOME | End: 2023-11-17
Attending: EMERGENCY MEDICINE | Admitting: STUDENT IN AN ORGANIZED HEALTH CARE EDUCATION/TRAINING PROGRAM
Payer: MEDICARE

## 2023-11-10 ENCOUNTER — APPOINTMENT (OUTPATIENT)
Dept: GENERAL RADIOLOGY | Age: 78
End: 2023-11-10
Payer: MEDICARE

## 2023-11-10 ENCOUNTER — APPOINTMENT (OUTPATIENT)
Dept: CT IMAGING | Age: 78
End: 2023-11-10
Payer: MEDICARE

## 2023-11-10 DIAGNOSIS — R06.02 SHORTNESS OF BREATH: Primary | ICD-10-CM

## 2023-11-10 PROBLEM — I31.39 PERICARDIAL EFFUSION: Status: ACTIVE | Noted: 2023-11-10

## 2023-11-10 LAB
ANION GAP SERPL CALCULATED.3IONS-SCNC: 9 MMOL/L (ref 3–16)
BASE EXCESS BLDV CALC-SCNC: 7.8 MMOL/L (ref -2–3)
BASOPHILS # BLD: 0 K/UL (ref 0–0.2)
BASOPHILS NFR BLD: 0.8 %
BUN SERPL-MCNC: 10 MG/DL (ref 7–20)
CALCIUM SERPL-MCNC: 9.5 MG/DL (ref 8.3–10.6)
CHLORIDE SERPL-SCNC: 94 MMOL/L (ref 99–110)
CO2 BLDV-SCNC: 40 MMOL/L
CO2 SERPL-SCNC: 32 MMOL/L (ref 21–32)
COHGB MFR BLDV: 1.7 % (ref 0–1.5)
CREAT SERPL-MCNC: 0.6 MG/DL (ref 0.6–1.2)
DEPRECATED RDW RBC AUTO: 15.3 % (ref 12.4–15.4)
DO-HGB MFR BLDV: 71.6 %
EKG ATRIAL RATE: 87 BPM
EKG DIAGNOSIS: NORMAL
EKG P AXIS: -10 DEGREES
EKG P-R INTERVAL: 190 MS
EKG Q-T INTERVAL: 404 MS
EKG QRS DURATION: 82 MS
EKG QTC CALCULATION (BAZETT): 486 MS
EKG R AXIS: -35 DEGREES
EKG T AXIS: 12 DEGREES
EKG VENTRICULAR RATE: 87 BPM
EOSINOPHIL # BLD: 0.1 K/UL (ref 0–0.6)
EOSINOPHIL NFR BLD: 2.1 %
FLUAV RNA RESP QL NAA+PROBE: NOT DETECTED
FLUBV RNA RESP QL NAA+PROBE: NOT DETECTED
GFR SERPLBLD CREATININE-BSD FMLA CKD-EPI: >60 ML/MIN/{1.73_M2}
GLUCOSE SERPL-MCNC: 95 MG/DL (ref 70–99)
HCO3 BLDV-SCNC: 37.2 MMOL/L (ref 24–28)
HCT VFR BLD AUTO: 37.2 % (ref 36–48)
HGB BLD-MCNC: 11.8 G/DL (ref 12–16)
LYMPHOCYTES # BLD: 0.6 K/UL (ref 1–5.1)
LYMPHOCYTES NFR BLD: 11.5 %
MCH RBC QN AUTO: 23.7 PG (ref 26–34)
MCHC RBC AUTO-ENTMCNC: 31.8 G/DL (ref 31–36)
MCV RBC AUTO: 74.5 FL (ref 80–100)
METHGB MFR BLDV: 0.5 % (ref 0–1.5)
MONOCYTES # BLD: 0.4 K/UL (ref 0–1.3)
MONOCYTES NFR BLD: 7.6 %
NEUTROPHILS # BLD: 3.8 K/UL (ref 1.7–7.7)
NEUTROPHILS NFR BLD: 78 %
NT-PROBNP SERPL-MCNC: 1233 PG/ML (ref 0–449)
PCO2 BLDV: 78.2 MMHG (ref 41–51)
PH BLDV: 7.29 [PH] (ref 7.35–7.45)
PLATELET # BLD AUTO: 198 K/UL (ref 135–450)
PMV BLD AUTO: 8.2 FL (ref 5–10.5)
PO2 BLDV: <30 MMHG (ref 25–40)
POTASSIUM SERPL-SCNC: 3.9 MMOL/L (ref 3.5–5.1)
RBC # BLD AUTO: 5 M/UL (ref 4–5.2)
SAO2 % BLDV: 27 %
SARS-COV-2 RNA RESP QL NAA+PROBE: NOT DETECTED
SODIUM SERPL-SCNC: 135 MMOL/L (ref 136–145)
TROPONIN, HIGH SENSITIVITY: 14 NG/L (ref 0–14)
TROPONIN, HIGH SENSITIVITY: 16 NG/L (ref 0–14)
WBC # BLD AUTO: 4.9 K/UL (ref 4–11)

## 2023-11-10 PROCEDURE — 6360000002 HC RX W HCPCS: Performed by: STUDENT IN AN ORGANIZED HEALTH CARE EDUCATION/TRAINING PROGRAM

## 2023-11-10 PROCEDURE — 71260 CT THORAX DX C+: CPT

## 2023-11-10 PROCEDURE — 6370000000 HC RX 637 (ALT 250 FOR IP): Performed by: STUDENT IN AN ORGANIZED HEALTH CARE EDUCATION/TRAINING PROGRAM

## 2023-11-10 PROCEDURE — 84484 ASSAY OF TROPONIN QUANT: CPT

## 2023-11-10 PROCEDURE — 94640 AIRWAY INHALATION TREATMENT: CPT

## 2023-11-10 PROCEDURE — 93005 ELECTROCARDIOGRAM TRACING: CPT | Performed by: STUDENT IN AN ORGANIZED HEALTH CARE EDUCATION/TRAINING PROGRAM

## 2023-11-10 PROCEDURE — 85025 COMPLETE CBC W/AUTO DIFF WBC: CPT

## 2023-11-10 PROCEDURE — 94761 N-INVAS EAR/PLS OXIMETRY MLT: CPT

## 2023-11-10 PROCEDURE — 80048 BASIC METABOLIC PNL TOTAL CA: CPT

## 2023-11-10 PROCEDURE — 82803 BLOOD GASES ANY COMBINATION: CPT

## 2023-11-10 PROCEDURE — 6360000004 HC RX CONTRAST MEDICATION: Performed by: STUDENT IN AN ORGANIZED HEALTH CARE EDUCATION/TRAINING PROGRAM

## 2023-11-10 PROCEDURE — 99285 EMERGENCY DEPT VISIT HI MDM: CPT

## 2023-11-10 PROCEDURE — 2580000003 HC RX 258: Performed by: STUDENT IN AN ORGANIZED HEALTH CARE EDUCATION/TRAINING PROGRAM

## 2023-11-10 PROCEDURE — 87636 SARSCOV2 & INF A&B AMP PRB: CPT

## 2023-11-10 PROCEDURE — 2700000000 HC OXYGEN THERAPY PER DAY

## 2023-11-10 PROCEDURE — 2060000000 HC ICU INTERMEDIATE R&B

## 2023-11-10 PROCEDURE — 83880 ASSAY OF NATRIURETIC PEPTIDE: CPT

## 2023-11-10 PROCEDURE — 71045 X-RAY EXAM CHEST 1 VIEW: CPT

## 2023-11-10 RX ORDER — ENOXAPARIN SODIUM 100 MG/ML
40 INJECTION SUBCUTANEOUS DAILY
Status: DISCONTINUED | OUTPATIENT
Start: 2023-11-10 | End: 2023-11-17

## 2023-11-10 RX ORDER — ARFORMOTEROL TARTRATE 15 UG/2ML
15 SOLUTION RESPIRATORY (INHALATION)
Status: DISCONTINUED | OUTPATIENT
Start: 2023-11-10 | End: 2023-11-17 | Stop reason: HOSPADM

## 2023-11-10 RX ORDER — ACETAMINOPHEN 325 MG/1
650 TABLET ORAL EVERY 6 HOURS PRN
Status: DISCONTINUED | OUTPATIENT
Start: 2023-11-10 | End: 2023-11-17 | Stop reason: HOSPADM

## 2023-11-10 RX ORDER — FUROSEMIDE 20 MG/1
20 TABLET ORAL DAILY
Status: DISCONTINUED | OUTPATIENT
Start: 2023-11-11 | End: 2023-11-13

## 2023-11-10 RX ORDER — PANTOPRAZOLE SODIUM 40 MG/1
40 TABLET, DELAYED RELEASE ORAL
Status: DISCONTINUED | OUTPATIENT
Start: 2023-11-11 | End: 2023-11-17 | Stop reason: HOSPADM

## 2023-11-10 RX ORDER — POTASSIUM CHLORIDE 7.45 MG/ML
10 INJECTION INTRAVENOUS PRN
Status: DISCONTINUED | OUTPATIENT
Start: 2023-11-10 | End: 2023-11-17 | Stop reason: HOSPADM

## 2023-11-10 RX ORDER — POLYETHYLENE GLYCOL 3350 17 G/17G
17 POWDER, FOR SOLUTION ORAL DAILY PRN
Status: DISCONTINUED | OUTPATIENT
Start: 2023-11-10 | End: 2023-11-17 | Stop reason: HOSPADM

## 2023-11-10 RX ORDER — ONDANSETRON 4 MG/1
4 TABLET, ORALLY DISINTEGRATING ORAL EVERY 8 HOURS PRN
Status: DISCONTINUED | OUTPATIENT
Start: 2023-11-10 | End: 2023-11-17 | Stop reason: HOSPADM

## 2023-11-10 RX ORDER — BUDESONIDE 0.5 MG/2ML
0.5 INHALANT ORAL
Status: DISCONTINUED | OUTPATIENT
Start: 2023-11-10 | End: 2023-11-17 | Stop reason: HOSPADM

## 2023-11-10 RX ORDER — ONDANSETRON 2 MG/ML
4 INJECTION INTRAMUSCULAR; INTRAVENOUS EVERY 6 HOURS PRN
Status: DISCONTINUED | OUTPATIENT
Start: 2023-11-10 | End: 2023-11-17 | Stop reason: HOSPADM

## 2023-11-10 RX ORDER — MAGNESIUM SULFATE IN WATER 40 MG/ML
2000 INJECTION, SOLUTION INTRAVENOUS PRN
Status: DISCONTINUED | OUTPATIENT
Start: 2023-11-10 | End: 2023-11-17 | Stop reason: HOSPADM

## 2023-11-10 RX ORDER — SODIUM CHLORIDE 0.9 % (FLUSH) 0.9 %
5-40 SYRINGE (ML) INJECTION EVERY 12 HOURS SCHEDULED
Status: DISCONTINUED | OUTPATIENT
Start: 2023-11-10 | End: 2023-11-17 | Stop reason: HOSPADM

## 2023-11-10 RX ORDER — ASPIRIN 81 MG/1
81 TABLET ORAL ONCE
Status: DISCONTINUED | OUTPATIENT
Start: 2023-11-10 | End: 2023-11-17 | Stop reason: HOSPADM

## 2023-11-10 RX ORDER — ACETAMINOPHEN 650 MG/1
650 SUPPOSITORY RECTAL EVERY 6 HOURS PRN
Status: DISCONTINUED | OUTPATIENT
Start: 2023-11-10 | End: 2023-11-17 | Stop reason: HOSPADM

## 2023-11-10 RX ORDER — SODIUM CHLORIDE 0.9 % (FLUSH) 0.9 %
5-40 SYRINGE (ML) INJECTION PRN
Status: DISCONTINUED | OUTPATIENT
Start: 2023-11-10 | End: 2023-11-17 | Stop reason: HOSPADM

## 2023-11-10 RX ORDER — IPRATROPIUM BROMIDE AND ALBUTEROL SULFATE 2.5; .5 MG/3ML; MG/3ML
1 SOLUTION RESPIRATORY (INHALATION)
Status: DISCONTINUED | OUTPATIENT
Start: 2023-11-10 | End: 2023-11-11

## 2023-11-10 RX ORDER — METOPROLOL SUCCINATE 25 MG/1
25 TABLET, EXTENDED RELEASE ORAL DAILY
Status: DISCONTINUED | OUTPATIENT
Start: 2023-11-11 | End: 2023-11-17 | Stop reason: HOSPADM

## 2023-11-10 RX ORDER — ATORVASTATIN CALCIUM 10 MG/1
10 TABLET, FILM COATED ORAL DAILY
Status: DISCONTINUED | OUTPATIENT
Start: 2023-11-11 | End: 2023-11-17 | Stop reason: HOSPADM

## 2023-11-10 RX ORDER — FUROSEMIDE 10 MG/ML
40 INJECTION INTRAMUSCULAR; INTRAVENOUS 2 TIMES DAILY
Status: DISCONTINUED | OUTPATIENT
Start: 2023-11-10 | End: 2023-11-17 | Stop reason: HOSPADM

## 2023-11-10 RX ORDER — POTASSIUM CHLORIDE 20 MEQ/1
40 TABLET, EXTENDED RELEASE ORAL PRN
Status: DISCONTINUED | OUTPATIENT
Start: 2023-11-10 | End: 2023-11-17 | Stop reason: HOSPADM

## 2023-11-10 RX ORDER — SODIUM CHLORIDE 9 MG/ML
INJECTION, SOLUTION INTRAVENOUS PRN
Status: DISCONTINUED | OUTPATIENT
Start: 2023-11-10 | End: 2023-11-17 | Stop reason: HOSPADM

## 2023-11-10 RX ADMIN — IPRATROPIUM BROMIDE AND ALBUTEROL SULFATE 1 DOSE: 2.5; .5 SOLUTION RESPIRATORY (INHALATION) at 21:46

## 2023-11-10 RX ADMIN — ENOXAPARIN SODIUM 40 MG: 100 INJECTION SUBCUTANEOUS at 21:43

## 2023-11-10 RX ADMIN — SODIUM CHLORIDE, PRESERVATIVE FREE 10 ML: 5 INJECTION INTRAVENOUS at 21:48

## 2023-11-10 RX ADMIN — IOPAMIDOL 75 ML: 755 INJECTION, SOLUTION INTRAVENOUS at 13:34

## 2023-11-10 RX ADMIN — IPRATROPIUM BROMIDE AND ALBUTEROL SULFATE 1 DOSE: 2.5; .5 SOLUTION RESPIRATORY (INHALATION) at 15:48

## 2023-11-10 RX ADMIN — FUROSEMIDE 40 MG: 10 INJECTION, SOLUTION INTRAMUSCULAR; INTRAVENOUS at 19:21

## 2023-11-10 ASSESSMENT — ENCOUNTER SYMPTOMS
DIARRHEA: 0
ABDOMINAL PAIN: 0
RHINORRHEA: 0
SHORTNESS OF BREATH: 1
CONSTIPATION: 0
NAUSEA: 0
VOMITING: 0
COUGH: 0

## 2023-11-10 NOTE — ED NOTES
please provide details: na  Pending Blood Product Administration: no       You may also review the ED PT Care Timeline found under the Summary Nursing Index tab. Recommendation    Pending orders admit orders  Plan for Discharge (if known):    Additional Comments: na   If any further questions, please call Sending RN at [phone]    Electronically signed by: Electronically signed by Della Bass RN on 11/10/2023 at 6:06 PM      [address]  11/10/23 9195

## 2023-11-10 NOTE — ED PROVIDER NOTES
ED Attending Attestation Note     Date of evaluation: 11/10/2023    This patient was seen by the resident. I have seen and examined the patient, agree with the workup, evaluation, management and diagnosis. The care plan has been discussed. I have reviewed the ECG and concur with the resident's interpretation. 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 Patient was in clinic earlier today and noted be hypoxic into the 70s. She states that she has been having worsening dyspnea on exertion over the last few weeks. She denies any chest pain during these activities. She denies any fevers, cough, congestion or sore throat. No abdominal pain, nausea or vomiting. Patient denies any smoking history or smoking exposure. She denies any muscle aches, headaches or lower extremity pain. On examination find adult female, speaking in complete sentences. She has no increased work of breathing or accessory muscle use during respiration. Lungs are clear to auscultation bilaterally. We will proceed with laboratory work-up and likely CTPA for evaluation of possible pulmonary embolism.     Huey Bonilla MD MPH   Physician     Annette Humphrey MD  11/10/23 1719

## 2023-11-11 PROBLEM — E43 SEVERE MALNUTRITION (HCC): Chronic | Status: ACTIVE | Noted: 2023-11-11

## 2023-11-11 LAB
ALBUMIN SERPL-MCNC: 3.8 G/DL (ref 3.4–5)
ALBUMIN SERPL-MCNC: 3.9 G/DL (ref 3.4–5)
ALBUMIN/GLOB SERPL: 1.1 {RATIO} (ref 1.1–2.2)
ALBUMIN/GLOB SERPL: 1.1 {RATIO} (ref 1.1–2.2)
ALP SERPL-CCNC: 84 U/L (ref 40–129)
ALP SERPL-CCNC: 85 U/L (ref 40–129)
ALT SERPL-CCNC: 16 U/L (ref 10–40)
ALT SERPL-CCNC: 18 U/L (ref 10–40)
ANION GAP SERPL CALCULATED.3IONS-SCNC: 8 MMOL/L (ref 3–16)
ANION GAP SERPL CALCULATED.3IONS-SCNC: 8 MMOL/L (ref 3–16)
AST SERPL-CCNC: 22 U/L (ref 15–37)
AST SERPL-CCNC: 25 U/L (ref 15–37)
BACTERIA URNS QL MICRO: ABNORMAL /HPF
BASE EXCESS BLDA CALC-SCNC: 14 MMOL/L (ref -3–3)
BASE EXCESS BLDA CALC-SCNC: 16 MMOL/L (ref -3–3)
BASOPHILS # BLD: 0 K/UL (ref 0–0.2)
BASOPHILS NFR BLD: 0.5 %
BILIRUB SERPL-MCNC: 0.4 MG/DL (ref 0–1)
BILIRUB SERPL-MCNC: 0.4 MG/DL (ref 0–1)
BILIRUB UR QL STRIP.AUTO: NEGATIVE
BODY TEMPERATURE: 37
BODY TEMPERATURE: 37
BUN SERPL-MCNC: 10 MG/DL (ref 7–20)
BUN SERPL-MCNC: 13 MG/DL (ref 7–20)
CALCIUM SERPL-MCNC: 8.5 MG/DL (ref 8.3–10.6)
CALCIUM SERPL-MCNC: 8.8 MG/DL (ref 8.3–10.6)
CHLORIDE SERPL-SCNC: 92 MMOL/L (ref 99–110)
CHLORIDE SERPL-SCNC: 96 MMOL/L (ref 99–110)
CLARITY UR: CLEAR
CO2 BLDA-SCNC: >50 MMOL/L
CO2 BLDA-SCNC: >50 MMOL/L
CO2 SERPL-SCNC: 33 MMOL/L (ref 21–32)
CO2 SERPL-SCNC: 36 MMOL/L (ref 21–32)
COLOR UR: YELLOW
CREAT SERPL-MCNC: 0.5 MG/DL (ref 0.6–1.2)
CREAT SERPL-MCNC: 0.7 MG/DL (ref 0.6–1.2)
DEPRECATED RDW RBC AUTO: 15.1 % (ref 12.4–15.4)
EOSINOPHIL # BLD: 0.1 K/UL (ref 0–0.6)
EOSINOPHIL NFR BLD: 1.2 %
EPI CELLS #/AREA URNS HPF: ABNORMAL /HPF (ref 0–5)
GFR SERPLBLD CREATININE-BSD FMLA CKD-EPI: >60 ML/MIN/{1.73_M2}
GFR SERPLBLD CREATININE-BSD FMLA CKD-EPI: >60 ML/MIN/{1.73_M2}
GLUCOSE BLD-MCNC: 135 MG/DL (ref 70–99)
GLUCOSE SERPL-MCNC: 130 MG/DL (ref 70–99)
GLUCOSE SERPL-MCNC: 155 MG/DL (ref 70–99)
GLUCOSE UR STRIP.AUTO-MCNC: NEGATIVE MG/DL
HCO3 BLDA-SCNC: 44.7 MMOL/L (ref 21–29)
HCO3 BLDA-SCNC: 46.7 MMOL/L (ref 21–29)
HCT VFR BLD AUTO: 38 % (ref 36–48)
HGB BLD-MCNC: 12 G/DL (ref 12–16)
HGB UR QL STRIP.AUTO: NEGATIVE
KETONES UR STRIP.AUTO-MCNC: NEGATIVE MG/DL
LACTATE BLD-SCNC: <0.3 MMOL/L (ref 0.4–2)
LEUKOCYTE ESTERASE UR QL STRIP.AUTO: ABNORMAL
LYMPHOCYTES # BLD: 0.7 K/UL (ref 1–5.1)
LYMPHOCYTES NFR BLD: 10.3 %
MCH RBC QN AUTO: 23.7 PG (ref 26–34)
MCHC RBC AUTO-ENTMCNC: 31.5 G/DL (ref 31–36)
MCV RBC AUTO: 75.3 FL (ref 80–100)
MONOCYTES # BLD: 0.4 K/UL (ref 0–1.3)
MONOCYTES NFR BLD: 6.1 %
NEUTROPHILS # BLD: 6 K/UL (ref 1.7–7.7)
NEUTROPHILS NFR BLD: 81.9 %
NITRITE UR QL STRIP.AUTO: NEGATIVE
PCO2 BLDA: 171.3 MM HG (ref 35–45)
PCO2 BLDA: 175.3 MM HG (ref 35–45)
PERFORMED ON: ABNORMAL
PH BLDA: 7.01 [PH] (ref 7.35–7.45)
PH BLDA: 7.04 [PH] (ref 7.35–7.45)
PH UR STRIP.AUTO: 6 [PH] (ref 5–8)
PLATELET # BLD AUTO: 240 K/UL (ref 135–450)
PMV BLD AUTO: 8.6 FL (ref 5–10.5)
PO2 BLDA: 200.8 MM HG (ref 75–108)
PO2 BLDA: 211.9 MM HG (ref 75–108)
POC SAMPLE TYPE: ABNORMAL
POC SAMPLE TYPE: ABNORMAL
POTASSIUM SERPL-SCNC: 3.7 MMOL/L (ref 3.5–5.1)
POTASSIUM SERPL-SCNC: 4.3 MMOL/L (ref 3.5–5.1)
PROT SERPL-MCNC: 7.2 G/DL (ref 6.4–8.2)
PROT SERPL-MCNC: 7.6 G/DL (ref 6.4–8.2)
PROT UR STRIP.AUTO-MCNC: NEGATIVE MG/DL
RBC # BLD AUTO: 5.04 M/UL (ref 4–5.2)
RBC #/AREA URNS HPF: ABNORMAL /HPF (ref 0–4)
SAO2 % BLDA: 99 % (ref 93–100)
SAO2 % BLDA: 99 % (ref 93–100)
SODIUM SERPL-SCNC: 136 MMOL/L (ref 136–145)
SODIUM SERPL-SCNC: 137 MMOL/L (ref 136–145)
SP GR UR STRIP.AUTO: 1.02 (ref 1–1.03)
UA DIPSTICK W REFLEX MICRO PNL UR: YES
URN SPEC COLLECT METH UR: ABNORMAL
UROBILINOGEN UR STRIP-ACNC: 0.2 E.U./DL
WBC # BLD AUTO: 7.3 K/UL (ref 4–11)
WBC #/AREA URNS HPF: ABNORMAL /HPF (ref 0–5)

## 2023-11-11 PROCEDURE — 6360000002 HC RX W HCPCS: Performed by: STUDENT IN AN ORGANIZED HEALTH CARE EDUCATION/TRAINING PROGRAM

## 2023-11-11 PROCEDURE — 6370000000 HC RX 637 (ALT 250 FOR IP): Performed by: STUDENT IN AN ORGANIZED HEALTH CARE EDUCATION/TRAINING PROGRAM

## 2023-11-11 PROCEDURE — 82803 BLOOD GASES ANY COMBINATION: CPT

## 2023-11-11 PROCEDURE — 85025 COMPLETE CBC W/AUTO DIFF WBC: CPT

## 2023-11-11 PROCEDURE — 2700000000 HC OXYGEN THERAPY PER DAY

## 2023-11-11 PROCEDURE — 2060000000 HC ICU INTERMEDIATE R&B

## 2023-11-11 PROCEDURE — 6370000000 HC RX 637 (ALT 250 FOR IP): Performed by: SURGERY

## 2023-11-11 PROCEDURE — 36591 DRAW BLOOD OFF VENOUS DEVICE: CPT

## 2023-11-11 PROCEDURE — 94761 N-INVAS EAR/PLS OXIMETRY MLT: CPT

## 2023-11-11 PROCEDURE — 93306 TTE W/DOPPLER COMPLETE: CPT

## 2023-11-11 PROCEDURE — 36600 WITHDRAWAL OF ARTERIAL BLOOD: CPT

## 2023-11-11 PROCEDURE — 94660 CPAP INITIATION&MGMT: CPT

## 2023-11-11 PROCEDURE — 2580000003 HC RX 258: Performed by: STUDENT IN AN ORGANIZED HEALTH CARE EDUCATION/TRAINING PROGRAM

## 2023-11-11 PROCEDURE — 81001 URINALYSIS AUTO W/SCOPE: CPT

## 2023-11-11 PROCEDURE — 94640 AIRWAY INHALATION TREATMENT: CPT

## 2023-11-11 PROCEDURE — 99223 1ST HOSP IP/OBS HIGH 75: CPT | Performed by: INTERNAL MEDICINE

## 2023-11-11 PROCEDURE — 36415 COLL VENOUS BLD VENIPUNCTURE: CPT

## 2023-11-11 PROCEDURE — 2580000003 HC RX 258: Performed by: SURGERY

## 2023-11-11 PROCEDURE — 80053 COMPREHEN METABOLIC PANEL: CPT

## 2023-11-11 PROCEDURE — 6360000002 HC RX W HCPCS: Performed by: SURGERY

## 2023-11-11 PROCEDURE — 83605 ASSAY OF LACTIC ACID: CPT

## 2023-11-11 PROCEDURE — 5A09357 ASSISTANCE WITH RESPIRATORY VENTILATION, LESS THAN 24 CONSECUTIVE HOURS, CONTINUOUS POSITIVE AIRWAY PRESSURE: ICD-10-PCS | Performed by: INTERNAL MEDICINE

## 2023-11-11 RX ORDER — ALBUTEROL SULFATE 2.5 MG/3ML
2.5 SOLUTION RESPIRATORY (INHALATION) EVERY 6 HOURS PRN
Status: DISCONTINUED | OUTPATIENT
Start: 2023-11-11 | End: 2023-11-17 | Stop reason: HOSPADM

## 2023-11-11 RX ORDER — IPRATROPIUM BROMIDE AND ALBUTEROL SULFATE 2.5; .5 MG/3ML; MG/3ML
1 SOLUTION RESPIRATORY (INHALATION)
Status: DISCONTINUED | OUTPATIENT
Start: 2023-11-11 | End: 2023-11-15

## 2023-11-11 RX ADMIN — ARFORMOTEROL TARTRATE 15 MCG: 15 SOLUTION RESPIRATORY (INHALATION) at 07:43

## 2023-11-11 RX ADMIN — SODIUM CHLORIDE, PRESERVATIVE FREE 10 ML: 5 INJECTION INTRAVENOUS at 10:01

## 2023-11-11 RX ADMIN — SODIUM CHLORIDE, PRESERVATIVE FREE 10 ML: 5 INJECTION INTRAVENOUS at 20:45

## 2023-11-11 RX ADMIN — METOPROLOL SUCCINATE 25 MG: 25 TABLET, EXTENDED RELEASE ORAL at 10:00

## 2023-11-11 RX ADMIN — ATORVASTATIN CALCIUM 10 MG: 10 TABLET, FILM COATED ORAL at 10:00

## 2023-11-11 RX ADMIN — FUROSEMIDE 40 MG: 10 INJECTION, SOLUTION INTRAMUSCULAR; INTRAVENOUS at 18:39

## 2023-11-11 RX ADMIN — IPRATROPIUM BROMIDE AND ALBUTEROL SULFATE 1 DOSE: 2.5; .5 SOLUTION RESPIRATORY (INHALATION) at 20:03

## 2023-11-11 RX ADMIN — PANTOPRAZOLE SODIUM 40 MG: 40 TABLET, DELAYED RELEASE ORAL at 06:55

## 2023-11-11 RX ADMIN — FUROSEMIDE 20 MG: 20 TABLET ORAL at 10:00

## 2023-11-11 RX ADMIN — ENOXAPARIN SODIUM 40 MG: 100 INJECTION SUBCUTANEOUS at 20:43

## 2023-11-11 RX ADMIN — FUROSEMIDE 40 MG: 10 INJECTION, SOLUTION INTRAMUSCULAR; INTRAVENOUS at 10:00

## 2023-11-11 RX ADMIN — BUDESONIDE INHALATION 500 MCG: 0.5 SUSPENSION RESPIRATORY (INHALATION) at 07:43

## 2023-11-11 RX ADMIN — ARFORMOTEROL TARTRATE 15 MCG: 15 SOLUTION RESPIRATORY (INHALATION) at 20:03

## 2023-11-11 RX ADMIN — CEFTRIAXONE 1000 MG: 1 INJECTION, POWDER, FOR SOLUTION INTRAMUSCULAR; INTRAVENOUS at 18:02

## 2023-11-11 RX ADMIN — IPRATROPIUM BROMIDE AND ALBUTEROL SULFATE 1 DOSE: 2.5; .5 SOLUTION RESPIRATORY (INHALATION) at 07:43

## 2023-11-11 RX ADMIN — BUDESONIDE INHALATION 500 MCG: 0.5 SUSPENSION RESPIRATORY (INHALATION) at 20:03

## 2023-11-11 ASSESSMENT — PAIN SCALES - WONG BAKER
WONGBAKER_NUMERICALRESPONSE: 0
WONGBAKER_NUMERICALRESPONSE: 0

## 2023-11-11 NOTE — PROGRESS NOTES
Patient appears confused and lethargic this AM. Pt is oriented to person but unable to identify date and place. VSS, no other complaints. MD Kaykay Hutchinson  notified - awaiting for reply. Report given to day shift RN for continuity of care.

## 2023-11-11 NOTE — PLAN OF CARE
Problem: Safety - Adult  Goal: Free from fall injury  Outcome: Progressing  Flowsheets (Taken 11/11/2023 1721)  Free From Fall Injury: Instruct family/caregiver on patient safety  Note: Pt in bed with bed alarm on, instructed to call for assistance. Verbalized understanding. All fall precautions in place. Problem: Respiratory - Adult  Goal: Achieves optimal ventilation and oxygenation  Outcome: Progressing  Flowsheets (Taken 11/11/2023 1721)  Achieves optimal ventilation and oxygenation:   Assess for changes in respiratory status   Assess for changes in mentation and behavior   Position to facilitate oxygenation and minimize respiratory effort   Initiate smoking cessation protocol as indicated   Oxygen supplementation based on oxygen saturation or arterial blood gases  Note: Pt continues to be on 2L oxygen and stable      Problem: Nutrition Deficit:  Goal: Optimize nutritional status  Outcome: Progressing  Flowsheets (Taken 11/11/2023 1721)  Nutrient intake appropriate for improving, restoring, or maintaining nutritional needs:   Assess nutritional status and recommend course of action   Recommend appropriate diets, oral nutritional supplements, and vitamin/mineral supplements   Order, calculate, and assess calorie counts as needed   Recommend, monitor, and adjust tube feedings and TPN/PPN based on assessed needs   Provide specific nutrition education to patient or family as appropriate   Monitor oral intake, labs, and treatment plans  Note: Pt has a decrease in PO intake        Pt continues to no be alert X4. Have made MD aware and no orders were placed. PT family is worried she is getting a UTI.  RN sent a urinalysis pending results

## 2023-11-11 NOTE — PROGRESS NOTES
Patient admitted to [facility] via stretcher from ED. Patient and family oriented to patient room including call light, bed controls and unit policies. Admission assessment completed - see admission flowsheet documentation. Patient is a high fall risk. Safety measures instituted per policy. Patient verbalizes understanding of all instructions and will call for assistance as needed.

## 2023-11-11 NOTE — PROGRESS NOTES
Comprehensive Nutrition Assessment    RECOMMENDATIONS:  PO Diet: Continue Regular; cardiac diet  ONS: Add Ensure TID  Caregiver bringing in Bolthaus smoothies, discussing w/ RN on bringing in outside foods  Nutrition Education: Education not indicated     NUTRITION ASSESSMENT:   Nutritional summary & status: Pt w/ 22% wt loss over the past year. Discussed w/ pt and caretaker, her appetite has decreased over the past year, no N/V. She has been following the outpatient oncology RD to work on gaining wt. She drinks Bolthaus smoothies x2-3/day, caretaker will be bringing those in for her, additionally will add Ensure TID which pt was receptive to. She feels like she eats all day but cannot gain any wt, explained how needs increase w/ catabolic illnesses. Pt mentation is slightly below baseline per caregiver she is confused currently. Did not eat breakfast this AM, provided RD w/ a list of foods she would rather have, ordered for pt throught the kitchen. Discussin w/ RN if caretaker can bring in outside food that pt would be more agreeable to.    Admission // PMH: Pericardial effusion//metastatic adenocarcinoma of the lung Stage IV, CHF, HTN/HLD    MALNUTRITION ASSESSMENT  Context of Malnutrition: Chronic Illness   Malnutrition Status: Severe malnutrition  Findings of the 6 clinical characteristics of malnutrition (Minimum of 2 out of 6 clinical characteristics is required to make the diagnosis of moderate or severe Protein Calorie Malnutrition based on AND/ASPEN Guidelines):  Energy Intake:  75% or less estimated energy requirements for 1 month or longer  Weight Loss:  Greater than 20% over 1 year     Body Fat Loss:  Severe body fat loss Buccal region   Muscle Mass Loss:  Mild muscle mass loss Temples (temporalis), Clavicles (pectoralis & deltoids), Hand (interosseous)  Fluid Accumulation:  No significant fluid accumulation      NUTRITION DIAGNOSIS   Severe malnutrition related to catabolic illness as evidenced by Criteria as identified in malnutrition assessment    Nutrition Monitoring and Evaluation:   Food/Nutrient Intake Outcomes:  Food and Nutrient Intake, Supplement Intake  Physical Signs/Symptoms Outcomes:  Biochemical Data, Nutrition Focused Physical Findings, Weight     OBJECTIVE DATA: Significant to nutrition assessment  Nutrition Related Findings: . Wounds: None  Nutrition Goals: Meet at least 75% of estimated needs, prior to discharge     [address] DIET; Regular; Low Fat/Low Chol/High Fiber/2 gm Na  ADULT ORAL NUTRITION SUPPLEMENT; Breakfast, Lunch, Dinner; Standard High Calorie/High Protein Oral Supplement  PO Intake: 0%   PO Supplement Intake:None Ordered  Additional Sources of Calories/IVF:N/A     COMPARATIVE STANDARDS  Energy (kcal):  0041-2025 (30-35 kcal/kg CBW)     Protein (g):  75-90 (1.5-1.8 g/kg CBW)       Fluid (ml/day):  1 mL/kcal or per MD    ANTHROPOMETRICS  Current Height: 167.6 cm (5' 6\")  Current Weight - Scale: 49.9 kg (110 lb 0.2 oz)    Admission weight: 51.3 kg (113 lb)    The patient will be monitored per nutrition standards of care. Consult dietitian if additional nutrition interventions are needed prior to RD reassessment.      Juwan Good, [address] [facility], [address] [place] [address] Avenue:  [phone]

## 2023-11-11 NOTE — PROGRESS NOTES
4 Eyes Skin Assessment     NAME:  Diane Castro OF BIRTH:  1945  MEDICAL RECORD NUMBER:  [id number]    The patient is being assessed for  Admission    I agree that at least one RN has performed a thorough Head to Toe Skin Assessment on the patient. ALL assessment sites listed below have been assessed. Areas assessed by both nurses:    Head, Face, Ears, Shoulders, Back, Chest, Arms, Elbows, Hands, Sacrum. Buttock, Coccyx, Ischium, Legs. Feet and Heels, and Under Medical Devices         Does the Patient have a Wound?  No noted wound(s)       Selwyn Prevention initiated by RN: No  Wound Care Orders initiated by RN: No    Pressure Injury (Stage 3,4, Unstageable, DTI, NWPT, and Complex wounds) if present, place Wound referral order by RN under : No    New Ostomies, if present place, Ostomy referral order under : No     Nurse 1 eSignature: Electronically signed by Matt Cary RN on 11/10/23 at 10:57 PM EST    **SHARE this note so that the co-signing nurse can place an eSignature**    Nurse 2 eSignature: Electronically signed by García Carrillo RN on 11/11/23 at 12:48 AM EST

## 2023-11-11 NOTE — PROGRESS NOTES
Accessed port and dressing done using sterile technique following hospital policy. Sathish Garay RN, observed with procedure to ensure proper technique.

## 2023-11-11 NOTE — DISCHARGE INSTRUCTIONS
Extra Heart Failure sites:     https://Graematter. com/publication/?k=848700   --- this is American Heart Association interactive Healthier Living with Heart Failure guidebook. Please click hyperlink or copy / paste link into search bar. Use your mouse to scroll through the pages. Lots of information about weight monitoring, diet tips, activity, meds, etc    HF [place] beverly  -- this is a free smart phone beverly available for iPhone and Android download. Use your phone to track sodium / fluid intake, zone tool symptom tracking, weights, medications, etc. Click on this hyperlink  HF [place] Beverly   for QR code for easy download. DASH (Dietary Approach to Stop Hypertension) diet --  SeekAlumni.no -- this diet is a flexible eating plan that promotes heart healthy eating style. Click on hyperlink or copy / paste link into search bar. Lots of low sodium recipes and tips.     CigarRepair.ca  -- more free recipes

## 2023-11-12 ENCOUNTER — APPOINTMENT (OUTPATIENT)
Dept: GENERAL RADIOLOGY | Age: 78
End: 2023-11-12
Payer: MEDICARE

## 2023-11-12 LAB
ALBUMIN SERPL-MCNC: 3.7 G/DL (ref 3.4–5)
ALBUMIN/GLOB SERPL: 1.1 {RATIO} (ref 1.1–2.2)
ALP SERPL-CCNC: 76 U/L (ref 40–129)
ALT SERPL-CCNC: 20 U/L (ref 10–40)
ANION GAP SERPL CALCULATED.3IONS-SCNC: 17 MMOL/L (ref 3–16)
AST SERPL-CCNC: 24 U/L (ref 15–37)
BASE EXCESS BLDA CALC-SCNC: 13 MMOL/L (ref -3–3)
BASE EXCESS BLDA CALC-SCNC: 15 MMOL/L (ref -3–3)
BASE EXCESS BLDA CALC-SCNC: 15 MMOL/L (ref -3–3)
BASOPHILS # BLD: 0 K/UL (ref 0–0.2)
BASOPHILS NFR BLD: 0.2 %
BILIRUB SERPL-MCNC: 0.5 MG/DL (ref 0–1)
BODY TEMPERATURE: 36
BODY TEMPERATURE: 36
BUN SERPL-MCNC: 17 MG/DL (ref 7–20)
CALCIUM SERPL-MCNC: 8.5 MG/DL (ref 8.3–10.6)
CHLORIDE SERPL-SCNC: 91 MMOL/L (ref 99–110)
CO2 BLDA-SCNC: 38 MMOL/L
CO2 BLDA-SCNC: 38 MMOL/L
CO2 BLDA-SCNC: >50 MMOL/L
CO2 SERPL-SCNC: 30 MMOL/L (ref 21–32)
COHGB MFR BLDA: 1.2 % (ref 0–1.5)
CREAT SERPL-MCNC: 1.5 MG/DL (ref 0.6–1.2)
DEPRECATED RDW RBC AUTO: 15.3 % (ref 12.4–15.4)
EOSINOPHIL # BLD: 0 K/UL (ref 0–0.6)
EOSINOPHIL NFR BLD: 0 %
GFR SERPLBLD CREATININE-BSD FMLA CKD-EPI: 36 ML/MIN/{1.73_M2}
GLUCOSE SERPL-MCNC: 150 MG/DL (ref 70–99)
HCO3 BLDA-SCNC: 36.5 MMOL/L (ref 21–29)
HCO3 BLDA-SCNC: 37 MMOL/L (ref 21–29)
HCO3 BLDA-SCNC: 46.3 MMOL/L (ref 21–29)
HCT VFR BLD AUTO: 36.5 % (ref 36–48)
HGB BLD-MCNC: 11.3 G/DL (ref 12–16)
HGB BLDA-MCNC: 11.2 G/DL
IRON SATN MFR SERPL: 13 % (ref 15–50)
IRON SERPL-MCNC: 25 UG/DL (ref 37–145)
LACTATE BLDV-SCNC: 0.4 MMOL/L (ref 0.4–2)
LACTATE BLDV-SCNC: 1.6 MMOL/L (ref 0.4–2)
LYMPHOCYTES # BLD: 0.4 K/UL (ref 1–5.1)
LYMPHOCYTES NFR BLD: 5.3 %
MCH RBC QN AUTO: 24 PG (ref 26–34)
MCHC RBC AUTO-ENTMCNC: 30.9 G/DL (ref 31–36)
MCV RBC AUTO: 77.6 FL (ref 80–100)
METHGB MFR BLDA: 0.5 % (ref 0–1.4)
MONOCYTES # BLD: 0.7 K/UL (ref 0–1.3)
MONOCYTES NFR BLD: 10 %
NEUTROPHILS # BLD: 6.1 K/UL (ref 1.7–7.7)
NEUTROPHILS NFR BLD: 84.5 %
PCO2 BLDA: 188.8 MM HG (ref 35–45)
PCO2 BLDA: 38.6 MM HG (ref 35–45)
PCO2 BLDA: 41.5 MMHG (ref 35–45)
PERFORMED ON: ABNORMAL
PERFORMED ON: ABNORMAL
PH BLDA: 7 [PH] (ref 7.35–7.45)
PH BLDA: 7.55 [PH] (ref 7.35–7.45)
PH BLDA: 7.58 [PH] (ref 7.35–7.45)
PLATELET # BLD AUTO: 172 K/UL (ref 135–450)
PMV BLD AUTO: 8.7 FL (ref 5–10.5)
PO2 BLDA: 167.6 MM HG (ref 75–108)
PO2 BLDA: 183 MMHG (ref 75–108)
PO2 BLDA: 474.5 MM HG (ref 75–108)
POC SAMPLE TYPE: ABNORMAL
POC SAMPLE TYPE: ABNORMAL
POTASSIUM SERPL-SCNC: 4.2 MMOL/L (ref 3.5–5.1)
PROT SERPL-MCNC: 7 G/DL (ref 6.4–8.2)
RBC # BLD AUTO: 4.7 M/UL (ref 4–5.2)
SAO2 % BLDA: 100 % (ref 93–100)
SAO2 % BLDA: 98 % (ref 93–100)
SAO2 % BLDA: >100 % (ref 93–100)
SODIUM SERPL-SCNC: 138 MMOL/L (ref 136–145)
TIBC SERPL-MCNC: 193 UG/DL (ref 260–445)
WBC # BLD AUTO: 7.2 K/UL (ref 4–11)

## 2023-11-12 PROCEDURE — 51798 US URINE CAPACITY MEASURE: CPT

## 2023-11-12 PROCEDURE — 83550 IRON BINDING TEST: CPT

## 2023-11-12 PROCEDURE — 71045 X-RAY EXAM CHEST 1 VIEW: CPT

## 2023-11-12 PROCEDURE — 80053 COMPREHEN METABOLIC PANEL: CPT

## 2023-11-12 PROCEDURE — 85025 COMPLETE CBC W/AUTO DIFF WBC: CPT

## 2023-11-12 PROCEDURE — 31500 INSERT EMERGENCY AIRWAY: CPT

## 2023-11-12 PROCEDURE — 2500000003 HC RX 250 WO HCPCS

## 2023-11-12 PROCEDURE — 94002 VENT MGMT INPAT INIT DAY: CPT

## 2023-11-12 PROCEDURE — 36600 WITHDRAWAL OF ARTERIAL BLOOD: CPT

## 2023-11-12 PROCEDURE — 2580000003 HC RX 258: Performed by: SURGERY

## 2023-11-12 PROCEDURE — 36415 COLL VENOUS BLD VENIPUNCTURE: CPT

## 2023-11-12 PROCEDURE — 51701 INSERT BLADDER CATHETER: CPT

## 2023-11-12 PROCEDURE — 5A1945Z RESPIRATORY VENTILATION, 24-96 CONSECUTIVE HOURS: ICD-10-PCS | Performed by: INTERNAL MEDICINE

## 2023-11-12 PROCEDURE — 2580000003 HC RX 258: Performed by: STUDENT IN AN ORGANIZED HEALTH CARE EDUCATION/TRAINING PROGRAM

## 2023-11-12 PROCEDURE — 6370000000 HC RX 637 (ALT 250 FOR IP): Performed by: STUDENT IN AN ORGANIZED HEALTH CARE EDUCATION/TRAINING PROGRAM

## 2023-11-12 PROCEDURE — 99291 CRITICAL CARE FIRST HOUR: CPT | Performed by: INTERNAL MEDICINE

## 2023-11-12 PROCEDURE — 2580000003 HC RX 258

## 2023-11-12 PROCEDURE — 51702 INSERT TEMP BLADDER CATH: CPT

## 2023-11-12 PROCEDURE — 83605 ASSAY OF LACTIC ACID: CPT

## 2023-11-12 PROCEDURE — 6370000000 HC RX 637 (ALT 250 FOR IP)

## 2023-11-12 PROCEDURE — 87040 BLOOD CULTURE FOR BACTERIA: CPT

## 2023-11-12 PROCEDURE — 6360000002 HC RX W HCPCS: Performed by: STUDENT IN AN ORGANIZED HEALTH CARE EDUCATION/TRAINING PROGRAM

## 2023-11-12 PROCEDURE — 6360000002 HC RX W HCPCS

## 2023-11-12 PROCEDURE — 36591 DRAW BLOOD OFF VENOUS DEVICE: CPT

## 2023-11-12 PROCEDURE — 0BH17EZ INSERTION OF ENDOTRACHEAL AIRWAY INTO TRACHEA, VIA NATURAL OR ARTIFICIAL OPENING: ICD-10-PCS | Performed by: INTERNAL MEDICINE

## 2023-11-12 PROCEDURE — 74018 RADEX ABDOMEN 1 VIEW: CPT

## 2023-11-12 PROCEDURE — 82803 BLOOD GASES ANY COMBINATION: CPT

## 2023-11-12 PROCEDURE — 94640 AIRWAY INHALATION TREATMENT: CPT

## 2023-11-12 PROCEDURE — 2000000000 HC ICU R&B

## 2023-11-12 PROCEDURE — 94761 N-INVAS EAR/PLS OXIMETRY MLT: CPT

## 2023-11-12 PROCEDURE — 6360000002 HC RX W HCPCS: Performed by: SURGERY

## 2023-11-12 PROCEDURE — 6370000000 HC RX 637 (ALT 250 FOR IP): Performed by: SURGERY

## 2023-11-12 PROCEDURE — 83540 ASSAY OF IRON: CPT

## 2023-11-12 RX ORDER — PREDNISONE 5 MG/1
5 TABLET ORAL DAILY
Status: DISCONTINUED | OUTPATIENT
Start: 2023-11-12 | End: 2023-11-13

## 2023-11-12 RX ORDER — PREDNISONE 5 MG/1
5 TABLET ORAL DAILY
Status: DISCONTINUED | OUTPATIENT
Start: 2023-11-12 | End: 2023-11-12

## 2023-11-12 RX ORDER — 0.9 % SODIUM CHLORIDE 0.9 %
250 INTRAVENOUS SOLUTION INTRAVENOUS ONCE
Status: COMPLETED | OUTPATIENT
Start: 2023-11-12 | End: 2023-11-12

## 2023-11-12 RX ORDER — ETOMIDATE 2 MG/ML
0.3 INJECTION INTRAVENOUS ONCE
Status: COMPLETED | OUTPATIENT
Start: 2023-11-12 | End: 2023-11-12

## 2023-11-12 RX ORDER — PROPOFOL 10 MG/ML
INJECTION, EMULSION INTRAVENOUS
Status: DISPENSED
Start: 2023-11-12 | End: 2023-11-12

## 2023-11-12 RX ORDER — PROPOFOL 10 MG/ML
5-50 INJECTION, EMULSION INTRAVENOUS CONTINUOUS
Status: DISCONTINUED | OUTPATIENT
Start: 2023-11-12 | End: 2023-11-12

## 2023-11-12 RX ORDER — ETOMIDATE 2 MG/ML
20 INJECTION INTRAVENOUS ONCE
Status: DISCONTINUED | OUTPATIENT
Start: 2023-11-12 | End: 2023-11-12

## 2023-11-12 RX ORDER — ETOMIDATE 2 MG/ML
INJECTION INTRAVENOUS
Status: COMPLETED
Start: 2023-11-12 | End: 2023-11-12

## 2023-11-12 RX ORDER — MIDODRINE HYDROCHLORIDE 5 MG/1
5 TABLET ORAL 3 TIMES DAILY
Status: DISCONTINUED | OUTPATIENT
Start: 2023-11-12 | End: 2023-11-17 | Stop reason: HOSPADM

## 2023-11-12 RX ORDER — SODIUM CHLORIDE 9 MG/ML
INJECTION, SOLUTION INTRAVENOUS CONTINUOUS
Status: ACTIVE | OUTPATIENT
Start: 2023-11-12 | End: 2023-11-13

## 2023-11-12 RX ORDER — MIDODRINE HYDROCHLORIDE 5 MG/1
5 TABLET ORAL
Status: DISCONTINUED | OUTPATIENT
Start: 2023-11-12 | End: 2023-11-12

## 2023-11-12 RX ADMIN — BUDESONIDE INHALATION 500 MCG: 0.5 SUSPENSION RESPIRATORY (INHALATION) at 07:59

## 2023-11-12 RX ADMIN — ARFORMOTEROL TARTRATE 15 MCG: 15 SOLUTION RESPIRATORY (INHALATION) at 11:55

## 2023-11-12 RX ADMIN — ENOXAPARIN SODIUM 40 MG: 100 INJECTION SUBCUTANEOUS at 20:37

## 2023-11-12 RX ADMIN — ARFORMOTEROL TARTRATE 15 MCG: 15 SOLUTION RESPIRATORY (INHALATION) at 21:06

## 2023-11-12 RX ADMIN — MIDODRINE HYDROCHLORIDE 5 MG: 5 TABLET ORAL at 06:21

## 2023-11-12 RX ADMIN — METHYLPREDNISOLONE SODIUM SUCCINATE 125 MG: 125 INJECTION, POWDER, LYOPHILIZED, FOR SOLUTION INTRAMUSCULAR; INTRAVENOUS at 05:10

## 2023-11-12 RX ADMIN — SODIUM CHLORIDE, PRESERVATIVE FREE 10 ML: 5 INJECTION INTRAVENOUS at 20:44

## 2023-11-12 RX ADMIN — PANTOPRAZOLE SODIUM 40 MG: 40 TABLET, DELAYED RELEASE ORAL at 06:21

## 2023-11-12 RX ADMIN — IPRATROPIUM BROMIDE AND ALBUTEROL SULFATE 1 DOSE: 2.5; .5 SOLUTION RESPIRATORY (INHALATION) at 07:59

## 2023-11-12 RX ADMIN — SODIUM CHLORIDE: 9 INJECTION, SOLUTION INTRAVENOUS at 18:33

## 2023-11-12 RX ADMIN — BUDESONIDE INHALATION 500 MCG: 0.5 SUSPENSION RESPIRATORY (INHALATION) at 21:06

## 2023-11-12 RX ADMIN — DEXMEDETOMIDINE 0.8 MCG/KG/HR: 100 INJECTION, SOLUTION INTRAVENOUS at 14:33

## 2023-11-12 RX ADMIN — SODIUM CHLORIDE 250 ML: 9 INJECTION, SOLUTION INTRAVENOUS at 13:17

## 2023-11-12 RX ADMIN — SODIUM CHLORIDE 5 MCG/MIN: 9 INJECTION, SOLUTION INTRAVENOUS at 05:55

## 2023-11-12 RX ADMIN — MIDODRINE HYDROCHLORIDE 5 MG: 5 TABLET ORAL at 20:37

## 2023-11-12 RX ADMIN — ETOMIDATE 5 MG: 2 INJECTION INTRAVENOUS at 03:56

## 2023-11-12 RX ADMIN — PREDNISONE 5 MG: 5 TABLET ORAL at 11:40

## 2023-11-12 RX ADMIN — ACETAMINOPHEN 650 MG: 325 TABLET ORAL at 11:40

## 2023-11-12 RX ADMIN — SODIUM CHLORIDE, PRESERVATIVE FREE 10 ML: 5 INJECTION INTRAVENOUS at 08:39

## 2023-11-12 RX ADMIN — ATORVASTATIN CALCIUM 10 MG: 10 TABLET, FILM COATED ORAL at 08:38

## 2023-11-12 RX ADMIN — CEFTRIAXONE 1000 MG: 1 INJECTION, POWDER, FOR SOLUTION INTRAMUSCULAR; INTRAVENOUS at 18:34

## 2023-11-12 RX ADMIN — DEXMEDETOMIDINE 0.2 MCG/KG/HR: 100 INJECTION, SOLUTION INTRAVENOUS at 04:38

## 2023-11-12 RX ADMIN — ETOMIDATE 5 MG: 2 INJECTION, SOLUTION INTRAVENOUS at 03:56

## 2023-11-12 RX ADMIN — IPRATROPIUM BROMIDE AND ALBUTEROL SULFATE 1 DOSE: 2.5; .5 SOLUTION RESPIRATORY (INHALATION) at 21:06

## 2023-11-12 RX ADMIN — SODIUM CHLORIDE 250 ML: 9 INJECTION, SOLUTION INTRAVENOUS at 06:51

## 2023-11-12 ASSESSMENT — PAIN SCALES - GENERAL
PAINLEVEL_OUTOF10: 2
PAINLEVEL_OUTOF10: 0
PAINLEVEL_OUTOF10: 3
PAINLEVEL_OUTOF10: 0
PAINLEVEL_OUTOF10: 1
PAINLEVEL_OUTOF10: 2
PAINLEVEL_OUTOF10: 0
PAINLEVEL_OUTOF10: 1

## 2023-11-12 ASSESSMENT — PAIN SCALES - WONG BAKER
WONGBAKER_NUMERICALRESPONSE: 0

## 2023-11-12 ASSESSMENT — PULMONARY FUNCTION TESTS
PIF_VALUE: 23
PIF_VALUE: 24
PIF_VALUE: 21
PIF_VALUE: 20

## 2023-11-12 NOTE — PROGRESS NOTES
Pt intubated due to respiratory failure. Size 3 glidescope used to visualize vocal chords. Size 8 mm ET tube placed through the vocal chords to 24 cm at the teeth. ET tube cuff inflated to MOV. Positive color change on the etco2 detector noted. Bilateral breath sounds heard. X-ray called to confirm placement. Pt placed on mechanical ventilator with ordered settings. No visible trauma noted. Pt resting comfortably.

## 2023-11-12 NOTE — PROGRESS NOTES
Cardiology Note  - continue management per primary team  - pericardial effusion confirmed by TTE, no tamponade. No need to drain.   - does have Cor pulmonale, currently intubated. Resp failure not from cardiac etiology. Santiago Hopkins MD, [facility] [place], [address] [place] [address]

## 2023-11-12 NOTE — PROGRESS NOTES
Indwelling urinary catheter was placed, per orders, using sterile technique following hospital policy. Kip Yoon RN, observed with procedure to ensure proper technique. This catheter was placed on [facility].

## 2023-11-12 NOTE — PROGRESS NOTES
Patient seen at bedside for extreme lethargy  According to the family member at bedside patient has been lethargic around 4 PM today  ABG shows respiratory acidosis with severe CO2 retention to 170s  Patient's family member who is at bedside does not want her intubated  Discussed risks of not intubation, including respiratory failure and cardiac arrest  Patient's family member/daughter-Natalie at bedside agreeable for BiPAP  Discussed with RT and RN at bedside  Will place on BiPAP with repeat ABG in 2 hours  Sitter at bedside due to patient's mental status  Discussed with ICU resident team as well

## 2023-11-12 NOTE — PROGRESS NOTES
Physical / Occupational Therapy  Chelsey Caballero     PT/OT orders received, chart reviewed. Patient now intubated, not appropriate for PT/ OT evaluation this date. Discussed with RN. Plan to sign off and complete order without PT/OT evaluation - please re-refer to PT/OT once medically stable for PT/OT evaluation.     Shayy Buchanan PT, DPT [id number]  Luverne Krabbe, MOT, OTR/L

## 2023-11-12 NOTE — PROGRESS NOTES
Patient seen again  2 daughters at bedside  According to the patient wishes, did not want to be on life support  But one of the daughters wants to see what her next blood gas would look like and decide  Discussed with RT

## 2023-11-12 NOTE — PROGRESS NOTES
Attempted to place pt on weaning trial PSV 8/+5, but due to pt requiring sedation for agitation, minute ventilation was < than 4 and pt switched into backup ventilation. Dr Michael Avilez present and aware.

## 2023-11-12 NOTE — PROGRESS NOTES
After discussion made with the ICU residents, patient daughters decided to proceed with intubation. Etomidate 5mg given prior to RSI. Patient was intubated by RT using s8 ET at 24cm lip level, placement verification completed by CXR and lung auscultation. OGT and herzog catheter placed as ordered. KUB xray done for OG confirmatory placement. Patient started on Precedex and Levophed, infusions titrated accordingly - see MAR for details. Soft bilateral restraints applied for safety purposes, pt daughter Neville Alexandra notified. ABG repeated at 0531hrs, vent parameters adjusted by RT. Patient is currently responsive, open eyes spontaneously, not in acute distress. GCS 10, [id number]. Report given to day shift RN at bedside for continuity of care.

## 2023-11-12 NOTE — PROGRESS NOTES
V2.0  [facility] [place] [place] Hospitalist Progress Note      Name:  Ivet Castillo /Age/Sex: 1945  (69 y.o. female)   MRN & CSN:  [id number] & [id number] Encounter Date/Time: 2023 1:23 PM EST    Location:  [address] [place] [address] PCP: Evaristo Kelley MD       Hospital Day: 3    Assessment and Plan:   Ivet Castillo is a 69 y.o. female with dyspnea      Plan:    Hypercapnic failure  - vented  - weaning trial likely tomorrow    Pericardial Effusion  -moderate, found on CT   -cardiology consulted, echo ordered     Pulmonary hypertension  - apparent on echo  - cardiology following     Dyspnea   -2/2 pericardial effusion + pleural effusion   -lasix 40 mg BID, scn duonebs, cardiology consulted, echo pending      Hypoxia  -2/2 pleural + pericardial effusion  - on 2L NC, RA at baseline   -wean O2 as tolerated      COPD   -continue home inhalers      HLD   -continue ASA, lipitor     GERD  -continue protonix      HTN  -continue metoprolol     Ppx: DVT and ulcer ppx indications reviewed and ordered if indicated  Dispo:     Subjective:     Chief Complaint: Shortness of Breath (Shortness of breath with ambulation./Pt was sent by [facility], Dr Shelly Best, for hypoxia with ambulation in the office, spo2 70's/Soi2 90% on room air on arrival)       Patient is without complaints. No adverse interval events. Review of Systems:    Review of Systems    10 point ROS negative except as stated above in \"subjective\" section    Objective: Intake/Output Summary (Last 24 hours) at 2023 1302  Last data filed at 2023 0700  Gross per 24 hour   Intake 131 ml   Output 825 ml   Net -694 ml          Vitals:   Vitals:    23 1200   BP: (!) 88/60   Pulse: 86   Resp: 24   Temp:    SpO2: 100%       Physical Exam:     General: NAD  Eyes: EOMI  ENT: neck supple  Cardiovascular: Regular rate.   Respiratory: Clear to auscultation  Gastrointestinal: Soft, non tender  Genitourinary: no suprapubic tenderness  Musculoskeletal: No edema  Skin: warm, 7.350 - 7.450    pCO2, Arterial 41.5 35.0 - 45.0 mmHg    pO2, Arterial 183.0 (H) 75.0 - 108.0 mmHg    HCO3, Arterial 37 (H) 21 - 29 mmol/L    Base Excess, Arterial 13.0 (H) -3.0 - 3.0 mmol/L    Hemoglobin, Art, Extended 11.20 g/dL    O2 Sat, Arterial >100 93 - 100 %    Carboxyhgb, Arterial 1.2 0.0 - 1.5 %    Methemoglobin, Arterial 0.5 0.0 - 1.4 %    TCO2, Arterial 38 mmol/L        Imaging/Diagnostics Last 24 Hours   CT CHEST PULMONARY EMBOLISM W CONTRAST    Result Date: 11/10/2023  EXAM: CT ANGIOGRAPHY CHEST WITH CONTRAST (PULMONARY EMBOLUS PROTOCOL) INDICATION: hx cancer, new hypoxia, ro PE and worsening ca, COMPARISON: Most recent CT of the chest June 2, 2023 TECHNIQUE: Axial CT imaging obtained through the chest using CT pulmonary angiogram protocol. Axial images, multiplanar reformatted images and maximum intensity projection images were reviewed for CT angiographic technique. Individualized dose optimization technique was used in order to meet ALARA standards for radiation dose reduction. In addition to vendor specific dose reduction algorithms, the dose reduction techniques vary based on the specific scanner utilized but frequently include automated exposure control, adjustment of the mA and/or kV according to patient size, and use of iterative reconstruction technique. IV Contrast Media and volume: 75 mL Isovue-370 FINDINGS: DIAGNOSTIC QUALITY: Images degraded by respiratory motion. PULMONARY EMBOLI: No suspicious intraluminal filling defects on this motion degraded examination to suggest acute pulmonary emboli. RIGHT HEART STRAIN: None. LUNGS AND AIRWAYS: Right perihilar and right apical bronchiectasis/fibrotic change again noted. Large bullae within the posterior right lung apex similar to most recent study again containing a small amount of dependent debris.  More prominent atelectasis of the posterior left lower lobe compared with most recent study associated with the more prominent left pleural

## 2023-11-12 NOTE — PROGRESS NOTES
9:40 PM This RN notified NP Genna Vazquez regarding patient current mental status and condition. Blood works and BiPAP ordered - see order sheet for details. 11:10 PM MD Alli Jarvis saw patient at bedside. Plan discussed with this RN, RT and daughter. 11:46 PM Patient has no urine output since 4PM, NP Precious notified and straight cath order was placed. Discussed the plan with the family and they are agreeable. 12:07 AM NP Precious notified regarding latest VS, BP on the softer side. As per advise MAP of 65 is okay, no pressor ordered at this time. Family updated. SECURE MESSAGES below:  [address] [facility] Facility: [facility] From: Wing Seats RE: Nika Garcia 1945 RM: [id number] FYI: Patient appears more lethargic tonight. Open eyes to call, [id number], PERRLA. VSS. I'm just concern about pt current mental status. Any order? Please advise. Thank you. Need Callback: NO CALLBACK REQ Bone Marrow ROUTINE  Read 9:40 PM   11/11/23 9:42 PM  Is she answering your questions appropriately i.e. oriented? 11/11/23 9:52 PM   No, not at all. She will just open her eyes and mumble sounds. Read 9:53 PM   11/11/23 9:54 PM  Orders are in, thanks. Any other neuro changes? 11/11/23 9:57 PM   I think that's all. The daughters are at bedside and they claimed that pt had this episode when pt has UTI. Read 10:00 PM   11/11/23 10:01 PM  Ok, thanks. Please let me know when ABG is available. She has home bipap orders, will you make sure this is in place? 11/11/23 10:09 PM   I'll follow up with the RT  Read 10:21 PM   11/11/23 10:22 PM  Ok, thanks  11/11/23 10:40 PM   ABG: pH 7.04, pCO2 171.3, pO2 200.8, HCO3 46.7, BE 16.1, O2 98%. I feel like this result is a mixed blood, don't you think? Pt CPAP is not currently available at bedside but the daughter will bring it now from home. Any other order? Read 10:49 PM   11/11/23 10:49 PM  Can you have RT re-draw ABG and start Bipap with our machine?   11/11/23 10:50 PM Suni Better will do. Read 10:54 PM   11/11/23 11:46 PM   Pt has no urine output since 4PM. Bladder scan volume shows >200ml. Update: pt is on BiPAP right now, repeat blood gas is available if you want to review it. Dr. Kaykay Myers saw patient at bedside. Read 12:16 AM   11/12/23 12:07 AM  BP 83/60, HR 90, RR 17, SpO2 100%,  Read 12:16 AM   11/12/23 12:18 AM   Ok, thanks  11/12/23 12:20 AM  do you want to start any pressor if pt BP continues to drop?   Read 12:22 AM   11/12/23 12:22 AM   MAP is above 65, no need for pressor at this time, thanks

## 2023-11-12 NOTE — CONSULTS
11/11/23  2307 11/12/23  0154   PHART 7.015* 6.997*   QJY8CCL 175.3* 188.8*   PO2ART 211.9* 167.6*           DATA:       Labs:  CBC:   Recent Labs     11/10/23  1240 11/11/23  0351   WBC 4.9 7.3   HGB 11.8* 12.0   HCT 37.2 38.0    240       BMP:   Recent Labs     11/10/23  1240 11/11/23  0351 11/11/23  2245   * 137 136   K 3.9 3.7 4.3   CL 94* 96* 92*   CO2 32 33* 36*   BUN 10 10 13   CREATININE 0.6 0.5* 0.7   GLUCOSE 95 130* 155*     LFT's:   Recent Labs     11/11/23  0351 11/11/23  2245   AST 22 25   ALT 16 18   BILITOT 0.4 0.4   ALKPHOS 84 85     Troponin: No results for input(s): \"TROPONINI\" in the last 72 hours. BNP:No results for input(s): \"BNP\" in the last 72 hours. ABGs:   Recent Labs     11/11/23  2307 11/12/23  0154   PHART 7.015* 6.997*   FPS3SHH 175.3* 188.8*   PO2ART 211.9* 167.6*     INR: No results for input(s): \"INR\" in the last 72 hours. U/A:  Recent Labs     11/11/23  1134   COLORU Yellow   PHUR 6.0   WBCUA 21-50*   RBCUA 0-2   BACTERIA Rare*   CLARITYU Clear   SPECGRAV 1.025   LEUKOCYTESUR MODERATE*   UROBILINOGEN 0.2   BILIRUBINUR Negative   BLOODU Negative   GLUCOSEU Negative       CT CHEST PULMONARY EMBOLISM W CONTRAST   Final Result      No evidence of acute pulmonary emboli. New moderate size pericardial effusion. New moderate size left pleural effusion with associated more prominent   atelectasis of the posterior left lower lobe. Chronic small right pleural effusion with post radiation/fibrotic changes of the   right lung apex and perihilar region. Stable large right apical bullous containing dependent debris. XR CHEST PORTABLE   Final Result      Right greater than left pulmonary scarring with elevated right hemidiaphragm. Cavitary lesion in the right upper lobe again seen, better evaluated on recent   chest CT. Severe cardiomegaly. Left subclavian venous port tip in the mid SVC.           EKG:   Echo:  Micro:     ASSESSMENT a thoracentesis (preferably off the vent) or imaging of her brain to evaluate for possible occult metastatic disease. Critical care time spent reviewing labs/films, examining patient, collaborating with other physicians but excluding procedures for life threatening organ failure is 50 minutes.       Kellie Miller MD

## 2023-11-13 PROBLEM — J96.22 ACUTE ON CHRONIC RESPIRATORY FAILURE WITH HYPERCAPNIA (HCC): Status: ACTIVE | Noted: 2023-11-13

## 2023-11-13 PROBLEM — J43.9 BULLOUS EMPHYSEMA (HCC): Status: ACTIVE | Noted: 2023-11-13

## 2023-11-13 LAB
ALBUMIN SERPL-MCNC: 3.2 G/DL (ref 3.4–5)
ALBUMIN/GLOB SERPL: 1.1 {RATIO} (ref 1.1–2.2)
ALP SERPL-CCNC: 62 U/L (ref 40–129)
ALT SERPL-CCNC: 14 U/L (ref 10–40)
ANION GAP SERPL CALCULATED.3IONS-SCNC: 9 MMOL/L (ref 3–16)
AST SERPL-CCNC: 18 U/L (ref 15–37)
BASE EXCESS BLDA CALC-SCNC: 11 MMOL/L (ref -3–3)
BASOPHILS # BLD: 0 K/UL (ref 0–0.2)
BASOPHILS NFR BLD: 0.2 %
BILIRUB SERPL-MCNC: 0.6 MG/DL (ref 0–1)
BUN SERPL-MCNC: 24 MG/DL (ref 7–20)
CALCIUM SERPL-MCNC: 8.2 MG/DL (ref 8.3–10.6)
CHLORIDE SERPL-SCNC: 94 MMOL/L (ref 99–110)
CO2 BLDA-SCNC: 37 MMOL/L
CO2 SERPL-SCNC: 31 MMOL/L (ref 21–32)
CREAT SERPL-MCNC: 1 MG/DL (ref 0.6–1.2)
DEPRECATED RDW RBC AUTO: 14.7 % (ref 12.4–15.4)
EOSINOPHIL # BLD: 0 K/UL (ref 0–0.6)
EOSINOPHIL NFR BLD: 0 %
GFR SERPLBLD CREATININE-BSD FMLA CKD-EPI: 58 ML/MIN/{1.73_M2}
GLUCOSE SERPL-MCNC: 122 MG/DL (ref 70–99)
HCO3 BLDA-SCNC: 35.1 MMOL/L (ref 21–29)
HCT VFR BLD AUTO: 32.4 % (ref 36–48)
HGB BLD-MCNC: 10.4 G/DL (ref 12–16)
LYMPHOCYTES # BLD: 0.5 K/UL (ref 1–5.1)
LYMPHOCYTES NFR BLD: 9.2 %
MCH RBC QN AUTO: 23.6 PG (ref 26–34)
MCHC RBC AUTO-ENTMCNC: 32 G/DL (ref 31–36)
MCV RBC AUTO: 73.8 FL (ref 80–100)
MONOCYTES # BLD: 0.6 K/UL (ref 0–1.3)
MONOCYTES NFR BLD: 10.9 %
NEUTROPHILS # BLD: 4.3 K/UL (ref 1.7–7.7)
NEUTROPHILS NFR BLD: 79.7 %
PCO2 BLDA: 51.5 MM HG (ref 35–45)
PERFORMED ON: ABNORMAL
PH BLDA: 7.44 [PH] (ref 7.35–7.45)
PLATELET # BLD AUTO: 156 K/UL (ref 135–450)
PMV BLD AUTO: 8.9 FL (ref 5–10.5)
PO2 BLDA: 163.7 MM HG (ref 75–108)
POC SAMPLE TYPE: ABNORMAL
POTASSIUM SERPL-SCNC: 3.9 MMOL/L (ref 3.5–5.1)
PROT SERPL-MCNC: 6.1 G/DL (ref 6.4–8.2)
RBC # BLD AUTO: 4.39 M/UL (ref 4–5.2)
SAO2 % BLDA: 100 % (ref 93–100)
SODIUM SERPL-SCNC: 134 MMOL/L (ref 136–145)
WBC # BLD AUTO: 5.4 K/UL (ref 4–11)

## 2023-11-13 PROCEDURE — 82803 BLOOD GASES ANY COMBINATION: CPT

## 2023-11-13 PROCEDURE — 2500000003 HC RX 250 WO HCPCS

## 2023-11-13 PROCEDURE — 6370000000 HC RX 637 (ALT 250 FOR IP): Performed by: SURGERY

## 2023-11-13 PROCEDURE — 85025 COMPLETE CBC W/AUTO DIFF WBC: CPT

## 2023-11-13 PROCEDURE — 99291 CRITICAL CARE FIRST HOUR: CPT | Performed by: INTERNAL MEDICINE

## 2023-11-13 PROCEDURE — 6370000000 HC RX 637 (ALT 250 FOR IP)

## 2023-11-13 PROCEDURE — 36591 DRAW BLOOD OFF VENOUS DEVICE: CPT

## 2023-11-13 PROCEDURE — 6360000002 HC RX W HCPCS

## 2023-11-13 PROCEDURE — 2000000000 HC ICU R&B

## 2023-11-13 PROCEDURE — 6360000002 HC RX W HCPCS: Performed by: SURGERY

## 2023-11-13 PROCEDURE — 2580000003 HC RX 258: Performed by: STUDENT IN AN ORGANIZED HEALTH CARE EDUCATION/TRAINING PROGRAM

## 2023-11-13 PROCEDURE — 92610 EVALUATE SWALLOWING FUNCTION: CPT

## 2023-11-13 PROCEDURE — 94003 VENT MGMT INPAT SUBQ DAY: CPT

## 2023-11-13 PROCEDURE — 2700000000 HC OXYGEN THERAPY PER DAY

## 2023-11-13 PROCEDURE — 94640 AIRWAY INHALATION TREATMENT: CPT

## 2023-11-13 PROCEDURE — 2580000003 HC RX 258: Performed by: SURGERY

## 2023-11-13 PROCEDURE — 2580000003 HC RX 258

## 2023-11-13 PROCEDURE — 94761 N-INVAS EAR/PLS OXIMETRY MLT: CPT

## 2023-11-13 PROCEDURE — 6370000000 HC RX 637 (ALT 250 FOR IP): Performed by: STUDENT IN AN ORGANIZED HEALTH CARE EDUCATION/TRAINING PROGRAM

## 2023-11-13 PROCEDURE — 80053 COMPREHEN METABOLIC PANEL: CPT

## 2023-11-13 PROCEDURE — 6360000002 HC RX W HCPCS: Performed by: STUDENT IN AN ORGANIZED HEALTH CARE EDUCATION/TRAINING PROGRAM

## 2023-11-13 RX ADMIN — ATORVASTATIN CALCIUM 10 MG: 10 TABLET, FILM COATED ORAL at 08:02

## 2023-11-13 RX ADMIN — PREDNISONE 5 MG: 5 TABLET ORAL at 08:02

## 2023-11-13 RX ADMIN — PANTOPRAZOLE SODIUM 40 MG: 40 TABLET, DELAYED RELEASE ORAL at 06:13

## 2023-11-13 RX ADMIN — MIDODRINE HYDROCHLORIDE 5 MG: 5 TABLET ORAL at 20:34

## 2023-11-13 RX ADMIN — BUDESONIDE INHALATION 500 MCG: 0.5 SUSPENSION RESPIRATORY (INHALATION) at 20:52

## 2023-11-13 RX ADMIN — IPRATROPIUM BROMIDE AND ALBUTEROL SULFATE 1 DOSE: 2.5; .5 SOLUTION RESPIRATORY (INHALATION) at 08:18

## 2023-11-13 RX ADMIN — SODIUM CHLORIDE, PRESERVATIVE FREE 10 ML: 5 INJECTION INTRAVENOUS at 08:02

## 2023-11-13 RX ADMIN — ARFORMOTEROL TARTRATE 15 MCG: 15 SOLUTION RESPIRATORY (INHALATION) at 08:18

## 2023-11-13 RX ADMIN — FUROSEMIDE 40 MG: 10 INJECTION, SOLUTION INTRAMUSCULAR; INTRAVENOUS at 17:53

## 2023-11-13 RX ADMIN — DEXMEDETOMIDINE 0.8 MCG/KG/HR: 100 INJECTION, SOLUTION INTRAVENOUS at 01:42

## 2023-11-13 RX ADMIN — IPRATROPIUM BROMIDE AND ALBUTEROL SULFATE 1 DOSE: 2.5; .5 SOLUTION RESPIRATORY (INHALATION) at 20:52

## 2023-11-13 RX ADMIN — ACETAMINOPHEN 650 MG: 325 TABLET ORAL at 20:34

## 2023-11-13 RX ADMIN — MIDODRINE HYDROCHLORIDE 5 MG: 5 TABLET ORAL at 15:28

## 2023-11-13 RX ADMIN — ARFORMOTEROL TARTRATE 15 MCG: 15 SOLUTION RESPIRATORY (INHALATION) at 20:52

## 2023-11-13 RX ADMIN — CEFTRIAXONE 1000 MG: 1 INJECTION, POWDER, FOR SOLUTION INTRAMUSCULAR; INTRAVENOUS at 17:58

## 2023-11-13 RX ADMIN — BUDESONIDE INHALATION 500 MCG: 0.5 SUSPENSION RESPIRATORY (INHALATION) at 08:18

## 2023-11-13 RX ADMIN — FUROSEMIDE 40 MG: 10 INJECTION, SOLUTION INTRAMUSCULAR; INTRAVENOUS at 10:34

## 2023-11-13 RX ADMIN — ENOXAPARIN SODIUM 40 MG: 100 INJECTION SUBCUTANEOUS at 20:34

## 2023-11-13 RX ADMIN — MIDODRINE HYDROCHLORIDE 5 MG: 5 TABLET ORAL at 08:02

## 2023-11-13 RX ADMIN — WATER 40 MG: 1 INJECTION INTRAMUSCULAR; INTRAVENOUS; SUBCUTANEOUS at 15:00

## 2023-11-13 RX ADMIN — SODIUM CHLORIDE, PRESERVATIVE FREE 10 ML: 5 INJECTION INTRAVENOUS at 20:38

## 2023-11-13 ASSESSMENT — PAIN SCALES - GENERAL
PAINLEVEL_OUTOF10: 0

## 2023-11-13 ASSESSMENT — PULMONARY FUNCTION TESTS
PIF_VALUE: 25
PIF_VALUE: 24

## 2023-11-13 NOTE — PROGRESS NOTES
Peripheral IV assessed. 22 gauge in the right hand. Site is clean, dry, and intact. Dressing occlusive, clean, dry and intact. IV infusing. Staff RNs to assess per floor policy. Patient seen and assessed for standard line care needs. CVC site remains free of visible signs and symptoms of infection. No drainage, edema, erythema, pain, itching or warmth noted at and around the insertion site. Line care performed by Staff RN. The need for continued use of the CVC is due to ongoing therapy. Patient unable to verbalize understanding of line care education provided by line care RN due to current medical state. Staff RNs will continue to monitor and assess the CVC site throughout the patient's hospital stay. Sterile dressing changes will continue to be changed per policy.     Ousmane Miner RN on 11/13/2023 at 8:34 AM

## 2023-11-13 NOTE — PROGRESS NOTES
Physician Progress Note      PATIENT:               Francine Carr  CSN #:                  [id number]  :                       1945  ADMIT DATE:       11/10/2023 11:42 AM  DISCH DATE:  RESPONDING  PROVIDER #:        Isabel Ortega MD          QUERY TEXT:    Pt admitted with pericardial and pleural effusions and has CHF documented. If   possible, please document in progress notes and discharge summary further   specificity regarding the type and acuity of CHF:      The medical record reflects the following:  Risk Factors: 70 y/o with pericardial and pleural effusions  Clinical Indicators: echo on :   Left Ventricle  Normal left ventricle   size, moderate hypertrophy, and systolic function with an estimated ejection   fraction of 65-70%. No regional wall motion abnormalities are seen. Grade I   diastolic dysfunction with normal LV filling pressures. Pro-BNP 1,233 Card   consult note : \"Cardiology Note:- continue management per primary team-   pericardial effusion confirmed by TTE, no tamponade. No need to drain. - does   have Cor pulmonale, currently intubated. Resp failure not from cardiac   etiology. Treatment:  Strict I/Os, Daily standing weights, lab monitoring, cardiology   consult, -lasix 40 mg BID, scn duonebs, echo  Options provided:  -- Acute on Chronic Diastolic CHF/HFpEF, present on admission  -- Acute Diastolic CHF/HFpEF, present on admission  -- Other - I will add my own diagnosis  -- Disagree - Not applicable / Not valid  -- Disagree - Clinically unable to determine / Unknown  -- Refer to Clinical Documentation Reviewer    PROVIDER RESPONSE TEXT:    This patient is in acute diastolic CHF/HFpEF, present on admission.     Query created by: Faustina Larson on 2023 6:26 PM      Electronically signed by:  Isabel Ortega MD 2023 7:45 AM

## 2023-11-13 NOTE — PLAN OF CARE
Problem: ABCDS Injury Assessment  Goal: Absence of physical injury  11/13/2023 1554 by Ana Luisa Edgar RN  Outcome: Progressing  Flowsheets (Taken 11/13/2023 1554)  Absence of Physical Injury: Implement safety measures based on patient assessment     Problem: Safety - Adult  Goal: Free from fall injury  11/13/2023 1554 by Ana Luisa Edgar RN  Outcome: Progressing  Flowsheets (Taken 11/13/2023 1554)  Free From Fall Injury: Instruct family/caregiver on patient safety  Note:   Pt is a High fall risk. See Annlen Sacks Fall Score and ABCDS Injury Risk assessments.   + Screening for Orthostasis and/or + High Fall Risk per MARIE/ABCDS: Explained fall risk precautions to pt and family and rationale behind their use to keep the patient safe. Pt bed is in low position, side rails up, call light and belongings are in reach. Fall wristband applied and present on pts wrist.  Bed alarm on. Pt encouraged to call for assistance. Will continue with hourly rounds for PO intake, pain needs, toileting and repositioning as needed.       Problem: Chronic Conditions and Co-morbidities  Goal: Patient's chronic conditions and co-morbidity symptoms are monitored and maintained or improved  11/13/2023 1554 by Ana Luisa Edgar RN  Outcome: Progressing  Flowsheets (Taken 11/13/2023 1554)  Care Plan - Patient's Chronic Conditions and Co-Morbidity Symptoms are Monitored and Maintained or Improved:   Monitor and assess patient's chronic conditions and comorbid symptoms for stability, deterioration, or improvement   Collaborate with multidisciplinary team to address chronic and comorbid conditions and prevent exacerbation or deterioration   Update acute care plan with appropriate goals if chronic or comorbid symptoms are exacerbated and prevent overall improvement and discharge     Problem: Respiratory - Adult  Goal: Achieves optimal ventilation and oxygenation  11/13/2023 1554 by Ana Luisa Edgar RN  Outcome: Progressing  Flowsheets (Taken 11/13/2023 reason for restraint   Every 2 hours: Monitor safety, psychosocial status, comfort, nutrition and hydration  Note: Restraints removed prior to extubation

## 2023-11-13 NOTE — PROGRESS NOTES
Collection Time: 11/13/23  4:15 AM   Result Value Ref Range    Sodium 134 (L) 136 - 145 mmol/L    Potassium reflex Magnesium 3.9 3.5 - 5.1 mmol/L    Chloride 94 (L) 99 - 110 mmol/L    CO2 31 21 - 32 mmol/L    Anion Gap 9 3 - 16    Glucose 122 (H) 70 - 99 mg/dL    BUN 24 (H) 7 - 20 mg/dL    Creatinine 1.0 0.6 - 1.2 mg/dL    Est, Glom Filt Rate 58 (A) >60    Calcium 8.2 (L) 8.3 - 10.6 mg/dL    Total Protein 6.1 (L) 6.4 - 8.2 g/dL    Albumin 3.2 (L) 3.4 - 5.0 g/dL    Albumin/Globulin Ratio 1.1 1.1 - 2.2    Total Bilirubin 0.6 0.0 - 1.0 mg/dL    Alkaline Phosphatase 62 40 - 129 U/L    ALT 14 10 - 40 U/L    AST 18 15 - 37 U/L   CBC with Auto Differential    Collection Time: 11/13/23  4:15 AM   Result Value Ref Range    WBC 5.4 4.0 - 11.0 K/uL    RBC 4.39 4.00 - 5.20 M/uL    Hemoglobin 10.4 (L) 12.0 - 16.0 g/dL    Hematocrit 32.4 (L) 36.0 - 48.0 %    MCV 73.8 (L) 80.0 - 100.0 fL    MCH 23.6 (L) 26.0 - 34.0 pg    MCHC 32.0 31.0 - 36.0 g/dL    RDW 14.7 12.4 - 15.4 %    Platelets 878 200 - 738 K/uL    MPV 8.9 5.0 - 10.5 fL    Neutrophils % 79.7 %    Lymphocytes % 9.2 %    Monocytes % 10.9 %    Eosinophils % 0.0 %    Basophils % 0.2 %    Neutrophils Absolute 4.3 1.7 - 7.7 K/uL    Lymphocytes Absolute 0.5 (L) 1.0 - 5.1 K/uL    Monocytes Absolute 0.6 0.0 - 1.3 K/uL    Eosinophils Absolute 0.0 0.0 - 0.6 K/uL    Basophils Absolute 0.0 0.0 - 0.2 K/uL   POCT Arterial    Collection Time: 11/13/23  9:23 AM   Result Value Ref Range    pH, Arterial 7.441 7.350 - 7.450    pCO2, Arterial 51.5 (H) 35.0 - 45.0 mm Hg    pO2, Arterial 163.7 (H) 75.0 - 108.0 mm Hg    HCO3, Arterial 35.1 (H) 21.0 - 29.0 mmol/L    Base Excess, Arterial 11 (H) -3 - 3    O2 Sat, Arterial 100 93 - 100 %    TCO2, Arterial 37 Not Established mmol/L    Sample Type ART     Performed on SEE BELOW         Imaging/Diagnostics Last 24 Hours   CT CHEST PULMONARY EMBOLISM W CONTRAST    Result Date: 11/10/2023  EXAM: CT ANGIOGRAPHY CHEST WITH CONTRAST (PULMONARY EMBOLUS PROTOCOL) INDICATION: hx cancer, new hypoxia, ro PE and worsening ca, COMPARISON: Most recent CT of the chest June 2, 2023 TECHNIQUE: Axial CT imaging obtained through the chest using CT pulmonary angiogram protocol. Axial images, multiplanar reformatted images and maximum intensity projection images were reviewed for CT angiographic technique. Individualized dose optimization technique was used in order to meet ALARA standards for radiation dose reduction. In addition to vendor specific dose reduction algorithms, the dose reduction techniques vary based on the specific scanner utilized but frequently include automated exposure control, adjustment of the mA and/or kV according to patient size, and use of iterative reconstruction technique. IV Contrast Media and volume: 75 mL Isovue-370 FINDINGS: DIAGNOSTIC QUALITY: Images degraded by respiratory motion. PULMONARY EMBOLI: No suspicious intraluminal filling defects on this motion degraded examination to suggest acute pulmonary emboli. RIGHT HEART STRAIN: None. LUNGS AND AIRWAYS: Right perihilar and right apical bronchiectasis/fibrotic change again noted. Large bullae within the posterior right lung apex similar to most recent study again containing a small amount of dependent debris. More prominent atelectasis of the posterior left lower lobe compared with most recent study associated with the more prominent left pleural effusion. Moderate emphysematous changes. Stable chronic atelectasis or scarring in the right lung base. PLEURA: New moderate size low-density simple appearing left pleural effusion. Chronic small right pleural effusion similar to prior study with stable smooth pleural thickening of the inferior right hemithorax. No gas within the pleural spaces. HEART / GREAT VESSELS: Cardiomegaly. New moderate size pericardial effusion measuring up to 1.8 cm in thickness adjacent to the right atrium. Stable mild ectasia of the ascending thoracic aorta 3.8 cm.

## 2023-11-13 NOTE — PROGRESS NOTES
Speech Language Pathology  Facility/Department:[address] [facility]   Evaluation  Name: Jessa Nolen  : 1945  MRN: [id number]                                                     Patient Diagnosis(es):   Patient Active Problem List    Diagnosis Date Noted    Chronic systolic (congestive) heart failure 2022    Acute on chronic respiratory failure with hypercapnia ([address]) 2023    Bullous emphysema ([address]) 2023    Severe malnutrition ([address]) 2023    Pericardial effusion 11/10/2023    Simple chronic bronchitis ([address]) 2023    Weight loss, unintentional 2023    SHANNA (obstructive sleep apnea) 2021    Hyponatremia 2021    Mixed hyperlipidemia 2019    Tachycardia 2019    Diplopia     Primary lung adenocarcinoma, right ([address])     Weakness     DNR (do not resuscitate) discussion     Patient in cancer related research study 2017    Encounter for chemotherapy management 2017    Primary malignant neoplasm of right upper lobe of lung ([address]) 2017    Secondary malignant neoplasm of liver ([address]) 2017    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 History of Present Illness  Per MD notes:  \"Mrs. Carley Fleischer is a 69 y.o.  F with pmhx of HTN, HLD, COPD, HFrEF, and adenocarcinoma of the Lung ([facility] patient) who presented from outpatient oncologist for hypoxia spo2 to the 70's with ambulation during her

## 2023-11-13 NOTE — CONSULTS
ICU Progress Note    Admit Date: 11/10/2023  Day: 2  Vent Day: None  IV Access:Peripheral  IV Fluids:None  Vasopressors:None                Antibiotics: None  Diet: Diet NPO    CC: hypoxia     Interval history:   Pt was seen and examined at bedside. No acute events overnight. Pt is doing well this morning. Follows all commands, gave me a thumbs up stating she's doing good. Labs reviewed. PRETTY resolved. Percedex gtt 0.4 gtt    SBT today, ABG and extubation. HPI:   \"Mrs. Elias Manning is a 69 y.o. F with pmhx of HTN, HLD, COPD, HFrEF, and adenocarcinoma of the Lung ([facility] patient) who presented from outpatient oncologist for hypoxia spo2 to the 70's with ambulation during her appointment. Pt is currently unresponsive to verbal stimuli and has been managed by hospitalist on [facility]. ICU consult placed for hypoxia on BiPAP and worsening respiratory acidemia. ABG pH 7.015, pCO2 175.3, HCO3 44.7 at 2314. Pt seen by ICU team with recheck ABG at 0159 which revealed pH 6.997, pCO2 188.8, HCO3 46.3. After discussion with two daughters at bedside decision to intubate pt was made with OG tube and herzog catheter placement. Pt currently is responsive to painful stimuli only and does not appear in acute distress. \"    Medications:     Scheduled Meds:   midodrine  5 mg Oral TID    predniSONE  5 mg Oral Daily    ipratropium 0.5 mg-albuterol 2.5 mg  1 Dose Inhalation BID RT    cefTRIAXone (ROCEPHIN) IV  1,000 mg IntraVENous Q24H    aspirin  81 mg Oral Once    atorvastatin  10 mg Oral Daily    [Held by provider] furosemide  20 mg Oral Daily    [Held by provider] metoprolol succinate  25 mg Oral Daily    pantoprazole  40 mg Oral QAM AC    [Held by provider] furosemide  40 mg IntraVENous BID    sodium chloride flush  5-40 mL IntraVENous 2 times per day    enoxaparin  40 mg SubCUTAneous Daily    budesonide  0.5 mg Nebulization BID RT    arformoterol tartrate  15 mcg Nebulization BID RT     Continuous Infusions:   dexmedetomidine

## 2023-11-13 NOTE — CARE COORDINATION
Case Management Assessment  Initial Evaluation    Date/Time of Evaluation: 11/13/2023 1:30 PM  Assessment Completed by: Sarah Curran RN    If patient is discharged prior to next notation, then this note serves as note for discharge by case management. Patient Name: Narayan Valente                   YOB: 1945  Diagnosis: Shortness of breath [R06.02]  Pericardial effusion [I31.39]                   Date / Time: 11/10/2023 11:42 AM    Patient Admission Status: Inpatient   Readmission Risk (Low < 19, Mod (19-27), High > 27): Readmission Risk Score: 14.4    Current PCP: Jeff Prince MD  PCP verified by CM? Yes    Chart Reviewed: Yes      History Provided by: Child/Family  Patient Orientation: Unable to Assess (pt intubated, but able to nod head)    Patient Cognition: Alert    Hospitalization in the last 30 days (Readmission):  No    If yes, Readmission Assessment in  Navigator will be completed. Advance Directives:      Code Status: DNR-CCA   Patient's Primary Decision Maker is: Legal Next of Kin    Primary Decision MakerSue Hsu - [phone]    Discharge Planning:    Patient lives with: Children Type of Home: House  Primary Care Giver: Self  Patient Support Systems include: Children, Family Members   Current Financial resources: Medicare  Current community resources: None  Current services prior to admission: Durable Medical Equipment            Current DME: Cane, Cpap            Type of Home Care services:  None    ADLS  Prior functional level: Assistance with the following:, Housework, Shopping, Mobility, Cooking  Current functional level:      PT AM-PAC:   /24  OT AM-PAC:   /24    Family can provide assistance at DC: Yes  Would you like Case Management to discuss the discharge plan with any other family members/significant others, and if so, who?  Yes (daughters)  Plans to Return to Present Housing: Unknown at present  Other Identified Issues/Barriers to RETURNING to current

## 2023-11-13 NOTE — PLAN OF CARE
Problem: Safety - Adult  Goal: Free from fall injury  Outcome: Progressing  + Screening for Orthostasis and/or + High Fall Risk per MARIE/ABCDS: Explained fall risk precautions to pt and family and rationale behind their use to keep the patient safe. Pt bed is in low position, side rails up, call light and belongings are in reach. Fall wristband applied and present on pts wrist.  Bed alarm on. Will continue with hourly rounds for pain needs, toileting and repositioning. Problem: Respiratory - Adult  Goal: Achieves optimal ventilation and oxygenation  Outcome: Progressing   No acute event overnight. Patient tolerating ventilator well with no issues. Problem: Cardiovascular - Adult  Goal: Maintains optimal cardiac output and hemodynamic stability  Outcome: Progressing   VSS. No pressor at this time. Problem: Safety - Medical Restraint  Goal: Remains free of injury from restraints (Restraint for Interference with Medical Device)  Description: INTERVENTIONS:  1. Determine that other, less restrictive measures have been tried or would not be effective before applying the restraint  2. Evaluate the patient's condition at the time of restraint application  3. Inform patient/family regarding the reason for restraint  4.  Q2H: Monitor safety, psychosocial status, comfort, nutrition and hydration  Outcome: Progressing  Flowsheets  Taken 11/13/2023 0400 by [address]ah[address], Kat Khanna, RN  Remains free of injury from restraints (restraint for interference with medical device):   Determine that other, less restrictive measures have been tried or would not be effective before applying the restraint   Evaluate the patient's condition at the time of restraint application   Inform patient/family regarding the reason for restraint   Every 2 hours: Monitor safety, psychosocial status, comfort, nutrition and hydration     Problem: Skin/Tissue Integrity - Adult  Goal: Skin integrity remains intact  Outcome: Progressing   Patient

## 2023-11-13 NOTE — PLAN OF CARE
Completed clinical swallow evaluation. Please see report in EMR.      Aundrea Marquez M.A., [facility]  Speech-Language Pathologist

## 2023-11-14 PROCEDURE — 2580000003 HC RX 258

## 2023-11-14 PROCEDURE — 6360000002 HC RX W HCPCS

## 2023-11-14 PROCEDURE — 94761 N-INVAS EAR/PLS OXIMETRY MLT: CPT

## 2023-11-14 PROCEDURE — 97530 THERAPEUTIC ACTIVITIES: CPT

## 2023-11-14 PROCEDURE — 94640 AIRWAY INHALATION TREATMENT: CPT

## 2023-11-14 PROCEDURE — 6370000000 HC RX 637 (ALT 250 FOR IP): Performed by: STUDENT IN AN ORGANIZED HEALTH CARE EDUCATION/TRAINING PROGRAM

## 2023-11-14 PROCEDURE — 5A09457 ASSISTANCE WITH RESPIRATORY VENTILATION, 24-96 CONSECUTIVE HOURS, CONTINUOUS POSITIVE AIRWAY PRESSURE: ICD-10-PCS | Performed by: INTERNAL MEDICINE

## 2023-11-14 PROCEDURE — 6370000000 HC RX 637 (ALT 250 FOR IP)

## 2023-11-14 PROCEDURE — 2700000000 HC OXYGEN THERAPY PER DAY

## 2023-11-14 PROCEDURE — 97162 PT EVAL MOD COMPLEX 30 MIN: CPT

## 2023-11-14 PROCEDURE — 2060000000 HC ICU INTERMEDIATE R&B

## 2023-11-14 PROCEDURE — 97166 OT EVAL MOD COMPLEX 45 MIN: CPT

## 2023-11-14 PROCEDURE — 97535 SELF CARE MNGMENT TRAINING: CPT

## 2023-11-14 PROCEDURE — 92526 ORAL FUNCTION THERAPY: CPT

## 2023-11-14 PROCEDURE — 97116 GAIT TRAINING THERAPY: CPT

## 2023-11-14 PROCEDURE — 99233 SBSQ HOSP IP/OBS HIGH 50: CPT | Performed by: INTERNAL MEDICINE

## 2023-11-14 PROCEDURE — 2580000003 HC RX 258: Performed by: STUDENT IN AN ORGANIZED HEALTH CARE EDUCATION/TRAINING PROGRAM

## 2023-11-14 PROCEDURE — 94660 CPAP INITIATION&MGMT: CPT

## 2023-11-14 RX ORDER — LABETALOL HYDROCHLORIDE 5 MG/ML
10 INJECTION, SOLUTION INTRAVENOUS EVERY 4 HOURS PRN
Status: DISCONTINUED | OUTPATIENT
Start: 2023-11-14 | End: 2023-11-17 | Stop reason: HOSPADM

## 2023-11-14 RX ORDER — SODIUM CHLORIDE 9 MG/ML
INJECTION, SOLUTION INTRAVENOUS CONTINUOUS
Status: ACTIVE | OUTPATIENT
Start: 2023-11-14 | End: 2023-11-15

## 2023-11-14 RX ADMIN — IRON SUCROSE 300 MG: 20 INJECTION, SOLUTION INTRAVENOUS at 08:28

## 2023-11-14 RX ADMIN — ARFORMOTEROL TARTRATE 15 MCG: 15 SOLUTION RESPIRATORY (INHALATION) at 19:40

## 2023-11-14 RX ADMIN — CEFTRIAXONE 1000 MG: 1 INJECTION, POWDER, FOR SOLUTION INTRAMUSCULAR; INTRAVENOUS at 17:05

## 2023-11-14 RX ADMIN — MIDODRINE HYDROCHLORIDE 5 MG: 5 TABLET ORAL at 08:31

## 2023-11-14 RX ADMIN — MIDODRINE HYDROCHLORIDE 5 MG: 5 TABLET ORAL at 15:19

## 2023-11-14 RX ADMIN — FUROSEMIDE 40 MG: 10 INJECTION, SOLUTION INTRAMUSCULAR; INTRAVENOUS at 08:28

## 2023-11-14 RX ADMIN — WATER 40 MG: 1 INJECTION INTRAMUSCULAR; INTRAVENOUS; SUBCUTANEOUS at 08:28

## 2023-11-14 RX ADMIN — ENOXAPARIN SODIUM 40 MG: 100 INJECTION SUBCUTANEOUS at 20:47

## 2023-11-14 RX ADMIN — SODIUM CHLORIDE, PRESERVATIVE FREE 10 ML: 5 INJECTION INTRAVENOUS at 20:48

## 2023-11-14 RX ADMIN — IPRATROPIUM BROMIDE AND ALBUTEROL SULFATE 1 DOSE: 2.5; .5 SOLUTION RESPIRATORY (INHALATION) at 19:39

## 2023-11-14 RX ADMIN — METOPROLOL SUCCINATE 25 MG: 25 TABLET, EXTENDED RELEASE ORAL at 08:31

## 2023-11-14 RX ADMIN — SODIUM CHLORIDE, PRESERVATIVE FREE 10 ML: 5 INJECTION INTRAVENOUS at 08:31

## 2023-11-14 RX ADMIN — MIDODRINE HYDROCHLORIDE 5 MG: 5 TABLET ORAL at 20:47

## 2023-11-14 RX ADMIN — ATORVASTATIN CALCIUM 10 MG: 10 TABLET, FILM COATED ORAL at 08:31

## 2023-11-14 RX ADMIN — BUDESONIDE INHALATION 500 MCG: 0.5 SUSPENSION RESPIRATORY (INHALATION) at 19:40

## 2023-11-14 RX ADMIN — PANTOPRAZOLE SODIUM 40 MG: 40 TABLET, DELAYED RELEASE ORAL at 06:09

## 2023-11-14 RX ADMIN — SODIUM CHLORIDE: 9 INJECTION, SOLUTION INTRAVENOUS at 10:14

## 2023-11-14 RX ADMIN — FUROSEMIDE 40 MG: 10 INJECTION, SOLUTION INTRAMUSCULAR; INTRAVENOUS at 18:38

## 2023-11-14 NOTE — CONSULTS
UTI  UA with leukocyte esterase, WBC, bacteria. Hx with pan sensitive E.coli  - cont Ceftriaxone 1g (11/15-)    HFrEF  11/11/23: Ef of 65-70%. Moderate tricuspid regurgitation. The right atrium is moderately to severely dilated. Moderate pericardial effusion. No tamponade. Pleural effusion noted. elevated RA pressure (8 mmHg). Estimated pulmonary artery systolic pressure is severely elevated at 74 mmHg  assuming a right atrial pressure of 8 mmHg. - strict I/Os  - daily weights  - lasix   - metoprolol 25 mg QD     HLD   -continue ASA, lipitor     GERD  -continue protonix      HTN  -held metoprolol for soft BP     Code Status:DNR-CCA  FEN: Diet NPO   PPX: Lovenox   DISPO: [facility]  Code Status: DNR-CCA  FEN: Diet NPO   PPX: lovenox 40mcg subQ  DISPO: [facility]    Gopi Mcfarland MD, PGY-1  11/14/23  7:27 AM    This patient has been staffed and discussed with Dr. Deandra Leonard     Patient seen and examined. I agree with Dr. Vinayak Aguero history, physical, lab findings, assessment and plan. Jerardo Olivas was extubated yesterday and is doing well. She is alert and oriented. No dyspnea, cough or wheezing. O2 sat 95% on 1 L NC     It is unclear why she had an acute on chronic hypercapnic resp failure. Based on her pre-intubation ABG it appeared she had very little ventilation but no V/Q mismatch as post intubation ABG showed paO2 475 on 100% FIO2. She did not get any respiratory suppressants     She does have a Hx of SHANNA but has lost a lot of weight since then.  She has a Dx of COPD but no PFTs and Stage IV NSCLC 2017 s/p chemo/XRT paratracheal LAD and now on Tafinlar and Mekinist for control     I reviewed CT Chest and showed bullous emphysema, fibrosis, bronchiectasis and small left pleural effusion      Plan    -Monitor closely for recurrent acute hypercapnic resp failure  - Wean oxygen to off with goal O2 sat 88%  -Solumedrol 40 mg IV D#3 of 5 days  -PT/OT  -Start regular diet  -No immediate

## 2023-11-14 NOTE — PROGRESS NOTES
Physical Therapy  Facility/Department: [address]  Physical Therapy Initial Assessment/Treatment    Name: Luis Monroy  : 1945  MRN: [id number]  Date of Service: 2023    Discharge Recommendations:  Home with Home health PT, 24 hour supervision or assist   PT Equipment Recommendations  Equipment Needed: Yes  Mobility Devices: Rosana Walton: [id number] Jessica Arriaga scored a 18/24 on the AM-PAC short mobility form. Current research shows that an AM-PAC score of 18 or greater is typically associated with a discharge to the patient's home setting. Based on the patient's AM-PAC score and their current functional mobility deficits, it is recommended that the patient have 2-3 sessions per week of Physical Therapy at d/c to increase the patient's independence. At this time, this patient demonstrates the endurance and safety to discharge home with home services and a follow up treatment frequency of 2-3x/wk. Please see assessment section for further patient specific details. If patient discharges prior to next session this note will serve as a discharge summary. Please see below for the latest assessment towards goals. ,    Patient Diagnosis(es): The encounter diagnosis was Shortness of breath. Past Medical History:  has a past medical history of Arthritis, Cancer ([address]), Hyperlipidemia, and Hypertension. Past Surgical History:  has a past surgical history that includes Tubal ligation; bronchoscopy (2017); and other surgical history (Left, 2017). Assessment   Assessment: pt presents from home where she is normally IND with mobility without AD in home and use of cane in community. Pt now requiring min A to ambulate short distances up to 20ft, appears unsteady without use of AD and reaching out for objects in room, improved steadiness with use of RW. Pt on 2L O2 (none at baseline) with PHAM noted with mobility, spO2 in low 90s throughout.  Pt is below her baseline and Minimum assistance (grab bars)  Gait Training  Gait Training: Yes  Gait  Overall Level of Assistance: Minimum assistance;Contact-guard assistance (initially Ethel no AD progressing to [facility] with RW)  Distance (ft):  (10+20)  Assistive Device: Walker, rolling;Gait belt  Interventions: Verbal cues  Base of Support: Narrowed  Speed/Ev: Slow                                        AM-PAC Score  AM-PAC Inpatient Mobility Raw Score : 18 (11/14/23 1541)  AM-PAC Inpatient T-Scale Score : 43.63 (11/14/23 1541)  Mobility Inpatient CMS 0-100% Score: 46.58 (11/14/23 1541)  Mobility Inpatient CMS G-Code Modifier : CK (11/14/23 1541)             Goals  Short Term Goals  Time Frame for Short Term Goals: discharge  Short Term Goal 1: pt will perf supine<>sit IND  Short Term Goal 2: pt will perf STS to LRAD mod I  Short Term Goal 3: pt will amb 150ft with LRAD mod I  Short Term Goal 4: pt will negotiate 12 stairs with rail and SBA       Education  Patient Education  Education Given To: Patient; Family  Education Provided: Role of Therapy;Plan of Care;Transfer Training;Precautions; Energy Conservation  Education Method: Verbal  Barriers to Learning: None  Education Outcome: Verbalized understanding      Therapy Time   Individual Concurrent Group Co-treatment   Time In 7672         Time Out 1523         Minutes 38             Timed Code Treatment Minutes: 23    Total Treatment Minutes: Bobby Mercado PT, DPT, NCS, [facility]

## 2023-11-14 NOTE — PROGRESS NOTES
Occupational Therapy  Facility/Department: [address]  Occupational Therapy Initial Assessment and Tx    Name: Josie Ortiz  : 1945  MRN: [id number]  Date of Service: 2023    Discharge Recommendations: Josie Ortiz scored a 19/24 on the AM-PAC ADL Inpatient form. Current research shows that an AM-PAC score of 18 or greater is typically associated with a discharge to the patient's home setting. Based on the patient's AM-PAC score, and their current ADL deficits, it is recommended that the patient have 2-3 sessions per week of Occupational Therapy at d/c to increase the patient's independence. At this time, this patient demonstrates the endurance and safety to discharge home with (home services) and a follow up treatment frequency of 2-3x/wk. Please see assessment section for further patient specific details. If patient discharges prior to next session this note will serve as a discharge summary. Please see below for the latest assessment towards goals. OT Equipment Recommendations  Equipment Needed: No  Other: Pt has needed DME (Shower chair)       Patient Diagnosis(es): The encounter diagnosis was Shortness of breath. Past Medical History:  has a past medical history of Arthritis, Cancer ([address]), Hyperlipidemia, and Hypertension. Past Surgical History:  has a past surgical history that includes Tubal ligation; bronchoscopy (2017); and other surgical history (Left, 2017). Assessment   Performance deficits / Impairments: Decreased functional mobility ; Decreased strength;Decreased endurance;Decreased ADL status; Decreased balance  Assessment: Pt is 70 y/o female who presents from home with shortness of breath. Pt presents functioning below her baseline and demos decreased activity tolerance, global deconditioning and generalized weakness. Pt requires Ethel to CGA for functional t/f and functional mobility.  Pt will benefit from continued skilled OT to

## 2023-11-14 NOTE — PLAN OF CARE
0107 by Allison Echeverria RN  Outcome: Progressing  Flowsheets (Taken 11/13/2023 2000)  Hemodynamic stability and optimal renal function maintained: Monitor labs and assess for signs and symptoms of volume excess or deficit  11/13/2023 1554 by Bina Woods RN  Outcome: Progressing  Flowsheets (Taken 11/13/2023 1554)  Hemodynamic stability and optimal renal function maintained:   Monitor labs and assess for signs and symptoms of volume excess or deficit   Monitor intake, output and patient weight     Problem: Nutrition Deficit:  Goal: Optimize nutritional status  Outcome: Progressing     Problem: Pain  Goal: Verbalizes/displays adequate comfort level or baseline comfort level  11/14/2023 0107 by Allison Echeverria RN  Outcome: Progressing  11/13/2023 1554 by Bina Woods RN  Flowsheets (Taken 11/13/2023 1554)  Verbalizes/displays adequate comfort level or baseline comfort level:   Encourage patient to monitor pain and request assistance   Implement non-pharmacological measures as appropriate and evaluate response   Administer analgesics based on type and severity of pain and evaluate response   Assess pain using appropriate pain scale   Consider cultural and social influences on pain and pain management   Notify Licensed Independent Practitioner if interventions unsuccessful or patient reports new pain  Note: Pt denies pain at this time. Pain assessed Q2 by nursig     Problem: Skin/Tissue Integrity - Adult  Goal: Skin integrity remains intact  11/14/2023 0107 by Allison Echeverria RN  Outcome: Progressing  Flowsheets (Taken 11/13/2023 2000)  Skin Integrity Remains Intact: Monitor for areas of redness and/or skin breakdown  11/13/2023 1554 by Bina Woods RN  Outcome: Progressing  Flowsheets (Taken 11/13/2023 1554)  Skin Integrity Remains Intact: Monitor for areas of redness and/or skin breakdown  Note: Skin breakdown prevention in place. Pt repositioned and assessed for incontinence Q2hrs and prn.  No s/s of new skin breakdown noted. See flowsheets for skin assessment. Offloading, pillows, and wedge support utilized. Sacral heart on coccyx and bony prominences- C/D/I. Pt on specialty bed       Problem: Hematologic - Adult  Goal: Maintains hematologic stability  11/14/2023 0107 by Arie Licea RN  Outcome: Progressing  11/13/2023 1554 by Rayshawn Nguyen RN  Outcome: Progressing  Note: Patient's hemoglobin this AM:   Recent Labs     11/13/23  0415   HGB 10.4*     Patient's platelet count this AM:   Recent Labs     11/13/23  0415       Thrombocytopenia not present at this time. Patient showing no signs or symptoms of active bleeding. Transfusion not indicated at this time. Patient verbalizes understanding of all instructions. Will continue to assess and implement POC. Call light within reach and hourly rounding in place.

## 2023-11-14 NOTE — PLAN OF CARE
VSS. NSR/ST on monitor. Patient is alert and oriented, denies any pain. Patient seen by SLP today and started on regular diet, tolerating well. Assisted with turns for pressure ulcer prevention. Attempted to wean 1 liter 02 off today, not tolerating at that time. Good urine output. PT/OT ordered. Medication for DVT prophylaxis. Free from any injury. Patient and family updated on plan of care. Will continue plan of care.       Problem: ABCDS Injury Assessment  Goal: Absence of physical injury  11/14/2023 1414 by Sharmila Nunez RN  Outcome: Progressing  11/14/2023 0107 by Viky Shi RN  Outcome: Progressing  Flowsheets (Taken 11/13/2023 2000)  Absence of Physical Injury: Implement safety measures based on patient assessment     Problem: Safety - Adult  Goal: Free from fall injury  11/14/2023 1414 by Sharmila Nunez RN  Outcome: Progressing  11/14/2023 0107 by Viky Shi RN  Outcome: Progressing  Flowsheets (Taken 11/13/2023 2000)  Free From Fall Injury: Instruct family/caregiver on patient safety     Problem: Chronic Conditions and Co-morbidities  Goal: Patient's chronic conditions and co-morbidity symptoms are monitored and maintained or improved  11/14/2023 1414 by Sharmila Nunez RN  Outcome: Progressing  Flowsheets (Taken 11/14/2023 0750)  Care Plan - Patient's Chronic Conditions and Co-Morbidity Symptoms are Monitored and Maintained or Improved: Monitor and assess patient's chronic conditions and comorbid symptoms for stability, deterioration, or improvement  11/14/2023 0107 by Viky Shi RN  Outcome: Progressing  Flowsheets (Taken 11/13/2023 2000)  Care Plan - Patient's Chronic Conditions and Co-Morbidity Symptoms are Monitored and Maintained or Improved: Monitor and assess patient's chronic conditions and comorbid symptoms for stability, deterioration, or improvement     Problem: Respiratory - Adult  Goal: Achieves optimal ventilation and oxygenation  11/14/2023 1414 by Sharmila Nunez stability and optimal renal function maintained: Monitor labs and assess for signs and symptoms of volume excess or deficit     Problem: Nutrition Deficit:  Goal: Optimize nutritional status  11/14/2023 1414 by Chang Velázquez RN  Outcome: Progressing  11/14/2023 0107 by Rommel Stiles RN  Outcome: Progressing     Problem: Pain  Goal: Verbalizes/displays adequate comfort level or baseline comfort level  11/14/2023 1414 by Chang Velázquez RN  Outcome: Progressing  11/14/2023 0107 by Rommel Stiles RN  Outcome: Progressing     Problem: Skin/Tissue Integrity - Adult  Goal: Skin integrity remains intact  11/14/2023 1414 by Chang Velázquez RN  Outcome: Progressing  Flowsheets (Taken 11/14/2023 0750)  Skin Integrity Remains Intact: Monitor for areas of redness and/or skin breakdown  11/14/2023 0107 by Rommel Stiles RN  Outcome: Progressing  Flowsheets (Taken 11/13/2023 2000)  Skin Integrity Remains Intact: Monitor for areas of redness and/or skin breakdown     Problem: Hematologic - Adult  Goal: Maintains hematologic stability  11/14/2023 1414 by Chang Velázquez RN  Outcome: Progressing  Flowsheets (Taken 11/14/2023 0750)  Maintains hematologic stability: Assess for signs and symptoms of bleeding or hemorrhage  11/14/2023 0107 by Rommel Stiles RN  Outcome: Progressing     Problem: Skin/Tissue Integrity  Goal: Absence of new skin breakdown  Description: 1. Monitor for areas of redness and/or skin breakdown  2. Assess vascular access sites hourly  3. Every 4-6 hours minimum:  Change oxygen saturation probe site  4. Every 4-6 hours:  If on nasal continuous positive airway pressure, respiratory therapy assess nares and determine need for appliance change or resting period.   Outcome: Progressing

## 2023-11-14 NOTE — PROGRESS NOTES
Comprehensive Nutrition Assessment    RECOMMENDATIONS:  PO Diet: Continue Regular  ONS: Begin Ensure +HP tid  Nutrition Education: Education not indicated     NUTRITION ASSESSMENT:   Nutritional summary & status: Follow-up. Pt extubated 11/13. Diet upgraded to Regular from NPO on this date per SLP. Pt with other staff in room on attempted visit. Pt receptive to receiving Ensure to promote adequate nutrition prior to NPO status. Will reorder and monitor acceptance. No BM noted since admit. If no BM in next 24-48 hrs, recommend initiation of bowel regimen. Continue to follow. Admission // PMH: Pericardial effusion//metastatic adenocarcinoma of the lung Stage IV, CHF, HTN/HLD    MALNUTRITION ASSESSMENT  Context of Malnutrition: Chronic Illness   Malnutrition Status: Severe malnutrition  Findings of the 6 clinical characteristics of malnutrition (Minimum of 2 out of 6 clinical characteristics is required to make the diagnosis of moderate or severe Protein Calorie Malnutrition based on AND/ASPEN Guidelines):  Energy Intake:  75% or less estimated energy requirements for 1 month or longer  Weight Loss:  Greater than 20% over 1 year     Body Fat Loss:  Severe body fat loss Buccal region   Muscle Mass Loss:  Mild muscle mass loss Temples (temporalis), Clavicles (pectoralis & deltoids), Hand (interosseous)  Fluid Accumulation:  No significant fluid accumulation     Strength:  Not Performed    NUTRITION DIAGNOSIS   Severe malnutrition related to catabolic illness as evidenced by Criteria as identified in malnutrition assessment    Nutrition Monitoring and Evaluation:   Food/Nutrient Intake Outcomes:  Food and Nutrient Intake, Supplement Intake  Physical Signs/Symptoms Outcomes:  Biochemical Data, Nutrition Focused Physical Findings, Weight     OBJECTIVE DATA: Significant to nutrition assessment  Nutrition Related Findings: Glu 122, Na 134. No BM noted. No edema.   Wounds: None  Nutrition Goals: Meet at least 75% of estimated needs, prior to discharge     [address] DIET; Regular  PO Intake: NPO (diet adv to Regular 11/14)   PO Supplement Intake:None Ordered (Ensure reordered 11/14)  Additional Sources of Calories/IVF:N/A     COMPARATIVE STANDARDS  Energy (kcal):  1790-1587 (30-35 kcal/kg CBW)     Protein (g):  75-90 (1.5-1.8 g/kg CBW)       Fluid (ml/day):  1 mL/kcal or per MD    ANTHROPOMETRICS  Current Height: 167.6 cm (5' 6\")  Current Weight - Scale: 50 kg (110 lb 3.7 oz)    Admission weight: 51.3 kg (113 lb)    The patient will be monitored per nutrition standards of care. Consult dietitian if additional nutrition interventions are needed prior to RD reassessment.      Briana Marcelino, [address] [address]:  [phone]  Office:  [phone]

## 2023-11-14 NOTE — PROGRESS NOTES
(11/13/23)  Pt presents with mild oropharyngeal dysphagia. Pt currently NPO post extubation this date. Pt seen approximately 1.5 hrs post extubation with ice chips, cup sips thin liquid. Oral mechanism exam is grossly WFL, however pt with wet productive cough (creamy white secretions) and excessive secretions in oropharynx noted. Pt endorsing odynophagia pre, during, and post PO. No anterior loss noted. Pt appeared to swallow all trials, however suspected delayed swallow initiation. Intermittent delayed cough and throat clearing throughout. Oral care completed with setup. Pt vitals stable throughout eval. Pt is at risk for dysphagia secondary to mild s/s associated with aspiration with PO trials, potential for edema given complaints of odynophagia and multiple swallows per bolus and Pt being closely followed by pulmonology for recurrent acute hypercapnic resp failure. Based on current evaluation, SLP recommending ice chips and thin water with re-evaluation tomorrow. There is no significant prior history suspicious for dysphagia with exception of unintentional weight loss. Anticipate pts suspected dysphagia will resolve within <24 hours and are associated with recent extubation only. Instrumental assessment results: None    Prognosis:  Prognosis for improvement: good  Barriers to reach goals: none for stated goals  Consulted and agree with results and recommendations: pt, family present in room    Treatment:  Dysphagia Goals: The pt will be seen  1-2 x to address the following goals:    Goal 1: Pt will tolerate advancement to least restrictive diet level without s/s associated with aspiration. 11/14: Pt seated upright in bed and agreeable session. Oral care via suction toothette completed with SLP. Oral mechanism ROM is WFL. Seen with ice chips, cup and straw sip thin liquid, puree, regular solid. Pt endorsed resolved hoarse vocal quality compared to previous day and reduction in expectorated secretions.  Pt expressed almost back to \"baseline\". Pt demonstrated adequate oral phase noted complete mastication and oral clearance (pt did place partial in during PO trials). Pt appeared to swallow all trials. X1 weak/dry cough, however no other s/s associated with aspiration. Pt did not endorse any globus sensation and no wet vocal quality noted across all trials. Respiratory rate increased to 35 in x2 instances, however quickly recovered and sating at 19-26 throughout. O2 sat fluctuating between 89-93% on 0.5 L via nasal cannula. Pt did not state any SOB or increased work of breathing across all PO intake. Pt denied odynophagia. Pt educated to risks with aspiration and standard dysphagia precautions. SLP recommending diet initiation to Regular solids/Thin liquids. Will see x1 to ensure diet tolerance with safety of swallow. Cont goal.     Education  Educated to rationale, results, recommendations of dysphagia re-assessment. Provided discussion of rec's for diet initiation and strategies to implement as needed. Pt and daughter present demonstrated understanding and agreement. Pt goal: pt did not state  Pt discharge goal: pt did not state    Total treatment time: 20 minutes dysphagia treatment     Plan: Continue per POC  Recommended Diet: Regular Solids, Thin Liquids   Medication administration: PO    Strategies:   General aspiration precautions  Good oral care  Seated upright with all PO  Reduced rate of intake/pacing   Small bites/sips    Discharge Recommendation: TBD, likely no need for ongoing dysphagia tx at DC  Discussed with RNDeb  Needs met prior to leaving room, call light within reach    Electronically signed by:  NURIA Hu.   Speech Language Pathology Graduate Clinician    Suzann Brunner, M.A., [facility]  Speech-Language Pathologist  Pg #: [phone]    The speech-language pathologist was present, directed the patient's care, made skilled judgment and was responsible for assessment and

## 2023-11-15 LAB
ALBUMIN SERPL-MCNC: 3.5 G/DL (ref 3.4–5)
ANION GAP SERPL CALCULATED.3IONS-SCNC: 5 MMOL/L (ref 3–16)
BUN SERPL-MCNC: 24 MG/DL (ref 7–20)
CALCIUM SERPL-MCNC: 8.5 MG/DL (ref 8.3–10.6)
CHLORIDE SERPL-SCNC: 94 MMOL/L (ref 99–110)
CO2 SERPL-SCNC: 39 MMOL/L (ref 21–32)
CREAT SERPL-MCNC: 0.7 MG/DL (ref 0.6–1.2)
DEPRECATED RDW RBC AUTO: 14.7 % (ref 12.4–15.4)
GFR SERPLBLD CREATININE-BSD FMLA CKD-EPI: >60 ML/MIN/{1.73_M2}
GLUCOSE SERPL-MCNC: 110 MG/DL (ref 70–99)
HCT VFR BLD AUTO: 32.5 % (ref 36–48)
HGB BLD-MCNC: 10.5 G/DL (ref 12–16)
MCH RBC QN AUTO: 23.8 PG (ref 26–34)
MCHC RBC AUTO-ENTMCNC: 32.3 G/DL (ref 31–36)
MCV RBC AUTO: 73.7 FL (ref 80–100)
PHOSPHATE SERPL-MCNC: 3 MG/DL (ref 2.5–4.9)
PLATELET # BLD AUTO: 176 K/UL (ref 135–450)
PMV BLD AUTO: 8.3 FL (ref 5–10.5)
POTASSIUM SERPL-SCNC: 3.2 MMOL/L (ref 3.5–5.1)
RBC # BLD AUTO: 4.41 M/UL (ref 4–5.2)
SODIUM SERPL-SCNC: 138 MMOL/L (ref 136–145)
WBC # BLD AUTO: 5.4 K/UL (ref 4–11)

## 2023-11-15 PROCEDURE — 6370000000 HC RX 637 (ALT 250 FOR IP)

## 2023-11-15 PROCEDURE — 2700000000 HC OXYGEN THERAPY PER DAY

## 2023-11-15 PROCEDURE — 97530 THERAPEUTIC ACTIVITIES: CPT

## 2023-11-15 PROCEDURE — 2580000003 HC RX 258

## 2023-11-15 PROCEDURE — 2060000000 HC ICU INTERMEDIATE R&B

## 2023-11-15 PROCEDURE — 36591 DRAW BLOOD OFF VENOUS DEVICE: CPT

## 2023-11-15 PROCEDURE — 92526 ORAL FUNCTION THERAPY: CPT

## 2023-11-15 PROCEDURE — 94640 AIRWAY INHALATION TREATMENT: CPT

## 2023-11-15 PROCEDURE — 97116 GAIT TRAINING THERAPY: CPT

## 2023-11-15 PROCEDURE — 80069 RENAL FUNCTION PANEL: CPT

## 2023-11-15 PROCEDURE — 6360000002 HC RX W HCPCS

## 2023-11-15 PROCEDURE — 94761 N-INVAS EAR/PLS OXIMETRY MLT: CPT

## 2023-11-15 PROCEDURE — 94660 CPAP INITIATION&MGMT: CPT

## 2023-11-15 PROCEDURE — 85027 COMPLETE CBC AUTOMATED: CPT

## 2023-11-15 PROCEDURE — 99232 SBSQ HOSP IP/OBS MODERATE 35: CPT | Performed by: INTERNAL MEDICINE

## 2023-11-15 RX ADMIN — IPRATROPIUM BROMIDE AND ALBUTEROL SULFATE 1 DOSE: 2.5; .5 SOLUTION RESPIRATORY (INHALATION) at 08:08

## 2023-11-15 RX ADMIN — FUROSEMIDE 40 MG: 10 INJECTION, SOLUTION INTRAMUSCULAR; INTRAVENOUS at 17:58

## 2023-11-15 RX ADMIN — PANTOPRAZOLE SODIUM 40 MG: 40 TABLET, DELAYED RELEASE ORAL at 06:21

## 2023-11-15 RX ADMIN — ATORVASTATIN CALCIUM 10 MG: 10 TABLET, FILM COATED ORAL at 08:48

## 2023-11-15 RX ADMIN — BUDESONIDE INHALATION 500 MCG: 0.5 SUSPENSION RESPIRATORY (INHALATION) at 08:08

## 2023-11-15 RX ADMIN — FUROSEMIDE 40 MG: 10 INJECTION, SOLUTION INTRAMUSCULAR; INTRAVENOUS at 08:49

## 2023-11-15 RX ADMIN — MIDODRINE HYDROCHLORIDE 5 MG: 5 TABLET ORAL at 08:48

## 2023-11-15 RX ADMIN — SODIUM CHLORIDE, PRESERVATIVE FREE 10 ML: 5 INJECTION INTRAVENOUS at 20:31

## 2023-11-15 RX ADMIN — IRON SUCROSE 300 MG: 20 INJECTION, SOLUTION INTRAVENOUS at 08:05

## 2023-11-15 RX ADMIN — ARFORMOTEROL TARTRATE 15 MCG: 15 SOLUTION RESPIRATORY (INHALATION) at 20:44

## 2023-11-15 RX ADMIN — SODIUM CHLORIDE, PRESERVATIVE FREE 10 ML: 5 INJECTION INTRAVENOUS at 08:50

## 2023-11-15 RX ADMIN — ARFORMOTEROL TARTRATE 15 MCG: 15 SOLUTION RESPIRATORY (INHALATION) at 08:08

## 2023-11-15 RX ADMIN — IPRATROPIUM BROMIDE AND ALBUTEROL SULFATE 1 DOSE: 2.5; .5 SOLUTION RESPIRATORY (INHALATION) at 20:44

## 2023-11-15 RX ADMIN — BUDESONIDE INHALATION 500 MCG: 0.5 SUSPENSION RESPIRATORY (INHALATION) at 20:44

## 2023-11-15 RX ADMIN — WATER 40 MG: 1 INJECTION INTRAMUSCULAR; INTRAVENOUS; SUBCUTANEOUS at 08:48

## 2023-11-15 RX ADMIN — MIDODRINE HYDROCHLORIDE 5 MG: 5 TABLET ORAL at 15:35

## 2023-11-15 RX ADMIN — POTASSIUM CHLORIDE 40 MEQ: 1500 TABLET, EXTENDED RELEASE ORAL at 08:05

## 2023-11-15 RX ADMIN — MIDODRINE HYDROCHLORIDE 5 MG: 5 TABLET ORAL at 20:31

## 2023-11-15 RX ADMIN — CEFTRIAXONE 1000 MG: 1 INJECTION, POWDER, FOR SOLUTION INTRAMUSCULAR; INTRAVENOUS at 18:09

## 2023-11-15 RX ADMIN — ENOXAPARIN SODIUM 40 MG: 100 INJECTION SUBCUTANEOUS at 20:31

## 2023-11-15 ASSESSMENT — ENCOUNTER SYMPTOMS
NAUSEA: 0
VOMITING: 0
SHORTNESS OF BREATH: 1
DIARRHEA: 0
WHEEZING: 0
COUGH: 1

## 2023-11-15 ASSESSMENT — PAIN SCALES - GENERAL
PAINLEVEL_OUTOF10: 0
PAINLEVEL_OUTOF10: 0

## 2023-11-15 NOTE — PROGRESS NOTES
Physician Progress Note      PATIENT:               Mellissa Rene  CSN #:                  [id number]  :                       1945  ADMIT DATE:       11/10/2023 11:42 AM  [facility] DATE:  Khanh Raniki  PROVIDER #:        Virginie Ward MD          QUERY TEXT:    Patient admitted 11/10 with Pericardial and pleural effusion with noted acute   diastolic CHF/HFpEF. Noted to have  Severe malnutrition on  by ordered   Dietary c/s. If possible, please document in progress notes and discharge   summary if you are evaluating and /or treating any of the following: The medical record reflects the following:  Risk Factors: Lung CA, New CHF  Clinical Indicators: HT: 5'6\", WT: 110 lbs, BMI: 17.9. Per RD & ASPEN   guidelines in chronic illness: Energy Intake:  75% or less estimated energy   requirements for 1 month or longer, Weight Loss:  Greater than 20% over 1   year, Severe fat loss, Mild Muscle loss\". Per ICU c/s \"47 lbs weight loss in   the past 6 months\". Treatment: RD monitoring, daily wts, NPO currently w/ recs for ONS    ASPEN Criteria:    https://aspenjournals. onlinelibrary. carias. com/doi/full/10.1177/508040125061713  5  Options provided:  -- Protein calorie malnutrition severe  -- Other - I will add my own diagnosis  -- Disagree - Not applicable / Not valid  -- Disagree - Clinically unable to determine / Unknown  -- Refer to Clinical Documentation Reviewer    PROVIDER RESPONSE TEXT:    This patient has severe protein calorie malnutrition. Query created by: Parish Concepcion on 2023 8:34 AM      QUERY TEXT:    Patient admitted 11/10 with Pericardial and pleural effusion with noted acute   diastolic CHF/HFpEF. Pt developed AMS w/ noted acute hypercapnic respiratory   failure. If possible, please document in the progress notes and discharge   summary if you are evaluating and / or treating any of the following:     The medical record reflects the following:  Risk Factors: acute hypercapnic

## 2023-11-15 NOTE — CARE COORDINATION
Case Management Daily Note                 Date/ Time of Note: 11/15/2023 3:26 PM         Note completed by: JYOTHI Hoover, WERNER    Patient Name: Aimee Barahona  YOB: 1945    Diagnosis:Shortness of breath [R06.02]  Pericardial effusion [I31.39]  Patient Admission Status: Inpatient  Date of Admission:11/10/2023 11:42 AM    Length of Stay: 5 GLOS: GMLOS: 3.9 Readmission Risk Score: Readmission Risk Score: 15.4    Current Plan of Care: Pt had been staying with daughter prior to admission. Pt does have her own home though. Plan is to return to daughters home. Pt did not have any home care. PT/OT is rec [facility] and a RW at [place], SW will follow up prior to DC. Pt is currently on 1L O2/. Referrals completed: Not Applicable    Resources/ information provided: Not indicated at this time    IMM Status:   will need at dc. Talon Caro and her family were provided with choice of provider; she and her family are in agreement with the discharge plan.     Electronically signed by JYOTHI Hoover LSW on 11/15/2023 at 3:26 PM  [facility]  Case Management Department  Ph: [phone]

## 2023-11-15 NOTE — PROGRESS NOTES
independent    Prior Dysphagia History:   Pt denies and none per chart review. Bedside Swallowing Evaluation Impression (11/13/23)  Pt presents with mild oropharyngeal dysphagia. Pt currently NPO post extubation this date. Pt seen approximately 1.5 hrs post extubation with ice chips, cup sips thin liquid. Oral mechanism exam is grossly WFL, however pt with wet productive cough (creamy white secretions) and excessive secretions in oropharynx noted. Pt endorsing odynophagia pre, during, and post PO. No anterior loss noted. Pt appeared to swallow all trials, however suspected delayed swallow initiation. Intermittent delayed cough and throat clearing throughout. Oral care completed with setup. Pt vitals stable throughout eval. Pt is at risk for dysphagia secondary to mild s/s associated with aspiration with PO trials, potential for edema given complaints of odynophagia and multiple swallows per bolus and Pt being closely followed by pulmonology for recurrent acute hypercapnic resp failure. Based on current evaluation, SLP recommending ice chips and thin water with re-evaluation tomorrow. There is no significant prior history suspicious for dysphagia with exception of unintentional weight loss. Anticipate pts suspected dysphagia will resolve within <24 hours and are associated with recent extubation only. Instrumental assessment results: None    Prognosis:  Prognosis for improvement: good  Barriers to reach goals: none for stated goals  Consulted and agree with results and recommendations: pt, family present in room    Treatment:  Dysphagia Goals: The pt will be seen  1-2 x to address the following goals:    Goal 1: Pt will tolerate advancement to least restrictive diet level without s/s associated with aspiration. 11/14: Pt seated upright in bed and agreeable session. Oral care via suction toothette completed with SLP. Oral mechanism ROM is WFL.  Seen with ice chips, cup and straw sip thin liquid, puree, regular solid. Pt endorsed resolved hoarse vocal quality compared to previous day and reduction in expectorated secretions. Pt expressed almost back to \"baseline\". Pt demonstrated adequate oral phase noted complete mastication and oral clearance (pt did place partial in during PO trials). Pt appeared to swallow all trials. X1 weak/dry cough, however no other s/s associated with aspiration. Pt did not endorse any globus sensation and no wet vocal quality noted across all trials. Respiratory rate increased to 35 in x2 instances, however quickly recovered and sating at 19-26 throughout. O2 sat fluctuating between 89-93% on 0.5 L via nasal cannula. Pt did not state any SOB or increased work of breathing across all PO intake. Pt denied odynophagia. Pt educated to risks with aspiration and standard dysphagia precautions. SLP recommending diet initiation to Regular solids/Thin liquids. Will see x1 to ensure diet tolerance with safety of swallow. Cont goal.   11/15: Pt seated upright in bed consuming afternoon meal. Pt and daughter endorsed no swallowing concerns since diet initiation yesterday. Pt stated tolerating diet with no overt s/s associated with aspiration. Pt demonstrated timely and complete mastication for adequate oral clearance. Appeared to swallow all trials with no s/s. No wet vocal quality and no globus sensation. Pt sating well 95% O2 and RR between 17-24 during session. Pt stated feeling back to baseline with voice/swallowing. Based on assessment SLP recommends continue current diet level. No further SLP services warranted at this time. Goal met. DC.     Education  Educated to rationale, results, recommendations of dysphagia re-assessment. Provided discussion of rec's for continued diet level and strategies to implement as needed. Pt and daughter present demonstrated understanding and agreement.      Pt goal: pt did not state  Pt discharge goal: pt did not state    Total treatment time: 15 minutes

## 2023-11-15 NOTE — PROGRESS NOTES
Pulmonology Progress Note    Admit Date: 11/10/2023  Day: 5  Diet: ADULT DIET; Regular  ADULT ORAL NUTRITION SUPPLEMENT; Breakfast, Lunch, Dinner; Standard High Calorie/High Protein Oral Supplement    CC: hypoxia    Interval history: Patient seen and examined at bedside. No acute events overnight. Patient states she feels really well this morning. She did her use CPAP overnight and states she tolerated it well. Patient notes her breathing feels much better as well. Seen on 1L of O2. Still reports a cough productive of clear sputum, still slightly worse than at baseline. She denies any other concerns at this time. HPI: \"Mrs. Carley Fleischer is a 69 y.o. F with pmhx of HTN, HLD, COPD, HFrEF, and adenocarcinoma of the Lung ([facility] patient) who presented from outpatient oncologist for hypoxia spo2 to the 70's with ambulation during her appointment. Pt is currently unresponsive to verbal stimuli and has been managed by hospitalist on [facility]. ICU consult placed for hypoxia on BiPAP and worsening respiratory acidemia. ABG pH 7.015, pCO2 175.3, HCO3 44.7 at 2314. Pt seen by ICU team with recheck ABG at 0159 which revealed pH 6.997, pCO2 188.8, HCO3 46.3. After discussion with two daughters at bedside decision to intubate pt was made with OG tube and herzog catheter placement. Pt currently is responsive to painful stimuli only and does not appear in acute distress. \"    Medications:     Scheduled Meds:   methylPREDNISolone  40 mg IntraVENous Daily    midodrine  5 mg Oral TID    ipratropium 0.5 mg-albuterol 2.5 mg  1 Dose Inhalation BID RT    cefTRIAXone (ROCEPHIN) IV  1,000 mg IntraVENous Q24H    aspirin  81 mg Oral Once    atorvastatin  10 mg Oral Daily    metoprolol succinate  25 mg Oral Daily    pantoprazole  40 mg Oral QAM AC    furosemide  40 mg IntraVENous BID    sodium chloride flush  5-40 mL IntraVENous 2 times per day    enoxaparin  40 mg SubCUTAneous Daily    budesonide  0.5 mg Nebulization BID RT    arformoterol tartrate

## 2023-11-15 NOTE — PROGRESS NOTES
Occupational Therapy  Daily Treatment Note  Patient Name: Ivet Castillo  MRN: [id number]    Chart Reviewed: Yes     Assessment: Pt demo decreased activity tolerance below her baseline. Pt on 1L O2 with saturation 88-90% with activity. Pt requires frequent rest breaks with activity. Anticipate pt safe to d/c home to daughter's with 24hr assist from family. Recommend home OT- pt and daughter agreeable    Discharge Recommendations:   Ivet Castillo scored a 19/24 on the AM-PAC ADL Inpatient form. Current research shows that an AM-PAC score of 18 or greater is typically associated with a discharge to the patient's home setting. Based on the patient's AM-PAC score, and their current ADL deficits, it is recommended that the patient have 2-3 sessions per week of Occupational Therapy at d/c to increase the patient's independence. At this time, this patient demonstrates the endurance and safety to discharge home with home services) and a follow up treatment frequency of 2-3x/wk. Please see assessment section for further patient specific details. If patient discharges prior to next session this note will serve as a discharge summary. Please see below for the latest assessment towards goals. Equipment Needs:  shower chair    Restrictions/Precautions: Fall Risk (high fall risk) Other position/activity restrictions: up with assistance, adult diet - regular     Additional Pertinent Hx: 70 y/o F presents with SOB. Hypoxic on arrival (SpO2 in the 70s<>80s). CT chest:moderate pericardial effusion. Pt with worsening ABGs, requiring intubation (11/12). PMH:primary lung adenocarcinoma status post chemotherapy and radiation, CHF      Diagnosis: shortness of breath       Subjective: Pt seated in chair, agreeable to therapy.      Pain: none    Objective:      Functional Mobility   Sit to Stand: CGA (from recliner; VC for safe hand placement with walker)  Stand to Sit: SBA (to recliner)  Other: Ambulated 39' using RW with CGA progressing to SBA    Activity Tolerance: Pt on 1L O2, reported mild SOB with ambulation, spO2 88% post-mobility on 1L    ADLs   LB dressing: mod I (doff/don socks)  Other: declined additional ADLs    Exercises   Consecutive sit<>Stand x5 reps  In stance: shoulder stretch overhead;  trunk extension, rotation, lateral flexion.   Educated pt on seated UE exercises to complete 10x while seated in chair, 2x/day    Patient Education: Pt education provided on energy conservation and DME recommendations for increased safety and independence during ADLs/IADLs- recommend use of shower chair    Safety Devices in Place: Pt in recliner, needs in reach, chair alarm engaged, RN aware, daughter present    Goals:  Short Term Goals  Time Frame for Short Term Goals: DC  Short Term Goal 1: toilet t/f SBA- not met  Short Term Goal 2: toileting SBA - not met  Short Term Goal 3: functional mobility to/from bathroom SBA with LRAD- not met  Short Term Goal 4: stance tolerance x5 mins SBA for ADLs- not met         Plan:  Times Per Week: 2-5x        Therapy Time   Individual Concurrent Group Co-treatment   Time In 1508         Time Out 1536         Minutes 28         Timed Code Treatment Minutes: [address], OT

## 2023-11-15 NOTE — PROGRESS NOTES
Physical Therapy  Facility/Department: [address]  Physical Therapy Treatment    Name: Trung Talamantes  : 1945  MRN: [id number]  Date of Service: 11/15/2023    Discharge Recommendations:  Trung Talamantes scored a 18/24 on the AM-PAC short mobility form. Current research shows that an AM-PAC score of 18 or greater is typically associated with a discharge to the patient's home setting. Based on the patient's AM-PAC score and their current functional mobility deficits, it is recommended that the patient have 2-3 sessions per week of Physical Therapy at d/c to increase the patient's independence. At this time, this patient demonstrates the endurance and safety to discharge home with home PT and initial 24 hour (A) and a follow up treatment frequency of 2-3x/wk. Please see assessment section for further patient specific details. HOME HEALTH CARE: LEVEL 1 STANDARD    - Initial home health evaluation to occur within 24-48 hours, in patient home   - Therapy to evaluate with goal of regaining prior level of functioning   - Therapy to evaluate if patient has [address] needs for personal care    If patient discharges prior to next session this note will serve as a discharge summary. Please see below for the latest assessment towards goals. Home with Home health PT, 24 hour supervision or assist   PT Equipment Recommendations  Mobility Devices: Merlin Andrea: Airam      Patient Diagnosis(es): The encounter diagnosis was Shortness of breath. Past Medical History:  has a past medical history of Arthritis, Cancer ([address]), Hyperlipidemia, and Hypertension. Past Surgical History:  has a past surgical history that includes Tubal ligation; bronchoscopy (2017); and other surgical history (Left, 2017). Assessment   Body Structures, Functions, Activity Limitations Requiring Skilled Therapeutic Intervention: Decreased functional mobility ; Decreased ADL status; Decreased adenocarcinoma status post chemotherapy and radiation, CHF  Family / Caregiver Present: Yes (daughter)  Follows Commands: Within Functional Limits  General Comment  Comments: Patient seated in recliner upon arrival - agreeable to PT.  RN approval obtained prior to PT entry. Patient on 1L O2 via nasal cannula upon arrival and maintained throughout session - O2 saturation fluctuating from 84 to 92% at rest on 1L, desaturates to 84% on 1L with gait, 2 minutes seated rest recovered > 90% - RN notified. Subjective  Subjective: Patient denies pain, reports feeling better overall. Social/Functional History  Social/Functional History  Lives With: Alone  Type of Home: House  Home Layout: Two level, Bed/Bath upstairs  Home Access: Stairs to enter with rails  Entrance Stairs - Number of Steps: 4  Bathroom Shower/Tub: Tub/Shower unit  Bathroom Toilet: Handicap height  Bathroom Equipment: Shower chair  Home Equipment: Cane  Has the patient had two or more falls in the past year or any fall with injury in the past year?: No  ADL Assistance: Needs assistance (SPV for showering)  Homemaking Assistance:  (grandson or daughter will do all)  Ambulation Assistance: Independent (cane for outdoors only)  Transfer Assistance: Independent  Active : No  Patient's  Info: daughter  Occupation: Retired  Type of Occupation: massage therapist  Leisure & Hobbies: watch TV, football, cooking  Additional Comments: will have intermittent assist from grandson and daughter. \"I'm rarely alone. \"    Cognition   WFL    Objective      Transfers  Sit to Stand: Stand by assistance (to RW)  Stand to Sit: Stand by assistance (from [employer])  Stand Pivot Transfers: Stand by assistance (with RW)  Ambulation  Surface: Level tile  Device: Rolling Walker  Assistance: Contact guard assistance  Quality of Gait: slow eduardo, decreased (B) step length, foot clearance, and heel strike; forward flexed posture;  Distance: 50ft  Comments: desaturates to

## 2023-11-15 NOTE — PROGRESS NOTES
Oncology    Although we haven't been consulted, we have been following along. She is well known to our service for a 2017 dx of metastatic, BRAF mutated lung adenocarcinoma. Her Trametinib/Dabrafenib targeted therapy has been on hold due to concerns about weight loss and declining performance status. Below is a recent office note. Dexter Hansen MD  ===============================    Patient Name: Rani Hernandez  Patient : 1945  Patient MRN: [id number]    Primary Oncologist: Dexter Hansen (Medical Oncology)  Referring Physician: Saloni Parra    Date of Service: 11/10/2023      Chief Complaint  Non-small cell lung cancer (disorder) ( Stage Date: 2017, Stage IVB (Primary, Unspecified upper lobe, bronchus or lung, TX, NX, M1c)-Pathological  Date of Dx:2017 Histopathologic Type: Adenocarcinoma; EGFR Expression: Negative; ALK (FISH): Negative; PD-L1: >= 50% Expression; ROS1 Gene: Negative; BRAF Mutation: BRAF V600E (Mutated); )      Problem List  Non-small cell lung cancer (disorder) ( Stage Date: 2017, Stage IVB (Primary, Unspecified upper lobe, bronchus or lung, TX, NX, M1c)-Pathological  Date of Dx:2017 Histopathologic Type: Adenocarcinoma; EGFR Expression: Negative; ALK (FISH): Negative; PD-L1: >= 50% Expression; ROS1 Gene: Negative; BRAF Mutation: BRAF V600E (Mutated); )  Anemia (disorder)  Arthritis  COPD  Dysuria  High risk drug monitoring status (finding)  Hyperlipidemia  Hypertension  Hypokalemia  Iron deficiency  SHANNA ( on CPAP; )  Patient eligible for clinical trial (finding)  Rash  Secondary malignant neoplasm of adrenal gland (disorder)  Secondary malignant neoplasm of liver (disorder)  Secondary malignant neoplasm of mediastinal lymph nodes  Test result to patient personally (situation)  Tobacco use  Transient ischemic attack (disorder)  Vitamin D deficiency (disorder)  Weight loss (finding)    HPI  Ms. Kaya Leal presented as a 71 y.o. female with metastatic adenocarcinoma

## 2023-11-15 NOTE — PLAN OF CARE
symptoms of electrolyte imbalances   Administer electrolyte replacement as ordered   Monitor response to electrolyte replacements, including repeat lab results as appropriate  Note: Potassium level has been optimized with Potassium Chloride extended release tablets 40 mEq this morning. Problem: Pain  Goal: Verbalizes/displays adequate comfort level or baseline comfort level  11/15/2023 0923 by Migdalia Chanel RN  Outcome: Progressing  Flowsheets (Taken 11/15/2023 8648)  Verbalizes/displays adequate comfort level or baseline comfort level:   Encourage patient to monitor pain and request assistance   Assess pain using appropriate pain scale   Administer analgesics based on type and severity of pain and evaluate response   Implement non-pharmacological measures as appropriate and evaluate response  Note: No complaint of pain or discomfort. Problem: Hematologic - Adult  Goal: Maintains hematologic stability  11/15/2023 0923 by Migdalia Chanel RN  Outcome: Progressing  Flowsheets (Taken 11/15/2023 6396)  Maintains hematologic stability:   Assess for signs and symptoms of bleeding or hemorrhage   Monitor labs for bleeding or clotting disorders   Administer blood products/factors as ordered  Patient's hemoglobin this AM:   Recent Labs     11/15/23  0324   HGB 10.5*     Patient's platelet count this AM:   Recent Labs     11/15/23  0324       Thrombocytopenia Precautions in place. Patient showing no signs or symptoms of active bleeding. Transfusion not indicated at this time. Patient verbalizes understanding of all instructions. Will continue to assess and implement POC. Call light within reach and hourly rounding in place.

## 2023-11-15 NOTE — PLAN OF CARE
Problem: Respiratory - Adult  Goal: Achieves optimal ventilation and oxygenation  Outcome: Progressing   Assess for changes in respiratory status   Assess for changes in mentation and behavior   Position to facilitate oxygenation and minimize respiratory effort   Encourage broncho-pulmonary hygiene including cough, deep breathe, incentive spirometry   Assess and instruct to report shortness of breath or any respiratory difficulty   Respiratory therapy support as indicated  Achieves optimal ventilation and oxygenation: Assess for changes in respiratory status     Problem: Cardiovascular - Adult  Goal: Maintains optimal cardiac output and hemodynamic stability  Outcome: Progressing  Maintains optimal cardiac output and hemodynamic stability:   Monitor blood pressure and heart rate   Monitor urine output and notify Licensed Independent Practitioner for values outside of normal range     Problem: Metabolic/Fluid and Electrolytes - Adult  Goal: Electrolytes maintained within normal limits  Outcome: Progressing  Electrolytes maintained within normal limits:   Monitor labs and assess patient for signs and symptoms of electrolyte imbalances   Administer electrolyte replacement as ordered   Monitor response to electrolyte replacements, including repeat lab results as appropriate     Problem: Nutrition Deficit:  Goal: Optimize nutritional status  Outcome: Progressing  Nutrient intake appropriate for improving, restoring, or maintaining nutritional needs:   Assess nutritional status and recommend course of action   Monitor oral intake, labs, and treatment plans   Recommend appropriate diets, oral nutritional supplements, and vitamin/mineral supplements   Order, calculate, and assess calorie counts as needed     Problem: Pain  Goal: Verbalizes/displays adequate comfort level or baseline comfort level  Outcome: Progressing  Verbalizes/displays adequate comfort level or baseline comfort level:   Encourage patient to monitor pain

## 2023-11-15 NOTE — PROGRESS NOTES
Central line dressing changed using sterile technique following hospital policy. Jada BE, observed with procedure to ensure proper technique.

## 2023-11-16 ENCOUNTER — APPOINTMENT (OUTPATIENT)
Dept: MRI IMAGING | Age: 78
End: 2023-11-16
Payer: MEDICARE

## 2023-11-16 LAB
ALBUMIN SERPL-MCNC: 3.5 G/DL (ref 3.4–5)
ANION GAP SERPL CALCULATED.3IONS-SCNC: 4 MMOL/L (ref 3–16)
BACTERIA BLD CULT: NORMAL
BUN SERPL-MCNC: 24 MG/DL (ref 7–20)
CALCIUM SERPL-MCNC: 8.5 MG/DL (ref 8.3–10.6)
CHLORIDE SERPL-SCNC: 95 MMOL/L (ref 99–110)
CO2 SERPL-SCNC: 40 MMOL/L (ref 21–32)
CREAT SERPL-MCNC: 0.7 MG/DL (ref 0.6–1.2)
DEPRECATED RDW RBC AUTO: 14.5 % (ref 12.4–15.4)
GFR SERPLBLD CREATININE-BSD FMLA CKD-EPI: >60 ML/MIN/{1.73_M2}
GLUCOSE SERPL-MCNC: 133 MG/DL (ref 70–99)
HCT VFR BLD AUTO: 31.7 % (ref 36–48)
HGB BLD-MCNC: 9.9 G/DL (ref 12–16)
MAGNESIUM SERPL-MCNC: 1.6 MG/DL (ref 1.8–2.4)
MCH RBC QN AUTO: 23.5 PG (ref 26–34)
MCHC RBC AUTO-ENTMCNC: 31.2 G/DL (ref 31–36)
MCV RBC AUTO: 75.5 FL (ref 80–100)
PHOSPHATE SERPL-MCNC: 2 MG/DL (ref 2.5–4.9)
PLATELET # BLD AUTO: 175 K/UL (ref 135–450)
PMV BLD AUTO: 8.3 FL (ref 5–10.5)
POTASSIUM SERPL-SCNC: 3.7 MMOL/L (ref 3.5–5.1)
RBC # BLD AUTO: 4.19 M/UL (ref 4–5.2)
SODIUM SERPL-SCNC: 139 MMOL/L (ref 136–145)
WBC # BLD AUTO: 5.5 K/UL (ref 4–11)

## 2023-11-16 PROCEDURE — 6370000000 HC RX 637 (ALT 250 FOR IP)

## 2023-11-16 PROCEDURE — 83735 ASSAY OF MAGNESIUM: CPT

## 2023-11-16 PROCEDURE — 70553 MRI BRAIN STEM W/O & W/DYE: CPT

## 2023-11-16 PROCEDURE — 80069 RENAL FUNCTION PANEL: CPT

## 2023-11-16 PROCEDURE — 94640 AIRWAY INHALATION TREATMENT: CPT

## 2023-11-16 PROCEDURE — 6360000002 HC RX W HCPCS

## 2023-11-16 PROCEDURE — 6360000004 HC RX CONTRAST MEDICATION: Performed by: INTERNAL MEDICINE

## 2023-11-16 PROCEDURE — 36591 DRAW BLOOD OFF VENOUS DEVICE: CPT

## 2023-11-16 PROCEDURE — 2580000003 HC RX 258

## 2023-11-16 PROCEDURE — A9579 GAD-BASE MR CONTRAST NOS,1ML: HCPCS | Performed by: INTERNAL MEDICINE

## 2023-11-16 PROCEDURE — 2700000000 HC OXYGEN THERAPY PER DAY

## 2023-11-16 PROCEDURE — 97116 GAIT TRAINING THERAPY: CPT

## 2023-11-16 PROCEDURE — 85027 COMPLETE CBC AUTOMATED: CPT

## 2023-11-16 PROCEDURE — 99232 SBSQ HOSP IP/OBS MODERATE 35: CPT | Performed by: INTERNAL MEDICINE

## 2023-11-16 PROCEDURE — 94761 N-INVAS EAR/PLS OXIMETRY MLT: CPT

## 2023-11-16 PROCEDURE — 1200000000 HC SEMI PRIVATE

## 2023-11-16 RX ADMIN — ATORVASTATIN CALCIUM 10 MG: 10 TABLET, FILM COATED ORAL at 09:08

## 2023-11-16 RX ADMIN — BUDESONIDE INHALATION 500 MCG: 0.5 SUSPENSION RESPIRATORY (INHALATION) at 10:03

## 2023-11-16 RX ADMIN — SODIUM CHLORIDE, PRESERVATIVE FREE 10 ML: 5 INJECTION INTRAVENOUS at 20:22

## 2023-11-16 RX ADMIN — MAGNESIUM SULFATE HEPTAHYDRATE 2000 MG: 40 INJECTION, SOLUTION INTRAVENOUS at 06:11

## 2023-11-16 RX ADMIN — FUROSEMIDE 40 MG: 10 INJECTION, SOLUTION INTRAMUSCULAR; INTRAVENOUS at 18:49

## 2023-11-16 RX ADMIN — MIDODRINE HYDROCHLORIDE 5 MG: 5 TABLET ORAL at 15:32

## 2023-11-16 RX ADMIN — ALBUTEROL SULFATE 2.5 MG: 2.5 SOLUTION RESPIRATORY (INHALATION) at 10:03

## 2023-11-16 RX ADMIN — ENOXAPARIN SODIUM 40 MG: 100 INJECTION SUBCUTANEOUS at 20:22

## 2023-11-16 RX ADMIN — GADOTERIDOL 10 ML: 279.3 INJECTION, SOLUTION INTRAVENOUS at 21:02

## 2023-11-16 RX ADMIN — PANTOPRAZOLE SODIUM 40 MG: 40 TABLET, DELAYED RELEASE ORAL at 06:10

## 2023-11-16 RX ADMIN — ARFORMOTEROL TARTRATE 15 MCG: 15 SOLUTION RESPIRATORY (INHALATION) at 22:18

## 2023-11-16 RX ADMIN — ARFORMOTEROL TARTRATE 15 MCG: 15 SOLUTION RESPIRATORY (INHALATION) at 10:03

## 2023-11-16 RX ADMIN — SODIUM CHLORIDE, PRESERVATIVE FREE 10 ML: 5 INJECTION INTRAVENOUS at 09:09

## 2023-11-16 RX ADMIN — MIDODRINE HYDROCHLORIDE 5 MG: 5 TABLET ORAL at 20:22

## 2023-11-16 RX ADMIN — FUROSEMIDE 40 MG: 10 INJECTION, SOLUTION INTRAMUSCULAR; INTRAVENOUS at 09:13

## 2023-11-16 RX ADMIN — WATER 40 MG: 1 INJECTION INTRAMUSCULAR; INTRAVENOUS; SUBCUTANEOUS at 09:08

## 2023-11-16 RX ADMIN — MIDODRINE HYDROCHLORIDE 5 MG: 5 TABLET ORAL at 09:08

## 2023-11-16 RX ADMIN — ACETAMINOPHEN 650 MG: 325 TABLET ORAL at 09:13

## 2023-11-16 RX ADMIN — BUDESONIDE INHALATION 500 MCG: 0.5 SUSPENSION RESPIRATORY (INHALATION) at 22:17

## 2023-11-16 ASSESSMENT — PAIN DESCRIPTION - ONSET
ONSET: ON-GOING
ONSET: GRADUAL

## 2023-11-16 ASSESSMENT — PAIN - FUNCTIONAL ASSESSMENT
PAIN_FUNCTIONAL_ASSESSMENT: ACTIVITIES ARE NOT PREVENTED
PAIN_FUNCTIONAL_ASSESSMENT: ACTIVITIES ARE NOT PREVENTED

## 2023-11-16 ASSESSMENT — PAIN DESCRIPTION - DESCRIPTORS
DESCRIPTORS: ACHING
DESCRIPTORS: ACHING

## 2023-11-16 ASSESSMENT — PAIN SCALES - GENERAL
PAINLEVEL_OUTOF10: 0
PAINLEVEL_OUTOF10: 0
PAINLEVEL_OUTOF10: 4
PAINLEVEL_OUTOF10: 0
PAINLEVEL_OUTOF10: 1
PAINLEVEL_OUTOF10: 0

## 2023-11-16 ASSESSMENT — ENCOUNTER SYMPTOMS
SHORTNESS OF BREATH: 1
WHEEZING: 0
NAUSEA: 0
DIARRHEA: 0
COUGH: 1
VOMITING: 0

## 2023-11-16 ASSESSMENT — PAIN DESCRIPTION - PAIN TYPE
TYPE: CHRONIC PAIN
TYPE: ACUTE PAIN

## 2023-11-16 ASSESSMENT — PAIN DESCRIPTION - LOCATION
LOCATION: HEAD
LOCATION: HEAD

## 2023-11-16 ASSESSMENT — PAIN DESCRIPTION - FREQUENCY
FREQUENCY: INTERMITTENT
FREQUENCY: INTERMITTENT

## 2023-11-16 ASSESSMENT — PAIN DESCRIPTION - ORIENTATION
ORIENTATION: INNER
ORIENTATION: INNER;MID

## 2023-11-16 NOTE — PLAN OF CARE
Problem: Respiratory - Adult  Goal: Achieves optimal ventilation and oxygenation  Outcome: Progressing  Achieves optimal ventilation and oxygenation:   Assess for changes in respiratory status   Assess for changes in mentation and behavior   Position to facilitate oxygenation and minimize respiratory effort   Encourage broncho-pulmonary hygiene including cough, deep breathe, incentive spirometry   Assess and instruct to report shortness of breath or any respiratory difficulty   Respiratory therapy support as indicated     Problem: Cardiovascular - Adult  Goal: Maintains optimal cardiac output and hemodynamic stability  Outcome: Progressing  Maintains optimal cardiac output and hemodynamic stability:   Monitor blood pressure and heart rate   Assess for signs of decreased cardiac output     Problem: Metabolic/Fluid and Electrolytes - Adult  Goal: Electrolytes maintained within normal limits  Outcome: Progressing  Electrolytes maintained within normal limits:   Monitor labs and assess patient for signs and symptoms of electrolyte imbalances   Administer electrolyte replacement as ordered   Monitor response to electrolyte replacements, including repeat lab results as appropriate     Problem: Nutrition Deficit:  Goal: Optimize nutritional status  Outcome: Progressing  Nutrient intake appropriate for improving, restoring, or maintaining nutritional needs:   Assess nutritional status and recommend course of action   Monitor oral intake, labs, and treatment plans   Recommend appropriate diets, oral nutritional supplements, and vitamin/mineral supplements   Order, calculate, and assess calorie counts as needed     Problem: Pain  Goal: Verbalizes/displays adequate comfort level or baseline comfort level  Outcome: Progressing  Verbalizes/displays adequate comfort level or baseline comfort level:   Encourage patient to monitor pain and request assistance   Assess pain using appropriate pain scale   Administer analgesics based

## 2023-11-16 NOTE — PROGRESS NOTES
Patient transferred to room 3314. VSS, on 1 L O2 nasal cannula. Family at bedside. Denies having any pain. All needs met at this time. Fall precautions in place. Bed locked in lowest position with alarm on. Call light within reach. Plan of care continues.

## 2023-11-16 NOTE — PROGRESS NOTES
Report called to [facility] on 3S. Patient wheeled to new room with PCA and daughter. All belongings with patient.

## 2023-11-16 NOTE — PLAN OF CARE
Problem: ABCDS Injury Assessment  Goal: Absence of physical injury  11/16/2023 1105 by Addy Metz RN  Outcome: Progressing  Flowsheets (Taken 11/16/2023 0906)  Absence of Physical Injury: Implement safety measures based on patient assessment     Problem: Safety - Adult  Goal: Free from fall injury  11/16/2023 1105 by Addy Metz RN  Outcome: Progressing  Flowsheets  Taken 11/16/2023 1105  Free From Fall Injury: Instruct family/caregiver on patient safety  Taken 11/16/2023 0906  Free From Fall Injury: Instruct family/caregiver on patient safety  Note: Pt does not meet criteria for orthostasis. Pt is a High fall risk. See Luciano Ogles Fall Score and ABCDS Injury Risk assessments.   + Screening for Orthostasis and/or + High Fall Risk per MARIE/ABCDS: Explained fall risk precautions to pt and family and rationale behind their use to keep the patient safe. Pt bed is in low position, side rails up, call light and belongings are in reach. Fall wristband applied and present on pts wrist.  Chair Alarm on. Pt encouraged to call for assistance. Will continue with hourly rounds for PO intake, pain needs, toileting and repositioning as needed. Problem: Respiratory - Adult  Goal: Achieves optimal ventilation and oxygenation  11/16/2023 1105 by Addy Metz RN  Outcome: Progressing  Flowsheets (Taken 11/16/2023 1105)  Achieves optimal ventilation and oxygenation:   Assess for changes in respiratory status   Assess for changes in mentation and behavior   Oxygen supplementation based on oxygen saturation or arterial blood gases  Note: Patient currently on 1L of oxygen. Will wean as tolerated.      Problem: Pain  Goal: Verbalizes/displays adequate comfort level or baseline comfort level  11/16/2023 1105 by Addy Metz RN  Outcome: Progressing  Flowsheets (Taken 11/16/2023 1105)  Verbalizes/displays adequate comfort level or baseline comfort level:   Encourage patient to monitor pain and request assistance   Assess

## 2023-11-16 NOTE — PROGRESS NOTES
Physical Therapy  Facility/Department: [address]  Physical Therapy Daily Treatment    Name: Josie Ortiz  : 1945  MRN: [id number]  Date of Service: 2023    Discharge Recommendations:  Home with Home health PT, 24 hour supervision or assist   PT Equipment Recommendations  Equipment Needed: Yes  Mobility Devices: Antoine Beckham: [address] [place] Charli Restrepo scored a 18/24 on the AM-PAC short mobility form. Current research shows that an AM-PAC score of 18 or greater is typically associated with a discharge to the patient's home setting. Based on the patient's AM-PAC score and their current functional mobility deficits, it is recommended that the patient have 2-3 sessions per week of Physical Therapy at d/c to increase the patient's independence. At this time, this patient demonstrates the endurance and safety to discharge home with home services and a follow up treatment frequency of 2-3x/wk. Please see assessment section for further patient specific details. If patient discharges prior to next session this note will serve as a discharge summary. Please see below for the latest assessment towards goals. Patient Diagnosis(es): The encounter diagnosis was Shortness of breath. Past Medical History:  has a past medical history of Arthritis, Cancer ([address]), Hyperlipidemia, and Hypertension. Past Surgical History:  has a past surgical history that includes Tubal ligation; bronchoscopy (2017); and other surgical history (Left, 2017). Assessment   Body Structures, Functions, Activity Limitations Requiring Skilled Therapeutic Intervention: Decreased functional mobility ; Decreased ADL status; Decreased strength;Decreased endurance;Decreased balance  Assessment: Patient demonstrates impaired functional mobility, now requiring SBA with RW or CGA without AD for safe mobility on 1L O2 - desaturates to 87% on 1L with gait. RN notified.   [facility]. Patient does not wear O2 at baseline. Patient lives with family, Samir Ladd is always there to help me. \"  Patient planning to d/c home with (A) when able. Patient will continue to benefit from additional skilled PT intervention to facilitate safe mobility and to optimize (I) to promote return to prior level of function. Treatment Diagnosis: impaired functional mobility  Therapy Prognosis: Good  Barriers to Learning: none  Requires PT Follow-Up: Yes  Activity Tolerance  Activity Tolerance: Patient limited by fatigue;Patient limited by endurance  Activity Tolerance Comments: 1L O2 desaturates to 84% with gait - recovers with seated rest x 2 minutes on 1 L to > 90%     Plan   Physical Therapy Plan  General Plan:  (2-5x/week)  Current Treatment Recommendations: Strengthening, Functional mobility training, Balance training, Transfer training, Gait training, Endurance training, Stair training, Safety education & training, Therapeutic activities, Patient/Caregiver education & training, Equipment evaluation, education, & procurement, Home exercise program  Safety Devices  Type of Devices: Call light within reach, Chair alarm in place, Gait belt, Left in chair, Nurse notified     Restrictions  Restrictions/Precautions  Restrictions/Precautions: Fall Risk (high fall risk)  Position Activity Restriction  Other position/activity restrictions: up with assistance, adult diet - regular     Subjective   General Comment  Comments: pt cleared to see for therapy by RN. Subjective  Subjective: pt sitting up in recliner on entry, agreeable to therapy session. Pt with several family members present in room. Pt hopeful to DC home soon, states she is feeling better overall.          Social/Functional History  Social/Functional History  Lives With: Alone  Type of Home: House  Home Layout: Two level, Bed/Bath upstairs  Home Access: Stairs to enter with rails  Entrance Stairs - Number of Steps: 4  Bathroom

## 2023-11-16 NOTE — CARE COORDINATION
Chart review day 6- Pt from home, currently staying at [facility] and plans to return there. PT/OT rec home w/home care and RW. Pt currently on 1L O2, none at baseline. CM will follow for DCP needs.

## 2023-11-17 VITALS
RESPIRATION RATE: 16 BRPM | DIASTOLIC BLOOD PRESSURE: 58 MMHG | WEIGHT: 106.04 LBS | TEMPERATURE: 97.9 F | HEIGHT: 66 IN | SYSTOLIC BLOOD PRESSURE: 108 MMHG | BODY MASS INDEX: 17.04 KG/M2 | HEART RATE: 93 BPM | OXYGEN SATURATION: 94 %

## 2023-11-17 PROCEDURE — 2580000003 HC RX 258

## 2023-11-17 PROCEDURE — 6360000002 HC RX W HCPCS

## 2023-11-17 PROCEDURE — 94761 N-INVAS EAR/PLS OXIMETRY MLT: CPT

## 2023-11-17 PROCEDURE — 6370000000 HC RX 637 (ALT 250 FOR IP)

## 2023-11-17 PROCEDURE — 94618 PULMONARY STRESS TESTING: CPT

## 2023-11-17 PROCEDURE — 94660 CPAP INITIATION&MGMT: CPT

## 2023-11-17 PROCEDURE — 94640 AIRWAY INHALATION TREATMENT: CPT

## 2023-11-17 PROCEDURE — 2700000000 HC OXYGEN THERAPY PER DAY

## 2023-11-17 RX ORDER — MIDODRINE HYDROCHLORIDE 5 MG/1
5 TABLET ORAL 3 TIMES DAILY
Qty: 90 TABLET | Refills: 3 | Status: SHIPPED | OUTPATIENT
Start: 2023-11-17

## 2023-11-17 RX ORDER — ENOXAPARIN SODIUM 100 MG/ML
30 INJECTION SUBCUTANEOUS DAILY
Status: DISCONTINUED | OUTPATIENT
Start: 2023-11-17 | End: 2023-11-17 | Stop reason: HOSPADM

## 2023-11-17 RX ADMIN — METOPROLOL SUCCINATE 25 MG: 25 TABLET, EXTENDED RELEASE ORAL at 11:33

## 2023-11-17 RX ADMIN — PANTOPRAZOLE SODIUM 40 MG: 40 TABLET, DELAYED RELEASE ORAL at 11:33

## 2023-11-17 RX ADMIN — SODIUM CHLORIDE, PRESERVATIVE FREE 10 ML: 5 INJECTION INTRAVENOUS at 11:33

## 2023-11-17 RX ADMIN — BUDESONIDE INHALATION 500 MCG: 0.5 SUSPENSION RESPIRATORY (INHALATION) at 08:06

## 2023-11-17 RX ADMIN — ATORVASTATIN CALCIUM 10 MG: 10 TABLET, FILM COATED ORAL at 11:33

## 2023-11-17 RX ADMIN — ARFORMOTEROL TARTRATE 15 MCG: 15 SOLUTION RESPIRATORY (INHALATION) at 08:06

## 2023-11-17 RX ADMIN — MIDODRINE HYDROCHLORIDE 5 MG: 5 TABLET ORAL at 11:34

## 2023-11-17 RX ADMIN — WATER 40 MG: 1 INJECTION INTRAMUSCULAR; INTRAVENOUS; SUBCUTANEOUS at 11:33

## 2023-11-17 RX ADMIN — ACETAMINOPHEN 650 MG: 325 TABLET ORAL at 10:30

## 2023-11-17 ASSESSMENT — PAIN SCALES - GENERAL
PAINLEVEL_OUTOF10: 3
PAINLEVEL_OUTOF10: 0
PAINLEVEL_OUTOF10: 0

## 2023-11-17 ASSESSMENT — PAIN DESCRIPTION - LOCATION: LOCATION: HEAD

## 2023-11-17 ASSESSMENT — PAIN DESCRIPTION - DESCRIPTORS: DESCRIPTORS: ACHING

## 2023-11-17 ASSESSMENT — PAIN DESCRIPTION - ORIENTATION: ORIENTATION: MID

## 2023-11-17 ASSESSMENT — PAIN - FUNCTIONAL ASSESSMENT: PAIN_FUNCTIONAL_ASSESSMENT: ACTIVITIES ARE NOT PREVENTED

## 2023-11-17 NOTE — DISCHARGE INSTR - COC
Continuity of Care Form    Patient Name: Ban Husbands   :  1945  MRN:  [id number]    Admit date:  11/10/2023  Discharge date:  ***    Code Status Order: DNR-CCA   Advance Directives:     Admitting Physician:  Gorge Gracia DO  PCP: Bert Guzman MD    Discharging Nurse: [facility] Unit/Room#: 1543/8519-63  Discharging Unit Phone Number: ***    Emergency Contact:   Extended Emergency Contact Information  Primary Emergency Contact: [facility]  Address: [address] [place] [address] of [address] Phone: [phone]  Mobile Phone: [phone]  Relation: Child  Secondary Emergency Contact: [address] Phone: [phone]  Mobile Phone: [phone]  Relation: Child    Past Surgical History:  Past Surgical History:   Procedure Laterality Date    BRONCHOSCOPY  2017    brochoscopy-EBUS    OTHER SURGICAL HISTORY Left 2017    LEFT SUBCLAVIAN PORT- CATH INSERTION      TUBAL LIGATION         Immunization History:   Immunization History   Administered Date(s) Administered    COVID-19, MODERNA BLUE border, Primary or Immunocompromised, (age 12y+), IM, 100 mcg/0.5mL 2021, 2021    COVID-19, MODERNA Booster BLUE border, (age 18y+), IM, 50mcg/0.25mL 2021    Influenza, FLUAD, (age 72 y+), Adjuvanted, 0.5mL 10/14/2022    Influenza, High Dose (Fluzone 65 yrs and older) 2014, 10/26/2018, 11/15/2019    Influenza, Triv, inactivated, subunit, adjuvanted, IM (Fluad 65 yrs and older) 10/14/2020    Pneumococcal, PCV-13, PREVNAR 15, (age 6w+), IM, 0.5mL 2018    Pneumococcal, PPSV23, PNEUMOVAX 23, (age 2y+), SC/IM, 0.5mL 10/26/2020, 2020       Active Problems:  Patient Active Problem List   Diagnosis Code    Hypertension I10    Vitamin D deficiency E55.9    Secondary malignant neoplasm of mediastinal lymph nodes ([address]) C77.1    Secondary malignant neoplasm of liver ([address]) C78.7    Primary malignant neoplasm of right 11/17/23 at 10:01 AM EST

## 2023-11-17 NOTE — PROGRESS NOTES
Patient is A&O x4.  O2 1L, sat 98%. No complaints of SOB. C/o dyspnea with exertion at times. C/o mild HA. Vital signs stable as charted. Respirations appear to easy and unlabored. Lungs clear but diminished throughout. Respirations easy with c/o productive yellow cough. No complaints of nausea/vomiting/diarrhea. Up with assist/walker to the bathroom or chair as needed. Left chest wall PAC intact and flushed with a brisk blood return noted. Tolerating regular diet. Plan of care and safety measures reviewed with the patient. Call light in reach and bed alarm in place. Bed attached to wall for alarm purposes. Will continue to monitor.   Electronically signed by Dawna Snellen, RN on 11/16/2023 at 10:14 PM

## 2023-11-17 NOTE — CARE COORDINATION
[facility]    Patient aware and agreeable to services.  Faxed orders to [facility] for [facility] by 11/19    Nicole Caceres LPN  Care Transition Nurse  [facility]  [phone]

## 2023-11-17 NOTE — CARE COORDINATION
Case Management Assessment            Discharge Note                    Date / Time of Note: 11/17/2023 2:19 PM                  Discharge Note Completed by: JYOTHI Cormier    Patient Name: Ivet Castillo   YOB: 1945  Diagnosis: Shortness of breath [R06.02]  Pericardial effusion [I31.39]   Date / Time: 11/10/2023 11:42 AM    Current PCP: Evaristo Kelley MD  Clinic patient: No    Hospitalization in the last 30 days: No       Advance Directives:  Code Status: DNR-CCA  [address] [facility] DNR form completed and on chart: Not Indicated    Financial:  Payor: Maddie Vance / Plan: Nikhil Logan PLUS HMO / Product Type: *No Product type* /      Pharmacy:    [facility] [id number] - [address] [place], [place] [address]  Phone: [phone] Fax: [phone]      Assistance purchasing medications?: Potential Assistance Purchasing Medications: No  Assistance provided by Case Management: None at this time    Does patient want to participate in local refill/ meds to beds program?: Yes    Meds To Beds General Rules:  1. Can ONLY be done Monday- Friday between 8:30am-5pm  2. Prescription(s) must be in pharmacy by 3pm to be filled same day  3. Copy of patient's insurance/ prescription drug card and patient face sheet must be sent along with the prescription(s)  4. Cost of Rx cannot be added to hospital bill. If financial assistance is needed, please contact unit  or ;  or  CANNOT provide pharmacy voucher for patients co-pays  5.  Patients can then  the prescription on their way out of the hospital at discharge, or pharmacy can deliver to the bedside if staff is available. (payment due at time of pick-up or delivery - cash, check, or card accepted)     Able to afford home medications/ co-pay costs: Yes    ADLS:  Current PT AM-PAC Score: 18 /24  Current OT AM-PAC Score: 19 /24      DISCHARGE plan. Patient has no more questions at this time and is content with the discharge plan. COVID Result:    Lab Results   Component Value Date/Time    COVID19 NOT DETECTED 11/10/2023 12:40 PM       The Plan for Transition of Care is related to the following treatment goals of Shortness of breath [R06.02]  Pericardial effusion [I31.39]    The Patient and/or patient representative Deena Bro and her family were provided with a choice of provider and agrees with the discharge plan Yes    Freedom of choice list was provided with basic dialogue that supports the patient's individualized plan of care/goals and shares the quality data associated with the providers.  Yes    Care Transitions patient: No    Sheldon Renee, JYOTHI  [facility]  Case Management Department  Ph: [phone]  Fax: [phone]

## 2023-11-17 NOTE — PROGRESS NOTES
11/17/23 1157   Resting (Room Air)   SpO2 90   HR 86   Resting (On O2)   SpO2 97   HR 81   O2 Device Nasal cannula   O2 Flow Rate (l/min) 1 l/min   During Walk (Room Air)   SpO2 86   HR 98   Walk/Assistance Device Walker   During Walk (On O2)   SpO2 92   HR 94   O2 Device Nasal cannula   O2 Flow Rate (l/min) 2 l/min   Walk/Assistance Device Walker   After Walk   SpO2 97   HR 82   O2 Device Nasal cannula   O2 Flow Rate (l/min) 1 l/min   Does the Patient Qualify for Home O2 Yes   Liter Flow at Rest 0   Liter Flow on Exertion 2   Does the Patient Need Portable Oxygen Tanks Yes

## 2023-11-17 NOTE — PROGRESS NOTES
Pharmacist Review and Automatic Dose Adjustment of Prophylactic Enoxaparin    The reviewing pharmacist has made an adjustment to the ordered enoxaparin dose or converted to UFH per the approved [address] [facility] protocol and table as defined below. Plan / Rationale: Based upon the patient's weight and renal function, the ordered dose of Enoxaparin 40 mg subQ daily has been converted to 30 mg subQ daily. Please call with questions--  Abiel Rutherford, PharmD, BCPS  Wireless: [id number]   11/17/2023 2:39 PM        Em Franklin is a 69 y.o. female. Recent Labs     11/15/23  0324 11/16/23  0334   CREATININE 0.7 0.7       Estimated Creatinine Clearance: 51 mL/min (based on SCr of 0.7 mg/dL). Recent Labs     11/15/23  0324 11/16/23  0334   HGB 10.5* 9.9*   HCT 32.5* 31.7*    175     No results for input(s): \"INR\" in the last 72 hours.     Height:   Ht Readings from Last 1 Encounters:   11/10/23 1.676 m (5' 6\")     Weight:  Wt Readings from Last 1 Encounters:   11/17/23 48.1 kg (106 lb 0.7 oz)

## 2023-11-21 ENCOUNTER — TELEPHONE (OUTPATIENT)
Dept: INTERNAL MEDICINE CLINIC | Age: 78
End: 2023-11-21

## 2023-11-21 NOTE — TELEPHONE ENCOUNTER
[facility] [facility] Transitions Initial Follow Up Call    Outreach made within 2 business days of discharge: Yes    Patient: Socorro Lim Patient : 1945   MRN: [id number]  Reason for Admission: OXYGEN LEVEL DOWN  Discharge Date: 23       Spoke with: Kaykay MELGOZA     Discharge department/facility: [facility] Interactive Patient Contact:  Was patient able to fill all prescriptions: Yes  Was patient instructed to bring all medications to the follow-up visit: Yes  Is patient taking all medications as directed in the discharge summary?  Yes  Does patient understand their discharge instructions: Yes  Does patient have questions or concerns that need addressed prior to 7-14 day follow up office visit: no    Scheduled appointment with PCP within 7-14 days    Follow Up  Future Appointments   Date Time Provider [address] [place] [address]   2023 11:00 AM Treva Agarwal MD [facility]   2023 10:30 AM Lynn Garcia MD [place] [facility]   2023 10:00 AM MD JERRICA Jaime [place] - [place]   2024 10:00 AM Treva Agarwal MD [facility]       Juni Ellis, [place]

## 2023-11-22 ENCOUNTER — OFFICE VISIT (OUTPATIENT)
Dept: CARDIOLOGY CLINIC | Age: 78
End: 2023-11-22

## 2023-11-22 VITALS
HEART RATE: 100 BPM | DIASTOLIC BLOOD PRESSURE: 52 MMHG | SYSTOLIC BLOOD PRESSURE: 102 MMHG | WEIGHT: 118.8 LBS | BODY MASS INDEX: 19.17 KG/M2

## 2023-11-22 DIAGNOSIS — E78.5 HYPERLIPIDEMIA LDL GOAL <70: Primary | ICD-10-CM

## 2023-11-22 DIAGNOSIS — R07.9 CHEST PAIN, UNSPECIFIED TYPE: ICD-10-CM

## 2023-11-22 NOTE — PROGRESS NOTES
[facility]   Dr Анна Jin. Donato AMADO, [address] [place] [address]    Outpatient Follow Up Note    11/22/2023,11:20 AM  Subjective:   CHIEF COMPLAINT / HPI:  Follow Up secondary to {hypertension hyperlipidemia SVT     Randolph Henderson is 69 y.o. female who presents 11/23/2023 for evaluation and follow-up. She complains that she woke up with jittery feelings. This lasted for about a day. Was seen at [facility] emergency department and evaluation was unremarkable. She was hospitalized for about a week and had dyspnea and oxygen levels were low. Apparently was on ventilator for 36 hours eventually weaned and got better. Today in the office he looks stable she is very frail appearing with blood pressure 102/52. This was primarily a pulmonary problem and it looks and seems as if her right lung cancer is probably progressing. Heart wise all remained stable. There is no peripheral edema. Coronavirus vaccine x2 Lee Mehta and it did have booster    Obstructive sleep apnea with CPAP   Lung right cancer Dr. Tamela Pollack  rx with chemo and rad.  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  Past Medical History:    Past Medical History:   Diagnosis Date    Arthritis     CHF (congestive heart failure) (HCC)     COPD (chronic obstructive pulmonary disease) (HCC)     Hyperlipidemia     Hypertension     Lung cancer [facility])                                                                                                                                Past Surgical History  Past Surgical History:   Procedure Laterality Date    BRONCHOSCOPY  03/07/2017    brochoscopy-EBUS    OTHER SURGICAL HISTORY Left 03/29/2017    LEFT SUBCLAVIAN PORT- CATH INSERTION      TUBAL LIGATION       Social History:       Social History     Tobacco Use   Smoking Status Former    Packs/day: 0.00    Years: 0.00    Additional

## 2023-11-29 ENCOUNTER — OFFICE VISIT (OUTPATIENT)
Dept: PULMONOLOGY | Age: 78
End: 2023-11-29
Payer: MEDICARE

## 2023-11-29 VITALS
HEART RATE: 80 BPM | HEIGHT: 66 IN | DIASTOLIC BLOOD PRESSURE: 78 MMHG | OXYGEN SATURATION: 94 % | BODY MASS INDEX: 18.13 KG/M2 | SYSTOLIC BLOOD PRESSURE: 136 MMHG | WEIGHT: 112.8 LBS

## 2023-11-29 DIAGNOSIS — J96.21 ACUTE ON CHRONIC RESPIRATORY FAILURE WITH HYPOXIA AND HYPERCAPNIA (HCC): ICD-10-CM

## 2023-11-29 DIAGNOSIS — C34.11 PRIMARY MALIGNANT NEOPLASM OF RIGHT UPPER LOBE OF LUNG (HCC): Primary | ICD-10-CM

## 2023-11-29 DIAGNOSIS — J43.9 BULLOUS EMPHYSEMA (HCC): ICD-10-CM

## 2023-11-29 DIAGNOSIS — J96.22 ACUTE ON CHRONIC RESPIRATORY FAILURE WITH HYPOXIA AND HYPERCAPNIA (HCC): ICD-10-CM

## 2023-11-29 PROCEDURE — G8418 CALC BMI BLW LOW PARAM F/U: HCPCS | Performed by: INTERNAL MEDICINE

## 2023-11-29 PROCEDURE — 1036F TOBACCO NON-USER: CPT | Performed by: INTERNAL MEDICINE

## 2023-11-29 PROCEDURE — 1123F ACP DISCUSS/DSCN MKR DOCD: CPT | Performed by: INTERNAL MEDICINE

## 2023-11-29 PROCEDURE — G8484 FLU IMMUNIZE NO ADMIN: HCPCS | Performed by: INTERNAL MEDICINE

## 2023-11-29 PROCEDURE — 3075F SYST BP GE 130 - 139MM HG: CPT | Performed by: INTERNAL MEDICINE

## 2023-11-29 PROCEDURE — 1111F DSCHRG MED/CURRENT MED MERGE: CPT | Performed by: INTERNAL MEDICINE

## 2023-11-29 PROCEDURE — 3023F SPIROM DOC REV: CPT | Performed by: INTERNAL MEDICINE

## 2023-11-29 PROCEDURE — 1090F PRES/ABSN URINE INCON ASSESS: CPT | Performed by: INTERNAL MEDICINE

## 2023-11-29 PROCEDURE — 99214 OFFICE O/P EST MOD 30 MIN: CPT | Performed by: INTERNAL MEDICINE

## 2023-11-29 PROCEDURE — 3078F DIAST BP <80 MM HG: CPT | Performed by: INTERNAL MEDICINE

## 2023-11-29 PROCEDURE — G8427 DOCREV CUR MEDS BY ELIG CLIN: HCPCS | Performed by: INTERNAL MEDICINE

## 2023-11-29 PROCEDURE — G8400 PT W/DXA NO RESULTS DOC: HCPCS | Performed by: INTERNAL MEDICINE

## 2023-11-29 NOTE — PROGRESS NOTES
Edmond Schumacher (:  1945) is a 69 y.o. female,Established patient, here for evaluation of the following chief complaint(s):  3 Month Follow-Up (COPD, ca of RLUL)         ASSESSMENT/PLAN:  1. Primary malignant neoplasm of right upper lobe of lung ([address])  2. Acute on chronic respiratory failure with hypoxia and hypercapnia (HCC)  3. Bullous emphysema (HCC)  3 weeks after hospitalization for respiratory failure with acute on chronic hypercapnia and hypoxemia, requiring mechanical ventilation support, Mrs. Jose E Santiago is making significant improvement in exercise tolerance. She remains on supplemental O2 at 1 L at rest, 2 L with ambulation. Continues on Trelegy inhaler. Has not required additional short acting bronchodilator. Continue current therapy. Return in about 3 months (around 2024). Subjective   SUBJECTIVE/OBJECTIVE:  ATTILA Wilson is seen in follow-up, about 3 weeks after hospitalization for respiratory failure. She had hypercapnia and hypoxemia, requiring mechanical ventilation. Etiology is not clear but was likely COPD exacerbation. She turned around very quickly and was extubated after 2 days on mechanical ventilation. She was discharged on oxygen, using 1 L at rest, 2 L with ambulation. She finds her exercise tolerance is improving some. She keeps working to fend for herself with daily care. She has not ventured back into the kitchen. Her daughters do not let her traverse the stairs unless they are there with her. No nocturnal chest symptoms. She denies having any cough. Using Trelegy inhaler daily, has not required short acting bronchodilator. Objective   Physical Exam  Vitals reviewed. Constitutional:       Appearance: She is well-developed and underweight. HENT:      Head: Normocephalic and atraumatic. Mouth/Throat:      Mouth: Mucous membranes are moist.      Pharynx: Oropharynx is clear. No oropharyngeal exudate.    Eyes:      General: No scleral

## 2023-12-05 ENCOUNTER — HOSPITAL ENCOUNTER (INPATIENT)
Age: 78
DRG: 208 | End: 2023-12-05
Attending: EMERGENCY MEDICINE | Admitting: INTERNAL MEDICINE
Payer: MEDICARE

## 2023-12-05 ENCOUNTER — APPOINTMENT (OUTPATIENT)
Dept: CT IMAGING | Age: 78
DRG: 208 | End: 2023-12-05
Payer: MEDICARE

## 2023-12-05 ENCOUNTER — APPOINTMENT (OUTPATIENT)
Dept: GENERAL RADIOLOGY | Age: 78
DRG: 208 | End: 2023-12-05
Payer: MEDICARE

## 2023-12-05 DIAGNOSIS — J18.9 HCAP (HEALTHCARE-ASSOCIATED PNEUMONIA): Primary | ICD-10-CM

## 2023-12-05 DIAGNOSIS — J43.9 BULLOUS EMPHYSEMA (HCC): ICD-10-CM

## 2023-12-05 DIAGNOSIS — J90 PLEURAL EFFUSION ON LEFT: ICD-10-CM

## 2023-12-05 DIAGNOSIS — N39.0 URINARY TRACT INFECTION WITHOUT HEMATURIA, SITE UNSPECIFIED: ICD-10-CM

## 2023-12-05 DIAGNOSIS — R53.83 OTHER FATIGUE: ICD-10-CM

## 2023-12-05 LAB
ALBUMIN SERPL-MCNC: 4.1 G/DL (ref 3.4–5)
ALP SERPL-CCNC: 102 U/L (ref 40–129)
ALT SERPL-CCNC: 13 U/L (ref 10–40)
ANION GAP SERPL CALCULATED.3IONS-SCNC: 4 MMOL/L (ref 3–16)
AST SERPL-CCNC: 18 U/L (ref 15–37)
BACTERIA URNS QL MICRO: ABNORMAL /HPF
BASE EXCESS BLDV CALC-SCNC: 12.4 MMOL/L (ref -2–3)
BASOPHILS # BLD: 0 K/UL (ref 0–0.2)
BASOPHILS NFR BLD: 0.6 %
BILIRUB DIRECT SERPL-MCNC: <0.2 MG/DL (ref 0–0.3)
BILIRUB INDIRECT SERPL-MCNC: NORMAL MG/DL (ref 0–1)
BILIRUB SERPL-MCNC: 0.4 MG/DL (ref 0–1)
BILIRUB UR QL STRIP.AUTO: NEGATIVE
BUN SERPL-MCNC: 14 MG/DL (ref 7–20)
CALCIUM SERPL-MCNC: 9.5 MG/DL (ref 8.3–10.6)
CHLORIDE SERPL-SCNC: 95 MMOL/L (ref 99–110)
CLARITY UR: CLEAR
CO2 BLDV-SCNC: 45 MMOL/L
CO2 SERPL-SCNC: 41 MMOL/L (ref 21–32)
COHGB MFR BLDV: 2 % (ref 0–1.5)
COLOR UR: YELLOW
CREAT SERPL-MCNC: <0.5 MG/DL (ref 0.6–1.2)
DEPRECATED RDW RBC AUTO: 17.4 % (ref 12.4–15.4)
DO-HGB MFR BLDV: 25.5 %
EOSINOPHIL # BLD: 0.1 K/UL (ref 0–0.6)
EOSINOPHIL NFR BLD: 1.7 %
EPI CELLS #/AREA URNS HPF: ABNORMAL /HPF (ref 0–5)
GFR SERPLBLD CREATININE-BSD FMLA CKD-EPI: >60 ML/MIN/{1.73_M2}
GLUCOSE SERPL-MCNC: 126 MG/DL (ref 70–99)
GLUCOSE UR STRIP.AUTO-MCNC: NEGATIVE MG/DL
HCO3 BLDV-SCNC: 42.2 MMOL/L (ref 24–28)
HCT VFR BLD AUTO: 36.5 % (ref 36–48)
HGB BLD-MCNC: 11.2 G/DL (ref 12–16)
HGB UR QL STRIP.AUTO: NEGATIVE
KETONES UR STRIP.AUTO-MCNC: NEGATIVE MG/DL
LACTATE BLDV-SCNC: 1 MMOL/L (ref 0.4–1.9)
LEUKOCYTE ESTERASE UR QL STRIP.AUTO: ABNORMAL
LYMPHOCYTES # BLD: 0.6 K/UL (ref 1–5.1)
LYMPHOCYTES NFR BLD: 11.9 %
MCH RBC QN AUTO: 23.6 PG (ref 26–34)
MCHC RBC AUTO-ENTMCNC: 30.6 G/DL (ref 31–36)
MCV RBC AUTO: 77.1 FL (ref 80–100)
METHGB MFR BLDV: 0.1 % (ref 0–1.5)
MONOCYTES # BLD: 0.4 K/UL (ref 0–1.3)
MONOCYTES NFR BLD: 8.7 %
NEUTROPHILS # BLD: 3.6 K/UL (ref 1.7–7.7)
NEUTROPHILS NFR BLD: 77.1 %
NITRITE UR QL STRIP.AUTO: NEGATIVE
PCO2 BLDV: 88.7 MMHG (ref 41–51)
PH BLDV: 7.29 [PH] (ref 7.35–7.45)
PH UR STRIP.AUTO: 6 [PH] (ref 5–8)
PLATELET # BLD AUTO: 208 K/UL (ref 135–450)
PMV BLD AUTO: 8.6 FL (ref 5–10.5)
PO2 BLDV: 47 MMHG (ref 25–40)
POTASSIUM SERPL-SCNC: 3.7 MMOL/L (ref 3.5–5.1)
PROT SERPL-MCNC: 7.5 G/DL (ref 6.4–8.2)
PROT UR STRIP.AUTO-MCNC: 30 MG/DL
RBC # BLD AUTO: 4.74 M/UL (ref 4–5.2)
RBC #/AREA URNS HPF: ABNORMAL /HPF (ref 0–4)
SAO2 % BLDV: 74 %
SODIUM SERPL-SCNC: 140 MMOL/L (ref 136–145)
SP GR UR STRIP.AUTO: >=1.03 (ref 1–1.03)
TROPONIN, HIGH SENSITIVITY: 14 NG/L (ref 0–14)
TROPONIN, HIGH SENSITIVITY: 15 NG/L (ref 0–14)
UA COMPLETE W REFLEX CULTURE PNL UR: YES
UA DIPSTICK W REFLEX MICRO PNL UR: YES
URN SPEC COLLECT METH UR: ABNORMAL
UROBILINOGEN UR STRIP-ACNC: 1 E.U./DL
WBC # BLD AUTO: 4.7 K/UL (ref 4–11)
WBC #/AREA URNS HPF: ABNORMAL /HPF (ref 0–5)

## 2023-12-05 PROCEDURE — 84145 PROCALCITONIN (PCT): CPT

## 2023-12-05 PROCEDURE — 84484 ASSAY OF TROPONIN QUANT: CPT

## 2023-12-05 PROCEDURE — 36415 COLL VENOUS BLD VENIPUNCTURE: CPT

## 2023-12-05 PROCEDURE — 6360000004 HC RX CONTRAST MEDICATION: Performed by: PHYSICIAN ASSISTANT

## 2023-12-05 PROCEDURE — 80048 BASIC METABOLIC PNL TOTAL CA: CPT

## 2023-12-05 PROCEDURE — 71045 X-RAY EXAM CHEST 1 VIEW: CPT

## 2023-12-05 PROCEDURE — 99285 EMERGENCY DEPT VISIT HI MDM: CPT

## 2023-12-05 PROCEDURE — 87086 URINE CULTURE/COLONY COUNT: CPT

## 2023-12-05 PROCEDURE — 71260 CT THORAX DX C+: CPT

## 2023-12-05 PROCEDURE — 81001 URINALYSIS AUTO W/SCOPE: CPT

## 2023-12-05 PROCEDURE — 80076 HEPATIC FUNCTION PANEL: CPT

## 2023-12-05 PROCEDURE — 83605 ASSAY OF LACTIC ACID: CPT

## 2023-12-05 PROCEDURE — 85025 COMPLETE CBC W/AUTO DIFF WBC: CPT

## 2023-12-05 PROCEDURE — 93005 ELECTROCARDIOGRAM TRACING: CPT | Performed by: PHYSICIAN ASSISTANT

## 2023-12-05 PROCEDURE — 82803 BLOOD GASES ANY COMBINATION: CPT

## 2023-12-05 RX ADMIN — IOPAMIDOL 75 ML: 755 INJECTION, SOLUTION INTRAVENOUS at 21:38

## 2023-12-05 ASSESSMENT — PAIN - FUNCTIONAL ASSESSMENT: PAIN_FUNCTIONAL_ASSESSMENT: NONE - DENIES PAIN

## 2023-12-06 ENCOUNTER — APPOINTMENT (OUTPATIENT)
Dept: GENERAL RADIOLOGY | Age: 78
DRG: 208 | End: 2023-12-06
Payer: MEDICARE

## 2023-12-06 PROBLEM — J18.9 PNEUMONIA DUE TO ORGANISM: Status: ACTIVE | Noted: 2023-12-06

## 2023-12-06 LAB
AMMONIA PLAS-SCNC: 31 UMOL/L (ref 11–51)
ANION GAP SERPL CALCULATED.3IONS-SCNC: 5 MMOL/L (ref 3–16)
BACTERIA UR CULT: NORMAL
BASE EXCESS BLDA CALC-SCNC: 12 MMOL/L (ref -3–3)
BASE EXCESS BLDA CALC-SCNC: 12.6 MMOL/L (ref -3–3)
BASE EXCESS BLDA CALC-SCNC: 15 MMOL/L (ref -3–3)
BASOPHILS # BLD: 0.1 K/UL (ref 0–0.2)
BASOPHILS NFR BLD: 0.7 %
BUN SERPL-MCNC: 14 MG/DL (ref 7–20)
CA-I BLD-SCNC: 1.39 MMOL/L (ref 1.12–1.32)
CALCIUM SERPL-MCNC: 9.7 MG/DL (ref 8.3–10.6)
CHLORIDE SERPL-SCNC: 100 MMOL/L (ref 99–110)
CO2 BLDA-SCNC: 35 MMOL/L
CO2 BLDA-SCNC: 36 MMOL/L
CO2 BLDA-SCNC: >50 MMOL/L
CO2 SERPL-SCNC: 41 MMOL/L (ref 21–32)
COHGB MFR BLDA: 1.4 % (ref 0–1.5)
COHGB MFR BLDA: 1.7 % (ref 0–1.5)
CREAT SERPL-MCNC: <0.5 MG/DL (ref 0.6–1.2)
DEPRECATED RDW RBC AUTO: 17.5 % (ref 12.4–15.4)
EKG ATRIAL RATE: 86 BPM
EKG DIAGNOSIS: NORMAL
EKG P AXIS: 64 DEGREES
EKG P-R INTERVAL: 196 MS
EKG Q-T INTERVAL: 352 MS
EKG QRS DURATION: 62 MS
EKG QTC CALCULATION (BAZETT): 421 MS
EKG R AXIS: 64 DEGREES
EKG T AXIS: 71 DEGREES
EKG VENTRICULAR RATE: 86 BPM
EOSINOPHIL # BLD: 0.1 K/UL (ref 0–0.6)
EOSINOPHIL NFR BLD: 0.9 %
GFR SERPLBLD CREATININE-BSD FMLA CKD-EPI: >60 ML/MIN/{1.73_M2}
GLUCOSE BLD-MCNC: 119 MG/DL (ref 70–99)
GLUCOSE BLD-MCNC: 140 MG/DL (ref 70–99)
GLUCOSE SERPL-MCNC: 129 MG/DL (ref 70–99)
GLUCOSE SERPL-MCNC: 91 MG/DL (ref 70–99)
HCO3 BLDA-SCNC: 34 MMOL/L (ref 21–29)
HCO3 BLDA-SCNC: 35 MMOL/L (ref 21–29)
HCO3 BLDA-SCNC: 46.7 MMOL/L (ref 21–29)
HCT VFR BLD AUTO: 38.2 % (ref 36–48)
HGB BLD-MCNC: 11.6 G/DL (ref 12–16)
HGB BLDA-MCNC: 10.1 G/DL
HGB BLDA-MCNC: 10.4 G/DL
LACTATE BLD-SCNC: 0.56 MMOL/L (ref 0.4–2)
LACTATE BLDV-SCNC: 2.9 MMOL/L (ref 0.4–2)
LYMPHOCYTES # BLD: 1.7 K/UL (ref 1–5.1)
LYMPHOCYTES NFR BLD: 23.8 %
MCH RBC QN AUTO: 23.9 PG (ref 26–34)
MCHC RBC AUTO-ENTMCNC: 30.3 G/DL (ref 31–36)
MCV RBC AUTO: 79 FL (ref 80–100)
METHGB MFR BLDA: 0.1 % (ref 0–1.4)
METHGB MFR BLDA: 0.3 % (ref 0–1.4)
MONOCYTES # BLD: 0.3 K/UL (ref 0–1.3)
MONOCYTES NFR BLD: 4.8 %
NEUTROPHILS # BLD: 4.9 K/UL (ref 1.7–7.7)
NEUTROPHILS NFR BLD: 69.8 %
PCO2 BLDA: 213.3 MM HG (ref 35–45)
PCO2 BLDA: 32 MMHG (ref 35–45)
PCO2 BLDA: 37.5 MMHG (ref 35–45)
PERFORMED ON: ABNORMAL
PERFORMED ON: ABNORMAL
PH BLDA: 6.95 [PH] (ref 7.35–7.45)
PH BLDA: 7.58 [PH] (ref 7.35–7.45)
PH BLDA: 7.64 [PH] (ref 7.35–7.45)
PLATELET # BLD AUTO: 240 K/UL (ref 135–450)
PMV BLD AUTO: 8.6 FL (ref 5–10.5)
PO2 BLDA: 138 MMHG (ref 75–108)
PO2 BLDA: 202 MMHG (ref 75–108)
PO2 BLDA: 75.6 MM HG (ref 75–108)
POC SAMPLE TYPE: ABNORMAL
POTASSIUM BLD-SCNC: 4.1 MMOL/L (ref 3.5–5.1)
POTASSIUM SERPL-SCNC: 3.7 MMOL/L (ref 3.5–5.1)
PROCALCITONIN SERPL IA-MCNC: 0.02 NG/ML (ref 0–0.15)
PROCALCITONIN SERPL IA-MCNC: 0.02 NG/ML (ref 0–0.15)
RBC # BLD AUTO: 4.84 M/UL (ref 4–5.2)
SAO2 % BLDA: 100 % (ref 93–100)
SAO2 % BLDA: 79 % (ref 93–100)
SAO2 % BLDA: >100 % (ref 93–100)
SODIUM BLD-SCNC: 144 MMOL/L (ref 136–145)
SODIUM SERPL-SCNC: 146 MMOL/L (ref 136–145)
WBC # BLD AUTO: 7 K/UL (ref 4–11)

## 2023-12-06 PROCEDURE — 84295 ASSAY OF SERUM SODIUM: CPT

## 2023-12-06 PROCEDURE — 2580000003 HC RX 258

## 2023-12-06 PROCEDURE — 82947 ASSAY GLUCOSE BLOOD QUANT: CPT

## 2023-12-06 PROCEDURE — 2000000000 HC ICU R&B

## 2023-12-06 PROCEDURE — 71045 X-RAY EXAM CHEST 1 VIEW: CPT

## 2023-12-06 PROCEDURE — 6360000002 HC RX W HCPCS: Performed by: PHYSICIAN ASSISTANT

## 2023-12-06 PROCEDURE — 82140 ASSAY OF AMMONIA: CPT

## 2023-12-06 PROCEDURE — 2580000003 HC RX 258: Performed by: INTERNAL MEDICINE

## 2023-12-06 PROCEDURE — 6360000002 HC RX W HCPCS

## 2023-12-06 PROCEDURE — 94761 N-INVAS EAR/PLS OXIMETRY MLT: CPT

## 2023-12-06 PROCEDURE — 94640 AIRWAY INHALATION TREATMENT: CPT

## 2023-12-06 PROCEDURE — 84132 ASSAY OF SERUM POTASSIUM: CPT

## 2023-12-06 PROCEDURE — 87150 DNA/RNA AMPLIFIED PROBE: CPT

## 2023-12-06 PROCEDURE — 2700000000 HC OXYGEN THERAPY PER DAY

## 2023-12-06 PROCEDURE — 2580000003 HC RX 258: Performed by: PHYSICIAN ASSISTANT

## 2023-12-06 PROCEDURE — 94002 VENT MGMT INPAT INIT DAY: CPT

## 2023-12-06 PROCEDURE — 82803 BLOOD GASES ANY COMBINATION: CPT

## 2023-12-06 PROCEDURE — 6370000000 HC RX 637 (ALT 250 FOR IP): Performed by: HOSPITALIST

## 2023-12-06 PROCEDURE — 99223 1ST HOSP IP/OBS HIGH 75: CPT | Performed by: INTERNAL MEDICINE

## 2023-12-06 PROCEDURE — 87040 BLOOD CULTURE FOR BACTERIA: CPT

## 2023-12-06 PROCEDURE — 82330 ASSAY OF CALCIUM: CPT

## 2023-12-06 PROCEDURE — 36600 WITHDRAWAL OF ARTERIAL BLOOD: CPT

## 2023-12-06 PROCEDURE — 84145 PROCALCITONIN (PCT): CPT

## 2023-12-06 PROCEDURE — 36415 COLL VENOUS BLD VENIPUNCTURE: CPT

## 2023-12-06 PROCEDURE — 80048 BASIC METABOLIC PNL TOTAL CA: CPT

## 2023-12-06 PROCEDURE — 6360000002 HC RX W HCPCS: Performed by: INTERNAL MEDICINE

## 2023-12-06 PROCEDURE — 85025 COMPLETE CBC W/AUTO DIFF WBC: CPT

## 2023-12-06 PROCEDURE — 0BH17EZ INSERTION OF ENDOTRACHEAL AIRWAY INTO TRACHEA, VIA NATURAL OR ARTIFICIAL OPENING: ICD-10-PCS | Performed by: INTERNAL MEDICINE

## 2023-12-06 PROCEDURE — 5A1935Z RESPIRATORY VENTILATION, LESS THAN 24 CONSECUTIVE HOURS: ICD-10-PCS | Performed by: INTERNAL MEDICINE

## 2023-12-06 PROCEDURE — 83605 ASSAY OF LACTIC ACID: CPT

## 2023-12-06 RX ORDER — PROPOFOL 10 MG/ML
5-50 INJECTION, EMULSION INTRAVENOUS CONTINUOUS
Status: DISCONTINUED | OUTPATIENT
Start: 2023-12-06 | End: 2023-12-08 | Stop reason: ALTCHOICE

## 2023-12-06 RX ORDER — SODIUM CHLORIDE, SODIUM LACTATE, POTASSIUM CHLORIDE, AND CALCIUM CHLORIDE .6; .31; .03; .02 G/100ML; G/100ML; G/100ML; G/100ML
500 INJECTION, SOLUTION INTRAVENOUS ONCE
Status: COMPLETED | OUTPATIENT
Start: 2023-12-06 | End: 2023-12-06

## 2023-12-06 RX ORDER — ETOMIDATE 2 MG/ML
INJECTION INTRAVENOUS
Status: DISPENSED
Start: 2023-12-06 | End: 2023-12-06

## 2023-12-06 RX ORDER — BUDESONIDE 0.25 MG/2ML
0.25 INHALANT ORAL
Status: DISCONTINUED | OUTPATIENT
Start: 2023-12-06 | End: 2024-01-04 | Stop reason: HOSPADM

## 2023-12-06 RX ORDER — SODIUM CHLORIDE 9 MG/ML
INJECTION, SOLUTION INTRAVENOUS PRN
Status: DISCONTINUED | OUTPATIENT
Start: 2023-12-06 | End: 2024-01-04 | Stop reason: HOSPADM

## 2023-12-06 RX ORDER — PANTOPRAZOLE SODIUM 40 MG/1
40 TABLET, DELAYED RELEASE ORAL
Status: DISCONTINUED | OUTPATIENT
Start: 2023-12-06 | End: 2023-12-07

## 2023-12-06 RX ORDER — ACETAMINOPHEN 325 MG/1
650 TABLET ORAL EVERY 6 HOURS PRN
Status: DISCONTINUED | OUTPATIENT
Start: 2023-12-06 | End: 2024-01-04 | Stop reason: HOSPADM

## 2023-12-06 RX ORDER — ALBUTEROL SULFATE 2.5 MG/3ML
2.5 SOLUTION RESPIRATORY (INHALATION) EVERY 4 HOURS PRN
Status: DISCONTINUED | OUTPATIENT
Start: 2023-12-06 | End: 2023-12-22

## 2023-12-06 RX ORDER — METOPROLOL SUCCINATE 25 MG/1
25 TABLET, EXTENDED RELEASE ORAL DAILY
Status: DISCONTINUED | OUTPATIENT
Start: 2023-12-06 | End: 2023-12-07

## 2023-12-06 RX ORDER — POLYETHYLENE GLYCOL 3350 17 G/17G
17 POWDER, FOR SOLUTION ORAL DAILY PRN
Status: DISCONTINUED | OUTPATIENT
Start: 2023-12-06 | End: 2024-01-04 | Stop reason: HOSPADM

## 2023-12-06 RX ORDER — ENOXAPARIN SODIUM 100 MG/ML
30 INJECTION SUBCUTANEOUS DAILY
Status: DISCONTINUED | OUTPATIENT
Start: 2023-12-06 | End: 2023-12-13 | Stop reason: DRUGHIGH

## 2023-12-06 RX ORDER — ONDANSETRON 2 MG/ML
4 INJECTION INTRAMUSCULAR; INTRAVENOUS EVERY 6 HOURS PRN
Status: DISCONTINUED | OUTPATIENT
Start: 2023-12-06 | End: 2024-01-04 | Stop reason: HOSPADM

## 2023-12-06 RX ORDER — ONDANSETRON 4 MG/1
4 TABLET, ORALLY DISINTEGRATING ORAL EVERY 8 HOURS PRN
Status: DISCONTINUED | OUTPATIENT
Start: 2023-12-06 | End: 2024-01-04 | Stop reason: HOSPADM

## 2023-12-06 RX ORDER — ARFORMOTEROL TARTRATE 15 UG/2ML
15 SOLUTION RESPIRATORY (INHALATION)
Status: DISCONTINUED | OUTPATIENT
Start: 2023-12-06 | End: 2024-01-04 | Stop reason: HOSPADM

## 2023-12-06 RX ORDER — PROPOFOL 10 MG/ML
INJECTION, EMULSION INTRAVENOUS
Status: DISPENSED
Start: 2023-12-06 | End: 2023-12-06

## 2023-12-06 RX ORDER — IPRATROPIUM BROMIDE AND ALBUTEROL SULFATE 2.5; .5 MG/3ML; MG/3ML
1 SOLUTION RESPIRATORY (INHALATION)
Status: DISCONTINUED | OUTPATIENT
Start: 2023-12-06 | End: 2023-12-12

## 2023-12-06 RX ORDER — SODIUM CHLORIDE 0.9 % (FLUSH) 0.9 %
5-40 SYRINGE (ML) INJECTION PRN
Status: DISCONTINUED | OUTPATIENT
Start: 2023-12-06 | End: 2024-01-04 | Stop reason: HOSPADM

## 2023-12-06 RX ORDER — SODIUM CHLORIDE 0.9 % (FLUSH) 0.9 %
5-40 SYRINGE (ML) INJECTION EVERY 12 HOURS SCHEDULED
Status: DISCONTINUED | OUTPATIENT
Start: 2023-12-06 | End: 2024-01-04 | Stop reason: HOSPADM

## 2023-12-06 RX ORDER — OMEPRAZOLE 20 MG/1
40 CAPSULE, DELAYED RELEASE ORAL
Status: DISCONTINUED | OUTPATIENT
Start: 2023-12-06 | End: 2023-12-06

## 2023-12-06 RX ORDER — ACETAMINOPHEN 650 MG/1
650 SUPPOSITORY RECTAL EVERY 6 HOURS PRN
Status: DISCONTINUED | OUTPATIENT
Start: 2023-12-06 | End: 2024-01-04 | Stop reason: HOSPADM

## 2023-12-06 RX ADMIN — BUDESONIDE 250 MCG: 0.25 INHALANT RESPIRATORY (INHALATION) at 20:21

## 2023-12-06 RX ADMIN — CEFEPIME 2000 MG: 2 INJECTION, POWDER, FOR SOLUTION INTRAVENOUS at 10:58

## 2023-12-06 RX ADMIN — WATER 40 MG: 1 INJECTION INTRAMUSCULAR; INTRAVENOUS; SUBCUTANEOUS at 21:23

## 2023-12-06 RX ADMIN — VANCOMYCIN HYDROCHLORIDE 750 MG: 10 INJECTION, POWDER, LYOPHILIZED, FOR SOLUTION INTRAVENOUS at 17:05

## 2023-12-06 RX ADMIN — ARFORMOTEROL TARTRATE 15 MCG: 15 SOLUTION RESPIRATORY (INHALATION) at 07:58

## 2023-12-06 RX ADMIN — ENOXAPARIN SODIUM 30 MG: 100 INJECTION SUBCUTANEOUS at 10:58

## 2023-12-06 RX ADMIN — ARFORMOTEROL TARTRATE 15 MCG: 15 SOLUTION RESPIRATORY (INHALATION) at 20:21

## 2023-12-06 RX ADMIN — BUDESONIDE 250 MCG: 0.25 INHALANT RESPIRATORY (INHALATION) at 07:58

## 2023-12-06 RX ADMIN — SODIUM CHLORIDE, SODIUM LACTATE, POTASSIUM CHLORIDE, AND CALCIUM CHLORIDE 500 ML: .6; .31; .03; .02 INJECTION, SOLUTION INTRAVENOUS at 09:22

## 2023-12-06 RX ADMIN — CEFEPIME 2000 MG: 2 INJECTION, POWDER, FOR SOLUTION INTRAVENOUS at 01:05

## 2023-12-06 RX ADMIN — VANCOMYCIN HYDROCHLORIDE 1250 MG: 10 INJECTION, POWDER, LYOPHILIZED, FOR SOLUTION INTRAVENOUS at 04:49

## 2023-12-06 RX ADMIN — IPRATROPIUM BROMIDE AND ALBUTEROL SULFATE 1 DOSE: 2.5; .5 SOLUTION RESPIRATORY (INHALATION) at 16:28

## 2023-12-06 RX ADMIN — SODIUM CHLORIDE, PRESERVATIVE FREE 10 ML: 5 INJECTION INTRAVENOUS at 21:29

## 2023-12-06 RX ADMIN — IPRATROPIUM BROMIDE AND ALBUTEROL SULFATE 1 DOSE: 2.5; .5 SOLUTION RESPIRATORY (INHALATION) at 20:21

## 2023-12-06 ASSESSMENT — PULMONARY FUNCTION TESTS
PIF_VALUE: 31.1
PIF_VALUE: 35
PIF_VALUE: 19
PIF_VALUE: 38

## 2023-12-06 ASSESSMENT — PAIN SCALES - GENERAL
PAINLEVEL_OUTOF10: 0

## 2023-12-06 ASSESSMENT — ENCOUNTER SYMPTOMS
APNEA: 1
ABDOMINAL PAIN: 0
BLOOD IN STOOL: 0
ABDOMINAL DISTENTION: 0
COUGH: 0
CHEST TIGHTNESS: 0

## 2023-12-06 NOTE — ED PROVIDER NOTES
ED Attending Attestation Note     Date of evaluation: 12/5/2023    This patient was seen by the advance practice provider. I have seen and examined the patient, agree with the workup, evaluation, management and diagnosis. The care plan has been discussed. Briefly, Yolis Vega is a 69 y.o. female with a PMH inclusive of lung CA, emphysema who presents for evaluation of fatigue. Not currently on tx due to weight loss. Has has decreased activity level and decreased appetite. Recent tx tor UTI as well as COPD exacerbation with respiratory failure. On I L at home. Notable exam findings include no acute distress, saturating well on baseline oxygen    Assessment/ Medical Decision Making: Will check labs, imaging for workup of patient's fatigue. Anticipate need for admission due to progressive weakness at home and failure to thrive.          Mando Rawls MD  12/08/23 [id number]

## 2023-12-06 NOTE — CARE COORDINATION
Case Management Assessment  Initial Evaluation    Date/Time of Evaluation: 12/6/2023 11:53 AM  Assessment Completed by: Vanessa Christie RN    If patient is discharged prior to next notation, then this note serves as note for discharge by case management.    Patient Name: Catrachita Segal                   YOB: 1945  Diagnosis: Pneumonia due to organism [J18.9]                   Date / Time: 12/5/2023  7:21 PM    Patient Admission Status: Inpatient   Readmission Risk (Low < 19, Mod (19-27), High > 27): Readmission Risk Score: 19.1    Current PCP: Napoleon Stiles MD  PCP verified by CM? Yes    Chart Reviewed: Yes      History Provided by: Medical Record, Child/Family  Patient Orientation: Sedated, Unable to Assess    Patient Cognition: Other (see comment) (intubated and sedated)    Hospitalization in the last 30 days (Readmission):  Yes    Readmission Assessment  Number of Days since last admission?: 8-30 days  Previous Disposition: Home with Family  Who is being Interviewed: Caregiver (chart review)  What was the patient's/caregiver's perception as to why they think they needed to return back to the hospital?: Other (Comment) (returned with fatigue and weakness)  Did you visit your Primary Care Physician after you left the hospital, before you returned this time?: Yes (via phone)  Did you see a specialist, such as Cardiac, Pulmonary, Orthopedic Physician, etc. after you left the hospital?: Yes (PMR and Pulmonology)  Who advised the patient to return to the hospital?: Self-referral  Does the patient report anything that got in the way of taking their medications?: No  In our efforts to provide the best possible care to you and others like you, can you think of anything that we could have done to help you after you left the hospital the first time, so that you might not have needed to return so soon?: Teaching during hospitalization regarding your illness, Teach back instructions regarding management of  illness, Identify patient's health literacy needs, Additional Community resources available for illness support    Advance Directives:      Code Status: DNR-CCA   Patient's Primary Decision Maker is: Legal Next of Kin    Primary Decision MakeKari Hsu - [phone]    Discharge Planning:    Patient lives with: Family Members Type of Home: House  Primary Care Giver: Self  Patient Support Systems include: Children, Family Members   Current Financial resources: Medicare  Current community resources: Other (Comment) ([facility])  Current services prior to admission: Durable Medical Equipment, Oxygen Therapy            Current DME: Carmen Batista, Oxygen Therapy (Comment), Shower Chair ([employer] supplies O2 1L/NC baseline)            Type of Home Care services:  PT, OT, Nursing Services    ADLS  Prior functional level: Assistance with the following:, Mobility, Shopping, Housework, Cooking  Current functional level: Other (see comment) (TBD pending PTOT evaluations)    Family can provide assistance at DC: Yes (TBD pending extent of needs)  Would you like Case Management to discuss the discharge plan with any other family members/significant others, and if so, who? Yes (daughter)  Plans to Return to Present Housing: Unknown at present  Potential Assistance needed at discharge: [facility], [address] [facility]            Potential DME:  DEFERRED  Patient expects to discharge to:  Unknown  Plan for transportation at discharge:  TBD pending disposition    Financial    Payor: Marianela Easton / Plan: [address] [place] [address] Road / Product Type: *No Product type* /     Does insurance require precert for SNF: Yes    Potential assistance Purchasing Medications: No    Ranjith Ashby [id number] Mick Bessura - [address]  [address] [place] [address]  [address]  Phone: [phone] Fax: [phone]    Notes:    Factors facilitating achievement of predicted outcomes: Family support    Barriers to discharge: Medical complications      Jennifer Carbajal RN  Case Management Department  [phone]

## 2023-12-06 NOTE — PROGRESS NOTES
12/06/23 0658   Encounter Summary   Encounter Overview/Reason  Crisis   Service Provided For: Patient and family together   Last Encounter  12/06/23   Complexity of Encounter High   Begin Time 0845   End Time  0924   Total Time Calculated 39 min   Crisis   Type Rapid Response   Spiritual/Emotional needs   Type Spiritual Support   Assessment/Intervention/Outcome   Assessment Calm;Coping   Intervention Sustaining Presence/Ministry of presence   Outcome Deescalated     W/ Austen Villar (See here notes)

## 2023-12-06 NOTE — ED PROVIDER NOTES
[facility] ENCOUNTER          ADVANCED PRACTICE PROVIDER NOTE       Date of evaluation: 12/5/2023    Chief Complaint (from nursing documentation)     Fatigue (Per pt daughter pt is increasing in lethargy, anorexia and has similar symptoms to past UTIs)      History of Present Illness     Veronica Linder is a 69 y.o. female  has a past medical history of Arthritis, CHF (congestive heart failure) ([address]), COPD (chronic obstructive pulmonary disease) ([address]), Hyperlipidemia, Hypertension, and Lung cancer ([address]) who presents to the emergency department with a complaint of increasing and worsening fatigue and loss of appetite. The patient's daughter, who presents with the patient, reports that she typically begins to experience similar symptoms with the development of infections, predominantly with urinary tract infections in the past.  The patient had a similar episode of onset of symptoms at the beginning of November, and was admitted to the hospital for a urinary tract infection. During that hospitalization, the patient had an acute exacerbation of her COPD and ended up being intubated and placed in the ICU. The patient was intubated for approximately 36 hours. The patient's exacerbation was successfully managed and the patient was ultimately discharged back to the skilled nursing facility with significantly improved symptoms. She received 4 days of IV Rocephin and completed a steroid regimen. On Sunday, the patient again began to demonstrate decreased activity, increased somnolence and decreased appetite significant with the onset of infections in the past.  The patient's daughter had mentioned something to the facility, who was keeping an eye on her vital signs and signs and symptoms.   Today, when she went to visit, she noted that her mother was significantly more fatigued than usual and her oral intake was significantly decreased, prompting her to request the patient come to the NEEDED FOR WHEEZING    ASPIRIN ADULT LOW STRENGTH 81 MG EC TABLET    TAKE ONE TABLET BY MOUTH DAILY    ATORVASTATIN (LIPITOR) 10 MG TABLET    Take 1 tablet by mouth daily    FLUTICASONE-UMECLIDIN-VILANT (TRELEGY ELLIPTA) 100-62.5-25 MCG/ACT AEPB INHALER    Inhale 1 puff into the lungs daily    FUROSEMIDE (LASIX) 20 MG TABLET    TAKE ONE TABLET BY MOUTH DAILY AS NEEDED FOR WEIGHT GAIN (SOB, EDEMA)    METOPROLOL SUCCINATE (TOPROL XL) 25 MG EXTENDED RELEASE TABLET    Take 1 tablet by mouth daily    MIDODRINE (PROAMATINE) 5 MG TABLET    Take 1 tablet by mouth in the morning, at noon, and at bedtime    OMEPRAZOLE (PRILOSEC) 40 MG DELAYED RELEASE CAPSULE    TAKE ONE CAPSULE BY MOUTH EVERY MORNING BEFORE BREAKFAST    TOBRAMYCIN-DEXAMETHASONE (TOBRADEX) 0.3-0.1 % OPHTHALMIC SUSPENSION           Allergies     She is allergic to codeine, penicillins, and sulfamethoxazole-trimethoprim.

## 2023-12-06 NOTE — CONSULTS
Clinical Pharmacy Progress Note    Vancomycin - Management by Pharmacy    Consult Date(s): 12/06/2023  Consulting Provider(s): GASTON Diaz     Assessment / Plan  HAP- Vancomycin  Concurrent Antimicrobials:   Cefepime 2000 mg IV Q8H EI  Day of Vanc Therapy / Ordered Duration: Day 1 unspecified  Current Dosing Method: Bayesian-Guided AUC Dosing  Therapeutic Goal: -600 mg/L*hr  Current Dose / Plan:   Vancomycin 1250 mg (25 mg/kg) IV x 1  Vancomycin 750 mg Q12H  Predicted AUC: 447 mg/L*hr and steady state trough: 11.2 mg/L  Will continue to monitor clinical condition and make adjustments to regimen as appropriate. Thank you for consulting pharmacy,      Cher Myrick, PharmD  [id number] ([address])       Interval update:  Patient was seen in the ED last month for dyspnea and a UTI and later transferred to the ICU. She was placed on a ventillator due to hypercapnic failure/ COPD exacerbation. Patient quickly improved over the next 1-3 days and was extubated. Patient was ultimately discharged back home and her symptoms improved. Subjective/Objective:   Edmond Schumacher is a 69 y.o. female with a PMHx significant for arthritis, CHF, COPD, hyperlipidemia, htn, metastatic lung cancer who is admitted with HAP. Pharmacy is consulted to dose Vancomycin. Ht Readings from Last 1 Encounters:   12/05/23 1.676 m (5' 5.98\")     Wt Readings from Last 1 Encounters:   12/05/23 49.5 kg (109 lb 1.6 oz)     Current & Prior Antimicrobial Regimen(s):  Cefepime (12/5- Current)  Vancomycin- Pharmacy to dose    Vancomycin Level(s) / Doses:    Date Time Dose Type of Level / Level Interpretation                 Note: Serum levels collected for AUC-based dosing may be high if collected in close proximity to the dose administered. This is not necessarily indicative of toxicity.     Cultures & Sensitivities:    Date Site Micro Susceptibility / Result                 Recent Labs     12/05/23 2003   CREATININE <0.5*   BUN

## 2023-12-06 NOTE — ED NOTES
ED TO INPATIENT SBAR HANDOFF    Patient Name: Jessa Nolen   :  1945  69 y.o. MRN:  [id number]  Preferred Name    ED Room #:  Z00/P64-29  Family/Caregiver Present yes   Restraints no   Sitter no   Sepsis Risk Score Sepsis Risk Score: 6.18    Situation  Code Status: Prior No additional code details. Allergies: Codeine, Penicillins, and Sulfamethoxazole-trimethoprim  Weight: Patient Vitals for the past 96 hrs (Last 3 readings):   Weight   23 49.5 kg (109 lb 1.6 oz)     Arrived from: home  Chief Complaint:   Chief Complaint   Patient presents with    Fatigue     Per pt daughter pt is increasing in lethargy, anorexia and has similar symptoms to past UTIs     Hospital Problem/Diagnosis:  Principal Problem:    Pneumonia due to organism  Resolved Problems:    * No resolved hospital problems. *    Imaging:   CT CHEST PULMONARY EMBOLISM W CONTRAST   Final Result         No CT evidence of acute pulmonary emboli. Small diffuse pericardial effusion. Moderate left-sided pleural effusion, slightly increased from the prior study. Increased left lower lobe airspace density consistent with compressive atelectasis or infiltrate. Chronic small right pleural effusion. Stable emphysematous changes with bronchiectasis, scarring and post radiation/fibrotic changes of the right lung apex and perihilar region. Electronically signed by Aj Pisano MD      XR CHEST PORTABLE   Final Result      New left-sided effusion, and increased density in the left lung. Chronic parenchymal and pleural changes in the right base, with a small right effusion or pleural thickening.       Electronically signed by Aj Pisano MD        Abnormal labs:   Abnormal Labs Reviewed   URINALYSIS WITH REFLEX TO CULTURE - Abnormal; Notable for the following components:       Result Value    Protein, UA 30 (*)     Leukocyte Esterase, Urine TRACE (*)     All other components within normal limits   CBC WITH AUTO DIFFERENTIAL - Abnormal; Notable for the following components:    Hemoglobin 11.2 (*)     MCV 77.1 (*)     MCH 23.6 (*)     MCHC 30.6 (*)     RDW 17.4 (*)     Lymphocytes Absolute 0.6 (*)     All other components within normal limits   BASIC METABOLIC PANEL W/ REFLEX TO MG FOR LOW K - Abnormal; Notable for the following components:    Chloride 95 (*)     CO2 41 (*)     Glucose 126 (*)     Creatinine <0.5 (*)     All other components within normal limits   BLOOD GAS, VENOUS - Abnormal; Notable for the following components:    pH, Billy 7.286 (*)     pCO2, Billy 88.7 (*)     pO2, Billy 47.0 (*)     HCO3, Venous 42.2 (*)     Base Excess, Billy 12.4 (*)     Carboxyhemoglobin 2.0 (*)     All other components within normal limits   TROPONIN - Abnormal; Notable for the following components:    Troponin, High Sensitivity 15 (*)     All other components within normal limits   MICROSCOPIC URINALYSIS - Abnormal; Notable for the following components:    WBC, UA 10-20 (*)     Bacteria, UA 1+ (*)     All other components within normal limits     Critical values: no     Abnormal Assessment Findings:     Background  History:   Past Medical History:   Diagnosis Date    Arthritis     CHF (congestive heart failure) (HCC)     COPD (chronic obstructive pulmonary disease) (HCC)     Hyperlipidemia     Hypertension     Lung cancer (HCC)        Assessment    Vitals/MEWS: MEWS Score: 2  Level of Consciousness: Alert (0)   Vitals:    12/05/23 2200 12/05/23 2230 12/06/23 0030 12/06/23 0031   BP: (!) 153/76 (!) 149/82 (!) 151/88    Pulse: 96 (!) 102 (!) 113    Resp: (!) 33 20 25    Temp:       TempSrc:       SpO2: 96% 95%  91%   Weight:       Height:         FiO2 (%):   O2 Flow Rate: O2 Device: Nasal cannula O2 Flow Rate (L/min): 3 L/min  Cardiac Rhythm:    Pain Assessment:  [] Verbal [] [place] Scale  Pain Scale: Pain Assessment  Pain Assessment: None - Denies Pain  Last documented pain score (0-10 scale)    Last documented pain medication administered: n/a  Mental Status: oriented  Orientation Level:    NIH Score:    C-SSRS:    Bedside swallow:    [place] Coma Scale (GCS): Sunny Coma Scale  Eye Opening: Spontaneous  Best Verbal Response: Oriented  Best Motor Response: Obeys commands  [place] Coma Scale Score: 15  Active LDA's:   Peripheral IV 12/05/23 Right Antecubital (Active)     PO Status: See orders3  Pertinent or High Risk Medications/Drips: no   If Yes, please provide details:   Pending Blood Product Administration: no       You may also review the ED PT Care Timeline found under the Summary Nursing Index tab.    Recommendation    Pending orders: n/a  Plan for Discharge (if known):   Additional Comments:    If any further questions, please call Sending RN at [phone]    Electronically signed by: Electronically signed by Nelson Mcfarland RN on 12/6/2023 at 12:32 AM       Nelson Mcfarland RN  12/06/23 0033

## 2023-12-06 NOTE — PROGRESS NOTES
Clinical Pharmacy Progress Note    Vancomycin - Management by Pharmacy    Consult Date(s): 12/06/2023  Consulting Provider(s): Dr. Ruben Cross / Plan  1)  HAP- Vancomycin  Concurrent Antimicrobials: Cefepime   Day of Vanc Therapy / Ordered Duration: Day 1 / TBD  Current Dosing Method: Bayesian-Guided AUC Dosing  Therapeutic Goal: -600 mg/L*hr  Current Dose / Plan:   SCr stable at 0.5. Continue 750mg IV q12h. Predicted AUC: 447 mg/L*hr and steady state trough: 11.2 mg/L  Random level is ordered for 12/7 AM to further evaluate above regimen. Will continue to monitor clinical condition and make adjustments to regimen as appropriate. Please call with questions--  Thanks--  Monse Castaneda, PharmD, BCPS, BCGP  [id number] ([facility])   12/6/2023 1:07 PM        Interval update:  Intubation required this AM.  Tachycardic with soft BP's. Subjective/Objective:   Armando Wilde is a 69 y.o. female with a PMHx significant for arthritis, CHF, COPD, HTN, HLD, metastatic lung cancer, and recent admission for UTI who is admitted with pneumonia, suspected UTI, and acute on chronic respiratory failure. Pharmacy is consulted to dose Vancomycin. Ht Readings from Last 1 Encounters:   12/05/23 1.676 m (5' 5.98\")     Wt Readings from Last 1 Encounters:   12/05/23 49.5 kg (109 lb 1.6 oz)     Current & Prior Antimicrobial Regimen(s):  Cefepime (12/5- Current)  Vancomycin- Pharmacy to dose  1250mg IV x1 12/6 AM  750mg IV q12h (12/6-current)    Vancomycin Level(s) / Doses:    Date Time Dose Type of Level / Level Interpretation   12/7 03:00 750mg q12h Random = ordered           Note: Serum levels collected for AUC-based dosing may be high if collected in close proximity to the dose administered. This is not necessarily indicative of toxicity.     Cultures & Sensitivities:    Date Site Micro Susceptibility / Result   12/5 Urine sent    12/6 Blood x2 sent    12/6 MRSA nasal PCR sent    12/6 Strep pneumo antigen sent    12/6 Legionella antigen sent    12/6 Sputum sent      Recent Labs     12/05/23 2003 12/06/23  0815   CREATININE <0.5* <0.5*   BUN 14 14   WBC 4.7 7.0     Estimated Creatinine Clearance: 74 mL/min (based on SCr of 0.5 mg/dL).     Additional Lab Values / Findings of Note:    Recent Labs     12/05/23 2115 12/06/23  0815   PROCAL 0.02 0.02

## 2023-12-06 NOTE — PROGRESS NOTES
Internal Medicine Progress Note    Date: 12/6/2023   Patient: [address] Day: 0    CC: Fatigue (Per pt daughter pt is increasing in lethargy, anorexia and has similar symptoms to past UTIs)       Interval Hx   Pt has been tachycardic and hypertensive w/ elevated RR to 30s. Per family, the pt is usually A&Ox4. She was fully responsive when she arrived to the ED but she has become more somnolent and less responsive, not following commands, overnght. The family states that infections cause her to be like this and they note her confusion and AMS today is similar to previous episodes caused by infection. The family states \"the UTIs take her out,\" as she has been previously seen for UTIs which cause significant confusion and decreased appetite. She had been eating grits and oats and some fries but otherwise very little PO intake over the past couple days despite an appetite. Per daughter Lexie Rizzo, the pt has not been complaining of new cough, abdominal pain, vomiting, or diarrhea, but had complained of headache since Sunday and difficulty sleeping overnight over the past few days. No recent sick contacts. Pt wears 2L O2 at home and CPAP at night and is compliant, per daughter. Pt has been living with her daughter since April 2023 due to needing assistance at home. HPI:   Ms. Edwige Neal is a 69year old female with PMHx of arthritis, CHF, COPD, hyperlipidemia, htn, metastatic lung adenocarcinoma who presented to the ED with worsening fatigue and loss of appetite since 12/3. Patient's daughter noticed that her mother was significantly looking more fatigued and oral intake was significantly decreased. She was immediately brought to the ED. Patient denies fevers, chills or new cough. Pt uses baseline O2 1L. Patient's daughter is present at bedside.       Patient was admitted on 11/10/2023 - 11/17/2023 for dyspnea and a UTI and later transferred to the ICU after having seen Dr. Guardado, her oncologist, earlier in the day. At that time he stopped her dabrafenib/trametinib. She was placed on a ventillator due to hypercapnic respiratory failure/ COPD exacerbation. Patient quickly improved over the next 1-3 days and was extubated. Patient was ultimately discharged back home and her symptoms improved. The daughter states that she was completely back to her baseline of eating, drinking, and mentation after that admission and only worsened over the past three days. Upon discharge her only medication change was addition of midodrine 5mg TID.     Pt was seen 11/10/2023 by [facility] for exertional dyspnea w/o respiratory symptoms. She was also noted to have unexplained weight loss. At that time her dabrafenib/trametinib were held, planned for a month.     She last saw her pulmonologist Dr. Sewell on 11/29/2023.     ED course:      In the ED, patient was mildly hypertensive (163/91), mildly tachypneic  with RR of 27 and O2 sats of 99% on 3L of O2. CT Chest showed no evidence of acute pulmonary emboli. Small diffuse pericardial effusion was seen on the left and right side. The left effusion is slighly increased from prior study. Right effusion is chronic and stable. UA negative. No leukocytosis. Rapid response called d/t decreased responsiveness developing overnight, requiring intubation for airway protection.       Objective     Vital Signs:  Patient Vitals for the past 8 hrs:   BP Temp Temp src Pulse Resp SpO2   12/06/23 1310 -- -- -- 98 19 100 %   12/06/23 1230 92/80 -- -- 94 20 --   12/06/23 1200 97/68 97.3 °F (36.3 °C) Axillary 97 19 100 %   12/06/23 1130 96/68 -- -- 95 15 --   12/06/23 1100 132/76 -- -- 98 22 --   12/06/23 1030 91/63 -- -- (!) 101 20 --   12/06/23 1000 107/74 -- -- (!) 106 16 --   12/06/23 0930 -- -- -- (!) 106 18 100 %   12/06/23 0925 -- -- -- (!) 115 22 100 %   12/06/23 0859 127/73 -- -- (!) 106 13 --   12/06/23 0858 127/73 98.1 °F (36.7 °C) Axillary (!) 106 13 95 %

## 2023-12-06 NOTE — H&P
Internal Medicine  PGY 1  History & Physical      CC worsening fatigue and loss of appetite    History Obtained From:  patient    HISTORY OF PRESENT ILLNESS:  Ms. Varsha Briseno is a 69year old female with PMHx of arthritis, CHF, COPD, hyperlipidemia, htn, metastatic lung cancer who presented to the ED with worsening fatigue and loss of appetite. Patient's daughter is present at bedside. Patient was seen in the ED last month for dyspnea and a UTI and later transferred to the ICU. She was placed on a ventillator due to hypercapnic failure/ COPD exacerbation. Patient quickly improved over the next 1-3 days and was extubated. Patient was ultimately discharged back home and her symptoms improved. Patient's daughter mentions that she began to experience more fatigue with increasing infections, especially UTI infections in the last month or so. This last Sunday, patient began to experience more decrease in activity, increased somnolence and decreased appetite. Patient's daughter noticed that her mother was significantly looking more fatigued and oral intake was significantly decreased. She was immediately brought to the ED. Patient denies fevers, chills or new cough. Patient is followed by [facility] for adenocarcinoma of the lung. Dr. Sandor Weiss has seen this patient before and continues to follow her. Dr. Felipe Thomson recently saw her on 11/22/23 and mentions from a cardiac perspective she is stable. In the ED, patient was mildly hypertensive (163/91), mildly tachypneic  with RR of 27 and O2 sats of 99% on 3L of O2. Her baseline is 1L O2. CT Chest showed no evidence of acute pulmonary emboli. Small diffuse pericardial effusion was seen on the left and right side. The left effusion is slighly increased from prior study. Right effusion is chronic and stable. UA came back negative. No leukocytosis.             Past Medical History:        Diagnosis Date    Arthritis     CHF (congestive heart failure) (HCC)     COPD (chronic obstructive pulmonary disease) (HCC)     Hyperlipidemia     Hypertension     Lung cancer [facility])        Past Surgical History:        Procedure Laterality Date    BRONCHOSCOPY  03/07/2017    brochoscopy-EBUS    OTHER SURGICAL HISTORY Left 03/29/2017    LEFT SUBCLAVIAN PORT- CATH INSERTION      TUBAL LIGATION         Medications Priorto Admission:    Not in a hospital admission. Allergies:  Codeine, Penicillins, and Sulfamethoxazole-trimethoprim    Social History:   TOBACCO:   reports that she quit smoking about 11 years ago. Her smoking use included cigarettes. She has never used smokeless tobacco.  ETOH:   reports current alcohol use. DRUGS : none  Patient currently lives with family daughters    Family History:   No family history on file. Review of Systems   Constitutional:  Positive for activity change and appetite change. Negative for chills and fever. HENT:  Negative for congestion, dental problem and drooling. Respiratory:  Positive for apnea. Negative for cough and chest tightness. Cardiovascular:  Negative for chest pain, palpitations and leg swelling. Gastrointestinal:  Negative for abdominal distention, abdominal pain and blood in stool. ROS: A 10 point review of systems was conducted, significant findings as noted in HPI. Physical Exam  Constitutional:       Appearance: She is ill-appearing. HENT:      Head: Normocephalic. Cardiovascular:      Rate and Rhythm: Regular rhythm. Tachycardia present. Pulses: Normal pulses. Heart sounds: Normal heart sounds. Pulmonary:      Effort: Pulmonary effort is normal.      Breath sounds: Wheezing present. Abdominal:      General: Abdomen is flat. Bowel sounds are normal.      Palpations: Abdomen is soft. Musculoskeletal:      Cervical back: Normal range of motion. Neurological:      General: No focal deficit present. Mental Status: She is alert and oriented to person, place, and time.  Mental status is at

## 2023-12-06 NOTE — PROGRESS NOTES
This RT preformed an ABG on the patient. The patient was hard to arouse, with an abnormal breathing pattern. Took directly to lab. The patient was being seen by a resident as I left.

## 2023-12-06 NOTE — PROGRESS NOTES
Pt started to become lethargic with shallow breathing at 0758. Message was sent out to  and resp was called. 0815 ABG was collected and lab was unable to run it. Pt became unresponsive but VS remained stable. Due to lethargy rapid was called at 0843. Pt transferred from ED30 to room T2. ABG was collected again at this time and pt intubated @ 0858. RN at bedside with pt. Will continue plan of care.  Darcy Crum RN

## 2023-12-07 LAB
ANION GAP SERPL CALCULATED.3IONS-SCNC: 9 MMOL/L (ref 3–16)
BASE EXCESS BLDA CALC-SCNC: 10.5 MMOL/L (ref -3–3)
BASE EXCESS BLDA CALC-SCNC: 11.1 MMOL/L (ref -3–3)
BASOPHILS # BLD: 0 K/UL (ref 0–0.2)
BASOPHILS NFR BLD: 0.1 %
BUN SERPL-MCNC: 21 MG/DL (ref 7–20)
CALCIUM SERPL-MCNC: 9.1 MG/DL (ref 8.3–10.6)
CHLORIDE SERPL-SCNC: 100 MMOL/L (ref 99–110)
CO2 BLDA-SCNC: 38 MMOL/L
CO2 BLDA-SCNC: 39 MMOL/L
CO2 SERPL-SCNC: 35 MMOL/L (ref 21–32)
COHGB MFR BLDA: 1.4 % (ref 0–1.5)
COHGB MFR BLDA: 1.5 % (ref 0–1.5)
CREAT SERPL-MCNC: 0.6 MG/DL (ref 0.6–1.2)
DEPRECATED RDW RBC AUTO: 17.1 % (ref 12.4–15.4)
EOSINOPHIL # BLD: 0 K/UL (ref 0–0.6)
EOSINOPHIL NFR BLD: 0.1 %
GFR SERPLBLD CREATININE-BSD FMLA CKD-EPI: >60 ML/MIN/{1.73_M2}
GLUCOSE SERPL-MCNC: 157 MG/DL (ref 70–99)
HCO3 BLDA-SCNC: 36 MMOL/L (ref 21–29)
HCO3 BLDA-SCNC: 37 MMOL/L (ref 21–29)
HCT VFR BLD AUTO: 31.9 % (ref 36–48)
HGB BLD-MCNC: 10.2 G/DL (ref 12–16)
HGB BLDA-MCNC: 10.4 G/DL
HGB BLDA-MCNC: 10.5 G/DL
LYMPHOCYTES # BLD: 0.2 K/UL (ref 1–5.1)
LYMPHOCYTES NFR BLD: 3.8 %
MCH RBC QN AUTO: 24.3 PG (ref 26–34)
MCHC RBC AUTO-ENTMCNC: 32 G/DL (ref 31–36)
MCV RBC AUTO: 75.8 FL (ref 80–100)
METHGB MFR BLDA: 0.4 % (ref 0–1.4)
METHGB MFR BLDA: 0.4 % (ref 0–1.4)
MONOCYTES # BLD: 0.4 K/UL (ref 0–1.3)
MONOCYTES NFR BLD: 6.4 %
NEUTROPHILS # BLD: 5.1 K/UL (ref 1.7–7.7)
NEUTROPHILS NFR BLD: 89.6 %
PCO2 BLDA: 48.7 MMHG (ref 35–45)
PCO2 BLDA: 62.1 MMHG (ref 35–45)
PH BLDA: 7.39 [PH] (ref 7.35–7.45)
PH BLDA: 7.48 [PH] (ref 7.35–7.45)
PHOSPHATE SERPL-MCNC: 3.8 MG/DL (ref 2.5–4.9)
PLATELET # BLD AUTO: 193 K/UL (ref 135–450)
PMV BLD AUTO: 9.1 FL (ref 5–10.5)
PO2 BLDA: 104 MMHG (ref 75–108)
PO2 BLDA: 92.1 MMHG (ref 75–108)
POTASSIUM SERPL-SCNC: 4 MMOL/L (ref 3.5–5.1)
RBC # BLD AUTO: 4.2 M/UL (ref 4–5.2)
REPORT: NORMAL
SAO2 % BLDA: 97 % (ref 93–100)
SAO2 % BLDA: 99 % (ref 93–100)
SODIUM SERPL-SCNC: 144 MMOL/L (ref 136–145)
VANCOMYCIN SERPL-MCNC: 30.7 UG/ML
WBC # BLD AUTO: 5.7 K/UL (ref 4–11)

## 2023-12-07 PROCEDURE — 2700000000 HC OXYGEN THERAPY PER DAY

## 2023-12-07 PROCEDURE — 92526 ORAL FUNCTION THERAPY: CPT

## 2023-12-07 PROCEDURE — 94003 VENT MGMT INPAT SUBQ DAY: CPT

## 2023-12-07 PROCEDURE — 80048 BASIC METABOLIC PNL TOTAL CA: CPT

## 2023-12-07 PROCEDURE — 6370000000 HC RX 637 (ALT 250 FOR IP)

## 2023-12-07 PROCEDURE — 2000000000 HC ICU R&B

## 2023-12-07 PROCEDURE — 85025 COMPLETE CBC W/AUTO DIFF WBC: CPT

## 2023-12-07 PROCEDURE — 6360000002 HC RX W HCPCS

## 2023-12-07 PROCEDURE — 36415 COLL VENOUS BLD VENIPUNCTURE: CPT

## 2023-12-07 PROCEDURE — 99291 CRITICAL CARE FIRST HOUR: CPT | Performed by: INTERNAL MEDICINE

## 2023-12-07 PROCEDURE — C9113 INJ PANTOPRAZOLE SODIUM, VIA: HCPCS

## 2023-12-07 PROCEDURE — 6360000002 HC RX W HCPCS: Performed by: INTERNAL MEDICINE

## 2023-12-07 PROCEDURE — 92610 EVALUATE SWALLOWING FUNCTION: CPT

## 2023-12-07 PROCEDURE — 80202 ASSAY OF VANCOMYCIN: CPT

## 2023-12-07 PROCEDURE — 2580000003 HC RX 258

## 2023-12-07 PROCEDURE — 82803 BLOOD GASES ANY COMBINATION: CPT

## 2023-12-07 PROCEDURE — 94660 CPAP INITIATION&MGMT: CPT

## 2023-12-07 PROCEDURE — 6360000002 HC RX W HCPCS: Performed by: PHYSICIAN ASSISTANT

## 2023-12-07 PROCEDURE — 6370000000 HC RX 637 (ALT 250 FOR IP): Performed by: HOSPITALIST

## 2023-12-07 PROCEDURE — 84100 ASSAY OF PHOSPHORUS: CPT

## 2023-12-07 PROCEDURE — 2580000003 HC RX 258: Performed by: PHYSICIAN ASSISTANT

## 2023-12-07 PROCEDURE — 94640 AIRWAY INHALATION TREATMENT: CPT

## 2023-12-07 PROCEDURE — 94761 N-INVAS EAR/PLS OXIMETRY MLT: CPT

## 2023-12-07 PROCEDURE — 2580000003 HC RX 258: Performed by: INTERNAL MEDICINE

## 2023-12-07 PROCEDURE — 51798 US URINE CAPACITY MEASURE: CPT

## 2023-12-07 RX ORDER — PANTOPRAZOLE SODIUM 40 MG/10ML
40 INJECTION, POWDER, LYOPHILIZED, FOR SOLUTION INTRAVENOUS DAILY
Status: DISCONTINUED | OUTPATIENT
Start: 2023-12-07 | End: 2023-12-08 | Stop reason: SDUPTHER

## 2023-12-07 RX ADMIN — METOPROLOL TARTRATE 12.5 MG: 25 TABLET, FILM COATED ORAL at 21:34

## 2023-12-07 RX ADMIN — VANCOMYCIN HYDROCHLORIDE 750 MG: 10 INJECTION, POWDER, LYOPHILIZED, FOR SOLUTION INTRAVENOUS at 05:26

## 2023-12-07 RX ADMIN — WATER 40 MG: 1 INJECTION INTRAMUSCULAR; INTRAVENOUS; SUBCUTANEOUS at 21:34

## 2023-12-07 RX ADMIN — ARFORMOTEROL TARTRATE 15 MCG: 15 SOLUTION RESPIRATORY (INHALATION) at 20:34

## 2023-12-07 RX ADMIN — ARFORMOTEROL TARTRATE 15 MCG: 15 SOLUTION RESPIRATORY (INHALATION) at 08:40

## 2023-12-07 RX ADMIN — CEFEPIME 2000 MG: 2 INJECTION, POWDER, FOR SOLUTION INTRAVENOUS at 09:05

## 2023-12-07 RX ADMIN — METOPROLOL TARTRATE 12.5 MG: 25 TABLET, FILM COATED ORAL at 09:41

## 2023-12-07 RX ADMIN — ENOXAPARIN SODIUM 30 MG: 100 INJECTION SUBCUTANEOUS at 08:48

## 2023-12-07 RX ADMIN — WATER 40 MG: 1 INJECTION INTRAMUSCULAR; INTRAVENOUS; SUBCUTANEOUS at 08:49

## 2023-12-07 RX ADMIN — BUDESONIDE 250 MCG: 0.25 INHALANT RESPIRATORY (INHALATION) at 08:40

## 2023-12-07 RX ADMIN — IPRATROPIUM BROMIDE AND ALBUTEROL SULFATE 1 DOSE: 2.5; .5 SOLUTION RESPIRATORY (INHALATION) at 12:38

## 2023-12-07 RX ADMIN — IPRATROPIUM BROMIDE AND ALBUTEROL SULFATE 1 DOSE: 2.5; .5 SOLUTION RESPIRATORY (INHALATION) at 08:40

## 2023-12-07 RX ADMIN — CEFEPIME 2000 MG: 2 INJECTION, POWDER, FOR SOLUTION INTRAVENOUS at 01:13

## 2023-12-07 RX ADMIN — CEFEPIME 2000 MG: 2 INJECTION, POWDER, FOR SOLUTION INTRAVENOUS at 16:40

## 2023-12-07 RX ADMIN — SODIUM CHLORIDE, PRESERVATIVE FREE 10 ML: 5 INJECTION INTRAVENOUS at 09:05

## 2023-12-07 RX ADMIN — IPRATROPIUM BROMIDE AND ALBUTEROL SULFATE 1 DOSE: 2.5; .5 SOLUTION RESPIRATORY (INHALATION) at 20:34

## 2023-12-07 RX ADMIN — SODIUM CHLORIDE, PRESERVATIVE FREE 10 ML: 5 INJECTION INTRAVENOUS at 21:42

## 2023-12-07 RX ADMIN — VANCOMYCIN HYDROCHLORIDE 750 MG: 10 INJECTION, POWDER, LYOPHILIZED, FOR SOLUTION INTRAVENOUS at 16:43

## 2023-12-07 RX ADMIN — PANTOPRAZOLE SODIUM 40 MG: 40 INJECTION, POWDER, FOR SOLUTION INTRAVENOUS at 08:48

## 2023-12-07 RX ADMIN — IPRATROPIUM BROMIDE AND ALBUTEROL SULFATE 1 DOSE: 2.5; .5 SOLUTION RESPIRATORY (INHALATION) at 16:08

## 2023-12-07 RX ADMIN — BUDESONIDE 250 MCG: 0.25 INHALANT RESPIRATORY (INHALATION) at 20:34

## 2023-12-07 ASSESSMENT — ENCOUNTER SYMPTOMS
NAUSEA: 0
CHEST TIGHTNESS: 0
ABDOMINAL PAIN: 0

## 2023-12-07 ASSESSMENT — PAIN SCALES - GENERAL
PAINLEVEL_OUTOF10: 0

## 2023-12-07 ASSESSMENT — PULMONARY FUNCTION TESTS
PIF_VALUE: 17
PIF_VALUE: 23

## 2023-12-07 NOTE — PROGRESS NOTES
Clinical Pharmacy Progress Note    Vancomycin - Management by Pharmacy    Consult Date(s): 12/06/2023  Consulting Provider(s): Dr. Sarah Mazariegos / Plan  1)  HAP - Vancomycin  Concurrent Antimicrobials: Cefepime   Day of Vanc Therapy / Ordered Duration: Day 2  Current Dosing Method: Bayesian-Guided AUC Dosing  Therapeutic Goal: -600 mg/L*hr  Current Dose / Plan:   SCr today 06, stable from yesterday's 0.5. No UOP documented this visit. Vancomycin level drawn this AM while dose was infusing. Unreliable level. For now, given stable SCr, will continue vancomycin 750mg IV q12h. Predicted AUC of ~452 mg/L*hr and steady state trough of 10.8 mg/L. Trough level ordered for 12/8 @ 03:30, prior to the 04:00 dose. MRSA nares ordered, but not yet collected. Will continue to monitor clinical condition and make adjustments to regimen as appropriate. Please call with questions--  Thanks--  Yelitza Ya PharmD., BCPS   12/7/2023 7:33 AM  Wireless: [phone]          Interval update:  Patient remains mechanically ventilated, not requiring sedation. Tmax 99.3 degF over prior 24h. Continues on steroids and antibiotics. Vanc level drawn while dose infusing; unreliable level. Subjective/Objective:   Jessa Nolen is a 69 y.o. female with a PMHx significant for arthritis, CHF, COPD, HTN, HLD, metastatic lung cancer, and recent admission for UTI who is admitted with pneumonia, suspected UTI, and acute on chronic respiratory failure. Pharmacy is consulted to dose Vancomycin.     Ht Readings from Last 1 Encounters:   12/05/23 1.676 m (5' 5.98\")     Wt Readings from Last 1 Encounters:   12/05/23 49.5 kg (109 lb 1.6 oz)     Current & Prior Antimicrobial Regimen(s):  Cefepime (12/5- Current)  Vancomycin- Pharmacy to dose  1250mg IV x1 12/6 AM  750mg IV q12h (12/6-current)    Vancomycin Level(s) / Doses:    Date Time Dose Type of Level / Level Interpretation   12/7 0600 750mg IV q12h Random =

## 2023-12-07 NOTE — PROGRESS NOTES
Physician Progress Note      Justine Garcia  [facility] #:                  [id number]  :                       1945  ADMIT DATE:       2023 7:21 PM  [address] [facility] DATE:  RESPONDING  PROVIDER #:        Amaris Ram MD          QUERY TEXT:    Patient admitted with pneumonia. Noted documentation of intubation for airway   protection in PN  and acute respiratory failure in the H&P. Please   indicate one of the following and document in the medical record: The medical record reflects the following:  Risk Factors: 68 yo w/ ILD, lung cancer, pna, on oxygen at baseline, recent   intubation 2023  Clinical Indicators: Per PN : Rapid response called d/t decreased   responsiveness developing overnight, requiring intubation for airway   protection. Per H&P: Acute on chronic respiratory failure with hypercapnia   with patient with interstitial lung disease and COPD, likely 2/2 PNA. Pulmonary effort is normal. RR 20-33. Treatment: Intubation  Options provided:  -- Intubated for Acute Respiratory Failure as evidenced by, Please document   evidence. -- Intubated for airway protection only, Acute Respiratory Failure ruled out   after study  -- Other - I will add my own diagnosis  -- Disagree - Not applicable / Not valid  -- Disagree - Clinically unable to determine / Unknown  -- Refer to Clinical Documentation Reviewer    PROVIDER RESPONSE TEXT:    This patient was intubated for acute respiratory failure as evidenced by resp   distress , hypoxia, severe hypercapnea on abg    Query created by: Radu Rivera on 2023 9:45 AM      QUERY TEXT:    Patient admitted with pneumonia. Documentation reflects Ventilator associated   or hospital acquired PNA in the H&P. If possible, please document in the   progress notes and discharge summary if the PNA was: The medical record reflects the following:  Risk Factors: 68 yo w/ lung ca, ILD, copd, chf w pneumonia.  Recent intubation   for 36 hours

## 2023-12-07 NOTE — PROGRESS NOTES
V2.0    [facility] [place] [facility] Progress Note      Name:  Jaylyn Junior /Age/Sex: 1945  (69 y.o. female)   MRN & CSN:  [id number] & [id number] Encounter Date/Time: 2023 11:59 AM EST   Location:  [facility]- PCP: Juan Carlos Sethi MD     Attending:Anderson [address] [place] [address] Day: 3    Assessment and Recommendations   Ms. Jaylyn Junior is a 69year old female with PMHx of arthritis, CHF, COPD, hyperlipidemia, htn, metastatic lung adenocarcinoma who presented to the ED with worsening fatigue loss of appetite and SOB . In the ED she became unresponsive and her ABG looked bad so he was intubated . CT showed moderate left-sided pleural effusion, slightly increased from the prior study. Increased left lower lobe airspace density consistent with compressive atelectasis or infiltrate. Patient was admitted on 11/10/2023 - 2023 where she was admitted for COPD exacerbation. She was extubated and discharged home     Acute hypoxic and hypercapnic resp failure 2/2 pneumonia , pleural effusion and COPD exacerbation  Recent admission 11/10- for resp failure 2/2 COPD exacerbation and was intubated for 39 hors   CHF, does not look in exacerbation   Metastatic lung cancer   -Bcx are negative. Cont Vanc(pending MRSA screen)/cefepime, steroids and nebs   -ABG are better and she was awake. Hopefully will be able to extubate soon   -DNR CCA    Chronic medical conditions  SHANNA: usually on CPAP    HTN: amlodipine, toprol XL 25mg QD, HCTZ all held pt intubated      HLD: home atorvastatin 10mg QD when can tolerate PO      Personally reviewed Lab Studies and Imaging         Subjective: Intubated but awake and responsive       Review of Systems:      Pertinent positives and negatives discussed in HPI    Objective:      Intake/Output Summary (Last 24 hours) at 2023 1159  Last data filed at 2023 0900  Gross per 24 hour   Intake 482 ml   Output 0 ml   Net 482 ml      Vitals:   Vitals:    23 0841 23 0844 lobe. There is a small right effusion with some chronic pleural thickening in the posterior right costophrenic angle. No new hilar, mediastinal or axillary adenopathy is seen. Port projects over the left upper chest, unchanged. Pacer device projects over the right upper chest. Thyroid gland unremarkable in the lower neck. Bony structures demonstrate no acute findings. No CT evidence of acute pulmonary emboli. Small diffuse pericardial effusion. Moderate left-sided pleural effusion, slightly increased from the prior study. Increased left lower lobe airspace density consistent with compressive atelectasis or infiltrate. Chronic small right pleural effusion. Stable emphysematous changes with bronchiectasis, scarring and post radiation/fibrotic changes of the right lung apex and perihilar region. Electronically signed by Bradley Tavares MD    XR CHEST PORTABLE    Result Date: 12/5/2023  PORTABLE AP CHEST AT 2016 HOURS:   HISTORY: Change in mental status. COMPARISON: 11/12/2023. FINDINGS: PowerPort projects over the left chest. The heart and pulmonary vasculature are within normal limits. There is ill-defined airspace density throughout the left lung, and in the right base. There is a moderate relative elevation of the right diaphragm, with a chronic appearing right-sided small effusion blunting of the lateral costophrenic angle. There is an increased left-sided pleural effusion. New left-sided effusion, and increased density in the left lung. Chronic parenchymal and pleural changes in the right base, with a small right effusion or pleural thickening.  Electronically signed by Bradley Tavares MD      CBC:   Recent Labs     12/05/23 2003 12/06/23  0815 12/07/23  0601   WBC 4.7 7.0 5.7   HGB 11.2* 11.6* 10.2*    240 193     BMP:    Recent Labs     12/05/23 2003 12/06/23  0815 12/06/23  1923 12/07/23  0601    146*  --  144   K 3.7 3.7  --  4.0   CL 95* 100  --  100   CO2 41* 41*  --  35*   BUN 14 14

## 2023-12-07 NOTE — PLAN OF CARE
Problem: Discharge Planning  Goal: Discharge to home or other facility with appropriate resources  Outcome: Not Progressing  Flowsheets  Taken 12/6/2023 2000 by Alanis Paiz RN  Discharge to home or other facility with appropriate resources:   Identify barriers to discharge with patient and caregiver   Arrange for needed discharge resources and transportation as appropriate  Taken 12/6/2023 1200 by Shira Edwards RN  Discharge to home or other facility with appropriate resources: Identify barriers to discharge with patient and caregiver     Problem: Safety - Medical Restraint  Goal: Remains free of injury from restraints (Restraint for Interference with Medical Device)  Description: INTERVENTIONS:  1. Determine that other, less restrictive measures have been tried or would not be effective before applying the restraint  2. Evaluate the patient's condition at the time of restraint application  3. Inform patient/family regarding the reason for restraint  4.  Q2H: Monitor safety, psychosocial status, comfort, nutrition and hydration  Outcome: Progressing  Flowsheets  Taken 12/7/2023 0000  Remains free of injury from restraints (restraint for interference with medical device):   Determine that other, less restrictive measures have been tried or would not be effective before applying the restraint   Evaluate the patient's condition at the time of restraint application   Inform patient/family regarding the reason for restraint   Every 2 hours: Monitor safety, psychosocial status, comfort, nutrition and hydration  Taken 12/6/2023 2200  Remains free of injury from restraints (restraint for interference with medical device):   Determine that other, less restrictive measures have been tried or would not be effective before applying the restraint   Evaluate the patient's condition at the time of restraint application   Inform patient/family regarding the reason for restraint   Every 2 hours: Monitor safety, psychosocial status, comfort, nutrition and hydration  Taken 12/6/2023 2000  Remains free of injury from restraints (restraint for interference with medical device):   Determine that other, less restrictive measures have been tried or would not be effective before applying the restraint   Evaluate the patient's condition at the time of restraint application   Inform patient/family regarding the reason for restraint   Every 2 hours: Monitor safety, psychosocial status, comfort, nutrition and hydration     Problem: Safety - Adult  Goal: Free from fall injury  Outcome: Progressing  Flowsheets (Taken 12/6/2023 2300)  Free From Fall Injury: Instruct family/caregiver on patient safety     Problem: Pain  Goal: Verbalizes/displays adequate comfort level or baseline comfort level  Outcome: Progressing  Flowsheets  Taken 12/6/2023 2200 by Alena Jackson RN  Verbalizes/displays adequate comfort level or baseline comfort level:   Assess pain using appropriate pain scale   Administer analgesics based on type and severity of pain and evaluate response   Implement non-pharmacological measures as appropriate and evaluate response  Taken 12/6/2023 1200 by Celine Blakely RN  Verbalizes/displays adequate comfort level or baseline comfort level: Encourage patient to monitor pain and request assistance     Problem: Discharge Planning  Goal: Discharge to home or other facility with appropriate resources  Outcome: Not Progressing  Flowsheets  Taken 12/6/2023 2000 by Alena Jackson RN  Discharge to home or other facility with appropriate resources:   Identify barriers to discharge with patient and caregiver   Arrange for needed discharge resources and transportation as appropriate  Taken 12/6/2023 1200 by Celine Blakely RN  Discharge to home or other facility with appropriate resources: Identify barriers to discharge with patient and caregiver

## 2023-12-07 NOTE — PROGRESS NOTES
ICU Progress Note    Admit Date: 2023  Hospital Day:  Hospital Day: 3  ICU Day:  Vent Day:    Vent Settings:  Vent Mode: AC/PRVC Resp Rate (Set): 12 bpm/Vt (Set, mL): 375 mL/ /FiO2 : 40 %    LDAs:    Continuous Infusions:    Vasopressors:    Antibiotics:  Vanc and Cefepime  Diet:  Diet NPO    CC: Fatigue and decreased intake      Interval History   Yesterday evening was seen that pt overcompensated on her ABG with pH 7.637 from pH in morning of 6.949 so RR on vent was reduced  Pt steadily decrease to pH current 7.477 with pCO2 48.7  RR this mornnig was reduced from 14 to 12  FiO2 was  frm 60 to 40    Patient seen this morning at bedside  awake alert and able to nod head yes and no to questions. Doing well and in no pain  , FiO2 40, RR 12 and PEEP 5    SBT performed, did well,  ABG pH 7.388, pCO2 62.1, pO2 92.1, and HCO3 37  She was extubated      HPI  Ms. Yakov Acuna is a 69year old female with PMHx of arthritis, CHF, COPD, hyperlipidemia, htn, metastatic lung adenocarcinoma who presented to the ED with worsening fatigue and loss of appetite since 12/3. Patient's daughter noticed that her mother was significantly looking more fatigued and oral intake was significantly decreased. She was immediately brought to the ED. Patient denies fevers, chills or new cough. Pt uses baseline O2 1L. Patient's daughter is present at bedside. Patient was admitted on 11/10/2023 - 2023 for dyspnea and a UTI and later transferred to the ICU after having seen Dr. Nahun Hyde, her oncologist, earlier in the day. At that time he stopped her dabrafenib/trametinib. She was placed on a ventillator due to hypercapnic respiratory failure/ COPD exacerbation. Patient quickly improved over the next 1-3 days and was extubated. Patient was ultimately discharged back home and her symptoms improved.  The daughter states that she was completely back to her baseline of eating, drinking, and mentation after that Code Status:DNR-CCA  FEN: ADULT DIET; Regular   PPX:  lovenox  DISPO:    Darcy Woodward MD,  Internal Medicine, PGY-1  12/07/23, 7:31 AM     This patinet has been staffed and discussed with Dr. Juan Luis Mayo    Patient examined, findings as discussed with Luis. Agree with assessment and plan. This morning she is awake and alert. She is placed on spontaneous breathing trial.  Although she was tachypneic and exhibited rapid shallow breathing, with coaching, she was able to reduce respiratory rate and increased tidal volume to acceptable level, with RSBI 65. On arterial blood sampling, pH 7.38, pCO2 62, pO2 92. She was successfully extubated. She remains quite alert and has normal breathing pattern. Able to ambulate in the room. She is able to start a regular diet and thin liquids. Family is present, updated regarding progress and plan.   Time spent in critical care

## 2023-12-08 LAB
ALBUMIN SERPL-MCNC: 3.4 G/DL (ref 3.4–5)
ANION GAP SERPL CALCULATED.3IONS-SCNC: 9 MMOL/L (ref 3–16)
BASOPHILS # BLD: 0 K/UL (ref 0–0.2)
BASOPHILS NFR BLD: 0.3 %
BUN SERPL-MCNC: 18 MG/DL (ref 7–20)
CALCIUM SERPL-MCNC: 9.1 MG/DL (ref 8.3–10.6)
CHLORIDE SERPL-SCNC: 102 MMOL/L (ref 99–110)
CO2 SERPL-SCNC: 31 MMOL/L (ref 21–32)
CREAT SERPL-MCNC: <0.5 MG/DL (ref 0.6–1.2)
DEPRECATED RDW RBC AUTO: 17.7 % (ref 12.4–15.4)
EOSINOPHIL # BLD: 0 K/UL (ref 0–0.6)
EOSINOPHIL NFR BLD: 0 %
GFR SERPLBLD CREATININE-BSD FMLA CKD-EPI: >60 ML/MIN/{1.73_M2}
GLUCOSE SERPL-MCNC: 130 MG/DL (ref 70–99)
HCT VFR BLD AUTO: 30.9 % (ref 36–48)
HGB BLD-MCNC: 9.7 G/DL (ref 12–16)
LYMPHOCYTES # BLD: 0.2 K/UL (ref 1–5.1)
LYMPHOCYTES NFR BLD: 2.8 %
MCH RBC QN AUTO: 23.9 PG (ref 26–34)
MCHC RBC AUTO-ENTMCNC: 31.3 G/DL (ref 31–36)
MCV RBC AUTO: 76.4 FL (ref 80–100)
MONOCYTES # BLD: 0.2 K/UL (ref 0–1.3)
MONOCYTES NFR BLD: 3.4 %
MRSA DNA SPEC QL NAA+PROBE: NORMAL
NEUTROPHILS # BLD: 6.6 K/UL (ref 1.7–7.7)
NEUTROPHILS NFR BLD: 93.5 %
PHOSPHATE SERPL-MCNC: 3 MG/DL (ref 2.5–4.9)
PLATELET # BLD AUTO: 178 K/UL (ref 135–450)
PMV BLD AUTO: 9.3 FL (ref 5–10.5)
POTASSIUM SERPL-SCNC: 3.9 MMOL/L (ref 3.5–5.1)
RBC # BLD AUTO: 4.05 M/UL (ref 4–5.2)
SODIUM SERPL-SCNC: 142 MMOL/L (ref 136–145)
VANCOMYCIN TROUGH SERPL-MCNC: 12 UG/ML (ref 10–20)
WBC # BLD AUTO: 7.1 K/UL (ref 4–11)

## 2023-12-08 PROCEDURE — 80069 RENAL FUNCTION PANEL: CPT

## 2023-12-08 PROCEDURE — 6370000000 HC RX 637 (ALT 250 FOR IP)

## 2023-12-08 PROCEDURE — 36415 COLL VENOUS BLD VENIPUNCTURE: CPT

## 2023-12-08 PROCEDURE — 2580000003 HC RX 258: Performed by: INTERNAL MEDICINE

## 2023-12-08 PROCEDURE — 6360000002 HC RX W HCPCS

## 2023-12-08 PROCEDURE — 87641 MR-STAPH DNA AMP PROBE: CPT

## 2023-12-08 PROCEDURE — 99233 SBSQ HOSP IP/OBS HIGH 50: CPT | Performed by: INTERNAL MEDICINE

## 2023-12-08 PROCEDURE — 6360000002 HC RX W HCPCS: Performed by: INTERNAL MEDICINE

## 2023-12-08 PROCEDURE — 1200000000 HC SEMI PRIVATE

## 2023-12-08 PROCEDURE — 2700000000 HC OXYGEN THERAPY PER DAY

## 2023-12-08 PROCEDURE — 94660 CPAP INITIATION&MGMT: CPT

## 2023-12-08 PROCEDURE — 92526 ORAL FUNCTION THERAPY: CPT

## 2023-12-08 PROCEDURE — 85025 COMPLETE CBC W/AUTO DIFF WBC: CPT

## 2023-12-08 PROCEDURE — 2580000003 HC RX 258: Performed by: PHYSICIAN ASSISTANT

## 2023-12-08 PROCEDURE — 80202 ASSAY OF VANCOMYCIN: CPT

## 2023-12-08 PROCEDURE — 94761 N-INVAS EAR/PLS OXIMETRY MLT: CPT

## 2023-12-08 PROCEDURE — 6370000000 HC RX 637 (ALT 250 FOR IP): Performed by: HOSPITALIST

## 2023-12-08 PROCEDURE — 87040 BLOOD CULTURE FOR BACTERIA: CPT

## 2023-12-08 PROCEDURE — 6360000002 HC RX W HCPCS: Performed by: PHYSICIAN ASSISTANT

## 2023-12-08 PROCEDURE — 94640 AIRWAY INHALATION TREATMENT: CPT

## 2023-12-08 PROCEDURE — 2580000003 HC RX 258

## 2023-12-08 PROCEDURE — C9113 INJ PANTOPRAZOLE SODIUM, VIA: HCPCS

## 2023-12-08 RX ORDER — PANTOPRAZOLE SODIUM 40 MG/1
40 TABLET, DELAYED RELEASE ORAL
Status: DISCONTINUED | OUTPATIENT
Start: 2023-12-09 | End: 2024-01-04 | Stop reason: HOSPADM

## 2023-12-08 RX ORDER — PREDNISONE 20 MG/1
40 TABLET ORAL DAILY
Status: COMPLETED | OUTPATIENT
Start: 2023-12-09 | End: 2023-12-11

## 2023-12-08 RX ORDER — METOPROLOL SUCCINATE 25 MG/1
25 TABLET, EXTENDED RELEASE ORAL DAILY
Status: DISCONTINUED | OUTPATIENT
Start: 2023-12-09 | End: 2024-01-04 | Stop reason: HOSPADM

## 2023-12-08 RX ADMIN — WATER 40 MG: 1 INJECTION INTRAMUSCULAR; INTRAVENOUS; SUBCUTANEOUS at 08:12

## 2023-12-08 RX ADMIN — CEFEPIME 2000 MG: 2 INJECTION, POWDER, FOR SOLUTION INTRAVENOUS at 17:45

## 2023-12-08 RX ADMIN — ARFORMOTEROL TARTRATE 15 MCG: 15 SOLUTION RESPIRATORY (INHALATION) at 09:46

## 2023-12-08 RX ADMIN — VANCOMYCIN HYDROCHLORIDE 750 MG: 10 INJECTION, POWDER, LYOPHILIZED, FOR SOLUTION INTRAVENOUS at 03:56

## 2023-12-08 RX ADMIN — CEFEPIME 2000 MG: 2 INJECTION, POWDER, FOR SOLUTION INTRAVENOUS at 00:24

## 2023-12-08 RX ADMIN — BUDESONIDE 250 MCG: 0.25 INHALANT RESPIRATORY (INHALATION) at 09:45

## 2023-12-08 RX ADMIN — METOPROLOL TARTRATE 12.5 MG: 25 TABLET, FILM COATED ORAL at 08:12

## 2023-12-08 RX ADMIN — IPRATROPIUM BROMIDE AND ALBUTEROL SULFATE 1 DOSE: 2.5; .5 SOLUTION RESPIRATORY (INHALATION) at 09:45

## 2023-12-08 RX ADMIN — BUDESONIDE 250 MCG: 0.25 INHALANT RESPIRATORY (INHALATION) at 20:36

## 2023-12-08 RX ADMIN — VANCOMYCIN HYDROCHLORIDE 750 MG: 10 INJECTION, POWDER, LYOPHILIZED, FOR SOLUTION INTRAVENOUS at 15:40

## 2023-12-08 RX ADMIN — PANTOPRAZOLE SODIUM 40 MG: 40 INJECTION, POWDER, FOR SOLUTION INTRAVENOUS at 08:12

## 2023-12-08 RX ADMIN — ARFORMOTEROL TARTRATE 15 MCG: 15 SOLUTION RESPIRATORY (INHALATION) at 20:36

## 2023-12-08 RX ADMIN — SODIUM CHLORIDE, PRESERVATIVE FREE 10 ML: 5 INJECTION INTRAVENOUS at 08:20

## 2023-12-08 RX ADMIN — CEFEPIME 2000 MG: 2 INJECTION, POWDER, FOR SOLUTION INTRAVENOUS at 08:14

## 2023-12-08 RX ADMIN — ENOXAPARIN SODIUM 30 MG: 100 INJECTION SUBCUTANEOUS at 08:12

## 2023-12-08 RX ADMIN — IPRATROPIUM BROMIDE AND ALBUTEROL SULFATE 1 DOSE: 2.5; .5 SOLUTION RESPIRATORY (INHALATION) at 20:36

## 2023-12-08 RX ADMIN — IPRATROPIUM BROMIDE AND ALBUTEROL SULFATE 1 DOSE: 2.5; .5 SOLUTION RESPIRATORY (INHALATION) at 13:26

## 2023-12-08 ASSESSMENT — PAIN SCALES - GENERAL
PAINLEVEL_OUTOF10: 0

## 2023-12-08 ASSESSMENT — ENCOUNTER SYMPTOMS
ABDOMINAL PAIN: 0
CHEST TIGHTNESS: 0
NAUSEA: 0

## 2023-12-08 NOTE — PROGRESS NOTES
Speech Language Pathology  Facility/Department:[facility] ICU  Dysphagia Treatment/Discharge    Name: Elisha Bhandari  : 1945  MRN: [id number]    Patient Diagnosis(es):   Patient Active Problem List    Diagnosis Date Noted    Chronic systolic (congestive) heart failure 2022    Pneumonia due to organism 2023    Acute on chronic respiratory failure with hypoxia and hypercapnia ([address]) 2023    Bullous emphysema ([address]) 2023    Severe malnutrition ([address]) 2023    Pericardial effusion 11/10/2023    Simple chronic bronchitis ([address]) 2023    Weight loss, unintentional 2023    SHANNA (obstructive sleep apnea) 2021    Hyponatremia 2021    Mixed hyperlipidemia 2019    Tachycardia 2019    Diplopia     Primary lung adenocarcinoma, right ([address])     Weakness     DNR (do not resuscitate) discussion     Patient in cancer related research study 2017    Encounter for chemotherapy management 2017    Primary malignant neoplasm of right upper lobe of lung ([address]) 2017    Secondary malignant neoplasm of liver ([address]) 2017    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    Vitamin D deficiency 2014    Hypertension 2012     Past Medical History:   Diagnosis Date    Arthritis     CHF (congestive heart failure) (HCC)     COPD (chronic obstructive pulmonary disease) ([address])     Hyperlipidemia     Hypertension     Lung cancer ([address])      Past Surgical History:   Procedure Laterality Date    BRONCHOSCOPY  2017    brochoscopy-EBUS    OTHER SURGICAL HISTORY Left 2017    LEFT SUBCLAVIAN PORT- CATH INSERTION      TUBAL LIGATION       Reason for Referral:  Elisha Bhandari  was referred for a Speech Therapy evaluation to assess swallow function and/or communication. History of Present Illness  Per MD notes: \"From resident H&P  \"Ms. Elisha Bhandari is a 69year old female with PMHx of arthritis, CHF, COPD, to reach goals: co-morbidities  Consulted and agree with results and recommendations: RN and family    Treatment:  Dysphagia Goals:  Pt to be seen to address the following goals:  1-The patient will tolerate recommended diet without observed clinical signs of aspiration  12/8: Per RN, pt tolerating regular diet and thin liquids well. Per chart review and bed side tx session this AM, patient demonstrates Good oral health status, no known aspiration, and normal immune system status (per recent WBC and Neutrophils Absolute, resulting in a predicted outcome of No Pneumonia per the pillars of aspiration pneumonia. Observed patient with regular solids (daniel crackers and peanut butter). Pt demo'd appropriate rate and bite size independently. Mastication was effective and pt with complete oral clearance. Independently alternated solids and liquids as needed. No s/s associated with aspiration observed throughout session and no evidence of respiratory decline (RR, SPO2, and HR remained WNL throughout session). Pt also denied any s/s associated with aspiration. Goal met- d/c goal         2- The patient /family will verbalize/demonstrate understanding of dysphagia recommendations  12/7: Educated pt and family to purpose of visit, s/s of aspiration, concern if aspiration occurs and  rationale for diet recommendation/strategies to reduce risk for aspiration. Educated to impact intubation can have on swallowing and voice.  Pt instructed to notify staff if any signs of aspiration emerge or dysphagia concerns.  Pt and family stated good comprehension   Cont goal  12/8: Pt educated to reason for SLP visit, swallowing and voice anatomy and impact intubation may have on these functions. Educated to s/s associated with aspiration, as well as aspiration precuations and pt able to teach back to SLP at end of session. Of note, pt does have hoarse voice still, but pt stated it was improved compared to yesterday. Educated pt to ENT consult

## 2023-12-08 NOTE — PROGRESS NOTES
Notification of IV to PO Conversion     IV To Po Conversion:   Will convert Pantoprazole to oral based on [address] IV to PO policy (see below). Please call with questions.   Savana Otero PharmD., [facility]   12/8/2023 8:29 AM  Wireless: [phone]      Criteria for conversion from IV to PO therapy per [address] IV to PO Protocol:   Patients should meet all of the following inclusion criteria and none of the exclusion criteria    Criteria to initiate medication route switch (Inclusion Criteria)  IV therapy for > 24 hours (antibiotics only)  Tolerating diet more advanced than clear liquids  Tolerating oral (PO) medications  Does not require vasopressor therapy for blood pressure support  No seizures for 72hrs (antiepileptic medications only)      Criteria indicating that the patient is NOT a candidate for IV to PO conversion (Exclusion Criteria)  Infections requiring IV therapy (ie: meningitis, endocarditis, osteomyelitis, pancreatitis)  Nausea and/or vomiting or severe diarrhea within past 24 hours  Has gastrectomy, ileus, gastric outlet or bowel obstruction, or malabsorption syndromes  Has significant, painful oral ulceration  TPN with an NPO order  Active GI bleed  Unable to swallow  Nothing by Mouth (NPO) status  Febrile in the last 24 hours- (antibiotics only)  Clinical deteriorating or unstable - (antibiotics only)  Pediatric patients and patients who are not euthyroid (not on oral levothyroxine/not stabilized on oral levothyroxine) - (Levothyroxine only)  Patients being treated for myxedema coma or during the organ donation process - (Levothyroxine only)    Other notes-   Patients may be given suppositories when available for the product being ordered if no contraindications exist (ie: rectal surgery or infection)   Oral solutions/suspensions may be considered for patients with a functioning enteral tube not on continuous suction (if medication is available in this formulation)

## 2023-12-08 NOTE — PROGRESS NOTES
Clinical Pharmacy Progress Note    Vancomycin - Management by Pharmacy    Consult Date(s): 12/06/2023  Consulting Provider(s): Dr. Tuyet Méndez / Plan  1)  HAP - Vancomycin  Concurrent Antimicrobials: Cefepime   Day of Vanc Therapy / Ordered Duration: Day 3  Current Dosing Method: Bayesian-Guided AUC Dosing  Therapeutic Goal: -600 mg/L*hr  Current Dose / Plan:   SCr today < 0.5, stable and at baseline. UOP not precisely documented to include in assessment. Continues on vancomycin 750mg IV q12h. Level this AM = 12 mg/L, drawn ~11h after prior dose. Calculated AUC = 463 mg/L*hr with trough of 11.8 mg/L, which is within goal range. Continue with current regimen. Will consider repeating a level in ~3 days to check for accumulation, or sooner if clinically indicated. MRSA nares ordered but not yet collected. Will continue to monitor clinical condition and make adjustments to regimen as appropriate. Please call with questions--  Thanks--  Charlie Giron PharmD., [facility]   12/8/2023 8:09 AM  Wireless: [phone]          Interval update:  Patient extubated yesterday; now on BiPAP. Passed SLP eval yesterday, now on regular diet. Afebrile for past 24h. One blood bottle with MRSE. Subjective/Objective:   Jacqueline Koenig is a 69 y.o. female with a PMHx significant for arthritis, CHF, COPD, HTN, HLD, metastatic lung cancer, and recent admission for UTI who is admitted with pneumonia, suspected UTI, and acute on chronic respiratory failure. Pharmacy is consulted to dose Vancomycin.     Ht Readings from Last 1 Encounters:   12/05/23 1.676 m (5' 5.98\")     Wt Readings from Last 1 Encounters:   12/05/23 49.5 kg (109 lb 1.6 oz)     Current & Prior Antimicrobial Regimen(s):  Cefepime (12/5- Current)  Vancomycin- Pharmacy to dose  1250mg IV x1 12/6 AM  750mg IV q12h (12/6-current)    Vancomycin Level(s) / Doses:    Date Time Dose Type of Level / Level Interpretation   12/7 0600 750mg IV q12h Random = 30.7 mg/L Drawn while dose was infusing  Unreliable level - did not include in assessment  Continue same regimen, repeat level tomorrow. 12/8 0330 750mg IV q12h Trough = 12 mg/L Drawn ~11h after dose  AUC = 463  Continue same regimen   Note: Serum levels collected for AUC-based dosing may be high if collected in close proximity to the dose administered. This is not necessarily indicative of toxicity. Cultures & Sensitivities:    Date Site Micro Susceptibility / Result   12/5 Urine Mixed skin/urogenital alexander No further workup   12/6 Blood #1 MRSE Pending   12/6 Blood #2 No growth to date    12/6 MRSA nasal PCR Ordered    12/6 Strep pneumo antigen Ordered    12/6 Legionella antigen Ordered    12/6 Sputum Ordered      Recent Labs     12/06/23  0815 12/07/23  0601 12/08/23  0353   CREATININE <0.5* 0.6 <0.5*   BUN 14 21* 18   WBC 7.0 5.7 7.1     Estimated Creatinine Clearance: 74 mL/min (based on SCr of 0.5 mg/dL).     Additional Lab Values / Findings of Note:    Recent Labs     12/05/23  2115 12/06/23  0815   PROCAL 0.02 0.02

## 2023-12-08 NOTE — CARE COORDINATION
CM following, pt extubated 12/7, pending PT OT eval for DC recs. Would need precert if SNF recommended.   Electronically signed by Piotr Martinez RN on 12/8/2023 at 3:41 PM   [phone]

## 2023-12-08 NOTE — DISCHARGE INSTRUCTIONS
Extra Heart Failure sites:     https://[employer]. com/publication/?z=709389   --- this is American Heart Association interactive Healthier Living with Heart Failure guidebook. Please click hyperlink or copy / paste link into search bar. Use your mouse to scroll through the pages. Lots of information about weight monitoring, diet tips, activity, meds, etc    HF [place] be[place]ly  -- this is a free smart phone beverly available for iPhone and Android download. Use your phone to track sodium / fluid intake, zone tool symptom tracking, weights, medications, etc. Click on this hyperlink  HF [place]   for QR code for easy download. DASH (Dietary Approach to Stop Hypertension) diet --  SeekAlumni.no -- this diet is a flexible eating plan that promotes heart healthy eating style. Click on hyperlink or copy / paste link into search bar. Lots of low sodium recipes and tips.     CigarRep[employer].ca  -- more free recipes

## 2023-12-08 NOTE — PLAN OF CARE
Problem: Safety - Medical Restraint  Goal: Remains free of injury from restraints (Restraint for Interference with Medical Device)  Description: INTERVENTIONS:  1. Determine that other, less restrictive measures have been tried or would not be effective before applying the restraint  2. Evaluate the patient's condition at the time of restraint application  3. Inform patient/family regarding the reason for restraint  4. Q2H: Monitor safety, psychosocial status, comfort, nutrition and hydration  Outcome: Progressing     Problem: Discharge Planning  Goal: Discharge to home or other facility with appropriate resources  Outcome: Progressing  Flowsheets (Taken 12/7/2023 2000)  Discharge to home or other facility with appropriate resources: Identify barriers to discharge with patient and caregiver     Problem: Safety - Adult  Goal: Free from fall injury  Outcome: Progressing     Problem: Pain  Goal: Verbalizes/displays adequate comfort level or baseline comfort level  Outcome: Progressing  Flowsheets (Taken 12/7/2023 2000)  Verbalizes/displays adequate comfort level or baseline comfort level:   Encourage patient to monitor pain and request assistance   Assess pain using appropriate pain scale   Administer analgesics based on type and severity of pain and evaluate response     Problem: Chronic Conditions and Co-morbidities  Goal: Patient's chronic conditions and co-morbidity symptoms are monitored and maintained or improved  Outcome: Progressing  Flowsheets (Taken 12/7/2023 2000)  Care Plan - Patient's Chronic Conditions and Co-Morbidity Symptoms are Monitored and Maintained or Improved: Monitor and assess patient's chronic conditions and comorbid symptoms for stability, deterioration, or improvement     Problem: SLP Adult - Impaired Swallowing  Goal: By Discharge: Advance to least restrictive diet without signs or symptoms of aspiration for planned discharge setting. See evaluation for individualized goals.   12/7/2023 1321 by Qasim Perez, SLP  Outcome: Progressing

## 2023-12-08 NOTE — PROGRESS NOTES
Comprehensive Nutrition Assessment    RECOMMENDATIONS:  PO Diet: Continue Regular diet  ONS: Add Ensure smoothies BID  Nutrition Education: Education not indicated     NUTRITION ASSESSMENT:   Nutritional summary & status: Positive screen: Pt on a regular diet intakes >50%. EMR shows large wt loss when first dx, her appetite has been good. Drinks bolthause smoothies (1-2) every day, ordered ensure smoothies for her during admission. Admission // PMH: Pneumonia//adenocarcinoma of the lung stage IV, CHF, HTN/HLD    MALNUTRITION ASSESSMENT  Context of Malnutrition: Chronic Illness   Malnutrition Status: Severe malnutrition  Findings of the 6 clinical characteristics of malnutrition (Minimum of 2 out of 6 clinical characteristics is required to make the diagnosis of moderate or severe Protein Calorie Malnutrition based on AND/ASPEN Guidelines):  Energy Intake:  Mild decrease in energy intake (Comment)  Weight Loss:  Greater than 20% over 1 year     Body Fat Loss:  Severe body fat loss Orbital, Triceps, Buccal region   Muscle Mass Loss:  Severe muscle mass loss Clavicles (pectoralis & deltoids), Temples (temporalis)  Fluid Accumulation:  No significant fluid accumulation      NUTRITION DIAGNOSIS   Severe malnutrition related to catabolic illness as evidenced by Criteria as identified in malnutrition assessment    Nutrition Monitoring and Evaluation:   Food/Nutrient Intake Outcomes:  Food and Nutrient Intake, Supplement Intake  Physical Signs/Symptoms Outcomes:  Biochemical Data, Nutrition Focused Physical Findings, Weight, Hemodynamic Status     OBJECTIVE DATA: Significant to nutrition assessment  Nutrition Related Findings: . Active bowel sounds. +BM 12/8. +1.3L. Wounds: None  Nutrition Goals: Meet at least 75% of estimated needs, prior to discharge     [address] DIET; Regular  ADULT ORAL NUTRITION SUPPLEMENT; Lunch, Dinner;  Other Oral Supplement; Smoothie  PO Intake: %   PO

## 2023-12-08 NOTE — PROGRESS NOTES
4 Eyes Skin Assessment     NAME:  Rocky Mcclendon OF BIRTH:  1945  MEDICAL RECORD NUMBER:  [id number]    The patient is being assessed for  Admission    I agree that at least one RN has performed a thorough Head to Toe Skin Assessment on the patient. ALL assessment sites listed below have been assessed. Areas assessed by both nurses:    Head, Face, Ears, Shoulders, Back, Chest, Arms, Elbows, Hands, Sacrum. Buttock, Coccyx, Ischium, Legs. Feet and Heels, and Under Medical Devices         Does the Patient have a Wound? Yes wound(s) were present on assessment. LDA wound assessment was Initiated and completed by RN    Stage 2 pressure ulcer on coccyx.        [place] Prevention initiated by RN: Yes  Wound Care Orders initiated by RN: No    Pressure Injury (Stage 3,4, Unstageable, DTI, NWPT, and Complex wounds) if present, place Wound referral order by RN under : No    New Ostomies, if present place, Ostomy referral order under : No     Nurse 1 eSignature: Electronically signed by Gilmar Alejandra RN on 12/7/23 at 7:27 PM EST    **SHARE this note so that the co-signing nurse can place an eSignature**    Nurse 2 eSignature: Electronically signed by Jennifer Carballo RN on 12/7/23 at 7:31 PM EST

## 2023-12-08 NOTE — PROGRESS NOTES
ICU Progress Note    Admit Date: 12/5/2023  Hospital Day:  Hospital Day: 4  ICU Day:3  Vent Day:  Extubated 12/7/23  Vent Settings:   Antibiotics:  Vanc and Cefepime  Diet:  ADULT DIET; Regular    CC: Fatigue and decreased intake    Interval History   No acute events overnight. She was seen this morning at bedside on 4L nasal canula, she did have Bipap on at night for 4-6 hours per her. She is feeling much better, She has some yellowish sputum production since starting breathing treatment, but overall feeling much better. Renal panel within normal limits, Hgb 9.7. She is saturating well, and back to baseline cognition, she can be downgraded to the floors today      HPI  Ms. Socorro Lim is a 69year old female with PMHx of arthritis, CHF, COPD, hyperlipidemia, htn, metastatic lung adenocarcinoma who presented to the ED with worsening fatigue and loss of appetite since 12/3. Patient's daughter noticed that her mother was significantly looking more fatigued and oral intake was significantly decreased. She was immediately brought to the ED. Patient denies fevers, chills or new cough. Pt uses baseline O2 1L. Patient's daughter is present at bedside. Patient was admitted on 11/10/2023 - 11/17/2023 for dyspnea and a UTI and later transferred to the ICU after having seen Dr. Stephany Clark, her oncologist, earlier in the day. At that time he stopped her dabrafenib/trametinib. She was placed on a ventillator due to hypercapnic respiratory failure/ COPD exacerbation. Patient quickly improved over the next 1-3 days and was extubated. Patient was ultimately discharged back home and her symptoms improved. The daughter states that she was completely back to her baseline of eating, drinking, and mentation after that admission and only worsened over the past three days. Upon discharge her only medication change was addition of midodrine 5mg TID.       Pt was seen 11/10/2023 by RENÉ CASTREJON for exertional dyspnea w/o

## 2023-12-08 NOTE — PROGRESS NOTES
Pt states home BiPAP settings of 25/13 are too strong. Changed to 20/10, pt states this is more comfortable. SpO2 99% on BiPAP with 3.5L bleed. Will continue to monitor.

## 2023-12-08 NOTE — CONSULTS
[facility]  Palliative Medicine Consultation Note      Date Of Admission:12/5/2023  Date of consult: 12/08/23  Seen by [facility] in the past:  Yes    Recommendations:        Met with the pt at the bedside. She is was sitting up in a chair eating lunch. Introduced palliative care. She endorses significant improvement today. She reports that she had been doing well at home until recently she lost her appetite and has been losing weight. She thinks this may have been due to her COPD exacerbation. She reports that she follows with Dr. Jyothi Kowalski for her cancer and she sees [facility] for help with her nutrition. She lives with her daughter Devendra Bullard and her daughters assist her as needed, although she reports she is mostly independent. She reports her daughter Janet Woo is her HCPOA and she has a copy at home on her computer. She reports having had several conversations with her daughters about her wishes. She believes Dr. Raf Schreiber office may have a copy of her HCPOA as well. I will call [facility] to see if they have a copy. Pt denies needs at this time. 1. Goals of Care/Advanced Care planning/Code status: DNRCCA, did not discuss today. Pt reports that her quality of life is fairly good and feels she is improving this admission. She is hopeful that she will return to baseline. 2. Pain: pt denies  3. SOB, acute respiratory failure 2/2 PNA and COPD exacerbation: Pt was intubated on admission 12/6 and extubated 12/7. Pt denies SOB, resting comfortably on 3LNC. She is not on O2 at baseline. She is hopeful that she will not need home O2 at discharge. Pt follows with Dr. Brielle Dill outpatient.   5. Disposition: TBD, pt from home with her daughter    Reason for Consult:         []  Goals of Care  []  Code Status Discussion/Advanced Care Planning   []  Psychosocial/Family Support  []  Symptom Management  [x]  Other (Specify) HF readmission    Requesting Physician: Dr. Kenia Guerrero:  worsening fatigue and loss of AEPB inhaler, Inhale 1 puff into the lungs daily  omeprazole (PRILOSEC) 40 MG delayed release capsule, TAKE ONE CAPSULE BY MOUTH EVERY MORNING BEFORE BREAKFAST  tobramycin-dexamethasone (TOBRADEX) 0.3-0.1 % ophthalmic suspension,   furosemide (LASIX) 20 MG tablet, TAKE ONE TABLET BY MOUTH DAILY AS NEEDED FOR WEIGHT GAIN (SOB, EDEMA)  metoprolol succinate (TOPROL XL) 25 MG extended release tablet, Take 1 tablet by mouth daily  albuterol sulfate HFA (PROVENTIL;VENTOLIN;PROAIR) 108 (90 Base) MCG/ACT inhaler, INHALE 2 PUFFS BY MOUTH EVERY 6 HOURS AS NEEDED FOR WHEEZING  atorvastatin (LIPITOR) 10 MG tablet, Take 1 tablet by mouth daily  ASPIRIN ADULT LOW STRENGTH 81 MG EC tablet, TAKE ONE TABLET BY MOUTH DAILY (Patient taking differently: every other day)    Allergies:  Codeine, Penicillins, and Sulfamethoxazole-trimethoprim    Social History:    TOBACCO: reports that she quit smoking about 11 years ago. Her smoking use included cigarettes. She has never used smokeless tobacco.  ETOH:   reports current alcohol use. Patient currently lives with family daughter    Review of Systems -   Review of Systems: A 10 point review of systems was conducted, significant findings as notedin HPI. Objective:          Physical Exam  Constitutional:       General: She is not in acute distress. Appearance: She is not ill-appearing. Cardiovascular:      Rate and Rhythm: Normal rate and regular rhythm. Heart sounds: Normal heart sounds. Pulmonary:      Breath sounds: Normal breath sounds. Abdominal:      General: Bowel sounds are normal.      Palpations: Abdomen is soft. Musculoskeletal:      Right lower leg: No edema. Left lower leg: No edema. Skin:     General: Skin is warm and dry. Neurological:      Mental Status: She is alert and oriented to person, place, and time.           Palliative Performance Scale:  [x] 60% Ambulation reduced; Significant disease; Can't do hobbies/housework; intake normal or reduced;

## 2023-12-09 LAB
ALBUMIN SERPL-MCNC: 3.5 G/DL (ref 3.4–5)
ANION GAP SERPL CALCULATED.3IONS-SCNC: 4 MMOL/L (ref 3–16)
BACTERIA BLD CULT ORG #2: NORMAL
BACTERIA BLD CULT: ABNORMAL
BACTERIA BLD CULT: NORMAL
BASOPHILS # BLD: 0 K/UL (ref 0–0.2)
BASOPHILS NFR BLD: 0 %
BUN SERPL-MCNC: 17 MG/DL (ref 7–20)
CALCIUM SERPL-MCNC: 9.2 MG/DL (ref 8.3–10.6)
CHLORIDE SERPL-SCNC: 102 MMOL/L (ref 99–110)
CO2 SERPL-SCNC: 35 MMOL/L (ref 21–32)
CREAT SERPL-MCNC: <0.5 MG/DL (ref 0.6–1.2)
DEPRECATED RDW RBC AUTO: 17.1 % (ref 12.4–15.4)
EOSINOPHIL # BLD: 0 K/UL (ref 0–0.6)
EOSINOPHIL NFR BLD: 0.1 %
GFR SERPLBLD CREATININE-BSD FMLA CKD-EPI: >60 ML/MIN/{1.73_M2}
GLUCOSE SERPL-MCNC: 107 MG/DL (ref 70–99)
HCT VFR BLD AUTO: 32 % (ref 36–48)
HGB BLD-MCNC: 9.9 G/DL (ref 12–16)
LYMPHOCYTES # BLD: 0.6 K/UL (ref 1–5.1)
LYMPHOCYTES NFR BLD: 7.2 %
MCH RBC QN AUTO: 23.9 PG (ref 26–34)
MCHC RBC AUTO-ENTMCNC: 31.1 G/DL (ref 31–36)
MCV RBC AUTO: 76.8 FL (ref 80–100)
MONOCYTES # BLD: 0.7 K/UL (ref 0–1.3)
MONOCYTES NFR BLD: 9.6 %
NEUTROPHILS # BLD: 6.5 K/UL (ref 1.7–7.7)
NEUTROPHILS NFR BLD: 83.1 %
ORGANISM: ABNORMAL
ORGANISM: ABNORMAL
PHOSPHATE SERPL-MCNC: 2.3 MG/DL (ref 2.5–4.9)
PLATELET # BLD AUTO: 173 K/UL (ref 135–450)
PMV BLD AUTO: 9 FL (ref 5–10.5)
POTASSIUM SERPL-SCNC: 4.3 MMOL/L (ref 3.5–5.1)
RBC # BLD AUTO: 4.16 M/UL (ref 4–5.2)
SODIUM SERPL-SCNC: 141 MMOL/L (ref 136–145)
WBC # BLD AUTO: 7.8 K/UL (ref 4–11)

## 2023-12-09 PROCEDURE — 94660 CPAP INITIATION&MGMT: CPT

## 2023-12-09 PROCEDURE — 2580000003 HC RX 258

## 2023-12-09 PROCEDURE — 6360000002 HC RX W HCPCS

## 2023-12-09 PROCEDURE — 6370000000 HC RX 637 (ALT 250 FOR IP): Performed by: HOSPITALIST

## 2023-12-09 PROCEDURE — 97530 THERAPEUTIC ACTIVITIES: CPT

## 2023-12-09 PROCEDURE — 1200000000 HC SEMI PRIVATE

## 2023-12-09 PROCEDURE — 97166 OT EVAL MOD COMPLEX 45 MIN: CPT

## 2023-12-09 PROCEDURE — 6370000000 HC RX 637 (ALT 250 FOR IP)

## 2023-12-09 PROCEDURE — 80069 RENAL FUNCTION PANEL: CPT

## 2023-12-09 PROCEDURE — 94640 AIRWAY INHALATION TREATMENT: CPT

## 2023-12-09 PROCEDURE — 36415 COLL VENOUS BLD VENIPUNCTURE: CPT

## 2023-12-09 PROCEDURE — 97535 SELF CARE MNGMENT TRAINING: CPT

## 2023-12-09 PROCEDURE — 94761 N-INVAS EAR/PLS OXIMETRY MLT: CPT

## 2023-12-09 PROCEDURE — 97162 PT EVAL MOD COMPLEX 30 MIN: CPT

## 2023-12-09 PROCEDURE — 2700000000 HC OXYGEN THERAPY PER DAY

## 2023-12-09 PROCEDURE — 85025 COMPLETE CBC W/AUTO DIFF WBC: CPT

## 2023-12-09 PROCEDURE — 6370000000 HC RX 637 (ALT 250 FOR IP): Performed by: INTERNAL MEDICINE

## 2023-12-09 PROCEDURE — 97116 GAIT TRAINING THERAPY: CPT

## 2023-12-09 RX ORDER — ATORVASTATIN CALCIUM 10 MG/1
10 TABLET, FILM COATED ORAL DAILY
Status: DISCONTINUED | OUTPATIENT
Start: 2023-12-09 | End: 2024-01-04 | Stop reason: HOSPADM

## 2023-12-09 RX ADMIN — METOPROLOL SUCCINATE 25 MG: 25 TABLET, EXTENDED RELEASE ORAL at 08:34

## 2023-12-09 RX ADMIN — CEFEPIME 2000 MG: 2 INJECTION, POWDER, FOR SOLUTION INTRAVENOUS at 18:57

## 2023-12-09 RX ADMIN — VANCOMYCIN HYDROCHLORIDE 750 MG: 10 INJECTION, POWDER, LYOPHILIZED, FOR SOLUTION INTRAVENOUS at 04:21

## 2023-12-09 RX ADMIN — ENOXAPARIN SODIUM 30 MG: 100 INJECTION SUBCUTANEOUS at 08:35

## 2023-12-09 RX ADMIN — IPRATROPIUM BROMIDE AND ALBUTEROL SULFATE 1 DOSE: 2.5; .5 SOLUTION RESPIRATORY (INHALATION) at 16:19

## 2023-12-09 RX ADMIN — SODIUM CHLORIDE, PRESERVATIVE FREE 10 ML: 5 INJECTION INTRAVENOUS at 00:41

## 2023-12-09 RX ADMIN — ARFORMOTEROL TARTRATE 15 MCG: 15 SOLUTION RESPIRATORY (INHALATION) at 20:10

## 2023-12-09 RX ADMIN — CEFEPIME 2000 MG: 2 INJECTION, POWDER, FOR SOLUTION INTRAVENOUS at 08:34

## 2023-12-09 RX ADMIN — SODIUM CHLORIDE, PRESERVATIVE FREE 10 ML: 5 INJECTION INTRAVENOUS at 08:24

## 2023-12-09 RX ADMIN — ARFORMOTEROL TARTRATE 15 MCG: 15 SOLUTION RESPIRATORY (INHALATION) at 08:26

## 2023-12-09 RX ADMIN — PREDNISONE 40 MG: 20 TABLET ORAL at 08:34

## 2023-12-09 RX ADMIN — ATORVASTATIN CALCIUM 10 MG: 10 TABLET, FILM COATED ORAL at 11:54

## 2023-12-09 RX ADMIN — DIBASIC SODIUM PHOSPHATE, MONOBASIC POTASSIUM PHOSPHATE AND MONOBASIC SODIUM PHOSPHATE 1 TABLET: 852; 155; 130 TABLET ORAL at 11:35

## 2023-12-09 RX ADMIN — PANTOPRAZOLE SODIUM 40 MG: 40 TABLET, DELAYED RELEASE ORAL at 06:47

## 2023-12-09 RX ADMIN — IPRATROPIUM BROMIDE AND ALBUTEROL SULFATE 1 DOSE: 2.5; .5 SOLUTION RESPIRATORY (INHALATION) at 20:10

## 2023-12-09 RX ADMIN — DIBASIC SODIUM PHOSPHATE, MONOBASIC POTASSIUM PHOSPHATE AND MONOBASIC SODIUM PHOSPHATE 1 TABLET: 852; 155; 130 TABLET ORAL at 20:35

## 2023-12-09 RX ADMIN — IPRATROPIUM BROMIDE AND ALBUTEROL SULFATE 1 DOSE: 2.5; .5 SOLUTION RESPIRATORY (INHALATION) at 08:25

## 2023-12-09 RX ADMIN — CEFEPIME 2000 MG: 2 INJECTION, POWDER, FOR SOLUTION INTRAVENOUS at 02:16

## 2023-12-09 RX ADMIN — IPRATROPIUM BROMIDE AND ALBUTEROL SULFATE 1 DOSE: 2.5; .5 SOLUTION RESPIRATORY (INHALATION) at 12:28

## 2023-12-09 RX ADMIN — BUDESONIDE 250 MCG: 0.25 INHALANT RESPIRATORY (INHALATION) at 20:10

## 2023-12-09 RX ADMIN — BUDESONIDE 250 MCG: 0.25 INHALANT RESPIRATORY (INHALATION) at 08:25

## 2023-12-09 ASSESSMENT — PAIN SCALES - GENERAL
PAINLEVEL_OUTOF10: 0

## 2023-12-09 NOTE — CONSULTS
Clinical Pharmacy Consult Note    Vancomycin has been discontinued - Pharmacy will sign off on dosing at this time. If resumed, please re-consult Pharmacy. Please call with questions.     Levy Rivera, PharmD, [address] [facility]  Clinical Pharmacist - Emergency Dept  Wireless: [phone]  12/9/2023 8:07 AM

## 2023-12-09 NOTE — PROGRESS NOTES
Assess: The patient is oriented x4 and alert. The patient reports that she feels well and she is breathing easy, not in distress. She is currently on 5 L of supplemental oxygen. Purewick in place. She has an improving appetite. VS: WNL, afebrile. Pain: no complaints of pain. Activity: offered assistance to get out of bed, the patient is declining at this time. Hygiene: [address] exchanged. Rose-care provided.

## 2023-12-09 NOTE — PLAN OF CARE
Problem: Discharge Planning  Goal: Discharge to home or other facility with appropriate resources  12/9/2023 1745 by Carolyn Burch RN  Outcome: Progressing  Flowsheets (Taken 12/9/2023 0800)  Discharge to home or other facility with appropriate resources: Identify barriers to discharge with patient and caregiver  12/9/2023 0502 by Cira Sandhoff, RN  Outcome: Progressing     Problem: Safety - Adult  Goal: Free from fall injury  12/9/2023 1745 by Carolyn Burch RN  Outcome: Progressing  Flowsheets (Taken 12/9/2023 0800)  Free From Fall Injury: Instruct family/caregiver on patient safety  12/9/2023 0502 by Cira Sandhoff, RN  Outcome: Progressing     Problem: Pain  Goal: Verbalizes/displays adequate comfort level or baseline comfort level  12/9/2023 1745 by Carolyn Burch RN  Outcome: Progressing  Flowsheets (Taken 12/9/2023 1200)  Verbalizes/displays adequate comfort level or baseline comfort level: Encourage patient to monitor pain and request assistance  12/9/2023 0502 by Cira Sandhoff, RN  Outcome: Progressing     Problem: Skin/Tissue Integrity  Goal: Absence of new skin breakdown  Description: 1. Monitor for areas of redness and/or skin breakdown  2. Assess vascular access sites hourly  3. Every 4-6 hours minimum:  Change oxygen saturation probe site  4. Every 4-6 hours:  If on nasal continuous positive airway pressure, respiratory therapy assess nares and determine need for appliance change or resting period.   12/9/2023 1745 by Carolyn Bruch RN  Outcome: Progressing  12/9/2023 0502 by Cira Sandhoff, RN  Outcome: Progressing     Problem: Chronic Conditions and Co-morbidities  Goal: Patient's chronic conditions and co-morbidity symptoms are monitored and maintained or improved  12/9/2023 1745 by Carolyn Burch RN  Outcome: Progressing  Flowsheets (Taken 12/9/2023 0800)  Care Plan - Patient's Chronic Conditions and Co-Morbidity Symptoms are Monitored and Maintained or Improved: Monitor and assess patient's chronic conditions and comorbid symptoms for stability, deterioration, or improvement  12/9/2023 0502 by Sherry Oscar RN  Outcome: Progressing     Problem: Nutrition Deficit:  Goal: Optimize nutritional status  12/9/2023 1745 by Walker Tucker RN  Outcome: Progressing  12/9/2023 0502 by Sherry Oscar RN  Outcome: Progressing     Problem: Safety - Medical Restraint  Goal: Remains free of injury from restraints (Restraint for Interference with Medical Device)  Description: INTERVENTIONS:  1. Determine that other, less restrictive measures have been tried or would not be effective before applying the restraint  2. Evaluate the patient's condition at the time of restraint application  3. Inform patient/family regarding the reason for restraint  4.  Q2H: Monitor safety, psychosocial status, comfort, nutrition and hydration  12/9/2023 1745 by Walker Tucker RN  Outcome: Completed  12/9/2023 0502 by Sherry Oscar RN  Outcome: Progressing

## 2023-12-09 NOTE — PROGRESS NOTES
Occupational Therapy  Facility/Department: [facility] ICU  Occupational Therapy Initial Assessment and Treatment    Name: Merrick Longoria  : 1945  MRN: [id number]  Date of Service: 2023    Discharge Recommendations:  Merrick Longoria scored a 18/24 on the AM-PAC ADL Inpatient form. Current research shows that an AM-PAC score of 18 or greater is typically associated with a discharge to the patient's home setting. Based on the patient's AM-PAC score, and their current ADL deficits, it is recommended that the patient have 2-3 sessions per week of Occupational Therapy at d/c to increase the patient's independence. At this time, this patient demonstrates the endurance and safety to discharge home with home vs OP services) and a follow up treatment frequency of 2-3x/wk. Please see assessment section for further patient specific details. If patient discharges prior to next session this note will serve as a discharge summary. Please see below for the latest assessment towards goals. 24 hour supervision or assist, Home with Home health OT  OT Equipment Recommendations  Equipment Needed: Yes  Other: shower chair- son will bring pt's shower chair to daughter's home where pt now lives       Patient Diagnosis(es): The primary encounter diagnosis was HCAP (healthcare-associated pneumonia). Diagnoses of Pleural effusion on left, Urinary tract infection without hematuria, site unspecified, and Other fatigue were also pertinent to this visit. Past Medical History:  has a past medical history of Arthritis, CHF (congestive heart failure) ([facility]), COPD (chronic obstructive pulmonary disease) ([facility]), Hyperlipidemia, Hypertension, and Lung cancer ([facility]). Past Surgical History:  has a past surgical history that includes Tubal ligation; bronchoscopy (2017); and other surgical history (Left, 2017).     Treatment Diagnosis: Impaired ADLs, mobility and activity tolerance      Assessment   Performance deficits / Impairments: Decreased ADL status; Decreased functional mobility ; Decreased endurance;Decreased strength  Assessment: Pt from home with her family, uses RW and requires bathing assist recently since recent hospitalization- pt had been home 2 weeks, receiving home therapy prior to this admission. Pt demo decreased activity tolerance- can complete ADLs with CGA but needs frequent rest breaks due to fatigue. O2 sats 87% with activity on 3L. Pt able to ambulate hallway with RW and CGA. Recommend 24hr assist upon d/c and home therapy- pt and daughter in agreement  Treatment Diagnosis: Impaired ADLs, mobility and activity tolerance  Prognosis: Good  Decision Making: Medium Complexity  REQUIRES OT FOLLOW-UP: Yes  Activity Tolerance  Activity Tolerance: Patient Tolerated treatment well        Plan   Occupational Therapy Plan  Times Per Week: 2-5  Current Treatment Recommendations: Functional mobility training, Self-Care / ADL, Balance training, Home management training, Safety education & training, Patient/Caregiver education & training, Endurance training     Restrictions  Position Activity Restriction  Other position/activity restrictions: up with assist    Subjective   General  Chart Reviewed: Yes  Additional Pertinent Hx: 69year old female with metastatic lung cancer. Pt presented 12/5 with solmnolence, SOB. Admitted for Acute on chronic respiratory failure with hypercapnia with patient with interstitial lung disease and COPD, likely 2/2 PNA. Pt intubated 12/6, extubated 12/7. PMHx of arthritis, CHF, COPD, hyperlipidemia, htn  Family / Caregiver Present: Yes (daughter)  Diagnosis: pneumonia  Subjective  Subjective: Pt in bed, eager to work with therapy.  Pain: none       Social/Functional History  Social/Functional History  Lives With: Daughter  Type of Home: House  Home Layout: Two level, Bed/Bath upstairs, 1/2 bath on main level  Home Access: Stairs to enter with rails  Entrance Stairs - Number of Steps: 4  Bathroom

## 2023-12-09 NOTE — PROGRESS NOTES
Assess: The patient is up to the chair this morning, 1 person assist. Tolerated BiPAP throughout the night. Alert and oriented but groggy. Still requiring supplemental oxygen 2-3 L to get an SpO2 >90%. Productive, strong cough. Mild lower extremity edema. Shortness of breath still present with exertion. The patient is motivated to get home. VS: WNL. Pain: no pain. Activity:up x1 with a walker. Hygiene: encouraged self-care and ADLs. Deferred hygiene care at this time.

## 2023-12-09 NOTE — PROGRESS NOTES
Physical Therapy  Facility/Department: [facility]  Physical Therapy Initial Assessment and treatment    Name: Kelsey Goodman  : 1945  MRN: [id number]  Date of Service: 2023    Discharge Recommendations:    Kelsey Goodman scored a 18/24 on the AM-PAC short mobility form. Current research shows that an AM-PAC score of 18 or greater is typically associated with a discharge to the patient's home setting. Based on the patient's AM-PAC score and their current functional mobility deficits, it is recommended that the patient have 2-3 sessions per week of Physical Therapy at d/c to increase the patient's independence. At this time, this patient demonstrates the endurance and safety to discharge home with home PT and a follow up treatment frequency of 2-3x/wk. Please see assessment section for further patient specific details. PT Equipment Recommendations  Equipment Needed: No      Patient Diagnosis(es): The primary encounter diagnosis was HCAP (healthcare-associated pneumonia). Diagnoses of Pleural effusion on left, Urinary tract infection without hematuria, site unspecified, and Other fatigue were also pertinent to this visit. Past Medical History:  has a past medical history of Arthritis, CHF (congestive heart failure) ([address]), COPD (chronic obstructive pulmonary disease) ([address]), Hyperlipidemia, Hypertension, and Lung cancer ([address]). Past Surgical History:  has a past surgical history that includes Tubal ligation; bronchoscopy (2017); and other surgical history (Left, 2017). Assessment   Body Structures, Functions, Activity Limitations Requiring Skilled Therapeutic Intervention: Decreased functional mobility ; Decreased endurance;Decreased balance  Assessment: Patient tolerated session fair with mobility being limited due to overall decreased activity tolerance and drop in O2 saturation with mobility.   Patient with quick fatigue requiring prolonged seated rest break in bathroom and bedside chair prior to hallway ambulation. Patient demonstrates steady gait with use of FWW though movements are slow and effortful. She needs cues for breathing techniques and UE placement during transfers throughout session. Pulse oximeter not reading well through most of session but appears to desat to 87% on 3L with mobility. Patient admitted from home where she has been for approximately 2 weeks after previous hospital discharge. She is currently functioning below baseline level and with increased O2 demands. Recommend continued skilled PT upon discharge. Will follow while in acute care setting to maximize independence with mobility. Treatment Diagnosis: impaired mobility associated with PNA  Therapy Prognosis: Good  Decision Making: Medium Complexity  Requires PT Follow-Up: Yes  Activity Tolerance  Activity Tolerance: Patient limited by fatigue;Patient limited by endurance  Activity Tolerance Comments: Patient with fluctuating O2 saturation (non-uniform curve at times throughout session) but appears to have dropped to 87% at lowest with mobility, verbal cues for breathing techniques throughout session     Plan   Physical Therapy Plan  General Plan:  (2-5)  Current Treatment Recommendations: Strengthening, Balance training, Gait training, Stair training, Functional mobility training, Transfer training, Endurance training, Safety education & training, Therapeutic activities, Patient/Caregiver education & training  Safety Devices  Type of Devices: Nurse notified, Chair alarm in place, Call light within reach, Left in chair, Gait belt     Restrictions  Position Activity Restriction  Other position/activity restrictions: up with assist     Subjective   General  Chart Reviewed: Yes  Patient assessed for rehabilitation services?: Yes  Additional Pertinent Hx: Patient is a 68 y/o female admitted 12/6 with increased lethargy and fatigue.   Patient found to be unresponsive overnight in ED with rapid response called requiring

## 2023-12-09 NOTE — PROGRESS NOTES
V2.0    [facility] Progress Note      Name:  Yovany Mann /Age/Sex: 1945  (69 y.o. female)   MRN & CSN:  [id number] & [id number] Encounter Date/Time: 2023 11:59 AM EST   Location:  [address] [place] [address] PCP: Krystal Pina MD     Attending:Anderson [address] [place] [address] Day: 5    Assessment and Recommendations   Ms. Yovany Mann is a 69year old female with PMHx of arthritis, CHF, COPD, hyperlipidemia, htn, metastatic lung adenocarcinoma who presented to the ED with worsening fatigue loss of appetite and SOB . In the ED she became unresponsive and her ABG looked bad so he was intubated . CT showed moderate left-sided pleural effusion, slightly increased from the prior study. Increased left lower lobe airspace density consistent with compressive atelectasis or infiltrate. Patient was admitted on 11/10/2023 - 2023 where she was admitted for COPD exacerbation. She was extubated and discharged home     Acute hypoxic and hypercapnic resp failure 2/2 pneumonia , pleural effusion and COPD exacerbation  Recent admission 11/10- for resp failure 2/2 COPD exacerbation and was intubated for 39 hors   CHF, does not look in exacerbation   Metastatic lung cancer   -S/P extubation   -Improving but not ready to go home  -Cont cefepime , prednisone and pulm support  -Bcx showed staph epi. contaminant. Repeat Bcx is negative. Vanc stopped   -PT/OT  -DNR CCA    Hypophosphatemia -  replace as needed. Chronic anemia, stable   SHANNA: usually on CPAP    HTN: restart toprol. Hold amlodipine and HCTZ for now   HLD: home atorvastatin 10mg QD      Personally reviewed Lab Studies and Imaging         Subjective:     Doing much better. Not ready to go home       Review of Systems:      Pertinent positives and negatives discussed in HPI    Objective:      Intake/Output Summary (Last 24 hours) at 2023 1114  Last data filed at 2023 0610  Gross per 24 hour   Intake 1513.4 ml   Output 500 ml   Net 1013.4 ml Pulmonary vascular congestion is again visualized. Overall, the appearance of the chest is stable. No pneumothorax.     Interval intubation. Otherwise, no significant change.    CT CHEST PULMONARY EMBOLISM W CONTRAST    Result Date: 12/5/2023  CT ANGIOGRAPHY CHEST WITH CONTRAST (PULMONARY EMBOLISM PROTOCOL) INDICATION: hx cancer, new hypoxia, ro PE and worsening CA COMPARISON: Most recent CT of the chest performed 11/10/2023. TECHNIQUE: Thin section axial CT imaging obtained through the chest, per pulmonary embolism protocol. A total of 75 mL Isovue-370 contrast given intravenously. Axial and coronal MIP and sagittal reformatting performed. Up-to-date CT equipment radiation dose reduction techniques are utilized. FINDINGS: DIAGNOSTIC QUALITY: Images degraded by respiratory motion. There is no CT evidence of pulmonary embolism. The thoracic aorta demonstrates some mild diffuse prominence of the ascending aorta, measuring proximally 4 cm in axial dimensions. There is no evidence of dissection. The heart is normal in size. There is a small diffuse pericardial effusion. No evidence of right heart strain. Right perihilar, upper and middle lobe bronchiectasis and diffuse fibrosis with bullous emphysematous changes are again noted. There is a dominant large bleb in the right apex, these findings unchanged. Peripheral subpleural emphysematous blebs are seen diffusely throughout the left upper lung. Stable scarring in the right base. There is an increase in size in the left pleural effusion in the posterior, dependent extending from the apex into the lung base. There is some increased airspace density and volume loss in the posterior left lower lobe, consistent with compressive atelectasis or superimposed acute infiltrate. There is relative elevation of the right diaphragm. The liver beneath the elevated diaphragm is stable in appearance, with a low-attenuation cystic lesion in the lateral right lobe. There is a small right

## 2023-12-09 NOTE — PLAN OF CARE
Problem: Safety - Medical Restraint  Goal: Remains free of injury from restraints (Restraint for Interference with Medical Device)  Description: INTERVENTIONS:  1. Determine that other, less restrictive measures have been tried or would not be effective before applying the restraint  2. Evaluate the patient's condition at the time of restraint application  3. Inform patient/family regarding the reason for restraint  4. Q2H: Monitor safety, psychosocial status, comfort, nutrition and hydration  Outcome: Progressing     Problem: Discharge Planning  Goal: Discharge to home or other facility with appropriate resources  Outcome: Progressing  Flowsheets (Taken 12/8/2023 0800)  Discharge to home or other facility with appropriate resources: Identify barriers to discharge with patient and caregiver     Problem: Safety - Adult  Goal: Free from fall injury  Outcome: Progressing  Flowsheets (Taken 12/8/2023 0800)  Free From Fall Injury: Instruct family/caregiver on patient safety     Problem: Pain  Goal: Verbalizes/displays adequate comfort level or baseline comfort level  Outcome: Progressing     Problem: Skin/Tissue Integrity  Goal: Absence of new skin breakdown  Description: 1. Monitor for areas of redness and/or skin breakdown  2. Assess vascular access sites hourly  3. Every 4-6 hours minimum:  Change oxygen saturation probe site  4. Every 4-6 hours:  If on nasal continuous positive airway pressure, respiratory therapy assess nares and determine need for appliance change or resting period.   Outcome: Progressing     Problem: Chronic Conditions and Co-morbidities  Goal: Patient's chronic conditions and co-morbidity symptoms are monitored and maintained or improved  Outcome: Progressing  Flowsheets (Taken 12/8/2023 0800)  Care Plan - Patient's Chronic Conditions and Co-Morbidity Symptoms are Monitored and Maintained or Improved: Monitor and assess patient's chronic conditions and comorbid symptoms for stability,

## 2023-12-09 NOTE — PLAN OF CARE
Problem: Safety - Medical Restraint  Goal: Remains free of injury from restraints (Restraint for Interference with Medical Device)  Description: INTERVENTIONS:  1. Determine that other, less restrictive measures have been tried or would not be effective before applying the restraint  2. Evaluate the patient's condition at the time of restraint application  3. Inform patient/family regarding the reason for restraint  4. Q2H: Monitor safety, psychosocial status, comfort, nutrition and hydration  12/9/2023 0502 by Lars Milton RN  Outcome: Progressing     Problem: Discharge Planning  Goal: Discharge to home or other facility with appropriate resources  12/9/2023 0502 by Lars Milton RN  Outcome: Progressing     Problem: Safety - Adult  Goal: Free from fall injury  12/9/2023 0502 by Lars Milton RN  Outcome: Progressing     Problem: Pain  Goal: Verbalizes/displays adequate comfort level or baseline comfort level  12/9/2023 0502 by Lars Milton RN  Outcome: Progressing     Problem: Skin/Tissue Integrity  Goal: Absence of new skin breakdown  Description: 1. Monitor for areas of redness and/or skin breakdown  2. Assess vascular access sites hourly  3. Every 4-6 hours minimum:  Change oxygen saturation probe site  4. Every 4-6 hours:  If on nasal continuous positive airway pressure, respiratory therapy assess nares and determine need for appliance change or resting period.   12/9/2023 0502 by Lars Milton RN  Outcome: Progressing     Problem: Chronic Conditions and Co-morbidities  Goal: Patient's chronic conditions and co-morbidity symptoms are monitored and maintained or improved  12/9/2023 0502 by Lars Milton RN  Outcome: Progressing     Problem: Nutrition Deficit:  Goal: Optimize nutritional status  12/9/2023 0502 by Lars Milton RN  Outcome: Progressing

## 2023-12-09 NOTE — PROGRESS NOTES
Assess: The patient is oriented x4. She stayed out of the bed the majority of the day and in the chair. Family was present throughout the day as co-learners. The patient reports improved respiratory status and she is on 2 L of supplemental oxygen. Virlinda Frame remains in place. VS: WNL. Pain: none. Activity: Stand by assist to the chair.

## 2023-12-10 LAB
ALBUMIN SERPL-MCNC: 2 G/DL (ref 3.4–5)
ANION GAP SERPL CALCULATED.3IONS-SCNC: 1 MMOL/L (ref 3–16)
ANION GAP SERPL CALCULATED.3IONS-SCNC: 15 MMOL/L (ref 3–16)
BACTERIA BLD CULT ORG #2: NORMAL
BASE EXCESS BLDV CALC-SCNC: 9.3 MMOL/L (ref -2–3)
BASOPHILS # BLD: 0 K/UL (ref 0–0.2)
BASOPHILS NFR BLD: 0.2 %
BUN SERPL-MCNC: 13 MG/DL (ref 7–20)
BUN SERPL-MCNC: 9 MG/DL (ref 7–20)
CALCIUM SERPL-MCNC: 5 MG/DL (ref 8.3–10.6)
CALCIUM SERPL-MCNC: 8.4 MG/DL (ref 8.3–10.6)
CHLORIDE SERPL-SCNC: 106 MMOL/L (ref 99–110)
CHLORIDE SERPL-SCNC: 106 MMOL/L (ref 99–110)
CO2 BLDV-SCNC: 38 MMOL/L
CO2 SERPL-SCNC: 23 MMOL/L (ref 21–32)
CO2 SERPL-SCNC: 36 MMOL/L (ref 21–32)
COHGB MFR BLDV: 1.9 % (ref 0–1.5)
CREAT SERPL-MCNC: 0.5 MG/DL (ref 0.6–1.2)
CREAT SERPL-MCNC: <0.5 MG/DL (ref 0.6–1.2)
DEPRECATED RDW RBC AUTO: 17.4 % (ref 12.4–15.4)
EOSINOPHIL # BLD: 0 K/UL (ref 0–0.6)
EOSINOPHIL NFR BLD: 0.4 %
GFR SERPLBLD CREATININE-BSD FMLA CKD-EPI: >60 ML/MIN/{1.73_M2}
GFR SERPLBLD CREATININE-BSD FMLA CKD-EPI: >60 ML/MIN/{1.73_M2}
GLUCOSE SERPL-MCNC: 57 MG/DL (ref 70–99)
GLUCOSE SERPL-MCNC: 88 MG/DL (ref 70–99)
HCO3 BLDV-SCNC: 36.2 MMOL/L (ref 24–28)
HCT VFR BLD AUTO: 30.1 % (ref 36–48)
HGB BLD-MCNC: 9.2 G/DL (ref 12–16)
LYMPHOCYTES # BLD: 0.6 K/UL (ref 1–5.1)
LYMPHOCYTES NFR BLD: 9.7 %
MCH RBC QN AUTO: 23.6 PG (ref 26–34)
MCHC RBC AUTO-ENTMCNC: 30.7 G/DL (ref 31–36)
MCV RBC AUTO: 76.8 FL (ref 80–100)
METHGB MFR BLDV: 0.3 % (ref 0–1.5)
MONOCYTES # BLD: 0.5 K/UL (ref 0–1.3)
MONOCYTES NFR BLD: 8.3 %
NEUTROPHILS # BLD: 4.9 K/UL (ref 1.7–7.7)
NEUTROPHILS NFR BLD: 81.4 %
PCO2 BLDV: 62.2 MMHG (ref 41–51)
PH BLDV: 7.37 [PH] (ref 7.35–7.45)
PHOSPHATE SERPL-MCNC: 1.1 MG/DL (ref 2.5–4.9)
PLATELET # BLD AUTO: 161 K/UL (ref 135–450)
PMV BLD AUTO: 8.4 FL (ref 5–10.5)
PO2 BLDV: 92.8 MMHG (ref 25–40)
POTASSIUM SERPL-SCNC: 2.2 MMOL/L (ref 3.5–5.1)
POTASSIUM SERPL-SCNC: 3.8 MMOL/L (ref 3.5–5.1)
RBC # BLD AUTO: 3.92 M/UL (ref 4–5.2)
SAO2 % BLDV: 98 %
SODIUM SERPL-SCNC: 143 MMOL/L (ref 136–145)
SODIUM SERPL-SCNC: 144 MMOL/L (ref 136–145)
WBC # BLD AUTO: 6.1 K/UL (ref 4–11)

## 2023-12-10 PROCEDURE — 1200000000 HC SEMI PRIVATE

## 2023-12-10 PROCEDURE — 6360000002 HC RX W HCPCS

## 2023-12-10 PROCEDURE — 6370000000 HC RX 637 (ALT 250 FOR IP)

## 2023-12-10 PROCEDURE — 94761 N-INVAS EAR/PLS OXIMETRY MLT: CPT

## 2023-12-10 PROCEDURE — 94640 AIRWAY INHALATION TREATMENT: CPT

## 2023-12-10 PROCEDURE — 36415 COLL VENOUS BLD VENIPUNCTURE: CPT

## 2023-12-10 PROCEDURE — 2700000000 HC OXYGEN THERAPY PER DAY

## 2023-12-10 PROCEDURE — 82803 BLOOD GASES ANY COMBINATION: CPT

## 2023-12-10 PROCEDURE — 2580000003 HC RX 258

## 2023-12-10 PROCEDURE — 80069 RENAL FUNCTION PANEL: CPT

## 2023-12-10 PROCEDURE — 99233 SBSQ HOSP IP/OBS HIGH 50: CPT | Performed by: INTERNAL MEDICINE

## 2023-12-10 PROCEDURE — 94660 CPAP INITIATION&MGMT: CPT

## 2023-12-10 PROCEDURE — 6370000000 HC RX 637 (ALT 250 FOR IP): Performed by: HOSPITALIST

## 2023-12-10 PROCEDURE — 85025 COMPLETE CBC W/AUTO DIFF WBC: CPT

## 2023-12-10 RX ORDER — LEVOFLOXACIN 750 MG/1
750 TABLET, FILM COATED ORAL DAILY
Status: COMPLETED | OUTPATIENT
Start: 2023-12-10 | End: 2023-12-14

## 2023-12-10 RX ADMIN — CEFEPIME 2000 MG: 2 INJECTION, POWDER, FOR SOLUTION INTRAVENOUS at 00:03

## 2023-12-10 RX ADMIN — ARFORMOTEROL TARTRATE 15 MCG: 15 SOLUTION RESPIRATORY (INHALATION) at 09:18

## 2023-12-10 RX ADMIN — LEVOFLOXACIN 750 MG: 750 TABLET, FILM COATED ORAL at 13:38

## 2023-12-10 RX ADMIN — IPRATROPIUM BROMIDE AND ALBUTEROL SULFATE 1 DOSE: 2.5; .5 SOLUTION RESPIRATORY (INHALATION) at 12:11

## 2023-12-10 RX ADMIN — IPRATROPIUM BROMIDE AND ALBUTEROL SULFATE 1 DOSE: 2.5; .5 SOLUTION RESPIRATORY (INHALATION) at 09:18

## 2023-12-10 RX ADMIN — ENOXAPARIN SODIUM 30 MG: 100 INJECTION SUBCUTANEOUS at 08:42

## 2023-12-10 RX ADMIN — IPRATROPIUM BROMIDE AND ALBUTEROL SULFATE 1 DOSE: 2.5; .5 SOLUTION RESPIRATORY (INHALATION) at 15:40

## 2023-12-10 RX ADMIN — IPRATROPIUM BROMIDE AND ALBUTEROL SULFATE 1 DOSE: 2.5; .5 SOLUTION RESPIRATORY (INHALATION) at 19:50

## 2023-12-10 RX ADMIN — ARFORMOTEROL TARTRATE 15 MCG: 15 SOLUTION RESPIRATORY (INHALATION) at 19:50

## 2023-12-10 RX ADMIN — PANTOPRAZOLE SODIUM 40 MG: 40 TABLET, DELAYED RELEASE ORAL at 06:34

## 2023-12-10 RX ADMIN — PREDNISONE 40 MG: 20 TABLET ORAL at 08:41

## 2023-12-10 RX ADMIN — BUDESONIDE 250 MCG: 0.25 INHALANT RESPIRATORY (INHALATION) at 19:50

## 2023-12-10 RX ADMIN — SODIUM CHLORIDE, PRESERVATIVE FREE 10 ML: 5 INJECTION INTRAVENOUS at 21:07

## 2023-12-10 RX ADMIN — ATORVASTATIN CALCIUM 10 MG: 10 TABLET, FILM COATED ORAL at 08:41

## 2023-12-10 RX ADMIN — CEFEPIME 2000 MG: 2 INJECTION, POWDER, FOR SOLUTION INTRAVENOUS at 08:48

## 2023-12-10 RX ADMIN — METOPROLOL SUCCINATE 25 MG: 25 TABLET, EXTENDED RELEASE ORAL at 08:40

## 2023-12-10 RX ADMIN — BUDESONIDE 250 MCG: 0.25 INHALANT RESPIRATORY (INHALATION) at 09:18

## 2023-12-10 ASSESSMENT — PAIN SCALES - GENERAL
PAINLEVEL_OUTOF10: 0

## 2023-12-10 NOTE — PLAN OF CARE
Problem: Discharge Planning  Goal: Discharge to home or other facility with appropriate resources  12/9/2023 2030 by Mily Angulo RN  Outcome: Progressing     Problem: Safety - Adult  Goal: Free from fall injury  12/9/2023 2030 by Mily Angulo RN  Outcome: Progressing     Problem: Pain  Goal: Verbalizes/displays adequate comfort level or baseline comfort level  12/9/2023 2030 by Mily Angulo RN  Outcome: Progressing     Problem: Skin/Tissue Integrity  Goal: Absence of new skin breakdown  Description: 1. Monitor for areas of redness and/or skin breakdown  2. Assess vascular access sites hourly  3. Every 4-6 hours minimum:  Change oxygen saturation probe site  4. Every 4-6 hours:  If on nasal continuous positive airway pressure, respiratory therapy assess nares and determine need for appliance change or resting period.   12/9/2023 2030 by Mily Angulo RN  Outcome: Progressing     Problem: Chronic Conditions and Co-morbidities  Goal: Patient's chronic conditions and co-morbidity symptoms are monitored and maintained or improved  12/9/2023 2030 by Mily Angulo RN  Outcome: Progressing     Problem: Nutrition Deficit:  Goal: Optimize nutritional status  12/9/2023 2030 by Mily Angulo RN  Outcome: Progressing

## 2023-12-10 NOTE — PLAN OF CARE
Problem: Discharge Planning  Goal: Discharge to home or other facility with appropriate resources  12/10/2023 0810 by Victor Manuel Mathis RN  Outcome: Progressing     Problem: Safety - Adult  Goal: Free from fall injury  12/10/2023 0810 by Victor Maunel Mathis RN  Outcome: Progressing     Problem: Pain  Goal: Verbalizes/displays adequate comfort level or baseline comfort level  12/10/2023 0810 by Victor Manuel Mathis RN  Outcome: Progressing     Problem: Skin/Tissue Integrity  Goal: Absence of new skin breakdown  Description: 1. Monitor for areas of redness and/or skin breakdown  2. Assess vascular access sites hourly  3. Every 4-6 hours minimum:  Change oxygen saturation probe site  4. Every 4-6 hours:  If on nasal continuous positive airway pressure, respiratory therapy assess nares and determine need for appliance change or resting period.   12/10/2023 0810 by Victor Manuel Mathis RN  Outcome: Progressing     Problem: Chronic Conditions and Co-morbidities  Goal: Patient's chronic conditions and co-morbidity symptoms are monitored and maintained or improved  12/10/2023 0810 by Victor Manuel Mathis RN  Outcome: Progressing     Problem: Nutrition Deficit:  Goal: Optimize nutritional status  12/10/2023 0810 by Victor Manuel Mathis RN  Outcome: Progressing

## 2023-12-10 NOTE — PROGRESS NOTES
V2.0    [facility] Progress Note      Name:  Ivet Castillo /Age/Sex: 1945  (69 y.o. female)   MRN & CSN:  [id number] & [id number] Encounter Date/Time: 12/10/2023 11:59 AM EST   Location:  [facility] PCP: Evaristo Kelley MD     Attending:Anderson [address] [place] [address] Day: 6    Assessment and Recommendations   Ms. Ivet Castillo is a 69year old female with PMHx of arthritis, CHF, COPD, hyperlipidemia, htn, metastatic lung adenocarcinoma who presented to the ED with worsening fatigue loss of appetite and SOB . In the ED she became unresponsive and her ABG looked bad so he was intubated . CT showed moderate left-sided pleural effusion, slightly increased from the prior study. Increased left lower lobe airspace density consistent with compressive atelectasis or infiltrate. She was started on antibiotics, nebs and steroids. She was extubated and slowly improved     Of note patient was admitted on 11/10/2023 - 2023 where she was admitted for COPD exacerbation. She was extubated and discharged home     Acute hypoxic and hypercapnic resp failure 2/2 pneumonia , pleural effusion and COPD exacerbation  Recent admission 11/10- for resp failure 2/2 COPD exacerbation and was intubated for 39 hors   CHF, does not look in exacerbation   Metastatic lung cancer   -S/P extubation   -Improving but not ready to go home  -Switch cefepime to Levaquin d5   -Cont prednisone and pulm support  -Bcx showed staph epi. contaminant. Repeat Bcx is negative. Vanc stopped   -PT/OT  -DNR CCA    Hypophosphatemia -  replace as needed. Chronic anemia, stable   SHANNA: usually on CPAP    HTN: restart toprol. Hold amlodipine and HCTZ for now   HLD: home atorvastatin 10mg QD      Personally reviewed Lab Studies and Imaging         Subjective:     Doing better. Had issues with bipap mask over night.  Not ready to go home       Review of Systems:      Pertinent positives and negatives discussed in HPI    Objective:

## 2023-12-10 NOTE — PROGRESS NOTES
Patient AM labs returned with a critical on K and Ca. Providers notified. Refill request from pharmacy for the medication listed below. Patient was last seen 3/9/23 for a Chronic Disease Follow-Up. , with a follow up due in 6 months    Next appt 8/24/23. Last written as     Disp Refills Start     hydroCHLOROthiazide (HYDRODIURIL) 25 MG tablet 90 tablet 0 12/27/2022     Sig - Route: Take 1 tablet by mouth daily. Protocol PASSED       Refill sent to pharmacy    Pharmacy: [facility]  Call when complete?   no

## 2023-12-10 NOTE — PROGRESS NOTES
Assess: The patient's daughter reports that her mom does not appear to be as sharp as she was yesterday. Respiratory status is unchanged. She is requiring 2 L of supplemental O2 to keep her SpO2 >90%. Respiratory rate 20-25 breaths/min. VS: mild tachycardia. Normotensive. Afebrile. Pain: no complaints of pain. Activity: Up to the chair. Walked in the hallways with PT/OT today. Hygiene: chris-care/ Oral care. Discussion with Doctor: Discussed order a VBG after discussion with the patient's daughter this evening. Waiting on response.

## 2023-12-10 NOTE — PROGRESS NOTES
CC: COPD exacerbation    Continues to feel much better. No complaints of dyspnea today. She has been ambulatory. Evaluation from OT/PT indicates she is qualified for discharge to home. No cough. We discussed BiPAP. She used the hospital machine, which felt different than her home machine, and the hospital supplied mask which she felt was more comfortable. Afebrile  WBC 6.1  Prednisone  /68. NSR. Heart sounds diminished. Breath sounds mildly decreased. No adventitious sounds. O2 saturation %, on O2 at 2 L/min. No peripheral edema. Creatinine stable 0.5. Bicarbonate 36, stable, other electrolytes normal.    A&P: COPD exacerbation with hypercapnic respiratory failure, significantly improving. She is at her baseline level of support with oxygen at 2 L/min. She has completed antibiotic and prednisone therapy. Her family will contact her DME supplier regarding getting a more comfortable mask to use with BiPAP, and check on the machine settings recommended by the sleep center.   Anticipate discharge home in next 24 hours

## 2023-12-11 VITALS
TEMPERATURE: 97.1 F | BODY MASS INDEX: 17.53 KG/M2 | SYSTOLIC BLOOD PRESSURE: 125 MMHG | WEIGHT: 109.1 LBS | DIASTOLIC BLOOD PRESSURE: 76 MMHG | HEART RATE: 99 BPM | RESPIRATION RATE: 23 BRPM | OXYGEN SATURATION: 91 % | HEIGHT: 66 IN

## 2023-12-11 LAB
ALBUMIN SERPL-MCNC: 2.9 G/DL (ref 3.4–5)
ANION GAP SERPL CALCULATED.3IONS-SCNC: 3 MMOL/L (ref 3–16)
BACTERIA BLD CULT: ABNORMAL
BASOPHILS # BLD: 0 K/UL (ref 0–0.2)
BASOPHILS NFR BLD: 0.5 %
BUN SERPL-MCNC: 12 MG/DL (ref 7–20)
CALCIUM SERPL-MCNC: 8.5 MG/DL (ref 8.3–10.6)
CHLORIDE SERPL-SCNC: 103 MMOL/L (ref 99–110)
CO2 SERPL-SCNC: 35 MMOL/L (ref 21–32)
CREAT SERPL-MCNC: <0.5 MG/DL (ref 0.6–1.2)
DEPRECATED RDW RBC AUTO: 16.9 % (ref 12.4–15.4)
EOSINOPHIL # BLD: 0.1 K/UL (ref 0–0.6)
EOSINOPHIL NFR BLD: 2.8 %
GFR SERPLBLD CREATININE-BSD FMLA CKD-EPI: >60 ML/MIN/{1.73_M2}
GLUCOSE SERPL-MCNC: 82 MG/DL (ref 70–99)
HCT VFR BLD AUTO: 30.8 % (ref 36–48)
HGB BLD-MCNC: 9.5 G/DL (ref 12–16)
LYMPHOCYTES # BLD: 0.9 K/UL (ref 1–5.1)
LYMPHOCYTES NFR BLD: 17.6 %
MCH RBC QN AUTO: 23.8 PG (ref 26–34)
MCHC RBC AUTO-ENTMCNC: 30.8 G/DL (ref 31–36)
MCV RBC AUTO: 77.2 FL (ref 80–100)
MONOCYTES # BLD: 0.6 K/UL (ref 0–1.3)
MONOCYTES NFR BLD: 10.7 %
NEUTROPHILS # BLD: 3.6 K/UL (ref 1.7–7.7)
NEUTROPHILS NFR BLD: 68.4 %
ORGANISM: ABNORMAL
PHOSPHATE SERPL-MCNC: 1.7 MG/DL (ref 2.5–4.9)
PLATELET # BLD AUTO: 165 K/UL (ref 135–450)
PMV BLD AUTO: 8.3 FL (ref 5–10.5)
POTASSIUM SERPL-SCNC: 4.1 MMOL/L (ref 3.5–5.1)
RBC # BLD AUTO: 3.98 M/UL (ref 4–5.2)
SODIUM SERPL-SCNC: 141 MMOL/L (ref 136–145)
TSH SERPL DL<=0.005 MIU/L-ACNC: 1.11 UIU/ML (ref 0.27–4.2)
WBC # BLD AUTO: 5.2 K/UL (ref 4–11)

## 2023-12-11 PROCEDURE — 36591 DRAW BLOOD OFF VENOUS DEVICE: CPT

## 2023-12-11 PROCEDURE — 6360000002 HC RX W HCPCS

## 2023-12-11 PROCEDURE — 2580000003 HC RX 258

## 2023-12-11 PROCEDURE — 94761 N-INVAS EAR/PLS OXIMETRY MLT: CPT

## 2023-12-11 PROCEDURE — 84443 ASSAY THYROID STIM HORMONE: CPT

## 2023-12-11 PROCEDURE — 99233 SBSQ HOSP IP/OBS HIGH 50: CPT | Performed by: INTERNAL MEDICINE

## 2023-12-11 PROCEDURE — 36415 COLL VENOUS BLD VENIPUNCTURE: CPT

## 2023-12-11 PROCEDURE — 6370000000 HC RX 637 (ALT 250 FOR IP)

## 2023-12-11 PROCEDURE — 94640 AIRWAY INHALATION TREATMENT: CPT

## 2023-12-11 PROCEDURE — 80069 RENAL FUNCTION PANEL: CPT

## 2023-12-11 PROCEDURE — 94660 CPAP INITIATION&MGMT: CPT

## 2023-12-11 PROCEDURE — 2580000003 HC RX 258: Performed by: HOSPITALIST

## 2023-12-11 PROCEDURE — 2700000000 HC OXYGEN THERAPY PER DAY

## 2023-12-11 PROCEDURE — 6370000000 HC RX 637 (ALT 250 FOR IP): Performed by: HOSPITALIST

## 2023-12-11 PROCEDURE — 2500000003 HC RX 250 WO HCPCS: Performed by: HOSPITALIST

## 2023-12-11 PROCEDURE — 85025 COMPLETE CBC W/AUTO DIFF WBC: CPT

## 2023-12-11 PROCEDURE — 2060000000 HC ICU INTERMEDIATE R&B

## 2023-12-11 RX ADMIN — ALBUTEROL SULFATE 2.5 MG: 2.5 SOLUTION RESPIRATORY (INHALATION) at 17:05

## 2023-12-11 RX ADMIN — METOPROLOL SUCCINATE 25 MG: 25 TABLET, EXTENDED RELEASE ORAL at 08:28

## 2023-12-11 RX ADMIN — PANTOPRAZOLE SODIUM 40 MG: 40 TABLET, DELAYED RELEASE ORAL at 06:22

## 2023-12-11 RX ADMIN — DIBASIC SODIUM PHOSPHATE, MONOBASIC POTASSIUM PHOSPHATE AND MONOBASIC SODIUM PHOSPHATE 1 TABLET: 852; 155; 130 TABLET ORAL at 09:27

## 2023-12-11 RX ADMIN — IPRATROPIUM BROMIDE AND ALBUTEROL SULFATE 1 DOSE: 2.5; .5 SOLUTION RESPIRATORY (INHALATION) at 07:55

## 2023-12-11 RX ADMIN — IPRATROPIUM BROMIDE AND ALBUTEROL SULFATE 1 DOSE: 2.5; .5 SOLUTION RESPIRATORY (INHALATION) at 11:27

## 2023-12-11 RX ADMIN — BUDESONIDE 250 MCG: 0.25 INHALANT RESPIRATORY (INHALATION) at 21:11

## 2023-12-11 RX ADMIN — ARFORMOTEROL TARTRATE 15 MCG: 15 SOLUTION RESPIRATORY (INHALATION) at 07:55

## 2023-12-11 RX ADMIN — ENOXAPARIN SODIUM 30 MG: 100 INJECTION SUBCUTANEOUS at 08:27

## 2023-12-11 RX ADMIN — ATORVASTATIN CALCIUM 10 MG: 10 TABLET, FILM COATED ORAL at 08:28

## 2023-12-11 RX ADMIN — ARFORMOTEROL TARTRATE 15 MCG: 15 SOLUTION RESPIRATORY (INHALATION) at 21:11

## 2023-12-11 RX ADMIN — SODIUM CHLORIDE, PRESERVATIVE FREE 10 ML: 5 INJECTION INTRAVENOUS at 08:28

## 2023-12-11 RX ADMIN — BUDESONIDE 250 MCG: 0.25 INHALANT RESPIRATORY (INHALATION) at 07:55

## 2023-12-11 RX ADMIN — SODIUM PHOSPHATE, MONOBASIC, MONOHYDRATE AND SODIUM PHOSPHATE, DIBASIC, ANHYDROUS 30 MMOL: 142; 276 INJECTION, SOLUTION INTRAVENOUS at 09:27

## 2023-12-11 RX ADMIN — IPRATROPIUM BROMIDE AND ALBUTEROL SULFATE 1 DOSE: 2.5; .5 SOLUTION RESPIRATORY (INHALATION) at 21:10

## 2023-12-11 RX ADMIN — SODIUM CHLORIDE, PRESERVATIVE FREE 10 ML: 5 INJECTION INTRAVENOUS at 20:15

## 2023-12-11 RX ADMIN — DIBASIC SODIUM PHOSPHATE, MONOBASIC POTASSIUM PHOSPHATE AND MONOBASIC SODIUM PHOSPHATE 1 TABLET: 852; 155; 130 TABLET ORAL at 20:15

## 2023-12-11 RX ADMIN — PREDNISONE 40 MG: 20 TABLET ORAL at 08:27

## 2023-12-11 RX ADMIN — LEVOFLOXACIN 750 MG: 750 TABLET, FILM COATED ORAL at 08:27

## 2023-12-11 ASSESSMENT — ENCOUNTER SYMPTOMS
SORE THROAT: 0
COUGH: 0
SINUS PRESSURE: 0
COLOR CHANGE: 0
EYE REDNESS: 0
WHEEZING: 0
RHINORRHEA: 0
NAUSEA: 0
EYE PAIN: 0
CHEST TIGHTNESS: 0
ABDOMINAL PAIN: 0
BACK PAIN: 0
DIARRHEA: 0
SHORTNESS OF BREATH: 0
ABDOMINAL DISTENTION: 0
VOMITING: 0
EYE ITCHING: 0

## 2023-12-11 NOTE — PLAN OF CARE
Problem: Discharge Planning  Goal: Discharge to home or other facility with appropriate resources  Outcome: Progressing     Problem: Safety - Adult  Goal: Free from fall injury  Outcome: Progressing     Problem: Pain  Goal: Verbalizes/displays adequate comfort level or baseline comfort level  Outcome: Progressing     Problem: Skin/Tissue Integrity  Goal: Absence of new skin breakdown  Description: 1. Monitor for areas of redness and/or skin breakdown  2. Assess vascular access sites hourly  3. Every 4-6 hours minimum:  Change oxygen saturation probe site  4. Every 4-6 hours:  If on nasal continuous positive airway pressure, respiratory therapy assess nares and determine need for appliance change or resting period.   Outcome: Progressing     Problem: Chronic Conditions and Co-morbidities  Goal: Patient's chronic conditions and co-morbidity symptoms are monitored and maintained or improved  Outcome: Progressing     Problem: Nutrition Deficit:  Goal: Optimize nutritional status  Outcome: Progressing     Problem: ABCDS Injury Assessment  Goal: Absence of physical injury  Outcome: Progressing

## 2023-12-11 NOTE — PROGRESS NOTES
CC: COPD exacerbation    Complains of feeling out of sorts, more tired today, but not short of breath. Not feeling congested, not coughing. Used BiPAP for only 4 hours last night, and gives her usual excuse for not wearing it longer \"I was tired of\"  Afebrile  WBC 5.2  Day #6 of antibiotic therapy, now on levofloxacin  Has completed 6 days of systemic steroids  /80, NSR. Heart sounds distant  O2 saturation 99%, O2 at 2 L/min  Breath sounds mildly decreased. No adventitious breath sounds  No peripheral edema. Creatinine 0.5. Bicarbonate 35, unchanged    A&P: COPD exacerbation with respiratory failure has significantly improved, in fairly short order with systemic steroids and bronchodilators. Not clear that there was an active bacterial infection. She is at her baseline level of oxygenation. From a functional standpoint I suspect she is close to that. Her overall muscle strength is likely holding her back more than respiratory dysfunction. She would probably be better off if she would use BiPAP through the night, get more sleep, and control hypercapnia.

## 2023-12-11 NOTE — PROGRESS NOTES
Physician Progress Note      Keyur Pastor  CSN #:                  [id number]  :                       1945  ADMIT DATE:       2023 7:21 PM  [facility] DATE:  RESPONDING  PROVIDER #:        Gloria Zavala MD          QUERY TEXT:    Pt admitted with pneumonia and has malnutrition documented  by Registered   Dietician. Please further specify type of malnutrition with documentation in   the medical record. The medical record reflects the following:  Risk Factors: 70 yo w/ ILD, cancer, was intubated. Clinical Indicators: Per RD : Severe malnutrition. Weight Loss:  Greater   than 20% over 1 year. Severe body fat loss Orbital, Triceps, Buccal region. Severe muscle mass loss Clavicles, Temples. Treatment: RD monitoring, regular diet, ONS BID. ASPEN Criteria:    https://aspenjournals. onlinelibrary. carias. com/doi/full/10.1177/768104157957686  5  Options provided:  -- Severe Malnutrition with BMI 17  -- Other - I will add my own diagnosis  -- Disagree - Not applicable / Not valid  -- Disagree - Clinically unable to determine / Unknown  -- Refer to Clinical Documentation Reviewer    PROVIDER RESPONSE TEXT:    This patient has severe malnutrition with BMI 17.    Query created by: Cecilia Villa on 2023 6:43 AM      Electronically signed by:  Gloria Zavala MD 2023 11:32 AM

## 2023-12-11 NOTE — PLAN OF CARE
Problem: Discharge Planning  Goal: Discharge to home or other facility with appropriate resources  Outcome: Progressing  Flowsheets (Taken 12/10/2023 2000)  Discharge to home or other facility with appropriate resources:   Identify barriers to discharge with patient and caregiver   Arrange for needed discharge resources and transportation as appropriate   Identify discharge learning needs (meds, wound care, etc)   Refer to discharge planning if patient needs post-hospital services based on physician order or complex needs related to functional status, cognitive ability or social support system     Problem: Safety - Adult  Goal: Free from fall injury  Outcome: Progressing  Flowsheets (Taken 12/10/2023 2000)  Free From Fall Injury: Instruct family/caregiver on patient safety     Problem: Pain  Goal: Verbalizes/displays adequate comfort level or baseline comfort level  Outcome: Progressing     Problem: Skin/Tissue Integrity  Goal: Absence of new skin breakdown  Description: 1. Monitor for areas of redness and/or skin breakdown  2. Assess vascular access sites hourly  3. Every 4-6 hours minimum:  Change oxygen saturation probe site  4. Every 4-6 hours:  If on nasal continuous positive airway pressure, respiratory therapy assess nares and determine need for appliance change or resting period.   Outcome: Progressing     Problem: Chronic Conditions and Co-morbidities  Goal: Patient's chronic conditions and co-morbidity symptoms are monitored and maintained or improved  Outcome: Progressing  Flowsheets (Taken 12/10/2023 2000)  Care Plan - Patient's Chronic Conditions and Co-Morbidity Symptoms are Monitored and Maintained or Improved:   Monitor and assess patient's chronic conditions and comorbid symptoms for stability, deterioration, or improvement   Collaborate with multidisciplinary team to address chronic and comorbid conditions and prevent exacerbation or deterioration   Update acute care plan with appropriate goals if chronic or comorbid symptoms are exacerbated and prevent overall improvement and discharge     Problem: Nutrition Deficit:  Goal: Optimize nutritional status  Outcome: Progressing     Problem: ABCDS Injury Assessment  Goal: Absence of physical injury  Outcome: Progressing  Flowsheets (Taken 12/10/2023 2000)  Absence of Physical Injury: Implement safety measures based on patient assessment

## 2023-12-11 NOTE — PROGRESS NOTES
12/08/2023 08:51 AM     No Growth to date. Any change in status will be called. Lab Results   Component Value Date/Time    BLOODCULT2  12/08/2023 08:52 AM     No Growth to date. Any change in status will be called.      Organism:   Lab Results   Component Value Date/Time    ORG Staphylococcus epidermidis DNA Detected 12/06/2023 01:15 AM    ORG Staphylococcus epidermidis 12/06/2023 01:15 AM         Electronically signed by Charlie Gamble MD on 12/11/2023 at 10:00 AM

## 2023-12-12 LAB
ALBUMIN SERPL-MCNC: 3.3 G/DL (ref 3.4–5)
ANION GAP SERPL CALCULATED.3IONS-SCNC: 4 MMOL/L (ref 3–16)
BACTERIA BLD CULT ORG #2: NORMAL
BACTERIA BLD CULT: NORMAL
BASOPHILS # BLD: 0 K/UL (ref 0–0.2)
BASOPHILS NFR BLD: 0.2 %
BUN SERPL-MCNC: 7 MG/DL (ref 7–20)
CALCIUM SERPL-MCNC: 8.3 MG/DL (ref 8.3–10.6)
CHLORIDE SERPL-SCNC: 99 MMOL/L (ref 99–110)
CO2 SERPL-SCNC: 38 MMOL/L (ref 21–32)
CREAT SERPL-MCNC: <0.5 MG/DL (ref 0.6–1.2)
DEPRECATED RDW RBC AUTO: 16.3 % (ref 12.4–15.4)
EOSINOPHIL # BLD: 0 K/UL (ref 0–0.6)
EOSINOPHIL NFR BLD: 1 %
GFR SERPLBLD CREATININE-BSD FMLA CKD-EPI: >60 ML/MIN/{1.73_M2}
GLUCOSE SERPL-MCNC: 91 MG/DL (ref 70–99)
HCT VFR BLD AUTO: 32.9 % (ref 36–48)
HGB BLD-MCNC: 10.3 G/DL (ref 12–16)
LYMPHOCYTES # BLD: 0.5 K/UL (ref 1–5.1)
LYMPHOCYTES NFR BLD: 10.5 %
MCH RBC QN AUTO: 24.1 PG (ref 26–34)
MCHC RBC AUTO-ENTMCNC: 31.3 G/DL (ref 31–36)
MCV RBC AUTO: 76.8 FL (ref 80–100)
MONOCYTES # BLD: 0.8 K/UL (ref 0–1.3)
MONOCYTES NFR BLD: 16 %
NEUTROPHILS # BLD: 3.7 K/UL (ref 1.7–7.7)
NEUTROPHILS NFR BLD: 72.3 %
PHOSPHATE SERPL-MCNC: 2.9 MG/DL (ref 2.5–4.9)
PLATELET # BLD AUTO: 213 K/UL (ref 135–450)
PMV BLD AUTO: 8.6 FL (ref 5–10.5)
POTASSIUM SERPL-SCNC: 3.7 MMOL/L (ref 3.5–5.1)
RBC # BLD AUTO: 4.29 M/UL (ref 4–5.2)
SODIUM SERPL-SCNC: 141 MMOL/L (ref 136–145)
WBC # BLD AUTO: 5.1 K/UL (ref 4–11)

## 2023-12-12 PROCEDURE — 2060000000 HC ICU INTERMEDIATE R&B

## 2023-12-12 PROCEDURE — 6370000000 HC RX 637 (ALT 250 FOR IP)

## 2023-12-12 PROCEDURE — 85025 COMPLETE CBC W/AUTO DIFF WBC: CPT

## 2023-12-12 PROCEDURE — 6370000000 HC RX 637 (ALT 250 FOR IP): Performed by: HOSPITALIST

## 2023-12-12 PROCEDURE — 2700000000 HC OXYGEN THERAPY PER DAY

## 2023-12-12 PROCEDURE — 80069 RENAL FUNCTION PANEL: CPT

## 2023-12-12 PROCEDURE — 6360000002 HC RX W HCPCS

## 2023-12-12 PROCEDURE — 94660 CPAP INITIATION&MGMT: CPT

## 2023-12-12 PROCEDURE — 94640 AIRWAY INHALATION TREATMENT: CPT

## 2023-12-12 PROCEDURE — 94761 N-INVAS EAR/PLS OXIMETRY MLT: CPT

## 2023-12-12 PROCEDURE — 94680 O2 UPTK RST&XERS DIR SIMPLE: CPT

## 2023-12-12 PROCEDURE — 36591 DRAW BLOOD OFF VENOUS DEVICE: CPT

## 2023-12-12 PROCEDURE — 2580000003 HC RX 258

## 2023-12-12 RX ORDER — IPRATROPIUM BROMIDE AND ALBUTEROL SULFATE 2.5; .5 MG/3ML; MG/3ML
1 SOLUTION RESPIRATORY (INHALATION)
Status: DISCONTINUED | OUTPATIENT
Start: 2023-12-12 | End: 2023-12-12

## 2023-12-12 RX ORDER — IPRATROPIUM BROMIDE AND ALBUTEROL SULFATE 2.5; .5 MG/3ML; MG/3ML
1 SOLUTION RESPIRATORY (INHALATION) EVERY 4 HOURS PRN
Status: DISCONTINUED | OUTPATIENT
Start: 2023-12-12 | End: 2023-12-18

## 2023-12-12 RX ADMIN — ARFORMOTEROL TARTRATE 15 MCG: 15 SOLUTION RESPIRATORY (INHALATION) at 09:19

## 2023-12-12 RX ADMIN — IPRATROPIUM BROMIDE AND ALBUTEROL SULFATE 1 DOSE: 2.5; .5 SOLUTION RESPIRATORY (INHALATION) at 09:22

## 2023-12-12 RX ADMIN — METOPROLOL SUCCINATE 25 MG: 25 TABLET, EXTENDED RELEASE ORAL at 09:06

## 2023-12-12 RX ADMIN — SODIUM CHLORIDE, PRESERVATIVE FREE 10 ML: 5 INJECTION INTRAVENOUS at 09:06

## 2023-12-12 RX ADMIN — ENOXAPARIN SODIUM 30 MG: 100 INJECTION SUBCUTANEOUS at 09:05

## 2023-12-12 RX ADMIN — LEVOFLOXACIN 750 MG: 750 TABLET, FILM COATED ORAL at 09:05

## 2023-12-12 RX ADMIN — SODIUM CHLORIDE, PRESERVATIVE FREE 10 ML: 5 INJECTION INTRAVENOUS at 19:34

## 2023-12-12 RX ADMIN — IPRATROPIUM BROMIDE AND ALBUTEROL SULFATE 1 DOSE: 2.5; .5 SOLUTION RESPIRATORY (INHALATION) at 12:25

## 2023-12-12 RX ADMIN — DIBASIC SODIUM PHOSPHATE, MONOBASIC POTASSIUM PHOSPHATE AND MONOBASIC SODIUM PHOSPHATE 1 TABLET: 852; 155; 130 TABLET ORAL at 09:06

## 2023-12-12 RX ADMIN — ARFORMOTEROL TARTRATE 15 MCG: 15 SOLUTION RESPIRATORY (INHALATION) at 20:22

## 2023-12-12 RX ADMIN — ATORVASTATIN CALCIUM 10 MG: 10 TABLET, FILM COATED ORAL at 09:06

## 2023-12-12 RX ADMIN — DIBASIC SODIUM PHOSPHATE, MONOBASIC POTASSIUM PHOSPHATE AND MONOBASIC SODIUM PHOSPHATE 1 TABLET: 852; 155; 130 TABLET ORAL at 19:34

## 2023-12-12 RX ADMIN — PANTOPRAZOLE SODIUM 40 MG: 40 TABLET, DELAYED RELEASE ORAL at 08:30

## 2023-12-12 RX ADMIN — BUDESONIDE 250 MCG: 0.25 INHALANT RESPIRATORY (INHALATION) at 20:22

## 2023-12-12 RX ADMIN — BUDESONIDE 250 MCG: 0.25 INHALANT RESPIRATORY (INHALATION) at 09:20

## 2023-12-12 NOTE — PROGRESS NOTES
pleural changes in the right base, with a small right effusion or pleural thickening. Electronically signed by Manolo Shrestha MD      CBC:   Recent Labs     12/10/23  0403 12/11/23  0410 12/12/23  0400   WBC 6.1 5.2 5.1   HGB 9.2* 9.5* 10.3*    165 213       BMP:    Recent Labs     12/10/23  0631 12/11/23  0410 12/12/23  0400    141 141   K 3.8 4.1 3.7    103 99   CO2 36* 35* 38*   BUN 13 12 7   CREATININE <0.5* <0.5* <0.5*   GLUCOSE 88 82 91       Hepatic:   No results for input(s): \"AST\", \"ALT\", \"ALB\", \"BILITOT\", \"ALKPHOS\" in the last 72 hours. Lipids:   Lab Results   Component Value Date/Time    CHOL 152 10/19/2022 11:56 AM    HDL 65 10/19/2022 11:56 AM    TRIG 60 10/19/2022 11:56 AM     Hemoglobin A1C:   Lab Results   Component Value Date/Time    LABA1C 5.6 04/26/2018 06:33 AM     TSH:   Lab Results   Component Value Date/Time    TSH 0.236 06/29/2017 10:51 AM     Troponin: No results found for: \"TROPONINT\"  Lactic Acid:   No results for input(s): \"LACTA\" in the last 72 hours. BNP: No results for input(s): \"PROBNP\" in the last 72 hours. UA:  Lab Results   Component Value Date/Time    NITRU Negative 12/05/2023 09:12 PM    COLORU Yellow 12/05/2023 09:12 PM    PHUR 6.0 12/05/2023 09:12 PM    WBCUA 10-20 12/05/2023 09:12 PM    RBCUA 0-2 12/05/2023 09:12 PM    BACTERIA 1+ 12/05/2023 09:12 PM    CLARITYU Clear 12/05/2023 09:12 PM    SPECGRAV >=1.030 12/05/2023 09:12 PM    LEUKOCYTESUR TRACE 12/05/2023 09:12 PM    UROBILINOGEN 1.0 12/05/2023 09:12 PM    BILIRUBINUR Negative 12/05/2023 09:12 PM    BLOODU Negative 12/05/2023 09:12 PM    GLUCOSEU Negative 12/05/2023 09:12 PM    KETUA Negative 12/05/2023 09:12 PM     Urine Cultures:   Lab Results   Component Value Date/Time    LABURIN  12/05/2023 09:45 PM     <10,000 CFU/ml mixed skin/urogenital alexander. No further workup     Blood Cultures:   Lab Results   Component Value Date/Time    BC No Growth after 4 days of incubation.  12/08/2023 08:51 AM

## 2023-12-12 NOTE — CARE COORDINATION
4:32 PM    Pt is from home alone and has [address] and [employer] for home o2. Pt has a RW, cane and shower chair. Pt is planning to dc home- pending PT/OT evals. Pt will need new home O2 orders when ready for dc. SW following.   Electronically signed by JYOTHI Taylor, WERNER on 12/12/2023 at 4:33 PM  [phone]

## 2023-12-12 NOTE — PROGRESS NOTES
4 Eyes Skin Assessment     NAME:  Yamila Merida OF BIRTH:  1945  MEDICAL RECORD NUMBER:  [id number]    The patient is being assessed for  Transfer to New Unit    I agree that at least one RN has performed a thorough Head to Toe Skin Assessment on the patient. ALL assessment sites listed below have been assessed. Areas assessed by both nurses:    Head, Face, Ears, Shoulders, Back, Chest, Arms, Elbows, Hands, Sacrum. Buttock, Coccyx, Ischium, Legs. Feet and Heels, and Under Medical Devices         Does the Patient have a Wound? Yes wound(s) were present on assessment.  LDA wound assessment was Initiated and completed by RN       Selwyn Prevention initiated by RN: Yes  Wound Care Orders initiated by RN: No    Pressure Injury (Stage 3,4, Unstageable, DTI, NWPT, and Complex wounds) if present, place Wound referral order by RN under : No    New Ostomies, if present place, Ostomy referral order under : No     Nurse 1 eSignature: Electronically signed by Augusto Vallejo RN on 12/12/23 at 2:44 AM EST    **SHARE this note so that the co-signing nurse can place an eSignature**    Nurse 2 eSignature: {Esignature:[id number]}

## 2023-12-12 NOTE — PLAN OF CARE
Problem: Safety - Adult  Goal: Free from fall injury  12/12/2023 1029 by Marie Klein RN  Outcome: Progressing  Flowsheets (Taken 12/12/2023 0841)  Free From Fall Injury: Instruct family/caregiver on patient safety  Pt meets criteria for orthostasis.  Pt is a High fall risk. See Marie Fall Score and ABCDS Injury Risk assessments.   + Screening for Orthostasis and/or + High Fall Risk per MARIE/ABCDS: Explained fall risk precautions to pt and family and rationale behind their use to keep the patient safe. Pt bed is in low position, side rails up, call light and belongings are in reach. Fall wristband applied and present on pts wrist.  Bed alarm on.  Pt encouraged to call for assistance. Will continue with hourly rounds for PO intake, pain needs, toileting and repositioning as needed.     Problem: Pain  Goal: Verbalizes/displays adequate comfort level or baseline comfort level  12/12/2023 1029 by Marie Klein RN  Outcome: Progressing  Flowsheets (Taken 12/12/2023 0841)  Verbalizes/displays adequate comfort level or baseline comfort level:   Encourage patient to monitor pain and request assistance   Assess pain using appropriate pain scale   Administer analgesics based on type and severity of pain and evaluate response   Implement non-pharmacological measures as appropriate and evaluate response  Note: No complaint of pain during the shift.     Problem: ABCDS Injury Assessment  Goal: Absence of physical injury  12/12/2023 1029 by Marie Klein RN  Outcome: Progressing  Flowsheets (Taken 12/12/2023 0841)  Absence of Physical Injury: Implement safety measures based on patient assessment  Note: No physical injury during the shift.     Problem: Respiratory - Adult  Goal: Achieves optimal ventilation and oxygenation  Recent Flowsheet Documentation  Taken 12/12/2023 0841 by Marie Klein RN  Achieves optimal ventilation and oxygenation:   Assess for changes in respiratory status   Assess for changes in  mentation and behavior   Position to facilitate oxygenation and minimize respiratory effort   Oxygen supplementation based on oxygen saturation or arterial blood gases   Encourage broncho-pulmonary hygiene including cough, deep breathe, incentive spirometry   Assess the need for suctioning and aspirate as needed   Assess and instruct to report shortness of breath or any respiratory difficulty   Respiratory therapy support as indicated  Note: Remains on 3L of oxygen via nasal cannula, saturating >92%. Seen by respiratory therapist few times today for her breathing treatment. Problem: Gastrointestinal - Adult  Goal: Minimal or absence of nausea and vomiting  Recent Flowsheet Documentation  Taken 12/12/2023 0841 by Romelia Nugent RN  Minimal or absence of nausea and vomiting:   Administer ordered antiemetic medications as needed   Provide nonpharmacologic comfort measures as appropriate  Note: No nausea or vomiting during the shift. Problem: Metabolic/Fluid and Electrolytes - Adult  Goal: Electrolytes maintained within normal limits  Recent Flowsheet Documentation  Taken 12/12/2023 0841 by Romelia Nugent RN  Electrolytes maintained within normal limits:   Monitor labs and assess patient for signs and symptoms of electrolyte imbalances   Administer electrolyte replacement as ordered   Monitor response to electrolyte replacements, including repeat lab results as appropriate  Note: Phosphorus tablet was given as prescribed. Other electrolytes maintained within normal limits.

## 2023-12-12 NOTE — PROGRESS NOTES
Physical Therapy/Occupational Therapy  Hold note    Pt declined therapy at this time due to just getting lunch and just worked with RT. Will f/u later pm as time allows or per POC.        Victoriano Paulino, PT   Precious Johnston, MOT, OTR/L

## 2023-12-12 NOTE — PROGRESS NOTES
Comprehensive Nutrition Assessment    RECOMMENDATIONS:  PO Diet: Continue Regular  ONS: Continue Fruit Smoothie bid  Nutrition Education: Education not indicated     NUTRITION ASSESSMENT:   Nutritional summary & status: Follow-up: Pt reports fair appetite. Stated that she is making effort to increase po intake with EMR showing 51-75% of meals consumed. Stated that she is drinking 100% of Fruit Smoothies bid. Provided pt with Always Available Food Lists for increased variety at meals. Encouraged pt to continue to increase meal intake, focusing on high protein foods to promote adequate nutrition. Pt voiced understanding. Continue with current nutrition interventions and monitor through admit.    Admission // PMH: Pneumonia//adenocarcinoma of the lung stage IV, CHF, HTN/HLD    MALNUTRITION ASSESSMENT  Context of Malnutrition: Chronic Illness   Malnutrition Status: Severe malnutrition  Findings of the 6 clinical characteristics of malnutrition (Minimum of 2 out of 6 clinical characteristics is required to make the diagnosis of moderate or severe Protein Calorie Malnutrition based on AND/ASPEN Guidelines):  Energy Intake:  Mild decrease in energy intake (Comment)  Weight Loss:  Greater than 20% over 1 year     Body Fat Loss:  Severe body fat loss Orbital, Triceps, Buccal region   Muscle Mass Loss:  Severe muscle mass loss Clavicles (pectoralis & deltoids), Temples (temporalis)  Fluid Accumulation:  No significant fluid accumulation     Strength:  Not Performed    NUTRITION DIAGNOSIS   Severe malnutrition related to catabolic illness as evidenced by Criteria as identified in malnutrition assessment    Nutrition Monitoring and Evaluation:   Food/Nutrient Intake Outcomes:  Food and Nutrient Intake, Supplement Intake  Physical Signs/Symptoms Outcomes:  Biochemical Data, Nutrition Focused Physical Findings, Weight, Hemodynamic Status     OBJECTIVE DATA: Significant to nutrition assessment  Nutrition Related Findings:

## 2023-12-12 NOTE — PROGRESS NOTES
12/12/23 1253   Resting (Room Air)   SpO2 80   HR 88   Resting (On O2)   SpO2 98   HR 95   During Walk (On O2)   SpO2 95   HR 95   Walk/Assistance Device Walker   After Walk   SpO2 90   HR 95

## 2023-12-13 ENCOUNTER — APPOINTMENT (OUTPATIENT)
Dept: GENERAL RADIOLOGY | Age: 78
DRG: 208 | End: 2023-12-13
Payer: MEDICARE

## 2023-12-13 LAB
ALBUMIN SERPL-MCNC: 3 G/DL (ref 3.4–5)
ANION GAP SERPL CALCULATED.3IONS-SCNC: 3 MMOL/L (ref 3–16)
BASOPHILS # BLD: 0 K/UL (ref 0–0.2)
BASOPHILS NFR BLD: 0.2 %
BUN SERPL-MCNC: 6 MG/DL (ref 7–20)
CALCIUM SERPL-MCNC: 8.2 MG/DL (ref 8.3–10.6)
CHLORIDE SERPL-SCNC: 97 MMOL/L (ref 99–110)
CO2 SERPL-SCNC: 41 MMOL/L (ref 21–32)
CREAT SERPL-MCNC: <0.5 MG/DL (ref 0.6–1.2)
DEPRECATED RDW RBC AUTO: 16.6 % (ref 12.4–15.4)
EOSINOPHIL # BLD: 0.2 K/UL (ref 0–0.6)
EOSINOPHIL NFR BLD: 3.7 %
GFR SERPLBLD CREATININE-BSD FMLA CKD-EPI: >60 ML/MIN/{1.73_M2}
GLUCOSE SERPL-MCNC: 69 MG/DL (ref 70–99)
HCT VFR BLD AUTO: 33.3 % (ref 36–48)
HGB BLD-MCNC: 10.3 G/DL (ref 12–16)
LYMPHOCYTES # BLD: 0.7 K/UL (ref 1–5.1)
LYMPHOCYTES NFR BLD: 13.7 %
MAGNESIUM SERPL-MCNC: 1.5 MG/DL (ref 1.8–2.4)
MCH RBC QN AUTO: 23.8 PG (ref 26–34)
MCHC RBC AUTO-ENTMCNC: 31 G/DL (ref 31–36)
MCV RBC AUTO: 76.8 FL (ref 80–100)
MONOCYTES # BLD: 0.5 K/UL (ref 0–1.3)
MONOCYTES NFR BLD: 9.8 %
NEUTROPHILS # BLD: 3.7 K/UL (ref 1.7–7.7)
NEUTROPHILS NFR BLD: 72.6 %
PHOSPHATE SERPL-MCNC: 2.4 MG/DL (ref 2.5–4.9)
PLATELET # BLD AUTO: 208 K/UL (ref 135–450)
PMV BLD AUTO: 8.4 FL (ref 5–10.5)
POTASSIUM SERPL-SCNC: 3.9 MMOL/L (ref 3.5–5.1)
RBC # BLD AUTO: 4.34 M/UL (ref 4–5.2)
SODIUM SERPL-SCNC: 141 MMOL/L (ref 136–145)
WBC # BLD AUTO: 5.1 K/UL (ref 4–11)

## 2023-12-13 PROCEDURE — 83735 ASSAY OF MAGNESIUM: CPT

## 2023-12-13 PROCEDURE — 2580000003 HC RX 258

## 2023-12-13 PROCEDURE — 36591 DRAW BLOOD OFF VENOUS DEVICE: CPT

## 2023-12-13 PROCEDURE — 6370000000 HC RX 637 (ALT 250 FOR IP)

## 2023-12-13 PROCEDURE — 97110 THERAPEUTIC EXERCISES: CPT

## 2023-12-13 PROCEDURE — 94660 CPAP INITIATION&MGMT: CPT

## 2023-12-13 PROCEDURE — 6360000002 HC RX W HCPCS

## 2023-12-13 PROCEDURE — 71045 X-RAY EXAM CHEST 1 VIEW: CPT

## 2023-12-13 PROCEDURE — 80069 RENAL FUNCTION PANEL: CPT

## 2023-12-13 PROCEDURE — 6370000000 HC RX 637 (ALT 250 FOR IP): Performed by: HOSPITALIST

## 2023-12-13 PROCEDURE — 2700000000 HC OXYGEN THERAPY PER DAY

## 2023-12-13 PROCEDURE — 2060000000 HC ICU INTERMEDIATE R&B

## 2023-12-13 PROCEDURE — 6360000002 HC RX W HCPCS: Performed by: INTERNAL MEDICINE

## 2023-12-13 PROCEDURE — 94640 AIRWAY INHALATION TREATMENT: CPT

## 2023-12-13 PROCEDURE — 94761 N-INVAS EAR/PLS OXIMETRY MLT: CPT

## 2023-12-13 PROCEDURE — 85025 COMPLETE CBC W/AUTO DIFF WBC: CPT

## 2023-12-13 PROCEDURE — 97530 THERAPEUTIC ACTIVITIES: CPT

## 2023-12-13 RX ORDER — MECOBALAMIN 5000 MCG
5 TABLET,DISINTEGRATING ORAL NIGHTLY
Status: DISCONTINUED | OUTPATIENT
Start: 2023-12-13 | End: 2024-01-04 | Stop reason: HOSPADM

## 2023-12-13 RX ORDER — ENOXAPARIN SODIUM 100 MG/ML
40 INJECTION SUBCUTANEOUS DAILY
Status: DISCONTINUED | OUTPATIENT
Start: 2023-12-14 | End: 2024-01-04 | Stop reason: HOSPADM

## 2023-12-13 RX ORDER — MAGNESIUM SULFATE IN WATER 40 MG/ML
4000 INJECTION, SOLUTION INTRAVENOUS ONCE
Status: COMPLETED | OUTPATIENT
Start: 2023-12-13 | End: 2023-12-13

## 2023-12-13 RX ADMIN — PANTOPRAZOLE SODIUM 40 MG: 40 TABLET, DELAYED RELEASE ORAL at 08:22

## 2023-12-13 RX ADMIN — LEVOFLOXACIN 750 MG: 750 TABLET, FILM COATED ORAL at 08:21

## 2023-12-13 RX ADMIN — METOPROLOL SUCCINATE 25 MG: 25 TABLET, EXTENDED RELEASE ORAL at 08:21

## 2023-12-13 RX ADMIN — SODIUM CHLORIDE, PRESERVATIVE FREE 10 ML: 5 INJECTION INTRAVENOUS at 08:22

## 2023-12-13 RX ADMIN — TIOTROPIUM BROMIDE INHALATION SPRAY 2 PUFF: 3.12 SPRAY, METERED RESPIRATORY (INHALATION) at 09:31

## 2023-12-13 RX ADMIN — BUDESONIDE 250 MCG: 0.25 INHALANT RESPIRATORY (INHALATION) at 20:59

## 2023-12-13 RX ADMIN — DIBASIC SODIUM PHOSPHATE, MONOBASIC POTASSIUM PHOSPHATE AND MONOBASIC SODIUM PHOSPHATE 1 TABLET: 852; 155; 130 TABLET ORAL at 08:21

## 2023-12-13 RX ADMIN — ARFORMOTEROL TARTRATE 15 MCG: 15 SOLUTION RESPIRATORY (INHALATION) at 20:59

## 2023-12-13 RX ADMIN — ATORVASTATIN CALCIUM 10 MG: 10 TABLET, FILM COATED ORAL at 08:22

## 2023-12-13 RX ADMIN — MAGNESIUM SULFATE HEPTAHYDRATE 4000 MG: 40 INJECTION, SOLUTION INTRAVENOUS at 11:28

## 2023-12-13 RX ADMIN — ARFORMOTEROL TARTRATE 15 MCG: 15 SOLUTION RESPIRATORY (INHALATION) at 09:28

## 2023-12-13 RX ADMIN — BUDESONIDE 250 MCG: 0.25 INHALANT RESPIRATORY (INHALATION) at 09:28

## 2023-12-13 RX ADMIN — DIBASIC SODIUM PHOSPHATE, MONOBASIC POTASSIUM PHOSPHATE AND MONOBASIC SODIUM PHOSPHATE 1 TABLET: 852; 155; 130 TABLET ORAL at 21:41

## 2023-12-13 RX ADMIN — ENOXAPARIN SODIUM 30 MG: 100 INJECTION SUBCUTANEOUS at 08:21

## 2023-12-13 RX ADMIN — SODIUM CHLORIDE, PRESERVATIVE FREE 10 ML: 5 INJECTION INTRAVENOUS at 21:41

## 2023-12-13 NOTE — PLAN OF CARE
Problem: Safety - Adult  Goal: Free from fall injury  Outcome: Progressing  Note: High Fall Risk per MARIE/ABCDS: Explained fall risk precautions to pt and family and rationale behind their use to keep the patient safe. Pt bed is in low position, side rails up, call light and belongings are in reach. Fall wristband applied and present on pts wrist.  Bed alarm on. Pt encouraged to call for assistance. Will continue with hourly rounds for PO intake, pain needs, toileting and repositioning as needed. Problem: Pain  Goal: Verbalizes/displays adequate comfort level or baseline comfort level  Outcome: Progressing  Note: Pt denies pain. Problem: Skin/Tissue Integrity  Goal: Absence of new skin breakdown  Description: 1. Monitor for areas of redness and/or skin breakdown  2. Assess vascular access sites hourly  3. Every 4-6 hours minimum:  Change oxygen saturation probe site  4. Every 4-6 hours:  If on nasal continuous positive airway pressure, respiratory therapy assess nares and determine need for appliance change or resting period. Outcome: Progressing  Note: Pt has sacral heart in place. Problem: Respiratory - Adult  Goal: Achieves optimal ventilation and oxygenation  Outcome: Progressing  Note: Pt continues on 3 L/ CPAP throughout night. Problem: Gastrointestinal - Adult  Goal: Minimal or absence of nausea and vomiting  Outcome: Progressing  Note: Pt denies nausea. Problem: Hematologic - Adult  Goal: Maintains hematologic stability  Outcome: Progressing  Note: Patient's hemoglobin this AM:   Recent Labs     12/13/23  0355   HGB 10.3*     Patient's platelet count this AM:   Recent Labs     12/13/23  0355       Thrombocytopenia not present at this time. Patient showing no signs or symptoms of active bleeding. Transfusion not indicated at this time. Patient verbalizes understanding of all instructions. Will continue to assess and implement POC.  Call light within reach and hourly

## 2023-12-13 NOTE — RT PROTOCOL NOTE
RT Nebulizer Bronchodilator Protocol Note    There is a bronchodilator order in the chart from a provider indicating to follow the RT Bronchodilator Protocol and there is an “Initiate RT Bronchodilator Protocol” order as well (see protocol at bottom of note).    CXR Findings:  No results found.    The findings from the last RT Protocol Assessment were as follows:  Smoking: Chronic pulmonary disease  Respiratory Pattern: Regular pattern and RR 12-20 bpm  Breath Sounds: Slightly diminished and/or crackles  Cough: Strong, spontaneous, non-productive  Indication for Bronchodilator Therapy: Decreased or absent breath sounds  Bronchodilator Assessment Score: 4    Aerosolized bronchodilator medication orders have been revised according to the RT Nebulizer Bronchodilator Protocol below.    Respiratory Therapist to perform RT Therapy Protocol Assessment initially then follow the protocol.  Repeat RT Therapy Protocol Assessment PRN for score 0-3 or on second treatment, BID, and PRN for scores above 3.    No Indications - adjust the frequency to every 6 hours PRN wheezing or bronchospasm, if no treatments needed after 48 hours then discontinue using Per Protocol order mode.     If indication present, adjust the RT bronchodilator orders based on the Bronchodilator Assessment Score as indicated below.  If a patient is on this medication at home then do not decrease Frequency below that used at home.    0-3 - enter or revise RT bronchodilator order(s) to equivalent RT Bronchodilator order with Frequency of every 4 hours PRN for wheezing or increased work of breathing using Per Protocol order mode.       4-6 - enter or revise RT Bronchodilator order(s) to two equivalent RT bronchodilator orders with one order with BID Frequency and one order with Frequency of every 4 hours PRN wheezing or increased work of breathing using Per Protocol order mode.         7-10 - enter or revise RT Bronchodilator order(s) to two equivalent RT  bronchodilator orders with one order with TID Frequency and one order with Frequency of every 4 hours PRN wheezing or increased work of breathing using Per Protocol order mode. 11-13 - enter or revise RT Bronchodilator order(s) to one equivalent RT bronchodilator order with QID Frequency and an Albuterol order with Frequency of every 4 hours PRN wheezing or increased work of breathing using Per Protocol order mode. Greater than 13 - enter or revise RT Bronchodilator order(s) to one equivalent RT bronchodilator order with every 4 hours Frequency and an Albuterol order with Frequency of every 2 hours PRN wheezing or increased work of breathing using Per Protocol order mode. RT to enter RT Home Evaluation for COPD & MDI Assessment order using Per Protocol order mode.     Electronically signed by Dorothy Rios RCP on 12/13/2023 at 9:34 AM

## 2023-12-13 NOTE — PROGRESS NOTES
assist;Home with Home health OT  Other: shower chair- son will bring pt's shower chair to daughter's home where pt now lives      Plan   Occupational Therapy Plan  Times Per Week: 2-5  Current Treatment Recommendations: Functional mobility training;Self-Care / ADL; Balance training;Home management training; Safety education & training;Patient/Caregiver education & training; Endurance training            Subjective   Subjective  Subjective: Pt met supine in bed and agreeable to OT session. Pt reporting feeling \"gittery\" this session with RN aware. Daughter present and encouraging throughout session.   Orientation  Overall Orientation Status: Within Functional Limits  Cognition  Memory: Decreased recall of recent events;Decreased recall of biographical Information  Cognition Comment: some decreased recall of recent events, home set up- daughter clarified        Objective    Pt on 2L O2 throughout session requiring extended rest breaks and O2 dropping with activity to 87% but pt initiating PLB and returning to 90% with rest breaks     Bed Mobility Training  Bed Mobility Training: Yes  Overall Level of Assistance: Stand-by assistance  Supine to Sit: Stand-by assistance  Scooting: Stand-by assistance (Upon sitting up to EOB pt taking prolong rest break)  Balance  Sitting: Intact  Transfer Training  Transfer Training: Yes  Overall Level of Assistance: Contact-guard assistance  Interventions: Safety awareness training;Verbal cues  Sit to Stand: Contact-guard assistance (from EOB, couch and recliner chair)  Stand to Sit: Contact-guard assistance  Gait  Overall Level of Assistance: Contact-guard assistance (Pt completing short distance functional mobiltiy from EOB to couch to recliner chair)  Assistive Device: Walker, rolling;Gait belt  Interventions: Verbal cues  Base of Support: Narrowed  Speed/Ev: Slow     ADL  Feeding: Independent  Feeding Skilled Clinical Factors: taking a drink from tray table  Skin Care: [facility] wipes;Incontinent cleanser  OT Exercises  Exercise Treatment: Pt completing one rep horizonal AB/ADDuction then stating \"Its to much\"     Safety Devices  Type of Devices: Nurse notified;Chair alarm in place;Call light within reach;Left in chair;Gait belt     Patient Education  Education Given To: Patient  Education Provided: Role of Therapy;Plan of Care;Precautions;ADL Adaptive Strategies;Fall Prevention Strategies;Transfer Training  Education Provided Comments: encouragment for out of bed movement  Education Method: Verbal  Barriers to Learning: None  Education Outcome: Verbalized understanding;Demonstrated understanding    Goals  Short Term Goals  Time Frame for Short Term Goals: discharge  Short Term Goal 1: mod I toilet transfer- not met  Short Term Goal 2: mod I LB dressing- not met  Short Term Goal 3: tolerate standing 8 minutes for ADLs/mobility for ADLs- not met    AM-PAC - ADL       Therapy Time   Individual Concurrent Group Co-treatment   Time In 0957         Time Out 1039         Minutes 42         Timed Code Treatment Minutes: 42 Minutes       ANGIE Biswas/GERMÁN

## 2023-12-13 NOTE — DISCHARGE INSTR - COC
Continuity of Care Form    Patient Name: Catrachita Segal   :  1945  MRN:  [id number]    Admit date:  2023  Discharge date:  ***    Code Status Order: DNR-CCA   Advance Directives:     Admitting Physician:  Nidhi Urbina MD  PCP: Napoleon Stiles MD    Discharging Nurse: ***  Discharging Hospital Unit/Room#: 3518/3518-01  Discharging Unit Phone Number: ***    Emergency Contact:   Extended Emergency Contact Information  Primary Emergency Contact: Alexandra Mackey  Address: [address] [place] [address]  Home Phone: [phone]  Mobile Phone: [phone]  Relation: Child  Secondary Emergency Contact: Yovani Burnette  Home Phone: [phone]  Mobile Phone: [phone]  Relation: Child    Past Surgical History:  Past Surgical History:   Procedure Laterality Date    BRONCHOSCOPY  2017    brochoscopy-EBUS    OTHER SURGICAL HISTORY Left 2017    LEFT SUBCLAVIAN PORT- CATH INSERTION      TUBAL LIGATION         Immunization History:   Immunization History   Administered Date(s) Administered    COVID-19, MODERNA BLUE border, Primary or Immunocompromised, (age 12y+), IM, 100 mcg/0.5mL 2021, 2021    COVID-19, MODERNA Booster BLUE border, (age 18y+), IM, 50mcg/0.25mL 2021    Influenza, FLUAD, (age 65 y+), Adjuvanted, 0.5mL 10/14/2022    Influenza, High Dose (Fluzone 65 yrs and older) 2014, 10/26/2018, 11/15/2019    Influenza, Triv, inactivated, subunit, adjuvanted, IM (Fluad 65 yrs and older) 10/14/2020    Pneumococcal, PCV-13, PREVNAR 13, (age 6w+), IM, 0.5mL 2018    Pneumococcal, PPSV23, PNEUMOVAX 23, (age 2y+), SC/IM, 0.5mL 10/26/2020, 2020       Active Problems:  Patient Active Problem List   Diagnosis Code    Hypertension I10    Vitamin D deficiency E55.9    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      Update Admission H&P: No change in H&P    PHYSICIAN SIGNATURE:  Electronically signed by Glory Duran MD on 12/15/23 at 2:00 PM EST

## 2023-12-13 NOTE — PLAN OF CARE
Problem: Safety - Adult  Goal: Free from fall injury  12/13/2023 1054 by Ruel Prado RN  Outcome: Progressing  Flowsheets (Taken 12/13/2023 1054)  Free From Fall Injury:   Wes Chatterjee family/caregiver on patient safety   Based on caregiver fall risk screen, instruct family/caregiver to ask for assistance with transferring infant if caregiver noted to have fall risk factors  Note: Bed in lowest position, call light within reach, non skid socks on. Patient educated and verbalized understanding on when to utilize call light. Patient rounded on frequently to make sure all needs are met. Problem: Respiratory - Adult  Goal: Achieves optimal ventilation and oxygenation  12/13/2023 1054 by Ruel Prado RN  Outcome: Progressing  Flowsheets (Taken 12/13/2023 1054)  Achieves optimal ventilation and oxygenation:   Assess for changes in respiratory status   Assess for changes in mentation and behavior   Position to facilitate oxygenation and minimize respiratory effort   Oxygen supplementation based on oxygen saturation or arterial blood gases   Assess the need for suctioning and aspirate as needed  Note: Patient remains on continuous pulse oximetry to monitor oxygen levels. She remains on 2 liters nasal cannula which is her baseline at home. Patient educated on calling for assistance when she feels short of breath or has any s/s of hypoxia.

## 2023-12-13 NOTE — PROGRESS NOTES
V2.0    [facility] Progress Note      Name:  Catrachita Segal /Age/Sex: 1945  (78 y.o. female)   MRN & CSN:  [id number] & [id number] Encounter Date/Time: 2023 11:59 AM EST   Location:  3518/3518-01 PCP: Napoleon Stiles MD     Attending:Christiane Perez MD       Hospital Day: 9    Assessment and Recommendations   Ms. Catrachita Segal is a 78 year old female with PMHx of arthritis, CHF, COPD, hyperlipidemia, htn, metastatic lung adenocarcinoma who presented to the ED with worsening fatigue loss of appetite and SOB . In the ED she became unresponsive and her ABG looked bad so he was intubated . CT showed moderate left-sided pleural effusion, slightly increased from the prior study. Increased left lower lobe airspace density consistent with compressive atelectasis or infiltrate.     She was started on antibiotics, nebs and steroids. She was extubated and slowly improved     Of note patient was admitted on 11/10/2023 - 2023 where she was admitted for COPD exacerbation. She was extubated and discharged home     Acute hypoxic and hypercapnic resp failure 2/2 pneumonia, Bilateral pleural effusion and COPD exacerbation  - Recent admission 11/10- for resp failure 2/2 COPD exacerbation and was intubated for 36 hours     Metastatic lung cancer     COPD    Chronic respiratory failure sec to COPD, lung ca: POA  - On 1-3 L O2 at home.     Last imaging chest CT PE protocol 2023    No CT evidence of acute pulmonary emboli.  Small diffuse pericardial effusion.  Moderate left-sided pleural effusion, slightly increased from the prior study. Increased left lower lobe airspace density consistent with compressive atelectasis or infiltrate.  Chronic small right pleural effusion. Stable emphysematous changes with bronchiectasis, scarring and post radiation/fibrotic changes of the right lung apex and perihilar region.      -S/P extubation 23  -Bcx showed staph epi. contaminant. Repeat Bcx is negative. Vanc  perihilar region. Electronically signed by Nelson Bradley MD    XR CHEST PORTABLE    Result Date: 12/5/2023  PORTABLE AP CHEST AT 2016 HOURS:   HISTORY: Change in mental status. COMPARISON: 11/12/2023. FINDINGS: PowerPort projects over the left chest. The heart and pulmonary vasculature are within normal limits. There is ill-defined airspace density throughout the left lung, and in the right base. There is a moderate relative elevation of the right diaphragm, with a chronic appearing right-sided small effusion blunting of the lateral costophrenic angle. There is an increased left-sided pleural effusion. New left-sided effusion, and increased density in the left lung. Chronic parenchymal and pleural changes in the right base, with a small right effusion or pleural thickening. Electronically signed by Nelson Bradley MD      CBC:   Recent Labs     12/11/23 0410 12/12/23  0400 12/13/23  0355   WBC 5.2 5.1 5.1   HGB 9.5* 10.3* 10.3*    213 208       BMP:    Recent Labs     12/11/23 0410 12/12/23  0400 12/13/23  0355    141 141   K 4.1 3.7 3.9    99 97*   CO2 35* 38* 41*   BUN 12 7 6*   CREATININE <0.5* <0.5* <0.5*   GLUCOSE 82 91 69*       Hepatic:   No results for input(s): \"AST\", \"ALT\", \"ALB\", \"BILITOT\", \"ALKPHOS\" in the last 72 hours. Lipids:   Lab Results   Component Value Date/Time    CHOL 152 10/19/2022 11:56 AM    HDL 65 10/19/2022 11:56 AM    TRIG 60 10/19/2022 11:56 AM     Hemoglobin A1C:   Lab Results   Component Value Date/Time    LABA1C 5.6 04/26/2018 06:33 AM     TSH:   Lab Results   Component Value Date/Time    TSH 0.236 06/29/2017 10:51 AM     Troponin: No results found for: \"TROPONINT\"  Lactic Acid:   No results for input(s): \"LACTA\" in the last 72 hours. BNP: No results for input(s): \"PROBNP\" in the last 72 hours.   UA:  Lab Results   Component Value Date/Time    NITRU Negative 12/05/2023 09:12 PM    COLORU Yellow 12/05/2023 09:12 PM    PHUR 6.0 12/05/2023 09:12 PM

## 2023-12-14 LAB
ALBUMIN SERPL-MCNC: 3 G/DL (ref 3.4–5)
ANION GAP SERPL CALCULATED.3IONS-SCNC: 0 MMOL/L (ref 3–16)
BASOPHILS # BLD: 0 K/UL (ref 0–0.2)
BASOPHILS NFR BLD: 0.3 %
BUN SERPL-MCNC: 7 MG/DL (ref 7–20)
CALCIUM SERPL-MCNC: 8.3 MG/DL (ref 8.3–10.6)
CHLORIDE SERPL-SCNC: 94 MMOL/L (ref 99–110)
CO2 SERPL-SCNC: 42 MMOL/L (ref 21–32)
CREAT SERPL-MCNC: <0.5 MG/DL (ref 0.6–1.2)
DEPRECATED RDW RBC AUTO: 16.1 % (ref 12.4–15.4)
EOSINOPHIL # BLD: 0.2 K/UL (ref 0–0.6)
EOSINOPHIL NFR BLD: 4.4 %
GFR SERPLBLD CREATININE-BSD FMLA CKD-EPI: >60 ML/MIN/{1.73_M2}
GLUCOSE SERPL-MCNC: 96 MG/DL (ref 70–99)
HCT VFR BLD AUTO: 30.5 % (ref 36–48)
HGB BLD-MCNC: 9.7 G/DL (ref 12–16)
LYMPHOCYTES # BLD: 0.7 K/UL (ref 1–5.1)
LYMPHOCYTES NFR BLD: 12.3 %
MAGNESIUM SERPL-MCNC: 2.1 MG/DL (ref 1.8–2.4)
MCH RBC QN AUTO: 24.3 PG (ref 26–34)
MCHC RBC AUTO-ENTMCNC: 31.9 G/DL (ref 31–36)
MCV RBC AUTO: 76.3 FL (ref 80–100)
MONOCYTES # BLD: 0.8 K/UL (ref 0–1.3)
MONOCYTES NFR BLD: 14.2 %
NEUTROPHILS # BLD: 3.8 K/UL (ref 1.7–7.7)
NEUTROPHILS NFR BLD: 68.8 %
PHOSPHATE SERPL-MCNC: 2.8 MG/DL (ref 2.5–4.9)
PLATELET # BLD AUTO: 191 K/UL (ref 135–450)
PMV BLD AUTO: 8.2 FL (ref 5–10.5)
POTASSIUM SERPL-SCNC: 3.7 MMOL/L (ref 3.5–5.1)
RBC # BLD AUTO: 4 M/UL (ref 4–5.2)
SODIUM SERPL-SCNC: 136 MMOL/L (ref 136–145)
WBC # BLD AUTO: 5.5 K/UL (ref 4–11)

## 2023-12-14 PROCEDURE — 94640 AIRWAY INHALATION TREATMENT: CPT

## 2023-12-14 PROCEDURE — 83735 ASSAY OF MAGNESIUM: CPT

## 2023-12-14 PROCEDURE — 2060000000 HC ICU INTERMEDIATE R&B

## 2023-12-14 PROCEDURE — 6360000002 HC RX W HCPCS

## 2023-12-14 PROCEDURE — 6370000000 HC RX 637 (ALT 250 FOR IP)

## 2023-12-14 PROCEDURE — 94761 N-INVAS EAR/PLS OXIMETRY MLT: CPT

## 2023-12-14 PROCEDURE — 2700000000 HC OXYGEN THERAPY PER DAY

## 2023-12-14 PROCEDURE — 94660 CPAP INITIATION&MGMT: CPT

## 2023-12-14 PROCEDURE — 2580000003 HC RX 258

## 2023-12-14 PROCEDURE — 80069 RENAL FUNCTION PANEL: CPT

## 2023-12-14 PROCEDURE — 36591 DRAW BLOOD OFF VENOUS DEVICE: CPT

## 2023-12-14 PROCEDURE — 85025 COMPLETE CBC W/AUTO DIFF WBC: CPT

## 2023-12-14 PROCEDURE — 6370000000 HC RX 637 (ALT 250 FOR IP): Performed by: HOSPITALIST

## 2023-12-14 RX ADMIN — BUDESONIDE 250 MCG: 0.25 INHALANT RESPIRATORY (INHALATION) at 09:07

## 2023-12-14 RX ADMIN — PANTOPRAZOLE SODIUM 40 MG: 40 TABLET, DELAYED RELEASE ORAL at 08:55

## 2023-12-14 RX ADMIN — ENOXAPARIN SODIUM 40 MG: 100 INJECTION SUBCUTANEOUS at 09:01

## 2023-12-14 RX ADMIN — SODIUM CHLORIDE, PRESERVATIVE FREE 10 ML: 5 INJECTION INTRAVENOUS at 09:49

## 2023-12-14 RX ADMIN — TIOTROPIUM BROMIDE INHALATION SPRAY 2 PUFF: 3.12 SPRAY, METERED RESPIRATORY (INHALATION) at 09:13

## 2023-12-14 RX ADMIN — DIBASIC SODIUM PHOSPHATE, MONOBASIC POTASSIUM PHOSPHATE AND MONOBASIC SODIUM PHOSPHATE 1 TABLET: 852; 155; 130 TABLET ORAL at 08:55

## 2023-12-14 RX ADMIN — DIBASIC SODIUM PHOSPHATE, MONOBASIC POTASSIUM PHOSPHATE AND MONOBASIC SODIUM PHOSPHATE 1 TABLET: 852; 155; 130 TABLET ORAL at 20:29

## 2023-12-14 RX ADMIN — ATORVASTATIN CALCIUM 10 MG: 10 TABLET, FILM COATED ORAL at 08:55

## 2023-12-14 RX ADMIN — ARFORMOTEROL TARTRATE 15 MCG: 15 SOLUTION RESPIRATORY (INHALATION) at 09:08

## 2023-12-14 RX ADMIN — BUDESONIDE 250 MCG: 0.25 INHALANT RESPIRATORY (INHALATION) at 19:50

## 2023-12-14 RX ADMIN — LEVOFLOXACIN 750 MG: 750 TABLET, FILM COATED ORAL at 08:55

## 2023-12-14 RX ADMIN — SODIUM CHLORIDE, PRESERVATIVE FREE 10 ML: 5 INJECTION INTRAVENOUS at 20:30

## 2023-12-14 RX ADMIN — ARFORMOTEROL TARTRATE 15 MCG: 15 SOLUTION RESPIRATORY (INHALATION) at 19:50

## 2023-12-14 NOTE — CARE COORDINATION
4:34 PM    Pt is from home w/daughter and has [facility] for [facility] o2. Pt has a RW, cane and shower chair. Pt is planning to dc home- pending PT evals. Pt will need new home O2 orders when ready for dc. Awaiting Med clearance poss thoracenteses needed. SW following.   Electronically signed by JYOTHI Young, WERNER on 12/14/2023 at 4:34 PM  [phone]

## 2023-12-14 NOTE — PLAN OF CARE
Problem: Safety - Adult  Goal: Free from fall injury  Outcome: Progressing  Note: Pt is a High fall risk. See Jennifer Nanny Fall Score and ABCDS Injury Risk assessments.   + High Fall Risk per MARIE/ABCDS: Explained fall risk precautions to pt and family and rationale behind their use to keep the patient safe. Pt bed is in low position, side rails up, call light and belongings are in reach. Bed alarm on. Pt encouraged to call for assistance. Will continue with hourly rounds for PO intake, pain needs, toileting and repositioning as needed. Problem: Pain  Goal: Verbalizes/displays adequate comfort level or baseline comfort level  Outcome: Progressing  Flowsheets (Taken 12/14/2023 0332)  Verbalizes/displays adequate comfort level or baseline comfort level:   Encourage patient to monitor pain and request assistance   Assess pain using appropriate pain scale   Administer analgesics based on type and severity of pain and evaluate response   Implement non-pharmacological measures as appropriate and evaluate response  Note: Pt denied pain this shift. Problem: Respiratory - Adult  Goal: Achieves optimal ventilation and oxygenation  Outcome: Progressing  Flowsheets (Taken 12/14/2023 0332)  Achieves optimal ventilation and oxygenation:   Assess for changes in respiratory status   Assess for changes in mentation and behavior   Position to facilitate oxygenation and minimize respiratory effort   Oxygen supplementation based on oxygen saturation or arterial blood gases   Assess and instruct to report shortness of breath or any respiratory difficulty   Respiratory therapy support as indicated  Note: Patient continued on 3 L oxygen via nasal cannula while sitting up in chair. Pt received nebulizer treatments with RT. Pt oxygen reduced to 2 L while on bi-pap in bed.      Problem: Gastrointestinal - Adult  Goal: Minimal or absence of nausea and vomiting  Outcome: Progressing  Flowsheets (Taken 12/14/2023 0332)  Minimal or absence of

## 2023-12-14 NOTE — PLAN OF CARE
Problem: Safety - Adult  Goal: Free from fall injury  12/14/2023 1345 by Vicki Melgar RN  Outcome: Progressing     Problem: Pain  Goal: Verbalizes/displays adequate comfort level or baseline comfort level  12/14/2023 1345 by Vicki Melgar RN  Outcome: Progressing     Problem: Skin/Tissue Integrity  Goal: Absence of new skin breakdown  Description: 1. Monitor for areas of redness and/or skin breakdown  2. Assess vascular access sites hourly  3. Every 4-6 hours minimum:  Change oxygen saturation probe site  4. Every 4-6 hours:  If on nasal continuous positive airway pressure, respiratory therapy assess nares and determine need for appliance change or resting period.   Outcome: Progressing

## 2023-12-14 NOTE — PROGRESS NOTES
Increased left lower lobe airspace density consistent with compressive atelectasis or infiltrate. Chronic small right pleural effusion. Stable emphysematous changes with bronchiectasis, scarring and post radiation/fibrotic changes of the right lung apex and perihilar region. Electronically signed by Cass Posey MD    XR CHEST PORTABLE    Result Date: 12/5/2023  PORTABLE AP CHEST AT 2016 HOURS:   HISTORY: Change in mental status. COMPARISON: 11/12/2023.     FINDINGS: PowerPort projects over the left chest. The heart and pulmonary vasculature are within normal limits. There is ill-defined airspace density throughout the left lung, and in the right base. There is a moderate relative elevation of the right diaphragm, with a chronic appearing right-sided small effusion blunting of the lateral costophrenic angle. There is an increased left-sided pleural effusion.     New left-sided effusion, and increased density in the left lung. Chronic parenchymal and pleural changes in the right base, with a small right effusion or pleural thickening. Electronically signed by Cass Posey MD      CBC:   Recent Labs     12/12/23  0400 12/13/23  0355 12/14/23  0514   WBC 5.1 5.1 5.5   HGB 10.3* 10.3* 9.7*    208 191       BMP:    Recent Labs     12/12/23  0400 12/13/23  0355 12/14/23  0514    141 136   K 3.7 3.9 3.7   CL 99 97* 94*   CO2 38* 41* 42*   BUN 7 6* 7   CREATININE <0.5* <0.5* <0.5*   GLUCOSE 91 69* 96       Hepatic:   No results for input(s): \"AST\", \"ALT\", \"ALB\", \"BILITOT\", \"ALKPHOS\" in the last 72 hours.    Lipids:   Lab Results   Component Value Date/Time    CHOL 152 10/19/2022 11:56 AM    HDL 65 10/19/2022 11:56 AM    TRIG 60 10/19/2022 11:56 AM     Hemoglobin A1C:   Lab Results   Component Value Date/Time    LABA1C 5.6 04/26/2018 06:33 AM     TSH:   Lab Results   Component Value Date/Time    TSH 0.236 06/29/2017 10:51 AM     Troponin: No results found for: \"TROPONINT\"  Lactic Acid:   No results for  input(s): \"LACTA\" in the last 72 hours. BNP: No results for input(s): \"PROBNP\" in the last 72 hours. UA:  Lab Results   Component Value Date/Time    NITRU Negative 12/05/2023 09:12 PM    COLORU Yellow 12/05/2023 09:12 PM    PHUR 6.0 12/05/2023 09:12 PM    WBCUA 10-20 12/05/2023 09:12 PM    RBCUA 0-2 12/05/2023 09:12 PM    BACTERIA 1+ 12/05/2023 09:12 PM    CLARITYU Clear 12/05/2023 09:12 PM    SPECGRAV >=1.030 12/05/2023 09:12 PM    LEUKOCYTESUR TRACE 12/05/2023 09:12 PM    UROBILINOGEN 1.0 12/05/2023 09:12 PM    BILIRUBINUR Negative 12/05/2023 09:12 PM    BLOODU Negative 12/05/2023 09:12 PM    GLUCOSEU Negative 12/05/2023 09:12 PM    KETUA Negative 12/05/2023 09:12 PM     Urine Cultures:   Lab Results   Component Value Date/Time    LABURIN  12/05/2023 09:45 PM     <10,000 CFU/ml mixed skin/urogenital alexander. No further workup     Blood Cultures:   Lab Results   Component Value Date/Time    BC No Growth after 4 days of incubation. 12/08/2023 08:51 AM     Lab Results   Component Value Date/Time    BLOODCULT2 No Growth after 4 days of incubation.  12/08/2023 08:52 AM     Organism:   Lab Results   Component Value Date/Time    ORG Staphylococcus epidermidis DNA Detected 12/06/2023 01:15 AM    ORG Staphylococcus epidermidis 12/06/2023 01:15 AM    ORG Kocuria rhizophila 12/06/2023 01:15 AM         Electronically signed by Rehan Groves MD on 12/14/2023 at 9:45 AM

## 2023-12-14 NOTE — PROGRESS NOTES
4 Eyes Skin Assessment     NAME:  Nae Duncan OF BIRTH:  1945  MEDICAL RECORD NUMBER:  [id number]    The patient is being assessed for  Shift Handoff    I agree that at least one RN has performed a thorough Head to Toe Skin Assessment on the patient. ALL assessment sites listed below have been assessed. Areas assessed by both nurses:    Head, Face, Ears, Shoulders, Back, Chest, Arms, Elbows, Hands, Sacrum. Buttock, Coccyx, Ischium, Legs. Feet and Heels, and Under Medical Devices         Does the Patient have a Wound? Yes wound(s) were present on assessment.  LDA wound assessment was Initiated and completed by RN       Selwyn Prevention initiated by RN: Yes  Wound Care Orders initiated by RN: Yes    Pressure Injury (Stage 3,4, Unstageable, DTI, NWPT, and Complex wounds) if present, place Wound referral order by RN under : No    New Ostomies, if present place, Ostomy referral order under : No     Nurse 1 eSignature: Electronically signed by Tor Morris RN on 12/14/23 at 3:42 AM EST    **SHARE this note so that the co-signing nurse can place an eSignature**    Nurse 2 eSignature: {Esignature:[id number]}

## 2023-12-15 LAB
ALBUMIN SERPL-MCNC: 3 G/DL (ref 3.4–5)
ANION GAP SERPL CALCULATED.3IONS-SCNC: -1 MMOL/L (ref 3–16)
BASOPHILS # BLD: 0 K/UL (ref 0–0.2)
BASOPHILS NFR BLD: 0.5 %
BUN SERPL-MCNC: 7 MG/DL (ref 7–20)
CALCIUM SERPL-MCNC: 8.3 MG/DL (ref 8.3–10.6)
CHLORIDE SERPL-SCNC: 97 MMOL/L (ref 99–110)
CO2 SERPL-SCNC: 45 MMOL/L (ref 21–32)
CREAT SERPL-MCNC: <0.5 MG/DL (ref 0.6–1.2)
DEPRECATED RDW RBC AUTO: 16.3 % (ref 12.4–15.4)
EOSINOPHIL # BLD: 0.2 K/UL (ref 0–0.6)
EOSINOPHIL NFR BLD: 3.3 %
GFR SERPLBLD CREATININE-BSD FMLA CKD-EPI: >60 ML/MIN/{1.73_M2}
GLUCOSE SERPL-MCNC: 92 MG/DL (ref 70–99)
HCT VFR BLD AUTO: 28.9 % (ref 36–48)
HGB BLD-MCNC: 9.1 G/DL (ref 12–16)
LYMPHOCYTES # BLD: 0.9 K/UL (ref 1–5.1)
LYMPHOCYTES NFR BLD: 18.7 %
MAGNESIUM SERPL-MCNC: 2 MG/DL (ref 1.8–2.4)
MCH RBC QN AUTO: 24.3 PG (ref 26–34)
MCHC RBC AUTO-ENTMCNC: 31.5 G/DL (ref 31–36)
MCV RBC AUTO: 77.1 FL (ref 80–100)
MONOCYTES # BLD: 0.7 K/UL (ref 0–1.3)
MONOCYTES NFR BLD: 15.6 %
NEUTROPHILS # BLD: 2.8 K/UL (ref 1.7–7.7)
NEUTROPHILS NFR BLD: 61.9 %
PHOSPHATE SERPL-MCNC: 2.9 MG/DL (ref 2.5–4.9)
PLATELET # BLD AUTO: 195 K/UL (ref 135–450)
PMV BLD AUTO: 8.4 FL (ref 5–10.5)
POTASSIUM SERPL-SCNC: 3.8 MMOL/L (ref 3.5–5.1)
RBC # BLD AUTO: 3.75 M/UL (ref 4–5.2)
SODIUM SERPL-SCNC: 141 MMOL/L (ref 136–145)
WBC # BLD AUTO: 4.6 K/UL (ref 4–11)

## 2023-12-15 PROCEDURE — 97535 SELF CARE MNGMENT TRAINING: CPT

## 2023-12-15 PROCEDURE — 97530 THERAPEUTIC ACTIVITIES: CPT

## 2023-12-15 PROCEDURE — 85025 COMPLETE CBC W/AUTO DIFF WBC: CPT

## 2023-12-15 PROCEDURE — 2700000000 HC OXYGEN THERAPY PER DAY

## 2023-12-15 PROCEDURE — 80069 RENAL FUNCTION PANEL: CPT

## 2023-12-15 PROCEDURE — 6370000000 HC RX 637 (ALT 250 FOR IP)

## 2023-12-15 PROCEDURE — 83735 ASSAY OF MAGNESIUM: CPT

## 2023-12-15 PROCEDURE — 2060000000 HC ICU INTERMEDIATE R&B

## 2023-12-15 PROCEDURE — 6360000002 HC RX W HCPCS

## 2023-12-15 PROCEDURE — 94640 AIRWAY INHALATION TREATMENT: CPT

## 2023-12-15 PROCEDURE — 94660 CPAP INITIATION&MGMT: CPT

## 2023-12-15 PROCEDURE — 36591 DRAW BLOOD OFF VENOUS DEVICE: CPT

## 2023-12-15 PROCEDURE — 2580000003 HC RX 258

## 2023-12-15 PROCEDURE — 94761 N-INVAS EAR/PLS OXIMETRY MLT: CPT

## 2023-12-15 PROCEDURE — 97116 GAIT TRAINING THERAPY: CPT

## 2023-12-15 PROCEDURE — 6370000000 HC RX 637 (ALT 250 FOR IP): Performed by: HOSPITALIST

## 2023-12-15 RX ORDER — ALBUTEROL SULFATE 2.5 MG/3ML
2.5 SOLUTION RESPIRATORY (INHALATION) EVERY 4 HOURS PRN
Qty: 120 EACH | Refills: 3 | Status: SHIPPED | OUTPATIENT
Start: 2023-12-15 | End: 2024-01-05

## 2023-12-15 RX ADMIN — TIOTROPIUM BROMIDE INHALATION SPRAY 2 PUFF: 3.12 SPRAY, METERED RESPIRATORY (INHALATION) at 08:34

## 2023-12-15 RX ADMIN — BUDESONIDE 250 MCG: 0.25 INHALANT RESPIRATORY (INHALATION) at 21:03

## 2023-12-15 RX ADMIN — METOPROLOL SUCCINATE 25 MG: 25 TABLET, EXTENDED RELEASE ORAL at 08:02

## 2023-12-15 RX ADMIN — ARFORMOTEROL TARTRATE 15 MCG: 15 SOLUTION RESPIRATORY (INHALATION) at 08:34

## 2023-12-15 RX ADMIN — DIBASIC SODIUM PHOSPHATE, MONOBASIC POTASSIUM PHOSPHATE AND MONOBASIC SODIUM PHOSPHATE 1 TABLET: 852; 155; 130 TABLET ORAL at 21:33

## 2023-12-15 RX ADMIN — ATORVASTATIN CALCIUM 10 MG: 10 TABLET, FILM COATED ORAL at 08:02

## 2023-12-15 RX ADMIN — DIBASIC SODIUM PHOSPHATE, MONOBASIC POTASSIUM PHOSPHATE AND MONOBASIC SODIUM PHOSPHATE 1 TABLET: 852; 155; 130 TABLET ORAL at 08:02

## 2023-12-15 RX ADMIN — PANTOPRAZOLE SODIUM 40 MG: 40 TABLET, DELAYED RELEASE ORAL at 07:42

## 2023-12-15 RX ADMIN — BUDESONIDE 250 MCG: 0.25 INHALANT RESPIRATORY (INHALATION) at 08:34

## 2023-12-15 RX ADMIN — SODIUM CHLORIDE, PRESERVATIVE FREE 10 ML: 5 INJECTION INTRAVENOUS at 08:02

## 2023-12-15 RX ADMIN — ARFORMOTEROL TARTRATE 15 MCG: 15 SOLUTION RESPIRATORY (INHALATION) at 21:03

## 2023-12-15 RX ADMIN — ENOXAPARIN SODIUM 40 MG: 100 INJECTION SUBCUTANEOUS at 08:02

## 2023-12-15 RX ADMIN — SODIUM CHLORIDE, PRESERVATIVE FREE 10 ML: 5 INJECTION INTRAVENOUS at 21:34

## 2023-12-15 ASSESSMENT — PAIN SCALES - WONG BAKER
WONGBAKER_NUMERICALRESPONSE: 0
WONGBAKER_NUMERICALRESPONSE: 0

## 2023-12-15 ASSESSMENT — PAIN SCALES - GENERAL
PAINLEVEL_OUTOF10: 0
PAINLEVEL_OUTOF10: 0

## 2023-12-15 NOTE — PLAN OF CARE
Problem: Safety - Adult  Goal: Free from fall injury  12/15/2023 1115 by Laina Martinez RN  Outcome: Progressing  Flowsheets (Taken 12/15/2023 1115)  Free From Fall Injury:   Ash Lauren family/caregiver on patient safety   Based on caregiver fall risk screen, instruct family/caregiver to ask for assistance with transferring infant if caregiver noted to have fall risk factors  Note: Patient remains on the bed alarm. Patient ambulates x1 with a walker and a gait belt and has some signs of weakness. Problem: Nutrition Deficit:  Goal: Optimize nutritional status  12/15/2023 1115 by Laina Martinez RN  Outcome: Progressing  Flowsheets (Taken 12/15/2023 1115)  Nutrient intake appropriate for improving, restoring, or maintaining nutritional needs:   Assess nutritional status and recommend course of action   Recommend appropriate diets, oral nutritional supplements, and vitamin/mineral supplements   Monitor oral intake, labs, and treatment plans  Note: Patient encouraged to eat and drink throughout shift. Problem: Respiratory - Adult  Goal: Achieves optimal ventilation and oxygenation  12/15/2023 1115 by Laina Martinez RN  Outcome: Progressing  Flowsheets (Taken 12/15/2023 1115)  Achieves optimal ventilation and oxygenation:   Assess for changes in respiratory status   Assess for changes in mentation and behavior   Position to facilitate oxygenation and minimize respiratory effort   Oxygen supplementation based on oxygen saturation or arterial blood gases  Note: Patient remains on 1-3 liters of nasal cannula which is her baseline at home. Patient still has dyspnea at exertion but that is also baseline.

## 2023-12-15 NOTE — PLAN OF CARE
Problem: Safety - Adult  Goal: Free from fall injury  12/15/2023 0046 by Navya Reed RN  Outcome: Progressing  Note: Pt is a High fall risk. See Rhesa Courts Fall Score and ABCDS Injury Risk assessments.   + High Fall Risk per MARIE/ABCDS: Explained fall risk precautions to pt and family and rationale behind their use to keep the patient safe. Pt bed is in low position, side rails up, call light and belongings are in reach. Bed alarm on. Pt encouraged to call for assistance. Will continue with hourly rounds for PO intake, pain needs, toileting and repositioning as needed. Problem: Skin/Tissue Integrity  Goal: Absence of new skin breakdown  Description: 1. Monitor for areas of redness and/or skin breakdown  2. Assess vascular access sites hourly  3. Every 4-6 hours minimum:  Change oxygen saturation probe site  4. Every 4-6 hours:  If on nasal continuous positive airway pressure, respiratory therapy assess nares and determine need for appliance change or resting period. 12/15/2023 0046 by Navya Reed RN  Outcome: Progressing  Note: Mepilex remains in place at pt's sacrum. Wound noted on LDA. No new skin breakdown noted. Problem: Nutrition Deficit:  Goal: Optimize nutritional status  Outcome: Progressing  Flowsheets (Taken 12/15/2023 0046)  Nutrient intake appropriate for improving, restoring, or maintaining nutritional needs:   Assess nutritional status and recommend course of action   Monitor oral intake, labs, and treatment plans   Recommend appropriate diets, oral nutritional supplements, and vitamin/mineral supplements   Provide specific nutrition education to patient or family as appropriate  Note: Patient reported that she has had a decreased appetite since becoming sick and prefers to have small snacks.      Problem: Respiratory - Adult  Goal: Achieves optimal ventilation and oxygenation  Outcome: Progressing  Flowsheets (Taken 12/15/2023 0046)  Achieves optimal ventilation and

## 2023-12-15 NOTE — PROGRESS NOTES
4 Eyes Skin Assessment     NAME:  Aisha Sommer OF BIRTH:  1945  MEDICAL RECORD NUMBER:  [id number]    The patient is being assessed for  Shift Handoff    I agree that at least one RN has performed a thorough Head to Toe Skin Assessment on the patient. ALL assessment sites listed below have been assessed. Areas assessed by both nurses:  Head, Face, Ears, Shoulders, Back, Chest, Arms, Elbows, Hands, Sacrum. Buttock, Coccyx, Ischium, Legs. Feet and Heels, Under Medical Devices , and Other          Does the Patient have a Wound? Yes wound(s) were present on assessment.  LDA wound assessment was Initiated and completed by RN       Selwyn Prevention initiated by RN: Yes  Wound Care Orders initiated by RN: No    Pressure Injury (Stage 3,4, Unstageable, DTI, NWPT, and Complex wounds) if present, place Wound referral order by RN under : No    New Ostomies, if present place, Ostomy referral order under : No     Nurse 1 eSignature: Electronically signed by Martinez Juarez RN on 12/15/23 at 11:23 AM EST    **SHARE this note so that the co-signing nurse can place an eSignature**    Nurse 2 eSignature: Electronically signed by Sheryl Ribeiro RN on 12/16/23 at 8:32 PM EST

## 2023-12-15 NOTE — PROGRESS NOTES
Resp. therapist did not complete the home O2 study. The resp. therapist said that in order to change the patient from a CIPAP to a BIPAP we need a sleep study to confirm she needs a bipap so there was no point in doing the home O2 study since it is only good for 24 hours. Dr. Colletta Hummer notified via perfect serve. 18 Dr. Colletta Hummer called and said we will re-evaluate the situation tomorrow and come up with a plan for discharge.

## 2023-12-15 NOTE — PROGRESS NOTES
This RT went to see the patient and was notified that the patient was switched from CPAP to BiPAP. I explained to the daughter that I cannot put in the orders for the new settings on the BiPAP. She told me she would not leave if the new settings could not be put in. Notified the nurse that the patient will not leave without new BiPAP orders.

## 2023-12-15 NOTE — PROGRESS NOTES
4 Eyes Skin Assessment     NAME:  Ada Schwartz OF BIRTH:  1945  MEDICAL RECORD NUMBER:  [id number]    The patient is being assessed for  Shift Handoff    I agree that at least one RN has performed a thorough Head to Toe Skin Assessment on the patient. ALL assessment sites listed below have been assessed. Areas assessed by both nurses:    Head, Face, Ears, Shoulders, Back, Chest, Arms, Elbows, Hands, Sacrum. Buttock, Coccyx, Ischium, Legs. Feet and Heels, and Under Medical Devices         Does the Patient have a Wound? Yes wound(s) were present on assessment.  LDA wound assessment was Initiated and completed by RN       Selwyn Prevention initiated by RN: Yes  Wound Care Orders initiated by RN: Yes    Pressure Injury (Stage 3,4, Unstageable, DTI, NWPT, and Complex wounds) if present, place Wound referral order by RN under : No    New Ostomies, if present place, Ostomy referral order under : No     Nurse 1 eSignature: Electronically signed by Carlos Benites RN on 12/15/23 at 1:14 AM EST    **SHARE this note so that the co-signing nurse can place an eSignature**    Nurse 2 eSignature: Electronically signed by Ilya Chavez RN on 12/15/23 at 11:23 AM EST

## 2023-12-15 NOTE — CARE COORDINATION
4:28 PM    GARTH spoke to pt's daughter. Still awaiting home O2 eval. Pt's family doesn't want pt to leave until everything is setup. SW explained to pt's daughter pt cant leave until O2 eval is completed the plan is for dc francisca. She is in agreement. HHC has been setup w/[facility]. Electronically signed by JYOTHI Ramsay LSW on 12/15/2023 at 4:28 PM    2:14 PM    Awaiting home O2 eval for DC. Pt is currently on 3L o2, on 1L at home currently. SW following.    Electronically signed by JYOTHI Ramsay LSW on 12/15/2023 at 2:14 PM  [phone]

## 2023-12-15 NOTE — DISCHARGE SUMMARY
Hospital Discharge Summary    Patient's PCP: Adrienne Mittal MD  Admit Date: 12/5/2023   Discharge Date: 12/15/2023    Admitting Physician: Dr. Wei Mackey MD  Discharge Physician: Dr. Lesa Orona MD   Consults: pulmonary/intensive care    HPI:  69year old female with PMHx of arthritis, CHF, COPD, hyperlipidemia, htn, metastatic lung adenocarcinoma who presented to the ED with worsening fatigue loss of appetite and SOB . In the ED she became unresponsive and her ABG looked bad so he was intubated . CT showed moderate left-sided pleural effusion, slightly increased from the prior study. Increased left lower lobe airspace density consistent with compressive atelectasis or infiltrate. She was started on antibiotics, nebs and steroids. She was extubated and slowly improved     Of note patient was admitted on 11/10/2023 - 11/17/2023 where she was admitted for COPD exacerbation. She was extubated and discharged home     Brief hospital course:  Given the concern of the patients presentation and the concern of the possible multi-factorial etiology contributing to patients symptomatology. Patient was admitted and evaluated and found to have:      Discharge Diagnoses:       Acute hypoxic and hypercapnic resp failure 2/2 pneumonia, Bilateral pleural effusion and COPD exacerbation  - Recent admission 11/10-11/18 for resp failure 2/2 COPD exacerbation and was intubated for 36 hours      Primary malignant neoplasm of right upper lobe of lung      Pleural effusions, Bilateral; POA  - Appeared small in the past. Moderate this admit, may be due to malignancy or CHF, although does not appear flud overloaded on exam     COPD     Chronic respiratory failure sec to COPD, lung ca: POA  - On 1-3 L O2 at home. Last imaging chest CT PE protocol 12/5/2023     No CT evidence of acute pulmonary emboli. Small diffuse pericardial effusion.   Moderate left-sided pleural effusion, slightly increased from the prior the morning, at noon, and at bedtime  Qty: 90 tablet, Refills: 3      fluticasone-umeclidin-vilant (TRELEGY ELLIPTA) 100-62.5-25 MCG/ACT AEPB inhaler Inhale 1 puff into the lungs daily  Qty: 3 each, Refills: 3      omeprazole (PRILOSEC) 40 MG delayed release capsule TAKE ONE CAPSULE BY MOUTH EVERY MORNING BEFORE BREAKFAST  Qty: 60 capsule, Refills: 2      tobramycin-dexamethasone (TOBRADEX) 0.3-0.1 % ophthalmic suspension       furosemide (LASIX) 20 MG tablet TAKE ONE TABLET BY MOUTH DAILY AS NEEDED FOR WEIGHT GAIN (SOB, EDEMA)  Qty: 90 tablet, Refills: 3      metoprolol succinate (TOPROL XL) 25 MG extended release tablet Take 1 tablet by mouth daily  Qty: 90 tablet, Refills: 3    Associated Diagnoses: Tachycardia      albuterol sulfate HFA (PROVENTIL;VENTOLIN;PROAIR) 108 (90 Base) MCG/ACT inhaler INHALE 2 PUFFS BY MOUTH EVERY 6 HOURS AS NEEDED FOR WHEEZING  Qty: 18 g, Refills: 3    Associated Diagnoses: Mild intermittent asthma without complication      atorvastatin (LIPITOR) 10 MG tablet Take 1 tablet by mouth daily  Qty: 90 tablet, Refills: 3    Associated Diagnoses: Mixed hyperlipidemia      ASPIRIN ADULT LOW STRENGTH 81 MG EC tablet TAKE ONE TABLET BY MOUTH DAILY  Qty: 30 tablet, Refills: 5    Associated Diagnoses: Essential hypertension           Activity: activity as tolerated  Diet: regular diet  Wound Care: none needed    Disposition: home  Discharged Condition: Stable  Follow Up: Primary Care Physician in one week    Total time spent on discharge, finalizing medications, referrals and arranging outpatient follow up was more than 30 minutes    Thank you Napoleon Johnson MD for the opportunity to be involved in this patients care. If you have any questions or concerns please feel free to contact me at [phone].

## 2023-12-15 NOTE — PROGRESS NOTES
Occupational Therapy  Facility/Department: [address] [facility]  Daily Treatment Note  NAME: Catrachita Segal  : 1945  MRN: [id number]    Date of Service: 12/15/2023    Discharge Recommendations:  Catrachita Segal scored a 18/24 on the AM-PAC ADL Inpatient form. Current research shows that an AM-PAC score of 18 or greater is typically associated with a discharge to the patient's home setting. Based on the patient's AM-PAC score, and their current ADL deficits, it is recommended that the patient have 2-3 sessions per week of Occupational Therapy at d/c to increase the patient's independence.  At this time, this patient demonstrates the endurance and safety to discharge home with 24hr assist and HHOT (home vs OP services) and a follow up treatment frequency of 2-3x/wk.   Please see assessment section for further patient specific details.    If patient discharges prior to next session this note will serve as a discharge summary.  Please see below for the latest assessment towards goals.    24 hour supervision or assist, Home with Home health OT  OT Equipment Recommendations  Other: shower chair- son will bring pt's shower chair to daughter's home where pt now lives      Patient Diagnosis(es): The primary encounter diagnosis was HCAP (healthcare-associated pneumonia). Diagnoses of Pleural effusion on left, Urinary tract infection without hematuria, site unspecified, and Other fatigue were also pertinent to this visit.     Assessment    Assessment: Pt completing functional mobiltiy to and from bathroom with extended rest breaks for O2 recovery. Pt on 3L O2 then increased to 5 L after oral care in stance. Transfers and functional mobiltiy requiring CGA. Grooming tasks requiring increased time and CGA to SBA. Pt would benefit from 24hr assist and HHOT upon discharge home. Continue OT per POC.      Activity Tolerance: Patient limited by fatigue;Patient limited by endurance  Discharge Recommendations: 24 hour  supervision or assist;Home with Home health OT  Other: shower chair- son will bring pt's shower chair to daughter's home where pt now lives      Plan   Occupational Therapy Plan  Times Per Week: 2-5  Current Treatment Recommendations: Functional mobility training;Self-Care / ADL; Balance training;Home management training; Safety education & training;Patient/Caregiver education & training; Endurance training     Restrictions   Position Activity Restriction  Other position/activity restrictions: up with assist       Subjective   Subjective  Subjective: Pt met seated in recliner chair and agreeable to OT session. Daughter present and encouraging. Orientation  Overall Orientation Status: Within Functional Limits  Cognition  Overall Cognitive Status: Exceptions  Memory: Decreased recall of recent events;Decreased recall of biographical Information  Cognition Comment: some decreased recall of recent events, home set up- daughter clarified        Objective    Vitals  Pt on 3L O2 upon entry and O2 sats at 98% seated, Pt requiring seated rest breaks for O2 recovery after activity. O2 monitored throughout session at 86% after mobility to bathroom returning to 95% after rest. Dropping to 73% after stance at sink returning to 99% after seated rest break with O2 bumped up to 5L then returned to 3L. O2 at 89% upon return to chair and returning 98% after rest break.       Bed Mobility Training  Scooting: Stand-by assistance (forward and back in chair)  Balance  Sitting: Intact  Standing: With support  Transfer Training  Transfer Training: Yes  Overall Level of Assistance: Contact-guard assistance (transfers completed to and from recliner chair and chair at sink with cues for hand placement.)  Interventions: Safety awareness training;Verbal cues  Sit to Stand: Contact-guard assistance  Stand to Sit: Contact-guard assistance (cues for eccentric control)  Toilet Transfer: Contact-guard assistance (heavy reliance of GB)  Gait  Overall

## 2023-12-15 NOTE — CARE COORDINATION
Resumption of Care    Patient currently active with [facility] prior to admission.  Orders faxed to [facility] for Resumption of Care by 12/17  Electronically signed by Lilian Boas, LPN on 57/34/8564 at 3:49 PM

## 2023-12-16 LAB
ALBUMIN SERPL-MCNC: 3.1 G/DL (ref 3.4–5)
ANION GAP SERPL CALCULATED.3IONS-SCNC: 1 MMOL/L (ref 3–16)
BASOPHILS # BLD: 0 K/UL (ref 0–0.2)
BASOPHILS NFR BLD: 0.5 %
BUN SERPL-MCNC: 9 MG/DL (ref 7–20)
CALCIUM SERPL-MCNC: 8.4 MG/DL (ref 8.3–10.6)
CHLORIDE SERPL-SCNC: 97 MMOL/L (ref 99–110)
CO2 SERPL-SCNC: 44 MMOL/L (ref 21–32)
CREAT SERPL-MCNC: <0.5 MG/DL (ref 0.6–1.2)
DEPRECATED RDW RBC AUTO: 16.6 % (ref 12.4–15.4)
EOSINOPHIL # BLD: 0.1 K/UL (ref 0–0.6)
EOSINOPHIL NFR BLD: 2.9 %
GFR SERPLBLD CREATININE-BSD FMLA CKD-EPI: >60 ML/MIN/{1.73_M2}
GLUCOSE SERPL-MCNC: 95 MG/DL (ref 70–99)
HCT VFR BLD AUTO: 30.6 % (ref 36–48)
HGB BLD-MCNC: 9.5 G/DL (ref 12–16)
LYMPHOCYTES # BLD: 0.7 K/UL (ref 1–5.1)
LYMPHOCYTES NFR BLD: 15.2 %
MCH RBC QN AUTO: 24.2 PG (ref 26–34)
MCHC RBC AUTO-ENTMCNC: 31 G/DL (ref 31–36)
MCV RBC AUTO: 78.1 FL (ref 80–100)
MONOCYTES # BLD: 0.9 K/UL (ref 0–1.3)
MONOCYTES NFR BLD: 18 %
NEUTROPHILS # BLD: 3 K/UL (ref 1.7–7.7)
NEUTROPHILS NFR BLD: 63.4 %
PHOSPHATE SERPL-MCNC: 3.2 MG/DL (ref 2.5–4.9)
PLATELET # BLD AUTO: 207 K/UL (ref 135–450)
PMV BLD AUTO: 8.6 FL (ref 5–10.5)
POTASSIUM SERPL-SCNC: 4 MMOL/L (ref 3.5–5.1)
RBC # BLD AUTO: 3.92 M/UL (ref 4–5.2)
SODIUM SERPL-SCNC: 142 MMOL/L (ref 136–145)
WBC # BLD AUTO: 4.7 K/UL (ref 4–11)

## 2023-12-16 PROCEDURE — 6370000000 HC RX 637 (ALT 250 FOR IP)

## 2023-12-16 PROCEDURE — 6360000002 HC RX W HCPCS

## 2023-12-16 PROCEDURE — 6370000000 HC RX 637 (ALT 250 FOR IP): Performed by: HOSPITALIST

## 2023-12-16 PROCEDURE — 85025 COMPLETE CBC W/AUTO DIFF WBC: CPT

## 2023-12-16 PROCEDURE — 94660 CPAP INITIATION&MGMT: CPT

## 2023-12-16 PROCEDURE — 2060000000 HC ICU INTERMEDIATE R&B

## 2023-12-16 PROCEDURE — 94640 AIRWAY INHALATION TREATMENT: CPT

## 2023-12-16 PROCEDURE — 2700000000 HC OXYGEN THERAPY PER DAY

## 2023-12-16 PROCEDURE — 36592 COLLECT BLOOD FROM PICC: CPT

## 2023-12-16 PROCEDURE — 2580000003 HC RX 258

## 2023-12-16 PROCEDURE — 94761 N-INVAS EAR/PLS OXIMETRY MLT: CPT

## 2023-12-16 PROCEDURE — 80069 RENAL FUNCTION PANEL: CPT

## 2023-12-16 PROCEDURE — 94150 VITAL CAPACITY TEST: CPT

## 2023-12-16 RX ADMIN — BUDESONIDE 250 MCG: 0.25 INHALANT RESPIRATORY (INHALATION) at 09:51

## 2023-12-16 RX ADMIN — ATORVASTATIN CALCIUM 10 MG: 10 TABLET, FILM COATED ORAL at 08:09

## 2023-12-16 RX ADMIN — METOPROLOL SUCCINATE 25 MG: 25 TABLET, EXTENDED RELEASE ORAL at 08:09

## 2023-12-16 RX ADMIN — PANTOPRAZOLE SODIUM 40 MG: 40 TABLET, DELAYED RELEASE ORAL at 08:09

## 2023-12-16 RX ADMIN — DIBASIC SODIUM PHOSPHATE, MONOBASIC POTASSIUM PHOSPHATE AND MONOBASIC SODIUM PHOSPHATE 1 TABLET: 852; 155; 130 TABLET ORAL at 20:08

## 2023-12-16 RX ADMIN — ARFORMOTEROL TARTRATE 15 MCG: 15 SOLUTION RESPIRATORY (INHALATION) at 09:51

## 2023-12-16 RX ADMIN — ARFORMOTEROL TARTRATE 15 MCG: 15 SOLUTION RESPIRATORY (INHALATION) at 20:49

## 2023-12-16 RX ADMIN — TIOTROPIUM BROMIDE INHALATION SPRAY 2 PUFF: 3.12 SPRAY, METERED RESPIRATORY (INHALATION) at 09:51

## 2023-12-16 RX ADMIN — SODIUM CHLORIDE, PRESERVATIVE FREE 10 ML: 5 INJECTION INTRAVENOUS at 08:09

## 2023-12-16 RX ADMIN — DIBASIC SODIUM PHOSPHATE, MONOBASIC POTASSIUM PHOSPHATE AND MONOBASIC SODIUM PHOSPHATE 1 TABLET: 852; 155; 130 TABLET ORAL at 08:09

## 2023-12-16 RX ADMIN — SODIUM CHLORIDE, PRESERVATIVE FREE 10 ML: 5 INJECTION INTRAVENOUS at 20:08

## 2023-12-16 RX ADMIN — ENOXAPARIN SODIUM 40 MG: 100 INJECTION SUBCUTANEOUS at 08:08

## 2023-12-16 RX ADMIN — BUDESONIDE 250 MCG: 0.25 INHALANT RESPIRATORY (INHALATION) at 20:53

## 2023-12-16 ASSESSMENT — PAIN SCALES - GENERAL: PAINLEVEL_OUTOF10: 0

## 2023-12-16 ASSESSMENT — PAIN SCALES - WONG BAKER
WONGBAKER_NUMERICALRESPONSE: 0

## 2023-12-16 NOTE — PROGRESS NOTES
Comprehensive Nutrition Assessment    RECOMMENDATIONS:  PO Diet: Continue Regular  ONS: Modify Fruit Smoothie to daily  Nutrition Education: Education not indicated     NUTRITION ASSESSMENT:   Nutritional summary & status: Follow-up. Meal intake varied from 1-100%, however, pt reports drinking Fruit Smoothies and [employer] Protein Drink(400 kcal, 30 gm pro) 1-2 x per day which dtr provides. Requests Fruit Smoothies be decreased from bid to daily. Encouraged pt to continue with protein drinks to promote adequate nutrition. Protein drinks providing increased protein to assist with healing of PI st 2 on coccyx. Will continue to follow.   Admission // PMH: Pneumonia//adenocarcinoma of the lung stage IV, CHF, HTN/HLD    MALNUTRITION ASSESSMENT  Context of Malnutrition: Chronic Illness   Malnutrition Status: Severe malnutrition  Findings of the 6 clinical characteristics of malnutrition (Minimum of 2 out of 6 clinical characteristics is required to make the diagnosis of moderate or severe Protein Calorie Malnutrition based on AND/ASPEN Guidelines):  Energy Intake:  Mild decrease in energy intake (Comment)  Weight Loss:  Greater than 20% over 1 year     Body Fat Loss:  Severe body fat loss Orbital, Triceps, Buccal region   Muscle Mass Loss:  Severe muscle mass loss Clavicles (pectoralis & deltoids), Temples (temporalis)  Fluid Accumulation:  No significant fluid accumulation     Strength:  Not Performed    NUTRITION DIAGNOSIS   Severe malnutrition related to catabolic illness as evidenced by Criteria as identified in malnutrition assessment    Nutrition Monitoring and Evaluation:   Food/Nutrient Intake Outcomes:  Food and Nutrient Intake, Supplement Intake  Physical Signs/Symptoms Outcomes:  Biochemical Data, Nutrition Focused Physical Findings, Weight, Hemodynamic Status     OBJECTIVE DATA: Significant to nutrition assessment  Nutrition Related Findings: Labs reveiwed, LBM12/15. BLE trace edema.  Wounds: Stage  II, Pressure Injury (coccyx)  Nutrition Goals: Meet at least 75% of estimated needs, prior to discharge     [address] [facility] [address]; Regular  ADULT ORAL NUTRITION SUPPLEMENT; Dinner; Other Oral Supplement; Smoothie  PO Intake: 1-25%, 26-50%, 51-75%, %   PO Supplement Intake:%  Additional Sources of Calories/IVF:n/a     COMPARATIVE STANDARDS  Energy (kcal):  6529-6167 (30-35 kcal/kg CBW)     Protein (g):  74-89 (1.5-1.8 g/kg CBW)       Fluid (ml/day):  1 mL/kcal or per MD    ANTHROPOMETRICS  Current Height: 167.6 cm (5' 5.98\")  Current Weight - Scale: 53.6 kg (118 lb 2.7 oz)    Admission weight: 49.5 kg (109 lb 1.6 oz)    The patient will be monitored per nutrition standards of care. Consult dietitian if additional nutrition interventions are needed prior to RD reassessment.      Cathy De Leon, [address]:  [phone]  Office:  [phone]

## 2023-12-16 NOTE — CARE COORDINATION
UPDATE:  Patient  still needs  Home O 2  eval  and requires  3L  NC  ( going between   3-5  L NC  )     Electronically signed by King Hines RN on 12/16/2023 at 3:47 PM     +++++++++++++++++++++++++++++++++++++++++++++++++++      CM  noted  resp  Note  ,  and  per note    -  Will follow up with  Dr Eren Duckworth  re discharge  Plan  patient refusing to leave without  bipap . Electronically signed by King Hines RN on 12/16/2023 at 11:10 AM       King Hines RN Case Manager  [facility]  [id number] [facility].   [facility] [address]  [phone]  Fax [phone]

## 2023-12-16 NOTE — PLAN OF CARE
Problem: Safety - Adult  Goal: Free from fall injury  12/16/2023 1122 by Laina Martinez RN  Outcome: Progressing  Flowsheets (Taken 12/16/2023 1122)  Free From Fall Injury:   Ash Lauren family/caregiver on patient safety   Based on caregiver fall risk screen, instruct family/caregiver to ask for assistance with transferring infant if caregiver noted to have fall risk factors  Note: Patient remains on the bed alarm d/t being a fall risk. Patient has a weak gait and ambulates with a walker x1 assist. Patient rounded on frequently to make sure all needs are met.       Problem: Pain  Goal: Verbalizes/displays adequate comfort level or baseline comfort level  12/16/2023 1122 by Laina Martinez RN  Outcome: Progressing  Flowsheets (Taken 12/16/2023 1122)  Verbalizes/displays adequate comfort level or baseline comfort level:   Encourage patient to monitor pain and request assistance   Assess pain using appropriate pain scale   Implement non-pharmacological measures as appropriate and evaluate response     Problem: Nutrition Deficit:  Goal: Optimize nutritional status  12/16/2023 1122 by Laina Martinez RN  Outcome: Progressing  Flowsheets (Taken 12/16/2023 1122)  Nutrient intake appropriate for improving, restoring, or maintaining nutritional needs:   Assess nutritional status and recommend course of action   Monitor oral intake, labs, and treatment plans   Recommend appropriate diets, oral nutritional supplements, and vitamin/mineral supplements     Problem: Respiratory - Adult  Goal: Achieves optimal ventilation and oxygenation  12/16/2023 1122 by Laina Martinez RN  Outcome: Progressing  Flowsheets (Taken 12/16/2023 1122)  Achieves optimal ventilation and oxygenation:   Assess for changes in respiratory status   Assess for changes in mentation and behavior   Oxygen supplementation based on oxygen saturation or arterial blood gases  Note: Patient remains on baseline oxygen of 1-3 liters of oxygen via

## 2023-12-16 NOTE — CARE COORDINATION
Per last Dr. Clydia Goldberg note: on 12/16/23 at 1541:\" Pxt has also been felt will need BiPAP at home, in view of frequent admissions with hypercapnia requiring intubations. Appears this was sth she discussed with Pulmonary. Will discuss with Pulmonary. \"  Dr. Colletta Hummer was informed that respiratory was reluctant to do O2 eval when patient needs BIPAP. Case management awaiting plan for discharge needs. CM will continue to follow patient until discharge.   Electronically signed by Noe Jordan RN on 12/16/2023 at 5:22 PM

## 2023-12-16 NOTE — PLAN OF CARE
Problem: Discharge Planning  Goal: Discharge to home or other facility with appropriate resources  Outcome: Progressing     Problem: Safety - Adult  Goal: Free from fall injury  Outcome: Progressing     Problem: Pain  Goal: Verbalizes/displays adequate comfort level or baseline comfort level  Outcome: Progressing     Problem: Skin/Tissue Integrity  Goal: Absence of new skin breakdown  Description: 1. Monitor for areas of redness and/or skin breakdown  2. Assess vascular access sites hourly  3. Every 4-6 hours minimum:  Change oxygen saturation probe site  4. Every 4-6 hours:  If on nasal continuous positive airway pressure, respiratory therapy assess nares and determine need for appliance change or resting period. Outcome: Progressing     Problem: Chronic Conditions and Co-morbidities  Goal: Patient's chronic conditions and co-morbidity symptoms are monitored and maintained or improved  Outcome: Progressing     Problem: Nutrition Deficit:  Goal: Optimize nutritional status  Outcome: Progressing     Problem: ABCDS Injury Assessment  Goal: Absence of physical injury  Outcome: Progressing     Problem: Respiratory - Adult  Goal: Achieves optimal ventilation and oxygenation  Outcome: Progressing     Problem: Gastrointestinal - Adult  Goal: Minimal or absence of nausea and vomiting  Outcome: Progressing     Problem: Metabolic/Fluid and Electrolytes - Adult  Goal: Electrolytes maintained within normal limits  Outcome: Progressing     Problem: Hematologic - Adult  Goal: Maintains hematologic stability  Outcome: Progressing    Pt without any complaints overnight. Slept with bipap on overnight requiring and increase in O2 from 3L to 5L to maintain O2 sats above 88%. Plan for sleep study prior to going home to obtain new orders for bipap vs cpap at home. Pt and family looking forward to going home with new bipap orders.

## 2023-12-16 NOTE — PROGRESS NOTES
V2.0    [facility] [place] Progress Note      Name:  Catrachita Segal /Age/Sex: 1945  (78 y.o. female)   MRN & CSN:  [id number] & [id number] Encounter Date/Time: 2023 11:59 AM EST   Location:  [facility]8/3518-01 PCP: Napoleon Stiles MD     Attending:Christiane Perez MD       Hospital Day: 12    Assessment and Recommendations   Ms. Catrachita Segal is a 78 year old female with PMHx of arthritis, CHF, COPD, hyperlipidemia, htn, metastatic lung adenocarcinoma who presented to the ED with worsening fatigue loss of appetite and SOB .     In the ED she became unresponsive and her ABG looked bad so he was intubated . CT showed moderate left-sided pleural effusion, slightly increased from the prior study. Increased left lower lobe airspace density consistent with compressive atelectasis or infiltrate.     She was started on antibiotics, nebs and steroids. She was extubated and slowly improved     Of note patient was admitted on 11/10/2023 - 2023 where she was admitted for COPD exacerbation. She was extubated and discharged home     Acute hypoxic and hypercapnic resp failure 2/2 pneumonia, Bilateral pleural effusion and COPD exacerbation  - Recent admission 11/10- for resp failure 2/2 COPD exacerbation and was intubated for 36 hours     Primary malignant neoplasm of right upper lobe of lung     Pleural effusions, Bilateral; POA  - Appeared small in the past. Moderate this admit, may be due to malignancy or CHF, although does not appear flud overloaded on exam    COPD    Chronic respiratory failure sec to COPD, lung ca: POA  - On 1-3 L O2 at home.     Last imaging chest CT PE protocol 2023    No CT evidence of acute pulmonary emboli.  Small diffuse pericardial effusion.  Moderate left-sided pleural effusion, slightly increased from the prior study. Increased left lower lobe airspace density consistent with compressive atelectasis or infiltrate.  Chronic small right pleural effusion. Stable emphysematous changes  in the last 72 hours. UA:  Lab Results   Component Value Date/Time    NITRU Negative 12/05/2023 09:12 PM    COLORU Yellow 12/05/2023 09:12 PM    PHUR 6.0 12/05/2023 09:12 PM    WBCUA 10-20 12/05/2023 09:12 PM    RBCUA 0-2 12/05/2023 09:12 PM    BACTERIA 1+ 12/05/2023 09:12 PM    CLARITYU Clear 12/05/2023 09:12 PM    SPECGRAV >=1.030 12/05/2023 09:12 PM    LEUKOCYTESUR TRACE 12/05/2023 09:12 PM    UROBILINOGEN 1.0 12/05/2023 09:12 PM    BILIRUBINUR Negative 12/05/2023 09:12 PM    BLOODU Negative 12/05/2023 09:12 PM    GLUCOSEU Negative 12/05/2023 09:12 PM    KETUA Negative 12/05/2023 09:12 PM     Urine Cultures:   Lab Results   Component Value Date/Time    LABURIN  12/05/2023 09:45 PM     <10,000 CFU/ml mixed skin/urogenital alexander. No further workup     Blood Cultures:   Lab Results   Component Value Date/Time    BC No Growth after 4 days of incubation. 12/08/2023 08:51 AM     Lab Results   Component Value Date/Time    BLOODCULT2 No Growth after 4 days of incubation.  12/08/2023 08:52 AM     Organism:   Lab Results   Component Value Date/Time    ORG Staphylococcus epidermidis DNA Detected 12/06/2023 01:15 AM    ORG Staphylococcus epidermidis 12/06/2023 01:15 AM    ORG Kocuria rhizophila 12/06/2023 01:15 AM         Electronically signed by Alix Huitron MD on 12/16/2023 at 2:59 PM

## 2023-12-17 VITALS
TEMPERATURE: 98.1 F | OXYGEN SATURATION: 100 % | RESPIRATION RATE: 26 BRPM | BODY MASS INDEX: 18.99 KG/M2 | WEIGHT: 118.17 LBS | SYSTOLIC BLOOD PRESSURE: 144 MMHG | DIASTOLIC BLOOD PRESSURE: 94 MMHG | HEART RATE: 108 BPM | HEIGHT: 66 IN

## 2023-12-17 LAB
ALBUMIN SERPL-MCNC: 2.8 G/DL (ref 3.4–5)
ANION GAP SERPL CALCULATED.3IONS-SCNC: 1 MMOL/L (ref 3–16)
BACTERIA URNS QL MICRO: ABNORMAL /HPF
BASOPHILS # BLD: 0 K/UL (ref 0–0.2)
BASOPHILS NFR BLD: 0.6 %
BILIRUB UR QL STRIP.AUTO: NEGATIVE
BUN SERPL-MCNC: 7 MG/DL (ref 7–20)
CALCIUM SERPL-MCNC: 7.9 MG/DL (ref 8.3–10.6)
CHLORIDE SERPL-SCNC: 99 MMOL/L (ref 99–110)
CLARITY UR: CLEAR
CO2 SERPL-SCNC: 40 MMOL/L (ref 21–32)
COLOR UR: YELLOW
CREAT SERPL-MCNC: <0.5 MG/DL (ref 0.6–1.2)
DEPRECATED RDW RBC AUTO: 16.6 % (ref 12.4–15.4)
EOSINOPHIL # BLD: 0.1 K/UL (ref 0–0.6)
EOSINOPHIL NFR BLD: 2.6 %
EPI CELLS #/AREA URNS HPF: ABNORMAL /HPF (ref 0–5)
GFR SERPLBLD CREATININE-BSD FMLA CKD-EPI: >60 ML/MIN/{1.73_M2}
GLUCOSE SERPL-MCNC: 85 MG/DL (ref 70–99)
GLUCOSE UR STRIP.AUTO-MCNC: NEGATIVE MG/DL
HCT VFR BLD AUTO: 31.6 % (ref 36–48)
HGB BLD-MCNC: 9.8 G/DL (ref 12–16)
HGB UR QL STRIP.AUTO: NEGATIVE
KETONES UR STRIP.AUTO-MCNC: NEGATIVE MG/DL
LEUKOCYTE ESTERASE UR QL STRIP.AUTO: ABNORMAL
LYMPHOCYTES # BLD: 0.7 K/UL (ref 1–5.1)
LYMPHOCYTES NFR BLD: 12.1 %
MCH RBC QN AUTO: 24.1 PG (ref 26–34)
MCHC RBC AUTO-ENTMCNC: 31 G/DL (ref 31–36)
MCV RBC AUTO: 77.9 FL (ref 80–100)
MONOCYTES # BLD: 0.7 K/UL (ref 0–1.3)
MONOCYTES NFR BLD: 13.7 %
MUCOUS THREADS #/AREA URNS LPF: ABNORMAL /LPF
NEUTROPHILS # BLD: 3.8 K/UL (ref 1.7–7.7)
NEUTROPHILS NFR BLD: 71 %
NITRITE UR QL STRIP.AUTO: NEGATIVE
PH UR STRIP.AUTO: 6.5 [PH] (ref 5–8)
PHOSPHATE SERPL-MCNC: 2.8 MG/DL (ref 2.5–4.9)
PLATELET # BLD AUTO: 202 K/UL (ref 135–450)
PMV BLD AUTO: 8.4 FL (ref 5–10.5)
POTASSIUM SERPL-SCNC: 4.2 MMOL/L (ref 3.5–5.1)
PROT UR STRIP.AUTO-MCNC: ABNORMAL MG/DL
RBC # BLD AUTO: 4.05 M/UL (ref 4–5.2)
RBC #/AREA URNS HPF: ABNORMAL /HPF (ref 0–4)
REASON FOR REJECTION: NORMAL
REASON FOR REJECTION: NORMAL
REJECTED TEST: NORMAL
REJECTED TEST: NORMAL
SODIUM SERPL-SCNC: 140 MMOL/L (ref 136–145)
SP GR UR STRIP.AUTO: 1.02 (ref 1–1.03)
UA COMPLETE W REFLEX CULTURE PNL UR: ABNORMAL
UA DIPSTICK W REFLEX MICRO PNL UR: YES
URN SPEC COLLECT METH UR: ABNORMAL
UROBILINOGEN UR STRIP-ACNC: 4 E.U./DL
WBC # BLD AUTO: 5.4 K/UL (ref 4–11)
WBC #/AREA URNS HPF: ABNORMAL /HPF (ref 0–5)

## 2023-12-17 PROCEDURE — 6360000002 HC RX W HCPCS

## 2023-12-17 PROCEDURE — 94640 AIRWAY INHALATION TREATMENT: CPT

## 2023-12-17 PROCEDURE — 80069 RENAL FUNCTION PANEL: CPT

## 2023-12-17 PROCEDURE — 2700000000 HC OXYGEN THERAPY PER DAY

## 2023-12-17 PROCEDURE — 81001 URINALYSIS AUTO W/SCOPE: CPT

## 2023-12-17 PROCEDURE — 2060000000 HC ICU INTERMEDIATE R&B

## 2023-12-17 PROCEDURE — 94150 VITAL CAPACITY TEST: CPT

## 2023-12-17 PROCEDURE — 2580000003 HC RX 258

## 2023-12-17 PROCEDURE — 6370000000 HC RX 637 (ALT 250 FOR IP)

## 2023-12-17 PROCEDURE — 94660 CPAP INITIATION&MGMT: CPT

## 2023-12-17 PROCEDURE — 85025 COMPLETE CBC W/AUTO DIFF WBC: CPT

## 2023-12-17 PROCEDURE — 36415 COLL VENOUS BLD VENIPUNCTURE: CPT

## 2023-12-17 PROCEDURE — 94761 N-INVAS EAR/PLS OXIMETRY MLT: CPT

## 2023-12-17 PROCEDURE — 6360000002 HC RX W HCPCS: Performed by: NURSE PRACTITIONER

## 2023-12-17 PROCEDURE — 6370000000 HC RX 637 (ALT 250 FOR IP): Performed by: HOSPITALIST

## 2023-12-17 PROCEDURE — 36591 DRAW BLOOD OFF VENOUS DEVICE: CPT

## 2023-12-17 RX ADMIN — DIBASIC SODIUM PHOSPHATE, MONOBASIC POTASSIUM PHOSPHATE AND MONOBASIC SODIUM PHOSPHATE 1 TABLET: 852; 155; 130 TABLET ORAL at 08:20

## 2023-12-17 RX ADMIN — SODIUM CHLORIDE, PRESERVATIVE FREE 10 ML: 5 INJECTION INTRAVENOUS at 08:20

## 2023-12-17 RX ADMIN — ALTEPLASE 1 MG: 2.2 INJECTION, POWDER, LYOPHILIZED, FOR SOLUTION INTRAVENOUS at 21:42

## 2023-12-17 RX ADMIN — DIBASIC SODIUM PHOSPHATE, MONOBASIC POTASSIUM PHOSPHATE AND MONOBASIC SODIUM PHOSPHATE 1 TABLET: 852; 155; 130 TABLET ORAL at 19:58

## 2023-12-17 RX ADMIN — METOPROLOL SUCCINATE 25 MG: 25 TABLET, EXTENDED RELEASE ORAL at 08:20

## 2023-12-17 RX ADMIN — BUDESONIDE 250 MCG: 0.25 INHALANT RESPIRATORY (INHALATION) at 09:04

## 2023-12-17 RX ADMIN — ARFORMOTEROL TARTRATE 15 MCG: 15 SOLUTION RESPIRATORY (INHALATION) at 09:04

## 2023-12-17 RX ADMIN — TIOTROPIUM BROMIDE INHALATION SPRAY 2 PUFF: 3.12 SPRAY, METERED RESPIRATORY (INHALATION) at 09:08

## 2023-12-17 RX ADMIN — PANTOPRAZOLE SODIUM 40 MG: 40 TABLET, DELAYED RELEASE ORAL at 06:27

## 2023-12-17 RX ADMIN — SODIUM CHLORIDE, PRESERVATIVE FREE 10 ML: 5 INJECTION INTRAVENOUS at 19:58

## 2023-12-17 RX ADMIN — ENOXAPARIN SODIUM 40 MG: 100 INJECTION SUBCUTANEOUS at 08:20

## 2023-12-17 RX ADMIN — BUDESONIDE 250 MCG: 0.25 INHALANT RESPIRATORY (INHALATION) at 21:15

## 2023-12-17 RX ADMIN — ARFORMOTEROL TARTRATE 15 MCG: 15 SOLUTION RESPIRATORY (INHALATION) at 21:10

## 2023-12-17 RX ADMIN — ATORVASTATIN CALCIUM 10 MG: 10 TABLET, FILM COATED ORAL at 08:20

## 2023-12-17 NOTE — PLAN OF CARE
Problem: Safety - Adult  Goal: Free from fall injury  Outcome: Progressing  Note:  High Fall Risk per MARIE/ABCDS: Explained fall risk precautions to pt and family and rationale behind their use to keep the patient safe. Pt bed is in low position, side rails up, call light and belongings are in reach. Fall wristband applied and present on pts wrist.  Bed alarm on.  Pt encouraged to call for assistance. Will continue with hourly rounds for PO intake, pain needs, toileting and repositioning as needed.      Problem: Pain  Goal: Verbalizes/displays adequate comfort level or baseline comfort level  Outcome: Progressing  Note: Pt denies pain.      Problem: Skin/Tissue Integrity  Goal: Absence of new skin breakdown  Description: 1.  Monitor for areas of redness and/or skin breakdown  2.  Assess vascular access sites hourly  3.  Every 4-6 hours minimum:  Change oxygen saturation probe site  4.  Every 4-6 hours:  If on nasal continuous positive airway pressure, respiratory therapy assess nares and determine need for appliance change or resting period.  Outcome: Progressing  Note: Pt continues with pressure injury on coccyx, educated on importance of turning and repositioning. Pt verbalizes understanding and continues to reposition frequently throughout the night.      Problem: Respiratory - Adult  Goal: Achieves optimal ventilation and oxygenation  Outcome: Progressing  Note: Pt continues on 3-4L of oxygen and on BIPAP when sleeping.      Problem: Gastrointestinal - Adult  Goal: Minimal or absence of nausea and vomiting  Outcome: Progressing  Note: Pt denies nausea.

## 2023-12-17 NOTE — PROGRESS NOTES
V2.0    [facility] Progress Note      Name:  Catrachita Segal /Age/Sex: 1945  (78 y.o. female)   MRN & CSN:  [id number] & [id number] Encounter Date/Time: 2023 11:59 AM EST   Location:  3518/3518-01 PCP: Napoleon Stiles MD     Attending:Christiane Perez MD       Hospital Day: 13    Assessment and Recommendations   Ms. Catrachita Segal is a 78 year old female with PMHx of arthritis, CHF, COPD, hyperlipidemia, htn, metastatic lung adenocarcinoma who presented to the ED with worsening fatigue loss of appetite and SOB .     In the ED she became unresponsive and her ABG looked bad so he was intubated . CT showed moderate left-sided pleural effusion, slightly increased from the prior study. Increased left lower lobe airspace density consistent with compressive atelectasis or infiltrate.     She was started on antibiotics, nebs and steroids. She was extubated and slowly improved     Of note patient was admitted on 11/10/2023 - 2023 where she was admitted for COPD exacerbation. She was extubated and discharged home     Acute hypoxic and hypercapnic resp failure sec to pneumonia, Bilateral pleural effusion and COPD exacerbation  - Recent admission 11/10- for resp failure 2/2 COPD exacerbation and was intubated for 36 hours     Metastatic adenocarcinoma of the lung with Liver, adrenal, mediastinal and supraclavicular murali metastases.   Followed by [facility] (Dr. Guardado).  Recent MRI brain in Nov. to rule out metastatic disease in her brain was negative.    Pleural effusions, Bilateral; POA  - Appeared small in the past. Moderate this admit, may be due to malignancy or CHF, although does not appear fluid overloaded on exam.  Possibly secondary to metastatic lung cancer    COPD  Being managed by Dr. Garcia     Chronic respiratory failure sec to COPD, lung ca: POA  - On 1-3 L O2 at home.     Last imaging chest CT PE protocol 2023    No CT evidence of acute pulmonary emboli.  Small diffuse pericardial

## 2023-12-17 NOTE — PROGRESS NOTES
4 Eyes Skin Assessment     NAME:  Catrachita Segal  YOB: 1945  MEDICAL RECORD NUMBER:  [id number]    The patient is being assessed for  Shift Handoff    I agree that at least one RN has performed a thorough Head to Toe Skin Assessment on the patient. ALL assessment sites listed below have been assessed.      Areas assessed by both nurses:    Head, Face, Ears, Shoulders, Back, Chest, Arms, Elbows, Hands, Sacrum. Buttock, Coccyx, Ischium, Legs. Feet and Heels, Under Medical Devices , and Other ***        Does the Patient have a Wound? Yes wound(s) were present on assessment. LDA wound assessment was Initiated and completed by RN       Selwyn Prevention initiated by RN: Yes  Wound Care Orders initiated by RN: No    Pressure Injury (Stage 3,4, Unstageable, DTI, NWPT, and Complex wounds) if present, place Wound referral order by RN under : No    New Ostomies, if present place, Ostomy referral order under : No     Nurse 1 eSignature: Electronically signed by Mora Thompson RN on 12/17/23 at 9:38 AM EST    **SHARE this note so that the co-signing nurse can place an eSignature**    Nurse 2 eSignature: {Esignature:[id number]}

## 2023-12-17 NOTE — CARE COORDINATION
ADDENDUM:  1:14 PM    GARTH spoke to silvio at [facility] since pt has COPD they will only need an overnight continuous pulse ox and arterial blood gas (ABG) testing to get a Bipap approved. GARTH will fax completed testing tomorrow and follow up with the [facility] office.     SW perfect served MD notifying him of the testing required/needed to be ordered for today,     GARTH following.   Electronically signed by JYOTHI Briscoe, WERNER on 12/17/2023 at 1:14 PM  [phone]

## 2023-12-17 NOTE — PROGRESS NOTES
Attempted to obtain CBC this AM, lab called stating the specimen was clotted. Attempted to obtain another CBC and lab stated the specimen was clotted again. Lab stated they would have someone come up and redraw lab.

## 2023-12-17 NOTE — PLAN OF CARE
Problem: Safety - Adult  Goal: Free from fall injury  12/17/2023 1529 by Mora Thompson RN  Outcome: Progressing  Flowsheets (Taken 12/17/2023 1529)  Free From Fall Injury:   Instruct family/caregiver on patient safety   Based on caregiver fall risk screen, instruct family/caregiver to ask for assistance with transferring infant if caregiver noted to have fall risk factors  Note: Patient ambulates with a steady gait when using a walker and x1 assistance.      Problem: Skin/Tissue Integrity  Goal: Absence of new skin breakdown  Description: 1.  Monitor for areas of redness and/or skin breakdown  2.  Assess vascular access sites hourly  3.  Every 4-6 hours minimum:  Change oxygen saturation probe site  4.  Every 4-6 hours:  If on nasal continuous positive airway pressure, respiratory therapy assess nares and determine need for appliance change or resting period.  12/17/2023 1529 by Mora Thompson RN  Outcome: Progressing  Note: Skin is assessed throughout shift for new skin breakdown.      Problem: Nutrition Deficit:  Goal: Optimize nutritional status  Outcome: Progressing  Flowsheets (Taken 12/17/2023 1529)  Nutrient intake appropriate for improving, restoring, or maintaining nutritional needs: Assess nutritional status and recommend course of action  Note: Patient encouraged to eat and drink throughout shift. Patient consumes % of meals.      Problem: ABCDS Injury Assessment  Goal: Absence of physical injury  Flowsheets (Taken 12/17/2023 1529)  Absence of Physical Injury: Implement safety measures based on patient assessment  Note: Bed alarm on, bed in lowest position, call light within reach and non skid socks on. Patient rounded on frequently to make sure all needs are met.

## 2023-12-18 ENCOUNTER — APPOINTMENT (OUTPATIENT)
Dept: GENERAL RADIOLOGY | Age: 78
DRG: 208 | End: 2023-12-18
Payer: MEDICARE

## 2023-12-18 LAB
ALBUMIN SERPL-MCNC: 3.5 G/DL (ref 3.4–5)
ANION GAP SERPL CALCULATED.3IONS-SCNC: 5 MMOL/L (ref 3–16)
BASE EXCESS BLDA CALC-SCNC: 17 MMOL/L (ref -3–3)
BASE EXCESS BLDA CALC-SCNC: 17 MMOL/L (ref -3–3)
BASE EXCESS BLDV CALC-SCNC: 19 MMOL/L (ref -3–3)
BASOPHILS # BLD: 0 K/UL (ref 0–0.2)
BASOPHILS NFR BLD: 0 %
BUN SERPL-MCNC: 9 MG/DL (ref 7–20)
CALCIUM SERPL-MCNC: 8.8 MG/DL (ref 8.3–10.6)
CHLORIDE SERPL-SCNC: 95 MMOL/L (ref 99–110)
CO2 BLDA-SCNC: 45 MMOL/L
CO2 BLDA-SCNC: 49 MMOL/L
CO2 BLDV-SCNC: >50 MMOL/L
CO2 SERPL-SCNC: 39 MMOL/L (ref 21–32)
CREAT SERPL-MCNC: <0.5 MG/DL (ref 0.6–1.2)
DEPRECATED RDW RBC AUTO: 17.5 % (ref 12.4–15.4)
EOSINOPHIL # BLD: 0 K/UL (ref 0–0.6)
EOSINOPHIL NFR BLD: 0 %
GFR SERPLBLD CREATININE-BSD FMLA CKD-EPI: >60 ML/MIN/{1.73_M2}
GLUCOSE SERPL-MCNC: 189 MG/DL (ref 70–99)
HCO3 BLDA-SCNC: 42.5 MMOL/L (ref 21–29)
HCO3 BLDA-SCNC: 45.1 MMOL/L (ref 21–29)
HCO3 BLDV-SCNC: 47.2 MMOL/L (ref 23–29)
HCT VFR BLD AUTO: 33.5 % (ref 36–48)
HGB BLD-MCNC: 10.3 G/DL (ref 12–16)
HYPOCHROMIA BLD QL SMEAR: ABNORMAL
LYMPHOCYTES # BLD: 0.3 K/UL (ref 1–5.1)
LYMPHOCYTES NFR BLD: 4 %
MCH RBC QN AUTO: 24.3 PG (ref 26–34)
MCHC RBC AUTO-ENTMCNC: 30.7 G/DL (ref 31–36)
MCV RBC AUTO: 79 FL (ref 80–100)
METAMYELOCYTES NFR BLD MANUAL: 4 %
MICROCYTES BLD QL SMEAR: ABNORMAL
MONOCYTES # BLD: 0.5 K/UL (ref 0–1.3)
MONOCYTES NFR BLD: 7 %
NEUTROPHILS # BLD: 6.5 K/UL (ref 1.7–7.7)
NEUTROPHILS NFR BLD: 82 %
NEUTS BAND NFR BLD MANUAL: 3 % (ref 0–7)
NT-PROBNP SERPL-MCNC: 1060 PG/ML (ref 0–449)
OVALOCYTES BLD QL SMEAR: ABNORMAL
PCO2 BLDA: 118.5 MM HG (ref 35–45)
PCO2 BLDA: 74.1 MM HG (ref 35–45)
PCO2 BLDV: 122.4 MM HG (ref 40–50)
PERFORMED ON: ABNORMAL
PH BLDA: 7.19 [PH] (ref 7.35–7.45)
PH BLDA: 7.37 [PH] (ref 7.35–7.45)
PH BLDV: 7.19 [PH] (ref 7.35–7.45)
PHOSPHATE SERPL-MCNC: 4.8 MG/DL (ref 2.5–4.9)
PLATELET # BLD AUTO: 228 K/UL (ref 135–450)
PMV BLD AUTO: 7.9 FL (ref 5–10.5)
PO2 BLDA: 176.4 MM HG (ref 75–108)
PO2 BLDA: 61.6 MM HG (ref 75–108)
PO2 BLDV: 49 MM HG
POC SAMPLE TYPE: ABNORMAL
POTASSIUM SERPL-SCNC: 4.5 MMOL/L (ref 3.5–5.1)
RBC # BLD AUTO: 4.24 M/UL (ref 4–5.2)
SAO2 % BLDA: 89 % (ref 93–100)
SAO2 % BLDA: 99 % (ref 93–100)
SAO2 % BLDV: 70 %
SODIUM SERPL-SCNC: 139 MMOL/L (ref 136–145)
STOMATOCYTES BLD QL SMEAR: ABNORMAL
TARGETS BLD QL SMEAR: ABNORMAL
WBC # BLD AUTO: 7.3 K/UL (ref 4–11)

## 2023-12-18 PROCEDURE — 6360000002 HC RX W HCPCS

## 2023-12-18 PROCEDURE — 2700000000 HC OXYGEN THERAPY PER DAY

## 2023-12-18 PROCEDURE — 2580000003 HC RX 258

## 2023-12-18 PROCEDURE — 6360000002 HC RX W HCPCS: Performed by: INTERNAL MEDICINE

## 2023-12-18 PROCEDURE — 99233 SBSQ HOSP IP/OBS HIGH 50: CPT | Performed by: INTERNAL MEDICINE

## 2023-12-18 PROCEDURE — 94761 N-INVAS EAR/PLS OXIMETRY MLT: CPT

## 2023-12-18 PROCEDURE — 82803 BLOOD GASES ANY COMBINATION: CPT

## 2023-12-18 PROCEDURE — 2060000000 HC ICU INTERMEDIATE R&B

## 2023-12-18 PROCEDURE — 85025 COMPLETE CBC W/AUTO DIFF WBC: CPT

## 2023-12-18 PROCEDURE — 6370000000 HC RX 637 (ALT 250 FOR IP): Performed by: INTERNAL MEDICINE

## 2023-12-18 PROCEDURE — 71045 X-RAY EXAM CHEST 1 VIEW: CPT

## 2023-12-18 PROCEDURE — 36600 WITHDRAWAL OF ARTERIAL BLOOD: CPT

## 2023-12-18 PROCEDURE — 94640 AIRWAY INHALATION TREATMENT: CPT

## 2023-12-18 PROCEDURE — 94660 CPAP INITIATION&MGMT: CPT

## 2023-12-18 PROCEDURE — 83880 ASSAY OF NATRIURETIC PEPTIDE: CPT

## 2023-12-18 PROCEDURE — 80069 RENAL FUNCTION PANEL: CPT

## 2023-12-18 PROCEDURE — 36591 DRAW BLOOD OFF VENOUS DEVICE: CPT

## 2023-12-18 RX ORDER — FUROSEMIDE 10 MG/ML
20 INJECTION INTRAMUSCULAR; INTRAVENOUS 2 TIMES DAILY
Status: DISCONTINUED | OUTPATIENT
Start: 2023-12-18 | End: 2023-12-20

## 2023-12-18 RX ORDER — IPRATROPIUM BROMIDE AND ALBUTEROL SULFATE 2.5; .5 MG/3ML; MG/3ML
1 SOLUTION RESPIRATORY (INHALATION)
Status: DISCONTINUED | OUTPATIENT
Start: 2023-12-18 | End: 2023-12-22

## 2023-12-18 RX ORDER — FUROSEMIDE 20 MG/1
20 TABLET ORAL EVERY OTHER DAY
Status: ON HOLD | COMMUNITY
End: 2024-01-04 | Stop reason: HOSPADM

## 2023-12-18 RX ORDER — ASPIRIN 81 MG/1
81 TABLET ORAL EVERY OTHER DAY
Status: ON HOLD | COMMUNITY
End: 2024-01-04 | Stop reason: HOSPADM

## 2023-12-18 RX ORDER — FUROSEMIDE 10 MG/ML
20 INJECTION INTRAMUSCULAR; INTRAVENOUS ONCE
Status: COMPLETED | OUTPATIENT
Start: 2023-12-18 | End: 2023-12-18

## 2023-12-18 RX ADMIN — SODIUM CHLORIDE, PRESERVATIVE FREE 10 ML: 5 INJECTION INTRAVENOUS at 20:52

## 2023-12-18 RX ADMIN — IPRATROPIUM BROMIDE AND ALBUTEROL SULFATE 1 DOSE: 2.5; .5 SOLUTION RESPIRATORY (INHALATION) at 19:46

## 2023-12-18 RX ADMIN — ARFORMOTEROL TARTRATE 15 MCG: 15 SOLUTION RESPIRATORY (INHALATION) at 09:41

## 2023-12-18 RX ADMIN — FUROSEMIDE 20 MG: 10 INJECTION, SOLUTION INTRAMUSCULAR; INTRAVENOUS at 18:01

## 2023-12-18 RX ADMIN — IPRATROPIUM BROMIDE AND ALBUTEROL SULFATE 1 DOSE: 2.5; .5 SOLUTION RESPIRATORY (INHALATION) at 16:46

## 2023-12-18 RX ADMIN — ARFORMOTEROL TARTRATE 15 MCG: 15 SOLUTION RESPIRATORY (INHALATION) at 19:45

## 2023-12-18 RX ADMIN — FUROSEMIDE 20 MG: 10 INJECTION, SOLUTION INTRAMUSCULAR; INTRAVENOUS at 15:03

## 2023-12-18 RX ADMIN — TIOTROPIUM BROMIDE INHALATION SPRAY 2 PUFF: 3.12 SPRAY, METERED RESPIRATORY (INHALATION) at 09:44

## 2023-12-18 RX ADMIN — ENOXAPARIN SODIUM 40 MG: 100 INJECTION SUBCUTANEOUS at 08:54

## 2023-12-18 RX ADMIN — BUDESONIDE 250 MCG: 0.25 INHALANT RESPIRATORY (INHALATION) at 09:41

## 2023-12-18 RX ADMIN — BUDESONIDE 250 MCG: 0.25 INHALANT RESPIRATORY (INHALATION) at 19:45

## 2023-12-18 RX ADMIN — SODIUM CHLORIDE, PRESERVATIVE FREE 10 ML: 5 INJECTION INTRAVENOUS at 08:54

## 2023-12-18 NOTE — PROGRESS NOTES
Nurse called about a mental status change in the patient and brenda a VBG to get a blood gas. Results came back as a 7.19 pH and a 122 CO2.     Patient placed on BIPAP per doctor order.    Trilogy AVAPS    EPAP min/max 5/15  IPAP 10/20  Vt 450  RR 24  FIO2 80%    SpO2 100%

## 2023-12-18 NOTE — CONSULTS
Clinical Pharmacy Consult Note  Medication History     Admit Date: 12/5/2023    Pharmacy consulted to verify home medication list by Christiane Newman MD     List of of current medications patient is taking is complete. Home Medication list in EPIC updated to reflect changes noted below.    Source of information: Dispense Report, [facility], and speaking to patient's daughter at bedside.    Patient's home pharmacy: [facility] in [place] and [address] in [place].    Changes made to medication list:   Medications removed: (these weren't originally listed on med rec anyway)  Dabrafenib 150 mg PO BID (for NSCLC.  Dr. Guardado temporarily held to get pt's weight back up.)  Trametinib 2 mg PO daily (for NSCLC. Dr. Guardado temporarily held to get pt's weight back up.)  Medication doses adjusted:   Baby aspirin 81 mg EC changed from daily to every other day.  Lasix 20 mg changed from daily PRN to 20 mg every other day    Current Outpatient Medications   Medication Instructions    albuterol (PROVENTIL) 2.5 mg, Nebulization, EVERY 4 HOURS PRN    albuterol sulfate HFA (PROVENTIL;VENTOLIN;PROAIR) 108 (90 Base) MCG/ACT inhaler INHALE 2 PUFFS BY MOUTH EVERY 6 HOURS AS NEEDED FOR WHEEZING    aspirin 81 mg, Oral, EVERY OTHER DAY    atorvastatin (LIPITOR) 10 mg, Oral, DAILY    fluticasone-umeclidin-vilant (TRELEGY ELLIPTA) 100-62.5-25 MCG/ACT AEPB inhaler 1 puff, Inhalation, DAILY    furosemide (LASIX) 20 mg, Oral, EVERY OTHER DAY    metoprolol succinate (TOPROL XL) 25 mg, Oral, DAILY    midodrine (PROAMATINE) 5 mg, Oral, 3 times daily    omeprazole (PRILOSEC) 40 MG delayed release capsule TAKE ONE CAPSULE BY MOUTH EVERY MORNING BEFORE BREAKFAST    tobramycin-dexamethasone (TOBRADEX) 0.3-0.1 % ophthalmic suspension 1 drop, Both Eyes, 2 times daily     Please call with any questions.    Matt Jama, Brenda MCKEONOP 12/18/2023 3:15 PM

## 2023-12-18 NOTE — PROGRESS NOTES
since 1/2019.  Scans were stable however she has progressive wt loss since July 2021.    Extensive fibrotic changes and bullous disease predominantly on the right side.      Acute on chronic diastolic CHF:  CT scan on 12/5 with bilateral pleural effusion and pericardial effusion.  F/u CXR today on 12/18 with worsening effusion.  On exam she has high JVD and trace bilateral edema.      This recurrent hypercarbic respiratory failure is probably due to volume overload, however progression of disease is also possible given her progressive wt loss.  CT scan in June 2023 was stable.  No recent PET    Discussed with primary team  Started on lasix   Check proBNP    If no improvement with diuretics will consider diagnostic taps    Kaylan Manning MD

## 2023-12-18 NOTE — FLOWSHEET NOTE
RT came to room to try and take her off Bipap.  When placed on NC pt O2 dropped to 73.  Placed on non rebreather at 15L and  O2 only came up to 92.  Her RR rate was also increasing.  Pt was not ready to come off Bipap.  RT placed her back on Bipap

## 2023-12-18 NOTE — CARE COORDINATION
DC order cancelled. Awaiting further O2 testing for DC.     Pt will dc home w/family. Pt has O2 1L at home through [employer] and a cpap currently. Pt's family is refusing to let pt DC without a bipap.     SW following.  Electronically signed by JYOTHI Briscoe, WERNER on 12/18/2023 at 11:41 AM  [phone]

## 2023-12-18 NOTE — PROGRESS NOTES
Spoke to Marisol Diaz NP about the order for an overnight sleep study and an ABG and agreed for them to be cancelled due to being not indicated or necessarily appropriate.    Our overnight sleep studies are ordered to see if we have a patient that is dropping below 90% SpO2 on room air. The patient is currently using their own home CPAP intermittently with an oxygen bleed and hasn't been on room air. The nurse practitioner and myself both agreed that the patient should be referred to Dr. Garcia in the AM and to have an outpatient sleep study in order to be prescribed for BIPAP instead of CPAP. Will have day shift RT assess the patient in the AM and talk with pulmonology for a game plan for discharge and future sleep study.    Patient is currently on their home CPAP with an SpO2 of 99%. Nurse made aware of situation as well.

## 2023-12-18 NOTE — PROGRESS NOTES
Occupational / Physical Therapy  Hold   Attempt to see this AM for therapies. Per RN hold at this time, pt needing bipap, blood gas abnormal. Will follow up later this PM vs 12/19/23.   Precious Johnston, KANE, OTR/L  [place], [place] [phone]

## 2023-12-18 NOTE — PROGRESS NOTES
12/18/23  0326   WBC 4.7 5.4 7.3   HGB 9.5* 9.8* 10.3*    202 228       BMP:    Recent Labs     12/16/23  0452 12/17/23  0401 12/18/23  0326    140 139   K 4.0 4.2 4.5   CL 97* 99 95*   CO2 44* 40* 39*   BUN 9 7 9   CREATININE <0.5* <0.5* <0.5*   GLUCOSE 95 85 189*       Hepatic:   No results for input(s): \"AST\", \"ALT\", \"ALB\", \"BILITOT\", \"ALKPHOS\" in the last 72 hours.    Lipids:   Lab Results   Component Value Date/Time    CHOL 152 10/19/2022 11:56 AM    HDL 65 10/19/2022 11:56 AM    TRIG 60 10/19/2022 11:56 AM     Hemoglobin A1C:   Lab Results   Component Value Date/Time    LABA1C 5.6 04/26/2018 06:33 AM     TSH:   Lab Results   Component Value Date/Time    TSH 0.236 06/29/2017 10:51 AM     Troponin: No results found for: \"TROPONINT\"  Lactic Acid:   No results for input(s): \"LACTA\" in the last 72 hours.    BNP: No results for input(s): \"PROBNP\" in the last 72 hours.  UA:  Lab Results   Component Value Date/Time    NITRU Negative 12/17/2023 06:57 PM    COLORU Yellow 12/17/2023 06:57 PM    PHUR 6.5 12/17/2023 06:57 PM    WBCUA 0-2 12/17/2023 06:57 PM    RBCUA 0-2 12/17/2023 06:57 PM    MUCUS 1+ 12/17/2023 06:57 PM    BACTERIA Rare 12/17/2023 06:57 PM    CLARITYU Clear 12/17/2023 06:57 PM    SPECGRAV 1.025 12/17/2023 06:57 PM    LEUKOCYTESUR SMALL 12/17/2023 06:57 PM    UROBILINOGEN 4.0 12/17/2023 06:57 PM    BILIRUBINUR Negative 12/17/2023 06:57 PM    BLOODU Negative 12/17/2023 06:57 PM    GLUCOSEU Negative 12/17/2023 06:57 PM    KETUA Negative 12/17/2023 06:57 PM     Urine Cultures:   Lab Results   Component Value Date/Time    LABURIN  12/05/2023 09:45 PM     <10,000 CFU/ml mixed skin/urogenital alexander. No further workup     Blood Cultures:   Lab Results   Component Value Date/Time    BC No Growth after 4 days of incubation. 12/08/2023 08:51 AM     Lab Results   Component Value Date/Time    BLOODCULT2 No Growth after 4 days of incubation. 12/08/2023 08:52 AM     Organism:   Lab Results   Component Value

## 2023-12-18 NOTE — PROGRESS NOTES
Port is not having any blood return. Messaged Dr. Perez via perfect serve for cath-lorena medication.

## 2023-12-18 NOTE — PLAN OF CARE
Problem: Discharge Planning  Goal: Discharge to home or other facility with appropriate resources  Outcome: Progressing     Problem: Safety - Adult  Goal: Free from fall injury  Outcome: Progressing     Problem: Pain  Goal: Verbalizes/displays adequate comfort level or baseline comfort level  Outcome: Progressing     Problem: Skin/Tissue Integrity  Goal: Absence of new skin breakdown  Description: 1.  Monitor for areas of redness and/or skin breakdown  2.  Assess vascular access sites hourly  3.  Every 4-6 hours minimum:  Change oxygen saturation probe site  4.  Every 4-6 hours:  If on nasal continuous positive airway pressure, respiratory therapy assess nares and determine need for appliance change or resting period.  Outcome: Progressing     Problem: Chronic Conditions and Co-morbidities  Goal: Patient's chronic conditions and co-morbidity symptoms are monitored and maintained or improved  Outcome: Progressing     Problem: Nutrition Deficit:  Goal: Optimize nutritional status  Outcome: Progressing     Problem: ABCDS Injury Assessment  Goal: Absence of physical injury  Outcome: Progressing     Problem: Respiratory - Adult  Goal: Achieves optimal ventilation and oxygenation  Outcome: Progressing     Problem: Gastrointestinal - Adult  Goal: Minimal or absence of nausea and vomiting  Outcome: Progressing     Problem: Metabolic/Fluid and Electrolytes - Adult  Goal: Electrolytes maintained within normal limits  Outcome: Progressing     Problem: Hematologic - Adult  Goal: Maintains hematologic stability  Outcome: Progressing

## 2023-12-18 NOTE — PROGRESS NOTES
Pt put on home CPAP to start sleep study around 2200. Around 2300 pt started pulling at mask and oxygen kept dropping to the 70's. Educated patient on the importance of the mask and helped patient repositioned mask to fit more comfortable for her, respiratory asked to come down and look at mask to be sure myself and the patient were putting in on correctly. Once Jose from respiratory came down to look at mask he determined it was the wrong size and changed it out for a different size. Pt continues to pull mask off multiple times throughout the night stating she feels like she is suffocating. Around 0030 Jose RT notified on pt's condition and requested to switch pt back to BIPAP. Patient was more comfortable and restful the previous night on Bipap. Jose RT stated that he didn't feel comfortable putting patient on BIPAP as she has no orders to do so. Jose assessed pt bedside and questioned the sleep study she was currently on. Per him, that specific sleep study determines if a pt needs a sleep study outpatient for a CPAP. Marisol Diaz NP paged per RT's request to discuss details about sleep study. They determined the sleep study could be stopped as it was not necessary. Sleep study and the overnight ABG discontinued around 0130. Around 0330 when assessing patient she was very lethargic, axillary temp was 95.8. Marisol Diaz made aware. She stated to get a rectal temp as it would be more accurate, rectal tempeture 97.9. NP made aware. New orders placed for a VBG. VBG obtained pH 7.1, CO2 122.4. Doctor Precious Pulido made aware of results and put in orders for patient to be put on BiPAP, RT made aware. RT started patient on BiPAP around 0545 and ABG obtained, pH 7.1 and CO2 118. Patient moved to a room closer to the nurses station with a camera to ensure patient isn't pulling off mask. Temperature rechecked at 0615 and now 97.2 axillary. Catrachita daughter, Alexandra, updated on events that happened overnight and pt's

## 2023-12-19 LAB
ALBUMIN SERPL-MCNC: 3.2 G/DL (ref 3.4–5)
ANION GAP SERPL CALCULATED.3IONS-SCNC: 3 MMOL/L (ref 3–16)
BASOPHILS # BLD: 0 K/UL (ref 0–0.2)
BASOPHILS NFR BLD: 0.8 %
BUN SERPL-MCNC: 12 MG/DL (ref 7–20)
CALCIUM SERPL-MCNC: 8.8 MG/DL (ref 8.3–10.6)
CHLORIDE SERPL-SCNC: 93 MMOL/L (ref 99–110)
CO2 SERPL-SCNC: 43 MMOL/L (ref 21–32)
CREAT SERPL-MCNC: <0.5 MG/DL (ref 0.6–1.2)
DEPRECATED RDW RBC AUTO: 16.8 % (ref 12.4–15.4)
EOSINOPHIL # BLD: 0 K/UL (ref 0–0.6)
EOSINOPHIL NFR BLD: 0.5 %
GFR SERPLBLD CREATININE-BSD FMLA CKD-EPI: >60 ML/MIN/{1.73_M2}
GLUCOSE SERPL-MCNC: 82 MG/DL (ref 70–99)
HCT VFR BLD AUTO: 27.3 % (ref 36–48)
HGB BLD-MCNC: 8.5 G/DL (ref 12–16)
HGB BLD-MCNC: 8.5 G/DL (ref 12–16)
HGB BLD-MCNC: 9.2 G/DL (ref 12–16)
LYMPHOCYTES # BLD: 0.6 K/UL (ref 1–5.1)
LYMPHOCYTES NFR BLD: 10.4 %
MCH RBC QN AUTO: 23.9 PG (ref 26–34)
MCHC RBC AUTO-ENTMCNC: 31 G/DL (ref 31–36)
MCV RBC AUTO: 77.1 FL (ref 80–100)
MONOCYTES # BLD: 0.7 K/UL (ref 0–1.3)
MONOCYTES NFR BLD: 11.8 %
NEUTROPHILS # BLD: 4.4 K/UL (ref 1.7–7.7)
NEUTROPHILS NFR BLD: 76.5 %
PHOSPHATE SERPL-MCNC: 3.5 MG/DL (ref 2.5–4.9)
PLATELET # BLD AUTO: 167 K/UL (ref 135–450)
PMV BLD AUTO: 7.7 FL (ref 5–10.5)
POTASSIUM SERPL-SCNC: 4.2 MMOL/L (ref 3.5–5.1)
RBC # BLD AUTO: 3.55 M/UL (ref 4–5.2)
SODIUM SERPL-SCNC: 139 MMOL/L (ref 136–145)
WBC # BLD AUTO: 5.7 K/UL (ref 4–11)

## 2023-12-19 PROCEDURE — 2580000003 HC RX 258

## 2023-12-19 PROCEDURE — 94761 N-INVAS EAR/PLS OXIMETRY MLT: CPT

## 2023-12-19 PROCEDURE — 94640 AIRWAY INHALATION TREATMENT: CPT

## 2023-12-19 PROCEDURE — 6370000000 HC RX 637 (ALT 250 FOR IP): Performed by: NURSE PRACTITIONER

## 2023-12-19 PROCEDURE — 6360000002 HC RX W HCPCS

## 2023-12-19 PROCEDURE — 80069 RENAL FUNCTION PANEL: CPT

## 2023-12-19 PROCEDURE — 1200000000 HC SEMI PRIVATE

## 2023-12-19 PROCEDURE — 6370000000 HC RX 637 (ALT 250 FOR IP): Performed by: INTERNAL MEDICINE

## 2023-12-19 PROCEDURE — 2700000000 HC OXYGEN THERAPY PER DAY

## 2023-12-19 PROCEDURE — 94660 CPAP INITIATION&MGMT: CPT

## 2023-12-19 PROCEDURE — 99233 SBSQ HOSP IP/OBS HIGH 50: CPT | Performed by: INTERNAL MEDICINE

## 2023-12-19 PROCEDURE — 85025 COMPLETE CBC W/AUTO DIFF WBC: CPT

## 2023-12-19 PROCEDURE — 6360000002 HC RX W HCPCS: Performed by: INTERNAL MEDICINE

## 2023-12-19 PROCEDURE — 85018 HEMOGLOBIN: CPT

## 2023-12-19 PROCEDURE — 6370000000 HC RX 637 (ALT 250 FOR IP): Performed by: HOSPITALIST

## 2023-12-19 PROCEDURE — 2060000000 HC ICU INTERMEDIATE R&B

## 2023-12-19 PROCEDURE — 36591 DRAW BLOOD OFF VENOUS DEVICE: CPT

## 2023-12-19 RX ORDER — LORAZEPAM 0.5 MG/1
0.5 TABLET ORAL ONCE
Status: DISCONTINUED | OUTPATIENT
Start: 2023-12-19 | End: 2024-01-04 | Stop reason: HOSPADM

## 2023-12-19 RX ADMIN — FUROSEMIDE 20 MG: 10 INJECTION, SOLUTION INTRAMUSCULAR; INTRAVENOUS at 09:12

## 2023-12-19 RX ADMIN — SODIUM CHLORIDE, PRESERVATIVE FREE 10 ML: 5 INJECTION INTRAVENOUS at 20:36

## 2023-12-19 RX ADMIN — SODIUM CHLORIDE, PRESERVATIVE FREE 10 ML: 5 INJECTION INTRAVENOUS at 08:56

## 2023-12-19 RX ADMIN — IPRATROPIUM BROMIDE AND ALBUTEROL SULFATE 1 DOSE: 2.5; .5 SOLUTION RESPIRATORY (INHALATION) at 20:50

## 2023-12-19 RX ADMIN — IPRATROPIUM BROMIDE AND ALBUTEROL SULFATE 1 DOSE: 2.5; .5 SOLUTION RESPIRATORY (INHALATION) at 08:03

## 2023-12-19 RX ADMIN — BUDESONIDE 250 MCG: 0.25 INHALANT RESPIRATORY (INHALATION) at 08:03

## 2023-12-19 RX ADMIN — ARFORMOTEROL TARTRATE 15 MCG: 15 SOLUTION RESPIRATORY (INHALATION) at 20:50

## 2023-12-19 RX ADMIN — ENOXAPARIN SODIUM 40 MG: 100 INJECTION SUBCUTANEOUS at 08:56

## 2023-12-19 RX ADMIN — IPRATROPIUM BROMIDE AND ALBUTEROL SULFATE 1 DOSE: 2.5; .5 SOLUTION RESPIRATORY (INHALATION) at 16:36

## 2023-12-19 RX ADMIN — DIBASIC SODIUM PHOSPHATE, MONOBASIC POTASSIUM PHOSPHATE AND MONOBASIC SODIUM PHOSPHATE 1 TABLET: 852; 155; 130 TABLET ORAL at 20:35

## 2023-12-19 RX ADMIN — DIBASIC SODIUM PHOSPHATE, MONOBASIC POTASSIUM PHOSPHATE AND MONOBASIC SODIUM PHOSPHATE 1 TABLET: 852; 155; 130 TABLET ORAL at 11:50

## 2023-12-19 RX ADMIN — ARFORMOTEROL TARTRATE 15 MCG: 15 SOLUTION RESPIRATORY (INHALATION) at 08:03

## 2023-12-19 RX ADMIN — BUDESONIDE 250 MCG: 0.25 INHALANT RESPIRATORY (INHALATION) at 20:50

## 2023-12-19 RX ADMIN — ATORVASTATIN CALCIUM 10 MG: 10 TABLET, FILM COATED ORAL at 11:50

## 2023-12-19 RX ADMIN — FUROSEMIDE 20 MG: 10 INJECTION, SOLUTION INTRAMUSCULAR; INTRAVENOUS at 16:56

## 2023-12-19 RX ADMIN — Medication 5 MG: at 20:35

## 2023-12-19 RX ADMIN — IPRATROPIUM BROMIDE AND ALBUTEROL SULFATE 1 DOSE: 2.5; .5 SOLUTION RESPIRATORY (INHALATION) at 11:28

## 2023-12-19 NOTE — PROGRESS NOTES
4 Eyes Skin Assessment     NAME:  Catrachita Segal  YOB: 1945  MEDICAL RECORD NUMBER:  [id number]    The patient is being assessed for  Shift Handoff    I agree that at least one RN has performed a thorough Head to Toe Skin Assessment on the patient. ALL assessment sites listed below have been assessed.      Areas assessed by both nurses: Vivien BE    Head, Face, Ears, Shoulders, Back, Chest, Arms, Elbows, Hands, Sacrum. Buttock, Coccyx, Ischium, Legs. Feet and Heels, and Under Medical Devices         Does the Patient have a Wound? Yes wound(s) were present on assessment. LDA wound assessment was Initiated and completed by RN       Selwyn Prevention initiated by RN: Yes  Wound Care Orders initiated by RN: Yes    Pressure Injury (Stage 3,4, Unstageable, DTI, NWPT, and Complex wounds) if present, place Wound referral order by RN under : Yes    New Ostomies, if present place, Ostomy referral order under : No     Nurse 1 eSignature: Electronically signed by Shawn Patel RN on 12/19/23 at 6:49 PM EST    **SHARE this note so that the co-signing nurse can place an eSignature**    Nurse 2 eSignature: Electronically signed by Valentina Sanchez RN on 12/19/23 at 9:37 PM EST

## 2023-12-19 NOTE — PROGRESS NOTES
BACTERIA Rare 12/17/2023 06:57 PM    CLARITYU Clear 12/17/2023 06:57 PM    SPECGRAV 1.025 12/17/2023 06:57 PM    LEUKOCYTESUR SMALL 12/17/2023 06:57 PM    UROBILINOGEN 4.0 12/17/2023 06:57 PM    BILIRUBINUR Negative 12/17/2023 06:57 PM    BLOODU Negative 12/17/2023 06:57 PM    GLUCOSEU Negative 12/17/2023 06:57 PM    KETUA Negative 12/17/2023 06:57 PM     Urine Cultures:   Lab Results   Component Value Date/Time    LABURIN  12/05/2023 09:45 PM     <10,000 CFU/ml mixed skin/urogenital alexander. No further workup     Blood Cultures:   Lab Results   Component Value Date/Time    BC No Growth after 4 days of incubation. 12/08/2023 08:51 AM     Lab Results   Component Value Date/Time    BLOODCULT2 No Growth after 4 days of incubation. 12/08/2023 08:52 AM     Organism:   Lab Results   Component Value Date/Time    ORG Staphylococcus epidermidis DNA Detected 12/06/2023 01:15 AM    ORG Staphylococcus epidermidis 12/06/2023 01:15 AM    ORG Kocuria rhizophila 12/06/2023 01:15 AM         Electronically signed by Arron Barron MD on 12/19/2023 at 7:49 AM

## 2023-12-19 NOTE — PROGRESS NOTES
Patient: Catrachita Segal, 78 y.o.    Admit Date: 12/5/2023    Reason for consult: Hypercapnic respiratory failure    HPI  Patient is a 78-year-old female with past medical history metastatic lung adenocarcinoma in remission for 7 years, tobacco use with 11-pack-year history, and COPD presents with altered mental status found to be in hypercapnic respiratory acidosis.    Has history of non small cell lung cancer stage IVB, diagnosed on 3/30/17.  She is on third line BRAF targeted therapy since 1/2019.  Scans were stable however she has progressive wt loss since July 2021. Weighed 166 lbs in 7/2021, decreased to 107 lbs in 11/2023 but seemingly has gone up to 130 lbs now.    Seen by Dr. Donaldson on 11/12 for hypoxia and hypercapnia, failed BiPAP and required intubation.  Extubated on 11/13.  Received steroid and Lasix treatment.  Seen by Dr. Figueroa outpatient on 11/29 at which time she was on 1 L O2 at rest and 2 with ambulation.  Continued on Trelegy.  Admitted again on 12/5 and diagnosed with COPD exacerbation and respiratory acidosis requiring intubation and ventilation support.  Extubated on 12/17.    She uses a CPAP at home for SHANNA and is compliant with this.  Was trialed on AVAPS overnight with good response and improved mentation this morning when I met with her.  Home machine was assessed yesterday by Dr. Armando here.  Found to be an auto BiPAP not a CPAP IPAP 25, EPAP 13, PS 4.  AVAPS settings tried overnight for EPAP 5, PS 15/20 and peak pressure 25 with .    When I saw her VT was 450.  Patient reported good appetite he was alert and oriented x 4 and cognitively intact.  SpO2 during my visit with her today, 12/19, was 100%.  I noted improved ABGs overnight as a result of AVAPS.    Subjective:     Patient has complaints of hunger.     Scheduled Meds:   ipratropium 0.5 mg-albuterol 2.5 mg  1 Dose Inhalation Q4H WA RT    furosemide  20 mg IntraVENous BID    [Held by provider] enoxaparin  40 mg

## 2023-12-19 NOTE — PLAN OF CARE
Problem: Safety - Adult  Goal: Free from fall injury  Note: Pt is a high fall risk (see Micha Fall Risk assessment).  Oriented pt to room and call light. Instructed pt to call for help when needed.  Call light and personal items within reach.  Bed in lowest position, brakes on, and 2/4 side rails up.  Non-skid footwear on. Falls risk stop sign on door; hourly visual checks implemented.  Falls risk arm band on pt.  Bed alarm is on.  Pt using call light appropriately.  Will continue to monitor.       Problem: Skin/Tissue Integrity  Goal: Absence of new skin breakdown  Description: 1.  Monitor for areas of redness and/or skin breakdown  2.  Assess vascular access sites hourly  3.  Every 4-6 hours minimum:  Change oxygen saturation probe site  4.  Every 4-6 hours:  If on nasal continuous positive airway pressure, respiratory therapy assess nares and determine need for appliance change or resting period.  Note: Pillow support, turns q 2 hour, frequent hygiene, mepilex to sacrum, pillows under legs to relief pressure off heels.

## 2023-12-19 NOTE — PROGRESS NOTES
Physical Therapy/Occupational Therapy  Hold note    Attempted to see pt this am and RN reports she is still on bipap. Will f/u per POC.       Kathryn Tadeo, PT   Precious Johnston, MOT, OTR/L

## 2023-12-20 ENCOUNTER — APPOINTMENT (OUTPATIENT)
Dept: CT IMAGING | Age: 78
DRG: 208 | End: 2023-12-20
Payer: MEDICARE

## 2023-12-20 ENCOUNTER — APPOINTMENT (OUTPATIENT)
Dept: GENERAL RADIOLOGY | Age: 78
DRG: 208 | End: 2023-12-20
Payer: MEDICARE

## 2023-12-20 LAB
ALBUMIN SERPL-MCNC: 3.1 G/DL (ref 3.4–5)
ANION GAP SERPL CALCULATED.3IONS-SCNC: 2 MMOL/L (ref 3–16)
BASOPHILS # BLD: 0 K/UL (ref 0–0.2)
BASOPHILS NFR BLD: 0.4 %
BUN SERPL-MCNC: 12 MG/DL (ref 7–20)
CALCIUM SERPL-MCNC: 8.3 MG/DL (ref 8.3–10.6)
CHLORIDE SERPL-SCNC: 92 MMOL/L (ref 99–110)
CO2 SERPL-SCNC: 46 MMOL/L (ref 21–32)
CREAT SERPL-MCNC: <0.5 MG/DL (ref 0.6–1.2)
DEPRECATED RDW RBC AUTO: 16.8 % (ref 12.4–15.4)
EOSINOPHIL # BLD: 0 K/UL (ref 0–0.6)
EOSINOPHIL NFR BLD: 0.8 %
GFR SERPLBLD CREATININE-BSD FMLA CKD-EPI: >60 ML/MIN/{1.73_M2}
GLUCOSE SERPL-MCNC: 116 MG/DL (ref 70–99)
HCT VFR BLD AUTO: 26.4 % (ref 36–48)
HGB BLD-MCNC: 8.2 G/DL (ref 12–16)
LYMPHOCYTES # BLD: 0.6 K/UL (ref 1–5.1)
LYMPHOCYTES NFR BLD: 10.1 %
MAGNESIUM SERPL-MCNC: 1.6 MG/DL (ref 1.8–2.4)
MCH RBC QN AUTO: 24 PG (ref 26–34)
MCHC RBC AUTO-ENTMCNC: 31.2 G/DL (ref 31–36)
MCV RBC AUTO: 77 FL (ref 80–100)
MONOCYTES # BLD: 0.8 K/UL (ref 0–1.3)
MONOCYTES NFR BLD: 13.5 %
NEUTROPHILS # BLD: 4.4 K/UL (ref 1.7–7.7)
NEUTROPHILS NFR BLD: 75.2 %
NT-PROBNP SERPL-MCNC: 535 PG/ML (ref 0–449)
PHOSPHATE SERPL-MCNC: 3.2 MG/DL (ref 2.5–4.9)
PLATELET # BLD AUTO: 162 K/UL (ref 135–450)
PMV BLD AUTO: 8 FL (ref 5–10.5)
POTASSIUM SERPL-SCNC: 3.2 MMOL/L (ref 3.5–5.1)
RBC # BLD AUTO: 3.43 M/UL (ref 4–5.2)
SODIUM SERPL-SCNC: 140 MMOL/L (ref 136–145)
WBC # BLD AUTO: 5.9 K/UL (ref 4–11)

## 2023-12-20 PROCEDURE — 97530 THERAPEUTIC ACTIVITIES: CPT

## 2023-12-20 PROCEDURE — 80069 RENAL FUNCTION PANEL: CPT

## 2023-12-20 PROCEDURE — 71046 X-RAY EXAM CHEST 2 VIEWS: CPT

## 2023-12-20 PROCEDURE — 6360000002 HC RX W HCPCS

## 2023-12-20 PROCEDURE — 94761 N-INVAS EAR/PLS OXIMETRY MLT: CPT

## 2023-12-20 PROCEDURE — 36591 DRAW BLOOD OFF VENOUS DEVICE: CPT

## 2023-12-20 PROCEDURE — 94640 AIRWAY INHALATION TREATMENT: CPT

## 2023-12-20 PROCEDURE — 83735 ASSAY OF MAGNESIUM: CPT

## 2023-12-20 PROCEDURE — 85025 COMPLETE CBC W/AUTO DIFF WBC: CPT

## 2023-12-20 PROCEDURE — 6360000002 HC RX W HCPCS: Performed by: HOSPITALIST

## 2023-12-20 PROCEDURE — 6360000002 HC RX W HCPCS: Performed by: INTERNAL MEDICINE

## 2023-12-20 PROCEDURE — 94660 CPAP INITIATION&MGMT: CPT

## 2023-12-20 PROCEDURE — 6370000000 HC RX 637 (ALT 250 FOR IP): Performed by: INTERNAL MEDICINE

## 2023-12-20 PROCEDURE — 71250 CT THORAX DX C-: CPT

## 2023-12-20 PROCEDURE — 1200000000 HC SEMI PRIVATE

## 2023-12-20 PROCEDURE — 2700000000 HC OXYGEN THERAPY PER DAY

## 2023-12-20 PROCEDURE — 6370000000 HC RX 637 (ALT 250 FOR IP): Performed by: NURSE PRACTITIONER

## 2023-12-20 PROCEDURE — 2580000003 HC RX 258

## 2023-12-20 PROCEDURE — 99233 SBSQ HOSP IP/OBS HIGH 50: CPT | Performed by: INTERNAL MEDICINE

## 2023-12-20 PROCEDURE — 97535 SELF CARE MNGMENT TRAINING: CPT

## 2023-12-20 PROCEDURE — 2060000000 HC ICU INTERMEDIATE R&B

## 2023-12-20 PROCEDURE — 6370000000 HC RX 637 (ALT 250 FOR IP): Performed by: HOSPITALIST

## 2023-12-20 PROCEDURE — 83880 ASSAY OF NATRIURETIC PEPTIDE: CPT

## 2023-12-20 RX ORDER — MAGNESIUM SULFATE IN WATER 40 MG/ML
2000 INJECTION, SOLUTION INTRAVENOUS ONCE
Status: COMPLETED | OUTPATIENT
Start: 2023-12-20 | End: 2023-12-20

## 2023-12-20 RX ORDER — FUROSEMIDE 10 MG/ML
20 INJECTION INTRAMUSCULAR; INTRAVENOUS DAILY
Status: DISCONTINUED | OUTPATIENT
Start: 2023-12-20 | End: 2023-12-21

## 2023-12-20 RX ADMIN — Medication 5 MG: at 19:35

## 2023-12-20 RX ADMIN — BUDESONIDE 250 MCG: 0.25 INHALANT RESPIRATORY (INHALATION) at 19:54

## 2023-12-20 RX ADMIN — POTASSIUM BICARBONATE 40 MEQ: 391 TABLET, EFFERVESCENT ORAL at 08:54

## 2023-12-20 RX ADMIN — FUROSEMIDE 20 MG: 10 INJECTION, SOLUTION INTRAMUSCULAR; INTRAVENOUS at 18:12

## 2023-12-20 RX ADMIN — SODIUM CHLORIDE, PRESERVATIVE FREE 10 ML: 5 INJECTION INTRAVENOUS at 19:35

## 2023-12-20 RX ADMIN — MAGNESIUM SULFATE HEPTAHYDRATE 2000 MG: 40 INJECTION, SOLUTION INTRAVENOUS at 13:35

## 2023-12-20 RX ADMIN — IPRATROPIUM BROMIDE AND ALBUTEROL SULFATE 1 DOSE: 2.5; .5 SOLUTION RESPIRATORY (INHALATION) at 07:34

## 2023-12-20 RX ADMIN — ARFORMOTEROL TARTRATE 15 MCG: 15 SOLUTION RESPIRATORY (INHALATION) at 19:54

## 2023-12-20 RX ADMIN — SODIUM CHLORIDE, PRESERVATIVE FREE 10 ML: 5 INJECTION INTRAVENOUS at 07:59

## 2023-12-20 RX ADMIN — ATORVASTATIN CALCIUM 10 MG: 10 TABLET, FILM COATED ORAL at 07:59

## 2023-12-20 RX ADMIN — IPRATROPIUM BROMIDE AND ALBUTEROL SULFATE 1 DOSE: 2.5; .5 SOLUTION RESPIRATORY (INHALATION) at 19:55

## 2023-12-20 RX ADMIN — BUDESONIDE 250 MCG: 0.25 INHALANT RESPIRATORY (INHALATION) at 07:34

## 2023-12-20 RX ADMIN — IPRATROPIUM BROMIDE AND ALBUTEROL SULFATE 1 DOSE: 2.5; .5 SOLUTION RESPIRATORY (INHALATION) at 11:48

## 2023-12-20 RX ADMIN — DIBASIC SODIUM PHOSPHATE, MONOBASIC POTASSIUM PHOSPHATE AND MONOBASIC SODIUM PHOSPHATE 1 TABLET: 852; 155; 130 TABLET ORAL at 07:59

## 2023-12-20 RX ADMIN — DIBASIC SODIUM PHOSPHATE, MONOBASIC POTASSIUM PHOSPHATE AND MONOBASIC SODIUM PHOSPHATE 1 TABLET: 852; 155; 130 TABLET ORAL at 19:35

## 2023-12-20 RX ADMIN — ARFORMOTEROL TARTRATE 15 MCG: 15 SOLUTION RESPIRATORY (INHALATION) at 07:33

## 2023-12-20 NOTE — PROGRESS NOTES
4 Eyes Skin Assessment     NAME:  Catrachita Segal  YOB: 1945  MEDICAL RECORD NUMBER:  [id number]    The patient is being assessed for  Shift Handoff    I agree that at least one RN has performed a thorough Head to Toe Skin Assessment on the patient. ALL assessment sites listed below have been assessed.      Areas assessed by both nurses:    Head, Face, Ears, Shoulders, Back, Chest, Arms, Elbows, Hands, Sacrum. Buttock, Coccyx, Ischium, Legs. Feet and Heels, and Under Medical Devices         Does the Patient have a Wound? Yes wound(s) were present on assessment. LDA wound assessment was Initiated and completed by RN       Selwyn Prevention initiated by RN: Yes  Wound Care Orders initiated by RN: No    Pressure Injury (Stage 3,4, Unstageable, DTI, NWPT, and Complex wounds) if present, place Wound referral order by RN under : No    New Ostomies, if present place, Ostomy referral order under : No     Nurse 1 eSignature: Electronically signed by Valentina Sanchez RN on 12/19/23 at 9:39 PM EST    **SHARE this note so that the co-signing nurse can place an eSignature**    Nurse 2 eSignature: Electronically signed by Shawn Patel RN on 12/20/23 at 7:29 PM EST

## 2023-12-20 NOTE — PROGRESS NOTES
Pulmonary & Critical Care Medicine    Admit Date: 2023  PCP: Napoleon Stiles MD    CC:  Assess BiPAP need   Events of Last 24 hours:   Overall she is doing OK.    She was on NC this morning.  There was no respiratory distress.      Brief history  Known hx of lung cancer.  She was seen by Dr. Donaldson on  for hypoxia and hypercapnia, failed BiPAP and required intubation.    She was extubated on .  She was treated with steroids and lasix  She was seen by Dr. Garcia in the office on .  At that time she was on 1L O2 at rest and 2 with ambulation.  She was continued on Trelegy.    She was readmitted on  and was diagnosed with COPD exacerbation / respiratory acidosis requiring vent support.  She was extubated on .      Vitals:  Tmax:  VITALS:  /74   Pulse (!) 101   Temp 97.6 °F (36.4 °C) (Oral)   Resp 24   Ht 1.676 m (5' 5.98\")   Wt 57 kg (125 lb 10.6 oz)   SpO2 98%   BMI 20.29 kg/m²   24HR INTAKE/OUTPUT:    Intake/Output Summary (Last 24 hours) at 2023 1735  Last data filed at 2023 1515  Gross per 24 hour   Intake 720 ml   Output 1100 ml   Net -380 ml     CURRENT PULSE OXIMETRY:  SpO2: 98 %  24HR PULSE OXIMETRY RANGE:  SpO2  Av.7 %  Min: 92 %  Max: 100 %    EXAM:  General: No distress. Alert.  Eyes: PERRL. No sclera icterus. No conjunctival injection.  ENT: No discharge. Moist oral mucosa   Neck: Trachea midline. Neck is supple   Resp: No accessory muscle use. Diminished air entry.    CV: Regular rate. Regular rhythm. No mumur or rub. Trace edema.  JVD is elevated.    GI: Non-tender. Non-distended.  Normal bowel sounds.   Neuro: Awake. Speech is clear.    Psych: No anxiety or agitation.     Medications:    IV:   sodium chloride           Scheduled Meds:   LORazepam  0.5 mg Oral Once    ipratropium 0.5 mg-albuterol 2.5 mg  1 Dose Inhalation Q4H WA RT    [Held by provider] enoxaparin  40 mg SubCUTAneous Daily    melatonin  5 mg Oral Nightly    phosphorus  250 mg

## 2023-12-20 NOTE — PLAN OF CARE
Problem: Safety - Adult  Goal: Free from fall injury  Note: Pt is a high fall risk (see Micha Fall Risk assessment).  Oriented pt to room and call light. Instructed pt to call for help when needed.  Call light and personal items within reach.  Bed in lowest position, brakes on, and 2/4 side rails up.  Non-skid footwear on. Falls risk stop sign on door; hourly visual checks implemented.  Falls risk arm band on pt.  Bed alarm is on.  Pt using call light appropriately.  Will continue to monitor.       Problem: Skin/Tissue Integrity  Goal: Absence of new skin breakdown  Description: 1.  Monitor for areas of redness and/or skin breakdown  2.  Assess vascular access sites hourly  3.  Every 4-6 hours minimum:  Change oxygen saturation probe site  4.  Every 4-6 hours:  If on nasal continuous positive airway pressure, respiratory therapy assess nares and determine need for appliance change or resting period.  Note: Turns q 2 hours, pillow support, frequent hygiene, heels elevated off bed, mepilex to sacrum.

## 2023-12-20 NOTE — PROGRESS NOTES
Occupational Therapy  Facility/Department: [address]  Occupational Therapy Treatment    Name: Catrachita Segal  : 1945  MRN: [id number]  Date of Service: 2023    Discharge Recommendations:  24 hour supervision or assist, Home with Home health OT  OT Equipment Recommendations  Other: shower chair- son will bring pt's shower chair to daughter's home where pt now lives       Patient Diagnosis(es): The primary encounter diagnosis was HCAP (healthcare-associated pneumonia). Diagnoses of Pleural effusion on left, Urinary tract infection without hematuria, site unspecified, Other fatigue, and Bullous emphysema (HCC) were also pertinent to this visit.  Past Medical History:  has a past medical history of Arthritis, CHF (congestive heart failure) (HCC), COPD (chronic obstructive pulmonary disease) (HCC), Hyperlipidemia, Hypertension, and Lung cancer (HCC).  Past Surgical History:  has a past surgical history that includes Tubal ligation; bronchoscopy (2017); and other surgical history (Left, 2017).    Treatment Diagnosis: Impaired ADLs, mobility and activity tolerance      Assessment   Performance deficits / Impairments: Decreased ADL status;Decreased functional mobility ;Decreased endurance;Decreased strength  Assessment: Pt requires 5L of O2 during session and ranged between 87-97% during session with HR flucuating between 107-135. When spO2 decreases, HR elevates. Pt only displaying SOB after standing for ~1 minute. Pt does good PLB technque to recover. Pt performed bed mobility with SBA and transfers with CGA. Pt performed LB dressing w/ SBA. Per RN request, pt returned to bed and not ready to be left in chair at this point yet. Hopeful that as O2 levels improve, pt will be able to be OOB to chair throughout day. Still planning on home d/c with 24hr initial A and HHOT. Will cont to follow on acute OT POC.  Treatment Diagnosis: Impaired ADLs, mobility and activity

## 2023-12-20 NOTE — PROGRESS NOTES
Physical Therapy/Occupational Therapy Attempt    Pt continues to require Bipap this am when therapist attempting pt. PT/OT to continue to hold at this time.    Bhumika Dixon, GILBERT Petersen OTR/L [id number]

## 2023-12-20 NOTE — RT PROTOCOL NOTE
or increased work of breathing using Per Protocol order mode.        4-6 - enter or revise RT Bronchodilator order(s) to two equivalent RT bronchodilator orders with one order with BID Frequency and one order with Frequency of every 4 hours PRN wheezing or increased work of breathing using Per Protocol order mode.        7-10 - enter or revise RT Bronchodilator order(s) to two equivalent RT bronchodilator orders with one order with TID Frequency and one order with Frequency of every 4 hours PRN wheezing or increased work of breathing using Per Protocol order mode.       11-13 - enter or revise RT Bronchodilator order(s) to one equivalent RT bronchodilator order with QID Frequency and an Albuterol order with Frequency of every 4 hours PRN wheezing or increased work of breathing using Per Protocol order mode.      Greater than 13 - enter or revise RT Bronchodilator order(s) to one equivalent RT bronchodilator order with every 4 hours Frequency and an Albuterol order with Frequency of every 2 hours PRN wheezing or increased work of breathing using Per Protocol order mode.     RT to enter RT Home Evaluation for COPD & MDI Assessment order using Per Protocol order mode.    Electronically signed by Kiara Garnett RCP on 12/20/2023 at 7:39 AM

## 2023-12-20 NOTE — PROGRESS NOTES
Physical Therapy  Facility/Department: [address] [facility] [place]  Daily Treatment Note  NAME: Catrachita Segal  : 1945  MRN: [id number]    Date of Service: 2023    Discharge Recommendations:Catrachita Segal scored a 18/24 on the AM-PAC short mobility form. Current research shows that an AM-PAC score of 18 or greater is typically associated with a discharge to the patient's home setting. Based on the patient's AM-PAC score and their current functional mobility deficits, it is recommended that the patient have 2-3 sessions per week of Physical Therapy at d/c to increase the patient's independence.  At this time, this patient demonstrates the endurance and safety to discharge home with home services and a follow up treatment frequency of 2-3x/wk.  Please see assessment section for further patient specific details.    If patient discharges prior to next session this note will serve as a discharge summary.  Please see below for the latest assessment towards goals.          PT Equipment Recommendations  Equipment Needed: No  Other: patient has FWW and wheelchair if needed    Patient Diagnosis(es): The primary encounter diagnosis was HCAP (healthcare-associated pneumonia). Diagnoses of Pleural effusion on left, Urinary tract infection without hematuria, site unspecified, Other fatigue, and Bullous emphysema (HCC) were also pertinent to this visit.    Assessment   Assessment: Pt declined from previous session as she has been confined to bed on Bipap and RN stating that pt is not ready for mobility except at EOB. Pt SBA for bed mobility and CGA with standing and a few side steps. Her SpO2 on 5 LO2 went down to ~87% though this was correlated to her HR which did not necessarily increase with activity. She tended to have a good HR and O2 rate with mobility tasks and would vascillate up/down without relation to mobility. Pt motivated to sit up in chair but RN stating he is not comfortable with that. PT to continue

## 2023-12-20 NOTE — PROGRESS NOTES
V2.0    [facility] [place]      Name:  Catrachita Segal /Age/Sex: 1945  (78 y.o. female)   MRN & CSN:  [id number] & [id number] Encounter Date/Time: 2023 11:59 AM EST   [facility]  [facility]- PCP: Napoleon Stiles MD     Attending:Arron Barron MD       Hospital Day: 16    Assessment and Recommendations   Ms. Catrachita Segal is a 78 year old female with PMHx of arthritis, CHF, COPD, hyperlipidemia, htn, metastatic sebas adenocarcinoma who presented to the ED with worsening fatigue loss of appetite and SOB .       Patient was admitted on 11/10/2023 - 2023 where she was admitted for COPD exacerbation. She was extubated and discharged home     In the ED she became unresponsive and her ABG looked bad so he was intubated . CT showed moderate left-sided pleural effusion, slightly increased from the prior study. Increased left lower lobe airspace density consistent with compressive atelectasis or infiltrate.     She was started on antibiotics, nebs and steroids. She was extubated and slowly improved     Acute hypoxic and hypercapnic resp failure sec to pneumonia, Bilateral pleural effusion and COPD exacerbation  - Recent recurrent admissions seen by Dr. Donaldson on  for hypoxia and hypercapnia, failed BiPAP and required intubation.    She was extubated on .  - Re-admitted on , diagnosed with COPD exacerbation / respiratory acidosis requiring vent support.  She was extubated on .   Completed Levaquin and prednisone  -Worsened hypoxia and hypercapnia   likely 2/2 fluid overload and pleural effusion. See below  -Cont BIPAP    Pleural effusions, Bilateral  Acute on Chronic CHF with preserved EF: POA  - Repeat CXR showed similar findings with possible loculation on the Rt side. 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

## 2023-12-20 NOTE — CARE COORDINATION
Awaiting further O2 testing for bipap at DC.     Pt will dc home w/family. Pt has O2 1L at home through [employer] and a cpap currently.    SW following.  Electronically signed by JYOTHI Briscoe, WERNER on 12/20/2023 at 2:01 PM  [phone]

## 2023-12-21 ENCOUNTER — APPOINTMENT (OUTPATIENT)
Dept: GENERAL RADIOLOGY | Age: 78
DRG: 208 | End: 2023-12-21
Payer: MEDICARE

## 2023-12-21 LAB
ALBUMIN SERPL-MCNC: 3.1 G/DL (ref 3.4–5)
ANION GAP SERPL CALCULATED.3IONS-SCNC: -1 MMOL/L (ref 3–16)
BASOPHILS # BLD: 0 K/UL (ref 0–0.2)
BASOPHILS NFR BLD: 0.6 %
BUN SERPL-MCNC: 6 MG/DL (ref 7–20)
CALCIUM SERPL-MCNC: 8.4 MG/DL (ref 8.3–10.6)
CHLORIDE SERPL-SCNC: 94 MMOL/L (ref 99–110)
CO2 SERPL-SCNC: 48 MMOL/L (ref 21–32)
CREAT SERPL-MCNC: <0.5 MG/DL (ref 0.6–1.2)
DEPRECATED RDW RBC AUTO: 17.1 % (ref 12.4–15.4)
EOSINOPHIL # BLD: 0.1 K/UL (ref 0–0.6)
EOSINOPHIL NFR BLD: 1.9 %
GFR SERPLBLD CREATININE-BSD FMLA CKD-EPI: >60 ML/MIN/{1.73_M2}
GLUCOSE SERPL-MCNC: 97 MG/DL (ref 70–99)
HCT VFR BLD AUTO: 28 % (ref 36–48)
HGB BLD-MCNC: 8.5 G/DL (ref 12–16)
LYMPHOCYTES # BLD: 0.6 K/UL (ref 1–5.1)
LYMPHOCYTES NFR BLD: 11.7 %
MCH RBC QN AUTO: 23.7 PG (ref 26–34)
MCHC RBC AUTO-ENTMCNC: 30.5 G/DL (ref 31–36)
MCV RBC AUTO: 77.6 FL (ref 80–100)
MONOCYTES # BLD: 0.7 K/UL (ref 0–1.3)
MONOCYTES NFR BLD: 13.5 %
NEUTROPHILS # BLD: 3.8 K/UL (ref 1.7–7.7)
NEUTROPHILS NFR BLD: 72.3 %
PHOSPHATE SERPL-MCNC: 3.2 MG/DL (ref 2.5–4.9)
PLATELET # BLD AUTO: 164 K/UL (ref 135–450)
PMV BLD AUTO: 7.9 FL (ref 5–10.5)
POTASSIUM SERPL-SCNC: 3.5 MMOL/L (ref 3.5–5.1)
RBC # BLD AUTO: 3.61 M/UL (ref 4–5.2)
SODIUM SERPL-SCNC: 141 MMOL/L (ref 136–145)
WBC # BLD AUTO: 5.2 K/UL (ref 4–11)

## 2023-12-21 PROCEDURE — 1200000000 HC SEMI PRIVATE

## 2023-12-21 PROCEDURE — 94660 CPAP INITIATION&MGMT: CPT

## 2023-12-21 PROCEDURE — 6360000002 HC RX W HCPCS: Performed by: INTERNAL MEDICINE

## 2023-12-21 PROCEDURE — 2060000000 HC ICU INTERMEDIATE R&B

## 2023-12-21 PROCEDURE — 71045 X-RAY EXAM CHEST 1 VIEW: CPT

## 2023-12-21 PROCEDURE — 99233 SBSQ HOSP IP/OBS HIGH 50: CPT | Performed by: INTERNAL MEDICINE

## 2023-12-21 PROCEDURE — 6370000000 HC RX 637 (ALT 250 FOR IP): Performed by: INTERNAL MEDICINE

## 2023-12-21 PROCEDURE — 6370000000 HC RX 637 (ALT 250 FOR IP)

## 2023-12-21 PROCEDURE — 2580000003 HC RX 258

## 2023-12-21 PROCEDURE — 2580000003 HC RX 258: Performed by: INTERNAL MEDICINE

## 2023-12-21 PROCEDURE — 97535 SELF CARE MNGMENT TRAINING: CPT

## 2023-12-21 PROCEDURE — 94761 N-INVAS EAR/PLS OXIMETRY MLT: CPT

## 2023-12-21 PROCEDURE — 6370000000 HC RX 637 (ALT 250 FOR IP): Performed by: HOSPITALIST

## 2023-12-21 PROCEDURE — 97530 THERAPEUTIC ACTIVITIES: CPT

## 2023-12-21 PROCEDURE — 80069 RENAL FUNCTION PANEL: CPT

## 2023-12-21 PROCEDURE — 85025 COMPLETE CBC W/AUTO DIFF WBC: CPT

## 2023-12-21 PROCEDURE — 94003 VENT MGMT INPAT SUBQ DAY: CPT

## 2023-12-21 PROCEDURE — 6370000000 HC RX 637 (ALT 250 FOR IP): Performed by: NURSE PRACTITIONER

## 2023-12-21 PROCEDURE — 6360000002 HC RX W HCPCS

## 2023-12-21 PROCEDURE — 94640 AIRWAY INHALATION TREATMENT: CPT

## 2023-12-21 PROCEDURE — 2700000000 HC OXYGEN THERAPY PER DAY

## 2023-12-21 RX ORDER — FUROSEMIDE 10 MG/ML
20 INJECTION INTRAMUSCULAR; INTRAVENOUS 2 TIMES DAILY
Status: DISCONTINUED | OUTPATIENT
Start: 2023-12-21 | End: 2024-01-04 | Stop reason: HOSPADM

## 2023-12-21 RX ORDER — POTASSIUM CHLORIDE 20 MEQ/1
40 TABLET, EXTENDED RELEASE ORAL ONCE
Status: COMPLETED | OUTPATIENT
Start: 2023-12-21 | End: 2023-12-21

## 2023-12-21 RX ORDER — WATER 10 ML/10ML
INJECTION INTRAMUSCULAR; INTRAVENOUS; SUBCUTANEOUS
Status: DISPENSED
Start: 2023-12-21 | End: 2023-12-22

## 2023-12-21 RX ADMIN — DIBASIC SODIUM PHOSPHATE, MONOBASIC POTASSIUM PHOSPHATE AND MONOBASIC SODIUM PHOSPHATE 1 TABLET: 852; 155; 130 TABLET ORAL at 08:46

## 2023-12-21 RX ADMIN — WATER 500 MG: 1 INJECTION INTRAMUSCULAR; INTRAVENOUS; SUBCUTANEOUS at 15:52

## 2023-12-21 RX ADMIN — Medication 5 MG: at 20:17

## 2023-12-21 RX ADMIN — ARFORMOTEROL TARTRATE 15 MCG: 15 SOLUTION RESPIRATORY (INHALATION) at 21:19

## 2023-12-21 RX ADMIN — PANTOPRAZOLE SODIUM 40 MG: 40 TABLET, DELAYED RELEASE ORAL at 08:46

## 2023-12-21 RX ADMIN — BUDESONIDE 250 MCG: 0.25 INHALANT RESPIRATORY (INHALATION) at 08:07

## 2023-12-21 RX ADMIN — IPRATROPIUM BROMIDE AND ALBUTEROL SULFATE 1 DOSE: 2.5; .5 SOLUTION RESPIRATORY (INHALATION) at 21:19

## 2023-12-21 RX ADMIN — IPRATROPIUM BROMIDE AND ALBUTEROL SULFATE 1 DOSE: 2.5; .5 SOLUTION RESPIRATORY (INHALATION) at 15:26

## 2023-12-21 RX ADMIN — FUROSEMIDE 20 MG: 10 INJECTION, SOLUTION INTRAMUSCULAR; INTRAVENOUS at 08:46

## 2023-12-21 RX ADMIN — BUDESONIDE 250 MCG: 0.25 INHALANT RESPIRATORY (INHALATION) at 21:19

## 2023-12-21 RX ADMIN — IPRATROPIUM BROMIDE AND ALBUTEROL SULFATE 1 DOSE: 2.5; .5 SOLUTION RESPIRATORY (INHALATION) at 12:31

## 2023-12-21 RX ADMIN — ATORVASTATIN CALCIUM 10 MG: 10 TABLET, FILM COATED ORAL at 08:46

## 2023-12-21 RX ADMIN — FUROSEMIDE 20 MG: 10 INJECTION, SOLUTION INTRAMUSCULAR; INTRAVENOUS at 18:04

## 2023-12-21 RX ADMIN — SODIUM CHLORIDE, PRESERVATIVE FREE 10 ML: 5 INJECTION INTRAVENOUS at 20:21

## 2023-12-21 RX ADMIN — POTASSIUM CHLORIDE 40 MEQ: 1500 TABLET, EXTENDED RELEASE ORAL at 15:54

## 2023-12-21 RX ADMIN — DIBASIC SODIUM PHOSPHATE, MONOBASIC POTASSIUM PHOSPHATE AND MONOBASIC SODIUM PHOSPHATE 1 TABLET: 852; 155; 130 TABLET ORAL at 20:18

## 2023-12-21 RX ADMIN — ARFORMOTEROL TARTRATE 15 MCG: 15 SOLUTION RESPIRATORY (INHALATION) at 08:07

## 2023-12-21 RX ADMIN — SODIUM CHLORIDE, PRESERVATIVE FREE 10 ML: 5 INJECTION INTRAVENOUS at 08:46

## 2023-12-21 RX ADMIN — IPRATROPIUM BROMIDE AND ALBUTEROL SULFATE 1 DOSE: 2.5; .5 SOLUTION RESPIRATORY (INHALATION) at 08:07

## 2023-12-21 NOTE — PROGRESS NOTES
4 Eyes Skin Assessment     NAME:  Catrachita Segal  YOB: 1945  MEDICAL RECORD NUMBER:  [id number]    The patient is being assessed for  Shift Handoff    I agree that at least one RN has performed a thorough Head to Toe Skin Assessment on the patient. ALL assessment sites listed below have been assessed.      Areas assessed by both nurses:    Head, Face, Ears, Shoulders, Back, Chest, Arms, Elbows, Hands, Sacrum. Buttock, Coccyx, Ischium, Legs. Feet and Heels, and Under Medical Devices         Does the Patient have a Wound? Yes wound(s) were present on assessment. LDA wound assessment was Initiated and completed by RN       Selwyn Prevention initiated by RN: Yes  Wound Care Orders initiated by RN: No    Pressure Injury (Stage 3,4, Unstageable, DTI, NWPT, and Complex wounds) if present, place Wound referral order by RN under : No    New Ostomies, if present place, Ostomy referral order under : No     Nurse 1 eSignature: Electronically signed by Valentina Sanchez RN on 12/21/23 at 4:03 AM EST    **SHARE this note so that the co-signing nurse can place an eSignature**    Nurse 2 eSignature: Electronically signed by Tracy Burgess RN, RN on 12/21/23 at 7:25 PM EST

## 2023-12-21 NOTE — PROGRESS NOTES
CLARITYU Clear 12/17/2023 06:57 PM    SPECGRAV 1.025 12/17/2023 06:57 PM    LEUKOCYTESUR SMALL 12/17/2023 06:57 PM    UROBILINOGEN 4.0 12/17/2023 06:57 PM    BILIRUBINUR Negative 12/17/2023 06:57 PM    BLOODU Negative 12/17/2023 06:57 PM    GLUCOSEU Negative 12/17/2023 06:57 PM    KETUA Negative 12/17/2023 06:57 PM     Urine Cultures:   Lab Results   Component Value Date/Time    LABURIN  12/05/2023 09:45 PM     <10,000 CFU/ml mixed skin/urogenital alexander. No further workup     Blood Cultures:   Lab Results   Component Value Date/Time    BC No Growth after 4 days of incubation. 12/08/2023 08:51 AM     Lab Results   Component Value Date/Time    BLOODCULT2 No Growth after 4 days of incubation. 12/08/2023 08:52 AM     Organism:   Lab Results   Component Value Date/Time    ORG Staphylococcus epidermidis DNA Detected 12/06/2023 01:15 AM    ORG Staphylococcus epidermidis 12/06/2023 01:15 AM    ORG Kocuria rhizophila 12/06/2023 01:15 AM         Electronically signed by Arron Barron MD on 12/21/2023 at 8:52 AM

## 2023-12-21 NOTE — PROGRESS NOTES
4 Eyes Skin Assessment     NAME:  Catrachita Segal  YOB: 1945  MEDICAL RECORD NUMBER:  [id number]    The patient is being assessed for  Shift Handoff    I agree that at least one RN has performed a thorough Head to Toe Skin Assessment on the patient. ALL assessment sites listed below have been assessed.      Areas assessed by both nurses: Vivien BE    Head, Face, Ears, Shoulders, Back, Chest, Arms, Elbows, Hands, Sacrum. Buttock, Coccyx, Ischium, Legs. Feet and Heels, and Under Medical Devices         Does the Patient have a Wound? Yes wound(s) were present on assessment. LDA wound assessment was Initiated and completed by RN       Selwyn Prevention initiated by RN: Yes  Wound Care Orders initiated by RN: Yes    Pressure Injury (Stage 3,4, Unstageable, DTI, NWPT, and Complex wounds) if present, place Wound referral order by RN under : No    New Ostomies, if present place, Ostomy referral order under : No     Nurse 1 eSignature: Electronically signed by Shawn Patel RN on 12/20/23 at 7:29 PM EST    **SHARE this note so that the co-signing nurse can place an eSignature**    Nurse 2 eSignature: Electronically signed by Valentina Sanchez RN on 12/20/23 at 10:20 PM EST

## 2023-12-21 NOTE — PROGRESS NOTES
Objective   Vitals     Bed Mobility Training  Bed Mobility Training: No  Balance  Sitting: Intact  Standing: Impaired (CGA with RW)  Standing - Static: Fair  Standing - Dynamic: Fair  Transfer Training  Transfer Training: Yes  Overall Level of Assistance: Contact-guard assistance  Interventions: Safety awareness training;Verbal cues  Sit to Stand: Contact-guard assistance;Assist X1 (from recliner, commode cueing for hand placement)  Stand to Sit: Contact-guard assistance;Assist X1  Toilet Transfer: Contact-guard assistance (from commode twice)  Gait Training  Gait Training: No           Safety Devices  Type of Devices: Nurse notified;Gait belt;Call light within reach;Left in chair;Chair alarm in place  Restraints  Restraints Initially in Place: No       Goals  Short Term Goals  Time Frame for Short Term Goals: discharge - all ongoing 12/15  Short Term Goal 1: patient will perform bed mobility with modified independence  Short Term Goal 2: patient will perform transfers sit<>stand with supervision  Short Term Goal 3: patient will ambulate 300' with FWW and supervision  Short Term Goal 4: patient will ascend/descend 12 steps with unilateral handrail and SBA  Patient Goals   Patient Goals : to go home    Education  Patient Education  Education Given To: Patient  Education Provided: Role of Therapy;Plan of Care;Transfer Training;Fall Prevention Strategies;Energy Conservation  Education Provided Comments: education on the importance of mobility, pursed lip breathing, and getting up in the chair as able, using commmode for urination versus purewick  Education Method: Verbal  Barriers to Learning: None  Education Outcome: Verbalized understanding;Continued education needed    \    Therapy Time   Individual Concurrent Group Co-treatment   Time In 1411         Time Out 1449         Minutes 38         Timed Code Treatment Minutes: 38 Minutes       Bhumika Wolf, PT

## 2023-12-21 NOTE — PROGRESS NOTES
Pulmonary & Critical Care Medicine    Admit Date: 2023  PCP: Napoleon Stiles MD    CC:  Assess BiPAP need   Events of Last 24 hours:   She was sitting on recliner.  She had an episode of anxiety and hypoxia with ambulation.      Brief history  Known hx of lung cancer.  She was seen by Dr. Donaldson on  for hypoxia and hypercapnia, failed BiPAP and required intubation.    She was extubated on .  She was treated with steroids and lasix  She was seen by Dr. Garcia in the office on .  At that time she was on 1L O2 at rest and 2 with ambulation.  She was continued on Trelegy.    She was readmitted on  and was diagnosed with COPD exacerbation / respiratory acidosis requiring vent support.  She was extubated on .      Vitals:  Tmax:  VITALS:  /70   Pulse (!) 110   Temp 97.8 °F (36.6 °C) (Oral)   Resp 25   Ht 1.676 m (5' 5.98\")   Wt 56.9 kg (125 lb 7.1 oz)   SpO2 99%   BMI 20.26 kg/m²   24HR INTAKE/OUTPUT:    Intake/Output Summary (Last 24 hours) at 2023 1306  Last data filed at 2023 1140  Gross per 24 hour   Intake 1110 ml   Output 2125 ml   Net -1015 ml     CURRENT PULSE OXIMETRY:  SpO2: 99 %  24HR PULSE OXIMETRY RANGE:  SpO2  Av.5 %  Min: 94 %  Max: 100 %    EXAM:  General: No distress. Alert.  Eyes: PERRL. No sclera icterus. No conjunctival injection.  ENT: No discharge. Moist oral mucosa   Neck: Trachea midline. Neck is supple   Resp: No accessory muscle use. Diminished air entry.    CV: Regular rate. Regular rhythm. No mumur or rub. Trace edema.  JVD is elevated.    GI: Non-tender. Non-distended.  Normal bowel sounds.   Neuro: Awake. Speech is clear.    Psych: No anxiety or agitation.     Medications:    IV:   sodium chloride           Scheduled Meds:   furosemide  20 mg IntraVENous Daily    LORazepam  0.5 mg Oral Once    ipratropium 0.5 mg-albuterol 2.5 mg  1 Dose Inhalation Q4H WA RT    [Held by provider] enoxaparin  40 mg SubCUTAneous Daily    melatonin  5

## 2023-12-21 NOTE — RT PROTOCOL NOTE
or increased work of breathing using Per Protocol order mode.        4-6 - enter or revise RT Bronchodilator order(s) to two equivalent RT bronchodilator orders with one order with BID Frequency and one order with Frequency of every 4 hours PRN wheezing or increased work of breathing using Per Protocol order mode.        7-10 - enter or revise RT Bronchodilator order(s) to two equivalent RT bronchodilator orders with one order with TID Frequency and one order with Frequency of every 4 hours PRN wheezing or increased work of breathing using Per Protocol order mode.       11-13 - enter or revise RT Bronchodilator order(s) to one equivalent RT bronchodilator order with QID Frequency and an Albuterol order with Frequency of every 4 hours PRN wheezing or increased work of breathing using Per Protocol order mode.      Greater than 13 - enter or revise RT Bronchodilator order(s) to one equivalent RT bronchodilator order with every 4 hours Frequency and an Albuterol order with Frequency of every 2 hours PRN wheezing or increased work of breathing using Per Protocol order mode.     RT to enter RT Home Evaluation for COPD & MDI Assessment order using Per Protocol order mode.    Electronically signed by Luis Miguel aSntizo RCP on 12/20/2023 at 8:05 PM

## 2023-12-21 NOTE — PROGRESS NOTES
12/21/23 1510   Encounter Summary   Encounter Overview/Reason  Interdisciplinary rounds;Follow-up   Service Provided For: Patient and family together   Referral/Consult From: Rounding   Last Encounter  12/21/23   Complexity of Encounter Moderate   Begin Time 1445   End Time  1511   Total Time Calculated 26 min   Assessment/Intervention/Outcome   Assessment Calm;Hopeful;Peaceful   Intervention Active listening;Discussed belief system/Gnosticism practices/maria a;Discussed illness injury and it’s impact;Discussed relationship with God;Empowerment;Life review/Legacy;Prayer (assurance of)/[place];Sustaining Presence/Ministry of presence   Outcome Comfort;Connection/Belonging;Expressed feelings, needs, and concerns;Expressed feelings of Mili, Peace and/or Love;Expressed Gratitude;Peace;Receptive   Plan and Referrals   Plan/Referrals Continue Support (comment)  (Pt is open to further visits for support, .)     -Intern Sarkis Parsons MTS.

## 2023-12-21 NOTE — PROGRESS NOTES
Occupational Therapy  Facility/Department: [address]  Occupational Therapy Treatment    Name: Catrachita Segal  : 1945  MRN: [id number]  Date of Service: 2023    Discharge Recommendations:  24 hour supervision or assist, Home with Home health OT  OT Equipment Recommendations  Other: shower chair- son will bring pt's shower chair to daughter's home where pt now lives       Patient Diagnosis(es): The primary encounter diagnosis was HCAP (healthcare-associated pneumonia). Diagnoses of Pleural effusion on left, Urinary tract infection without hematuria, site unspecified, Other fatigue, and Bullous emphysema (HCC) were also pertinent to this visit.  Past Medical History:  has a past medical history of Arthritis, CHF (congestive heart failure) (HCC), COPD (chronic obstructive pulmonary disease) (HCC), Hyperlipidemia, Hypertension, and Lung cancer (HCC).  Past Surgical History:  has a past surgical history that includes Tubal ligation; bronchoscopy (2017); and other surgical history (Left, 2017).    Treatment Diagnosis: Impaired ADLs, mobility and activity tolerance      Assessment   Performance deficits / Impairments: Decreased ADL status;Decreased functional mobility ;Decreased endurance;Decreased strength  Assessment: Pt now requiring 4L of O2 during session and ranged between 86-99% during session with HR flucuating between 100-120's, which is an improvement from yesterday. Pt up in chair at start of session and returned to recliner at end of session. Pt performed short ambulate from chair to bedside commode with 2WW and CGA. Pt continent of BM this date but required mod A for chris care and brief management. RN present at bedside to apply zinc paste and new mepilex to pts wound on backside. Pt continues to need rest breaks with ax 2/2 SOB. Still planning on home d/c with 24hr initial A and HHOT. Will cont to follow on acute OT POC.  Treatment Diagnosis: Impaired ADLs, mobility

## 2023-12-21 NOTE — PROGRESS NOTES
Comprehensive Nutrition Assessment    RECOMMENDATIONS:  PO Diet: Continue Regular  ONS: Begin Fruit Smoothie bid  Nutrition Education: Education not indicated     NUTRITION ASSESSMENT:   Nutritional summary & status: Follow-up. Pt reports fair appetite with EMR showing 26-50% avg meal intake. Fruit Smoothie not resumed following recent NPO status. Pt agreeable to receive Smoothies bid at breakfast and dinner for increased calories and protein. Pt expressed desire to gain weight. Will resume ONS as requested. Continue to follow.   Admission // PMH: Pneumonia//adenocarcinoma of the lung stage IV, CHF, HTN/HLD    MALNUTRITION ASSESSMENT  Context of Malnutrition: Chronic Illness   Malnutrition Status: Severe malnutrition  Findings of the 6 clinical characteristics of malnutrition (Minimum of 2 out of 6 clinical characteristics is required to make the diagnosis of moderate or severe Protein Calorie Malnutrition based on AND/ASPEN Guidelines):  Energy Intake:  Mild decrease in energy intake (Comment)  Weight Loss:  Greater than 20% over 1 year     Body Fat Loss:  Severe body fat loss Orbital, Triceps, Buccal region   Muscle Mass Loss:  Severe muscle mass loss Clavicles (pectoralis & deltoids), Temples (temporalis)  Fluid Accumulation:  No significant fluid accumulation     Strength:  Not Performed    NUTRITION DIAGNOSIS   Severe malnutrition related to catabolic illness as evidenced by Criteria as identified in malnutrition assessment    Nutrition Monitoring and Evaluation:   Food/Nutrient Intake Outcomes:  Food and Nutrient Intake, Supplement Intake  Physical Signs/Symptoms Outcomes:  Biochemical Data, Nutrition Focused Physical Findings, Weight, Hemodynamic Status     OBJECTIVE DATA: Significant to nutrition assessment  Nutrition Related Findings: LBM12/18. BLE +1 edema. Labs reviewed.  Wounds: Stage II, Pressure Injury (coccyx)  Nutrition Goals: Meet at least 75% of estimated needs, prior to discharge     CURRENT

## 2023-12-21 NOTE — PLAN OF CARE
Problem: Safety - Adult  Goal: Free from fall injury  Outcome: Progressing- pt on bed/chair alarm, educated and verbalized understanding. Encouraged pt to use bedside commode when possible and up to chair for majority of shift. Care continues.      Problem: Skin/Tissue Integrity  Goal: Absence of new skin breakdown  Description: 1.  Monitor for areas of redness and/or skin breakdown  2.  Assess vascular access sites hourly  3.  Every 4-6 hours minimum:  Change oxygen saturation probe site  4.  Every 4-6 hours:  If on nasal continuous positive airway pressure, respiratory therapy assess nares and determine need for appliance change or resting period.  12/21/2023 1705 by Tracy Burgess, RN  Outcome: Progressing- no new skin breakdown noted on shift. Mepilex over sacrum replaced and barrier cream applied, pt readjusting position in bed or chair when prompted by RN every 2 hours.     Problem: Metabolic/Fluid and Electrolytes - Adult  Goal: Electrolytes maintained within normal limits  12/21/2023 1704 by Tracy Burgess, RN  Outcome: Progressing- potassium replaced per orders.     Problem: Cardiovascular - Adult  Goal: Absence of cardiac dysrhythmias or at baseline  Outcome: Progressing- pt with run of vtach this afternoon with significant desat. Dr. Barron notified, orders received and implemented. Since episode, patient has been running sinus tachycardia on telemetry. Care continues.

## 2023-12-21 NOTE — RT PROTOCOL NOTE
orders with one order with TID Frequency and one order with Frequency of every 4 hours PRN wheezing or increased work of breathing using Per Protocol order mode.       11-13 - enter or revise RT Bronchodilator order(s) to one equivalent RT bronchodilator order with QID Frequency and an Albuterol order with Frequency of every 4 hours PRN wheezing or increased work of breathing using Per Protocol order mode.      Greater than 13 - enter or revise RT Bronchodilator order(s) to one equivalent RT bronchodilator order with every 4 hours Frequency and an Albuterol order with Frequency of every 2 hours PRN wheezing or increased work of breathing using Per Protocol order mode.     RT to enter RT Home Evaluation for COPD & MDI Assessment order using Per Protocol order mode.    Electronically signed by Hanane Lima RCP on 12/21/2023 at 8:14 AM

## 2023-12-22 LAB
ALBUMIN SERPL-MCNC: 3.4 G/DL (ref 3.4–5)
ANION GAP SERPL CALCULATED.3IONS-SCNC: 4 MMOL/L (ref 3–16)
BASOPHILS # BLD: 0 K/UL (ref 0–0.2)
BASOPHILS NFR BLD: 0.7 %
BUN SERPL-MCNC: 5 MG/DL (ref 7–20)
CALCIUM SERPL-MCNC: 8.7 MG/DL (ref 8.3–10.6)
CHLORIDE SERPL-SCNC: 97 MMOL/L (ref 99–110)
CO2 SERPL-SCNC: 42 MMOL/L (ref 21–32)
CREAT SERPL-MCNC: <0.5 MG/DL (ref 0.6–1.2)
DEPRECATED RDW RBC AUTO: 17.2 % (ref 12.4–15.4)
EOSINOPHIL # BLD: 0.1 K/UL (ref 0–0.6)
EOSINOPHIL NFR BLD: 2.2 %
GFR SERPLBLD CREATININE-BSD FMLA CKD-EPI: >60 ML/MIN/{1.73_M2}
GLUCOSE SERPL-MCNC: 91 MG/DL (ref 70–99)
HCT VFR BLD AUTO: 30.3 % (ref 36–48)
HGB BLD-MCNC: 9.2 G/DL (ref 12–16)
LYMPHOCYTES # BLD: 0.6 K/UL (ref 1–5.1)
LYMPHOCYTES NFR BLD: 10.7 %
MAGNESIUM SERPL-MCNC: 1.7 MG/DL (ref 1.8–2.4)
MAGNESIUM SERPL-MCNC: 1.7 MG/DL (ref 1.8–2.4)
MCH RBC QN AUTO: 23.9 PG (ref 26–34)
MCHC RBC AUTO-ENTMCNC: 30.4 G/DL (ref 31–36)
MCV RBC AUTO: 78.5 FL (ref 80–100)
MONOCYTES # BLD: 0.7 K/UL (ref 0–1.3)
MONOCYTES NFR BLD: 12.5 %
NEUTROPHILS # BLD: 4 K/UL (ref 1.7–7.7)
NEUTROPHILS NFR BLD: 73.9 %
PHOSPHATE SERPL-MCNC: 3.6 MG/DL (ref 2.5–4.9)
PLATELET # BLD AUTO: 184 K/UL (ref 135–450)
PMV BLD AUTO: 8.4 FL (ref 5–10.5)
POTASSIUM SERPL-SCNC: 3.4 MMOL/L (ref 3.5–5.1)
RBC # BLD AUTO: 3.87 M/UL (ref 4–5.2)
SODIUM SERPL-SCNC: 143 MMOL/L (ref 136–145)
WBC # BLD AUTO: 5.4 K/UL (ref 4–11)

## 2023-12-22 PROCEDURE — 6360000002 HC RX W HCPCS

## 2023-12-22 PROCEDURE — 93005 ELECTROCARDIOGRAM TRACING: CPT | Performed by: HOSPITALIST

## 2023-12-22 PROCEDURE — 2580000003 HC RX 258

## 2023-12-22 PROCEDURE — 94003 VENT MGMT INPAT SUBQ DAY: CPT

## 2023-12-22 PROCEDURE — 94761 N-INVAS EAR/PLS OXIMETRY MLT: CPT

## 2023-12-22 PROCEDURE — 6370000000 HC RX 637 (ALT 250 FOR IP)

## 2023-12-22 PROCEDURE — 6360000002 HC RX W HCPCS: Performed by: INTERNAL MEDICINE

## 2023-12-22 PROCEDURE — 94660 CPAP INITIATION&MGMT: CPT

## 2023-12-22 PROCEDURE — 99233 SBSQ HOSP IP/OBS HIGH 50: CPT | Performed by: INTERNAL MEDICINE

## 2023-12-22 PROCEDURE — 83735 ASSAY OF MAGNESIUM: CPT

## 2023-12-22 PROCEDURE — 36592 COLLECT BLOOD FROM PICC: CPT

## 2023-12-22 PROCEDURE — 85025 COMPLETE CBC W/AUTO DIFF WBC: CPT

## 2023-12-22 PROCEDURE — 6370000000 HC RX 637 (ALT 250 FOR IP): Performed by: NURSE PRACTITIONER

## 2023-12-22 PROCEDURE — 94640 AIRWAY INHALATION TREATMENT: CPT

## 2023-12-22 PROCEDURE — 6370000000 HC RX 637 (ALT 250 FOR IP): Performed by: INTERNAL MEDICINE

## 2023-12-22 PROCEDURE — 2700000000 HC OXYGEN THERAPY PER DAY

## 2023-12-22 PROCEDURE — 1200000000 HC SEMI PRIVATE

## 2023-12-22 PROCEDURE — 6370000000 HC RX 637 (ALT 250 FOR IP): Performed by: HOSPITALIST

## 2023-12-22 PROCEDURE — 2580000003 HC RX 258: Performed by: INTERNAL MEDICINE

## 2023-12-22 PROCEDURE — 36415 COLL VENOUS BLD VENIPUNCTURE: CPT

## 2023-12-22 PROCEDURE — 2060000000 HC ICU INTERMEDIATE R&B

## 2023-12-22 PROCEDURE — 80069 RENAL FUNCTION PANEL: CPT

## 2023-12-22 RX ORDER — MAGNESIUM SULFATE 1 G/100ML
1000 INJECTION INTRAVENOUS ONCE
Status: COMPLETED | OUTPATIENT
Start: 2023-12-22 | End: 2023-12-23

## 2023-12-22 RX ORDER — POTASSIUM CHLORIDE 20 MEQ/1
20 TABLET, EXTENDED RELEASE ORAL ONCE
Status: COMPLETED | OUTPATIENT
Start: 2023-12-22 | End: 2023-12-22

## 2023-12-22 RX ORDER — LANOLIN ALCOHOL/MO/W.PET/CERES
400 CREAM (GRAM) TOPICAL 3 TIMES DAILY
Status: COMPLETED | OUTPATIENT
Start: 2023-12-22 | End: 2023-12-24

## 2023-12-22 RX ORDER — ALBUTEROL SULFATE 2.5 MG/3ML
2.5 SOLUTION RESPIRATORY (INHALATION)
Status: DISCONTINUED | OUTPATIENT
Start: 2023-12-22 | End: 2023-12-24

## 2023-12-22 RX ORDER — WATER 10 ML/10ML
INJECTION INTRAMUSCULAR; INTRAVENOUS; SUBCUTANEOUS
Status: DISPENSED
Start: 2023-12-22 | End: 2023-12-23

## 2023-12-22 RX ORDER — IPRATROPIUM BROMIDE AND ALBUTEROL SULFATE 2.5; .5 MG/3ML; MG/3ML
1 SOLUTION RESPIRATORY (INHALATION) EVERY 4 HOURS PRN
Status: DISCONTINUED | OUTPATIENT
Start: 2023-12-22 | End: 2024-01-04 | Stop reason: HOSPADM

## 2023-12-22 RX ORDER — LANOLIN ALCOHOL/MO/W.PET/CERES
400 CREAM (GRAM) TOPICAL 3 TIMES DAILY
Status: DISCONTINUED | OUTPATIENT
Start: 2023-12-22 | End: 2023-12-22 | Stop reason: SDUPTHER

## 2023-12-22 RX ADMIN — BUDESONIDE 250 MCG: 0.25 INHALANT RESPIRATORY (INHALATION) at 20:03

## 2023-12-22 RX ADMIN — TIOTROPIUM BROMIDE INHALATION SPRAY 2 PUFF: 3.12 SPRAY, METERED RESPIRATORY (INHALATION) at 07:39

## 2023-12-22 RX ADMIN — Medication 400 MG: at 14:04

## 2023-12-22 RX ADMIN — ENOXAPARIN SODIUM 40 MG: 100 INJECTION SUBCUTANEOUS at 08:43

## 2023-12-22 RX ADMIN — SODIUM CHLORIDE, PRESERVATIVE FREE 10 ML: 5 INJECTION INTRAVENOUS at 21:21

## 2023-12-22 RX ADMIN — FUROSEMIDE 20 MG: 10 INJECTION, SOLUTION INTRAMUSCULAR; INTRAVENOUS at 17:59

## 2023-12-22 RX ADMIN — ATORVASTATIN CALCIUM 10 MG: 10 TABLET, FILM COATED ORAL at 08:43

## 2023-12-22 RX ADMIN — ARFORMOTEROL TARTRATE 15 MCG: 15 SOLUTION RESPIRATORY (INHALATION) at 07:39

## 2023-12-22 RX ADMIN — POTASSIUM CHLORIDE 20 MEQ: 1500 TABLET, EXTENDED RELEASE ORAL at 06:22

## 2023-12-22 RX ADMIN — Medication 5 MG: at 21:21

## 2023-12-22 RX ADMIN — PANTOPRAZOLE SODIUM 40 MG: 40 TABLET, DELAYED RELEASE ORAL at 06:23

## 2023-12-22 RX ADMIN — WATER 500 MG: 1 INJECTION INTRAMUSCULAR; INTRAVENOUS; SUBCUTANEOUS at 14:04

## 2023-12-22 RX ADMIN — POTASSIUM BICARBONATE 40 MEQ: 782 TABLET, EFFERVESCENT ORAL at 08:37

## 2023-12-22 RX ADMIN — SODIUM CHLORIDE, PRESERVATIVE FREE 10 ML: 5 INJECTION INTRAVENOUS at 08:43

## 2023-12-22 RX ADMIN — DIBASIC SODIUM PHOSPHATE, MONOBASIC POTASSIUM PHOSPHATE AND MONOBASIC SODIUM PHOSPHATE 1 TABLET: 852; 155; 130 TABLET ORAL at 21:21

## 2023-12-22 RX ADMIN — FUROSEMIDE 20 MG: 10 INJECTION, SOLUTION INTRAMUSCULAR; INTRAVENOUS at 08:43

## 2023-12-22 RX ADMIN — BUDESONIDE 250 MCG: 0.25 INHALANT RESPIRATORY (INHALATION) at 07:39

## 2023-12-22 RX ADMIN — Medication 400 MG: at 21:21

## 2023-12-22 RX ADMIN — ALBUTEROL SULFATE 2.5 MG: 2.5 SOLUTION RESPIRATORY (INHALATION) at 11:15

## 2023-12-22 RX ADMIN — ALBUTEROL SULFATE 2.5 MG: 2.5 SOLUTION RESPIRATORY (INHALATION) at 20:03

## 2023-12-22 RX ADMIN — DIBASIC SODIUM PHOSPHATE, MONOBASIC POTASSIUM PHOSPHATE AND MONOBASIC SODIUM PHOSPHATE 1 TABLET: 852; 155; 130 TABLET ORAL at 08:43

## 2023-12-22 RX ADMIN — ARFORMOTEROL TARTRATE 15 MCG: 15 SOLUTION RESPIRATORY (INHALATION) at 20:03

## 2023-12-22 RX ADMIN — ALBUTEROL SULFATE 2.5 MG: 2.5 SOLUTION RESPIRATORY (INHALATION) at 07:38

## 2023-12-22 RX ADMIN — ALBUTEROL SULFATE 2.5 MG: 2.5 SOLUTION RESPIRATORY (INHALATION) at 16:02

## 2023-12-22 NOTE — PROGRESS NOTES
Pulmonary & Critical Care Medicine    Admit Date: 2023  PCP: Napoleon Stiles MD    CC:  hypoxia   Events of Last 24 hours:   Still requiring 4-6 liters  Worse with exertion     Brief history  Known hx of lung cancer.  She was seen by Dr. Donaldson on  for hypoxia and hypercapnia, failed BiPAP and required intubation.    She was extubated on .  She was treated with steroids and lasix  She was seen by Dr. Garcia in the office on .  At that time she was on 1L O2 at rest and 2 with ambulation.  She was continued on Trelegy.    She was readmitted on  and was diagnosed with COPD exacerbation / respiratory acidosis requiring vent support.  She was extubated on .      Vitals:  Tmax:  VITALS:  /63   Pulse 97   Temp 97.7 °F (36.5 °C) (Oral)   Resp 28   Ht 1.676 m (5' 5.98\")   Wt 56 kg (123 lb 7.3 oz)   SpO2 96%   BMI 19.94 kg/m²   24HR INTAKE/OUTPUT:    Intake/Output Summary (Last 24 hours) at 2023 1203  Last data filed at 2023 0630  Gross per 24 hour   Intake 540 ml   Output 1100 ml   Net -560 ml     CURRENT PULSE OXIMETRY:  SpO2: 96 %  24HR PULSE OXIMETRY RANGE:  SpO2  Av.8 %  Min: 90 %  Max: 100 %    EXAM:  General: No distress. Alert.  Eyes: PERRL. No sclera icterus. No conjunctival injection.  ENT: No discharge. Moist oral mucosa   Neck: Trachea midline. Neck is supple   Resp: No accessory muscle use. Diminished air entry.    CV: Regular rate. Regular rhythm. No mumur or rub. Trace edema.  JVD is elevated.    GI: Non-tender. Non-distended.  Normal bowel sounds.   Neuro: Awake. Speech is clear.    Psych: No anxiety or agitation.     Medications:    IV:   sodium chloride           Scheduled Meds:   magnesium oxide  400 mg Oral TID    albuterol  2.5 mg Nebulization Q4H WA RT    furosemide  20 mg IntraVENous BID    LORazepam  0.5 mg Oral Once    enoxaparin  40 mg SubCUTAneous Daily    melatonin  5 mg Oral Nightly    phosphorus  250 mg Oral BID    atorvastatin  10 mg

## 2023-12-22 NOTE — PLAN OF CARE
Problem: Safety - Adult  Goal: Free from fall injury  12/22/2023 0602 by Kayla Mora, RN  Outcome: Progressing  Pt is a High fall risk.   Explained fall risk precautions to pt and rationale behind their use to keep the patient safe. Belongings are in reach. Pt encouraged to notify staff for any and all assistance.    Pt requests to use an external catheter at night. Educated pt that RN will ambulate pt to bedside commode if wanting to get up to use restroom. Pt verbalizes understanding.     Problem: Hematologic - Adult  Goal: Maintains hematologic stability  Outcome: Progressing  Patient's hemoglobin this AM:   Recent Labs     12/22/23  0446   HGB 9.2*     Patient's platelet count this AM:   Recent Labs     12/22/23  0446       Thrombocytopenia not present at this time.  Patient showing no signs or symptoms of active bleeding.  Transfusion not indicated at this time.  Patient verbalizes understanding of all instructions. Will continue to assess and implement POC. Call light within reach and hourly rounding in place.      Problem: Pain  Goal: Verbalizes/displays adequate comfort level or baseline comfort level  Outcome: Progressing  Pt verbalizes no pain throughout shift.

## 2023-12-22 NOTE — PROGRESS NOTES
Occupational Therapy/Physical Therapy  PT and OT attempt to work with pt. Pt up in chair politely declining due to feeling tired. Pt reports washing up and completing mobility with staff. Will f/u per POC.    Maritza Sainz OTR/L

## 2023-12-22 NOTE — PLAN OF CARE
Problem: Pain  Goal: Verbalizes/displays adequate comfort level or baseline comfort level  12/22/2023 1823 by Tracy Burgess RN  Outcome: Progressing- patient with no complaints of pain during shift. Care continues.     Problem: Skin/Tissue Integrity  Goal: Absence of new skin breakdown  Description: 1.  Monitor for areas of redness and/or skin breakdown  2.  Assess vascular access sites hourly  3.  Every 4-6 hours minimum:  Change oxygen saturation probe site  4.  Every 4-6 hours:  If on nasal continuous positive airway pressure, respiratory therapy assess nares and determine need for appliance change or resting period.  12/22/2023 1823 by Tracy Burgess, RN  Outcome: Progressing- no new skin breakdown noted on shift. Mepilex placed yesterday on coccyx, clean dry and intact. Pt turns and adjusts self, up to chair most of shift.      Problem: Metabolic/Fluid and Electrolytes - Adult  Goal: Electrolytes maintained within normal limits  12/22/2023 1823 by Tracy Burgess, RN  Outcome: Progressing- potassium and mag replaced per orders.     Problem: Cardiovascular - Adult  Goal: Absence of cardiac dysrhythmias or at baseline  12/22/2023 1823 by Tracy Burgess RN  Outcome: Progressing- pt running sinus tachy on tele during shift.      PROGRESS NOTE    By Yasmin Virk D.O GI Fellow    [facility]  Dr. Jaylan Figueroa MD, Dr. Tawnya Marshall MD, Dr Abdoul Baldwin, Dr. Iam De Jesus MD, Dr. Tammy Floyd, DO Naresh Carranza  62 y.o.  male    SUBJECTIVE:    Patient resting comfortably this AM.  He states he is tolerating oral intake. Denies any diarrhea melena hematochezia. OBJECTIVE:    /79   Pulse 100   Temp 97.9 °F (36.6 °C) (Oral)   Resp 16   Ht 5' (1.524 m)   Wt 109 lb 4.8 oz (49.6 kg)   SpO2 92%   BMI 21.35 kg/m²     Gen: NAD, AAO x 3  HEENT:PEERL, no icterus  Heart: RRR, no M/R/G  Lungs: CTAB  Abd.: soft, NT, ND,  Extr.: no C/C/E, right arm with dressing clean dry and intact    Stool (measured) : 0 mL  Lab Results   Component Value Date    WBC 10.4 05/09/2021    WBC 9.6 05/08/2021    WBC 10.3 05/07/2021    HGB 9.7 05/09/2021    HGB 8.7 05/08/2021    HGB 10.2 05/07/2021    HCT 29.8 05/09/2021    MCV 85.4 05/09/2021    RDW 15.9 05/09/2021     05/09/2021     05/08/2021     05/07/2021     Lab Results   Component Value Date     05/09/2021    K 4.1 05/09/2021    K 5.0 03/27/2021    CL 99 05/09/2021    CO2 18 05/09/2021    BUN 14 05/09/2021    CREATININE 1.4 05/09/2021    CALCIUM 7.6 05/09/2021    PROT 4.5 05/09/2021    LABALBU 2.0 05/09/2021    BILITOT 0.5 05/09/2021    BILITOT 0.4 05/08/2021    BILITOT 0.5 05/07/2021    ALKPHOS 89 05/09/2021    ALKPHOS 79 05/08/2021    ALKPHOS 106 05/07/2021    AST 27 05/09/2021    AST 23 05/08/2021    AST 27 05/07/2021    ALT 10 05/09/2021    ALT 10 05/08/2021    ALT 8 05/07/2021     No results found for: LIPASE  No results found for: AMYLASE      ASSESSMENT/PLAN:      1.  Dysphagia  - EGD 5/6 with LA grade C/D erosive esophagitis biopsies were obtained dilation not undertaken due to the acute inflammation  -Placed on Protonix 40 mg oral twice daily along with Carafate  -Repeat EGD in 12 weeks consider dilation at that time  -No indication for PEG tube placement patient with history of Crohn's disease not currently on any treatment. ,  Patient's underlying MS also play a role in his dysphagia  -Patient counseled the need to work with speech therapy    2. Hx of Moderate-Severe Stricturing Crohns Disease   -No signs of acute flare at this time  - Diagnosed in 2012 hx of Terminal ileum resection  - No previous biological or medication   - Hx of  Ct abdomen with high-grade obstruction with significantly dilated proximal small bowel loops with thickened and edematous distal neoterminal ileum  - Colonoscopy 4/2019 with moderate stricture in the very distal \  -Recommend conservative treatment at this time     3. Acute normocytic anemia  - VSS NO overt GI Bleed on exam   - Baseline Hgb 10.2-->14.3   - On therapeutic Lovenox for PE  -Hemoglobin 9.4 this am   -Continue with supportive care     3. Hx of Bilateral acute pulmonary embolisms diagnosed on 1/27/2021  -Okay to restart Eliquis from GI point of view      Pt was discussed with  Dr. Annie Grimes DO  GI Fellow   5/9/2021    I was present at bedside and performed my own exam.  I agree with the above findings and plan. Pt resting this am.  NO melean or hematochezia. Hg did improve today. Continue with PPI, will need F/U in our office given his hx per above. Abd soft, NT, ND BS +. See orders.     DO Teddy  5/9/2021  1:56 PM

## 2023-12-22 NOTE — PROGRESS NOTES
4 Eyes Skin Assessment     NAME:  Catrachita Segal  YOB: 1945  MEDICAL RECORD NUMBER:  [id number]    The patient is being assessed for  Shift Handoff    I agree that at least one RN has performed a thorough Head to Toe Skin Assessment on the patient. ALL assessment sites listed below have been assessed.      Areas assessed by both nurses:    Head, Face, Ears, Shoulders, Back, Chest, Arms, Elbows, Hands, Sacrum. Buttock, Coccyx, Ischium, Legs. Feet and Heels, and Under Medical Devices         Does the Patient have a Wound? Yes wound(s) were present on assessment. LDA wound assessment was Initiated and completed by RN       Selwyn Prevention initiated by RN: Yes  Wound Care Orders initiated by RN: Yes    Pressure Injury (Stage 3,4, Unstageable, DTI, NWPT, and Complex wounds) if present, place Wound referral order by RN under : No    New Ostomies, if present place, Ostomy referral order under : No     Nurse 1 eSignature: Electronically signed by Kayla Mora RN on 12/22/23 at 5:58 AM EST    **SHARE this note so that the co-signing nurse can place an eSignature**    Nurse 2 eSignature: {Esignature:[id number]}

## 2023-12-22 NOTE — PROGRESS NOTES
Pts temperature in the evening was 96.7 F axillary. Unable to take pts oral temperature due to her wearing her BiPAP at night. Reached out to NP Precious Levine. NP said to check rectal temperature.     Rectal temperature was 97.9 F.

## 2023-12-22 NOTE — CARE COORDINATION
Awaiting further O2 testing for bipap at DC.     Pt will dc home w/family. Pt has O2 1L at home through [employer] and a cpap currently.    Per pulm pt is not ready for bipap trial.     SW following.  Electronically signed by JYOTHI Briscoe, WERNER on 12/22/2023 at 2:13 PM  [phone]

## 2023-12-22 NOTE — PROGRESS NOTES
V2.0    [facility] Progress Note      Name:  Catrachita Segal /Age/Sex: 1945  (78 y.o. female)   MRN & CSN:  [id number] & [id number] Encounter Date/Time: 2023 11:59 AM EST   Location:  [facility]- PCP: Napoleon Stiles MD     Attending:Arron Barron MD       Hospital Day: 18    Assessment and Recommendations   Ms. Catrachita Segal is a 78 year old female with PMHx of arthritis, CHF, COPD, hyperlipidemia, htn, metastatic sebas adenocarcinoma who presented to the ED with worsening fatigue loss of appetite and SOB .       Patient was admitted on 11/10/2023 - 2023 where she was admitted for COPD exacerbation. She was extubated and discharged home     In the ED she became unresponsive and her ABG looked bad so he was intubated . CT showed moderate left-sided pleural effusion, slightly increased from the prior study. Increased left lower lobe airspace density consistent with compressive atelectasis or infiltrate.     She was started on antibiotics, nebs and steroids. She was extubated and slowly improved     Acute hypoxic and hypercapnic resp failure sec to pneumonia, Bilateral pleural effusion and COPD exacerbation  - Recent recurrent admissions seen by Dr. Donaldson on  for hypoxia and hypercapnia, failed BiPAP and required intubation.    She was extubated on .  - Re-admitted on , diagnosed with COPD exacerbation / respiratory acidosis requiring vent support.  She was extubated on .   Completed Levaquin and prednisone  -Worsened hypoxia and hypercapnia  likely 2/2 fluid overload and pleural effusion. See below  -Cont BIPAP at night and as needed     Pleural effusions, Bilateral  Acute on Chronic CHF with preserved EF: POA  -CT  showed large LT sided effusion that has slightly increased in size. Pt is being diuresed and she seems to be improving. Labs are better with diamox. 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

## 2023-12-22 NOTE — PROGRESS NOTES
4 Eyes Skin Assessment     NAME:  Catrachita Segal  YOB: 1945  MEDICAL RECORD NUMBER:  [id number]    The patient is being assessed for  Shift Handoff    I agree that at least one RN has performed a thorough Head to Toe Skin Assessment on the patient. ALL assessment sites listed below have been assessed.      Areas assessed by both nurses:    Head, Face, Ears, Shoulders, Back, Chest, Arms, Elbows, Hands, Sacrum. Buttock, Coccyx, Ischium, Legs. Feet and Heels, Under Medical Devices , and Other ***        Does the Patient have a Wound? Yes wound(s) were present on assessment. LDA wound assessment was Initiated and completed by RN       Selwyn Prevention initiated by RN: Yes  Wound Care Orders initiated by RN: Yes    Pressure Injury (Stage 3,4, Unstageable, DTI, NWPT, and Complex wounds) if present, place Wound referral order by RN under : No    New Ostomies, if present place, Ostomy referral order under : No     Nurse 1 eSignature: Electronically signed by Tracy Burgess, RN, RN on 12/22/23 at 3:04 PM EST    **SHARE this note so that the co-signing nurse can place an eSignature**    Nurse 2 eSignature: {Esignature:[id number]}

## 2023-12-22 NOTE — PROGRESS NOTES
4 Eyes Skin Assessment     NAME:  Catrachita Segal  YOB: 1945  MEDICAL RECORD NUMBER:  [id number]    The patient is being assessed for  Shift Handoff    I agree that at least one RN has performed a thorough Head to Toe Skin Assessment on the patient. ALL assessment sites listed below have been assessed.      Areas assessed by both nurses:    Head, Face, Ears, Shoulders, Back, Chest, Arms, Elbows, Hands, Sacrum. Buttock, Coccyx, Ischium, Legs. Feet and Heels, Under Medical Devices , and Other          Does the Patient have a Wound? Yes wound(s) were present on assessment. LDA wound assessment was Initiated and completed by RN       Selwyn Prevention initiated by RN: Yes  Wound Care Orders initiated by RN: Yes    Pressure Injury (Stage 3,4, Unstageable, DTI, NWPT, and Complex wounds) if present, place Wound referral order by RN under : Yes    New Ostomies, if present place, Ostomy referral order under : No     Nurse 1 eSignature: Electronically signed by Tracy Burgess, RN, RN on 12/21/23 at 7:25 PM EST    **SHARE this note so that the co-signing nurse can place an eSignature**    Nurse 2 eSignature: Electronically signed by Kayla Mora RN on 12/22/23 at 5:57 AM EST

## 2023-12-23 LAB
ALBUMIN SERPL-MCNC: 3.2 G/DL (ref 3.4–5)
ANION GAP SERPL CALCULATED.3IONS-SCNC: 7 MMOL/L (ref 3–16)
BASOPHILS # BLD: 0 K/UL (ref 0–0.2)
BASOPHILS NFR BLD: 0.5 %
BUN SERPL-MCNC: 6 MG/DL (ref 7–20)
CALCIUM SERPL-MCNC: 8.7 MG/DL (ref 8.3–10.6)
CHLORIDE SERPL-SCNC: 95 MMOL/L (ref 99–110)
CO2 SERPL-SCNC: 38 MMOL/L (ref 21–32)
CREAT SERPL-MCNC: <0.5 MG/DL (ref 0.6–1.2)
DEPRECATED RDW RBC AUTO: 17.2 % (ref 12.4–15.4)
EKG ATRIAL RATE: 100 BPM
EKG DIAGNOSIS: NORMAL
EKG P AXIS: 60 DEGREES
EKG P-R INTERVAL: 184 MS
EKG Q-T INTERVAL: 364 MS
EKG QRS DURATION: 78 MS
EKG QTC CALCULATION (BAZETT): 469 MS
EKG R AXIS: -12 DEGREES
EKG T AXIS: 29 DEGREES
EKG VENTRICULAR RATE: 100 BPM
EOSINOPHIL # BLD: 0.1 K/UL (ref 0–0.6)
EOSINOPHIL NFR BLD: 2.2 %
GFR SERPLBLD CREATININE-BSD FMLA CKD-EPI: >60 ML/MIN/{1.73_M2}
GLUCOSE SERPL-MCNC: 105 MG/DL (ref 70–99)
HCT VFR BLD AUTO: 30.9 % (ref 36–48)
HGB BLD-MCNC: 9.5 G/DL (ref 12–16)
LYMPHOCYTES # BLD: 0.6 K/UL (ref 1–5.1)
LYMPHOCYTES NFR BLD: 12.2 %
MAGNESIUM SERPL-MCNC: 1.9 MG/DL (ref 1.8–2.4)
MAGNESIUM SERPL-MCNC: 2.3 MG/DL (ref 1.8–2.4)
MCH RBC QN AUTO: 23.9 PG (ref 26–34)
MCHC RBC AUTO-ENTMCNC: 30.6 G/DL (ref 31–36)
MCV RBC AUTO: 78.2 FL (ref 80–100)
MONOCYTES # BLD: 0.6 K/UL (ref 0–1.3)
MONOCYTES NFR BLD: 11.6 %
NEUTROPHILS # BLD: 3.7 K/UL (ref 1.7–7.7)
NEUTROPHILS NFR BLD: 73.5 %
PHOSPHATE SERPL-MCNC: 3.2 MG/DL (ref 2.5–4.9)
PLATELET # BLD AUTO: 183 K/UL (ref 135–450)
PMV BLD AUTO: 8.4 FL (ref 5–10.5)
POTASSIUM SERPL-SCNC: 3.4 MMOL/L (ref 3.5–5.1)
POTASSIUM SERPL-SCNC: 3.9 MMOL/L (ref 3.5–5.1)
RBC # BLD AUTO: 3.95 M/UL (ref 4–5.2)
SODIUM SERPL-SCNC: 140 MMOL/L (ref 136–145)
WBC # BLD AUTO: 5.1 K/UL (ref 4–11)

## 2023-12-23 PROCEDURE — 2580000003 HC RX 258

## 2023-12-23 PROCEDURE — 6360000002 HC RX W HCPCS

## 2023-12-23 PROCEDURE — 80069 RENAL FUNCTION PANEL: CPT

## 2023-12-23 PROCEDURE — 94640 AIRWAY INHALATION TREATMENT: CPT

## 2023-12-23 PROCEDURE — 84132 ASSAY OF SERUM POTASSIUM: CPT

## 2023-12-23 PROCEDURE — 94660 CPAP INITIATION&MGMT: CPT

## 2023-12-23 PROCEDURE — 1200000000 HC SEMI PRIVATE

## 2023-12-23 PROCEDURE — 6360000002 HC RX W HCPCS: Performed by: INTERNAL MEDICINE

## 2023-12-23 PROCEDURE — 83735 ASSAY OF MAGNESIUM: CPT

## 2023-12-23 PROCEDURE — 6370000000 HC RX 637 (ALT 250 FOR IP): Performed by: NURSE PRACTITIONER

## 2023-12-23 PROCEDURE — 36591 DRAW BLOOD OFF VENOUS DEVICE: CPT

## 2023-12-23 PROCEDURE — 6360000002 HC RX W HCPCS: Performed by: NURSE PRACTITIONER

## 2023-12-23 PROCEDURE — 6370000000 HC RX 637 (ALT 250 FOR IP): Performed by: HOSPITALIST

## 2023-12-23 PROCEDURE — 2060000000 HC ICU INTERMEDIATE R&B

## 2023-12-23 PROCEDURE — 99233 SBSQ HOSP IP/OBS HIGH 50: CPT | Performed by: INTERNAL MEDICINE

## 2023-12-23 PROCEDURE — 85025 COMPLETE CBC W/AUTO DIFF WBC: CPT

## 2023-12-23 PROCEDURE — 2700000000 HC OXYGEN THERAPY PER DAY

## 2023-12-23 PROCEDURE — 93010 ELECTROCARDIOGRAM REPORT: CPT | Performed by: INTERNAL MEDICINE

## 2023-12-23 PROCEDURE — 6370000000 HC RX 637 (ALT 250 FOR IP)

## 2023-12-23 PROCEDURE — 94761 N-INVAS EAR/PLS OXIMETRY MLT: CPT

## 2023-12-23 RX ADMIN — POTASSIUM BICARBONATE 40 MEQ: 782 TABLET, EFFERVESCENT ORAL at 10:14

## 2023-12-23 RX ADMIN — ARFORMOTEROL TARTRATE 15 MCG: 15 SOLUTION RESPIRATORY (INHALATION) at 07:57

## 2023-12-23 RX ADMIN — Medication 400 MG: at 20:07

## 2023-12-23 RX ADMIN — ARFORMOTEROL TARTRATE 15 MCG: 15 SOLUTION RESPIRATORY (INHALATION) at 20:55

## 2023-12-23 RX ADMIN — PANTOPRAZOLE SODIUM 40 MG: 40 TABLET, DELAYED RELEASE ORAL at 06:09

## 2023-12-23 RX ADMIN — SODIUM CHLORIDE, PRESERVATIVE FREE 10 ML: 5 INJECTION INTRAVENOUS at 19:58

## 2023-12-23 RX ADMIN — MAGNESIUM SULFATE HEPTAHYDRATE 1000 MG: 1 INJECTION, SOLUTION INTRAVENOUS at 00:02

## 2023-12-23 RX ADMIN — ALBUTEROL SULFATE 2.5 MG: 2.5 SOLUTION RESPIRATORY (INHALATION) at 15:43

## 2023-12-23 RX ADMIN — BUDESONIDE 250 MCG: 0.25 INHALANT RESPIRATORY (INHALATION) at 07:57

## 2023-12-23 RX ADMIN — ALBUTEROL SULFATE 2.5 MG: 2.5 SOLUTION RESPIRATORY (INHALATION) at 11:20

## 2023-12-23 RX ADMIN — TIOTROPIUM BROMIDE INHALATION SPRAY 2 PUFF: 3.12 SPRAY, METERED RESPIRATORY (INHALATION) at 11:21

## 2023-12-23 RX ADMIN — SODIUM CHLORIDE, PRESERVATIVE FREE 10 ML: 5 INJECTION INTRAVENOUS at 10:14

## 2023-12-23 RX ADMIN — FUROSEMIDE 20 MG: 10 INJECTION, SOLUTION INTRAMUSCULAR; INTRAVENOUS at 12:34

## 2023-12-23 RX ADMIN — BUDESONIDE 250 MCG: 0.25 INHALANT RESPIRATORY (INHALATION) at 20:55

## 2023-12-23 RX ADMIN — Medication 400 MG: at 15:37

## 2023-12-23 RX ADMIN — ALBUTEROL SULFATE 2.5 MG: 2.5 SOLUTION RESPIRATORY (INHALATION) at 07:57

## 2023-12-23 RX ADMIN — Medication 400 MG: at 10:13

## 2023-12-23 RX ADMIN — DIBASIC SODIUM PHOSPHATE, MONOBASIC POTASSIUM PHOSPHATE AND MONOBASIC SODIUM PHOSPHATE 1 TABLET: 852; 155; 130 TABLET ORAL at 20:07

## 2023-12-23 RX ADMIN — ALBUTEROL SULFATE 2.5 MG: 2.5 SOLUTION RESPIRATORY (INHALATION) at 20:55

## 2023-12-23 RX ADMIN — DIBASIC SODIUM PHOSPHATE, MONOBASIC POTASSIUM PHOSPHATE AND MONOBASIC SODIUM PHOSPHATE 1 TABLET: 852; 155; 130 TABLET ORAL at 10:13

## 2023-12-23 RX ADMIN — ENOXAPARIN SODIUM 40 MG: 100 INJECTION SUBCUTANEOUS at 10:19

## 2023-12-23 RX ADMIN — ATORVASTATIN CALCIUM 10 MG: 10 TABLET, FILM COATED ORAL at 10:13

## 2023-12-23 RX ADMIN — Medication 5 MG: at 20:07

## 2023-12-23 NOTE — PROGRESS NOTES
preserved systolic function.   RVS-13.9cm/s. TAPSE-2.0cm.      Tricuspid Valve   The tricuspid valve is normal in structure. There is no significant   tricuspid valve stenosis. Moderate tricuspid regurgitation.      Right Atrium   The right atrium is moderately to severely dilated.      Pulmonic Valve   The pulmonic valve is not well visualized.   There is no evidence of pulmonic valve stenosis. Trivial pulmonic   insufficiency.      Pericardial Effusion   Moderate pericardial effusion.      Pleural Effusion   Pleural effusion noted.      Miscellaneous   IVC size is normal (<2.1 cm) but collapses < 50% with respiration consistent   with elevated RA pressure (8 mmHg).   Estimated pulmonary artery systolic pressure is severely elevated at 74 mmHg   assuming a right atrial pressure of 8 mmHg.       Assessment/Plan:  78 y.o. female with     Acute on chronic hypercarbic respiratory failure, failed on home BiPAP (Home setting: auto BiPAP with IPAP 25, EPAP 13, PS 4).  This setting is based on prior sleep study in 2021.  She was stable on that setting for two years.  Currently on AVAPS here in the [facility].       Non small cell lung cancer stage IVB, diagnosed on 3/30/17.  She is on third line BRAF targeted therapy since 1/2019.  Scans were stable however she has progressive wt loss since July 2021.     Extensive fibrotic changes and bullous disease predominantly on the right side.       Acute on chronic diastolic CHF:  CT scan on 12/5 with bilateral pleural effusion and pericardial effusion.  F/u CXR today on 12/18 with worsening effusion.   F/u CT scan on 12/20/23 showed improvement in pericardial effusion and persistent pleural effusion with slight improvement.      Severe pulmonary hypertension     This recurrent hypercarbic respiratory failure is probably due to volume overload, however progression of disease is also possible given her progressive wt loss.  CT scan in June 2023 was stable.  No recent

## 2023-12-23 NOTE — PROGRESS NOTES
Pt BP was 99/65 at 1230, was given the okay by MD to give lasix. Since giving pts BP has been 87/56, 89/52. Messaged MD and was told to hold to Lasix. Was not given any orders for the low BP.

## 2023-12-23 NOTE — PLAN OF CARE
Problem: Safety - Adult  Goal: Free from fall injury  Outcome: Progressing     Problem: ABCDS Injury Assessment  Goal: Absence of physical injury  Outcome: Progressing  Flowsheets (Taken 12/23/2023 0438)  Absence of Physical Injury: Implement safety measures based on patient assessment     Problem: Respiratory - Adult  Goal: Achieves optimal ventilation and oxygenation  Outcome: Progressing  Flowsheets (Taken 12/23/2023 0438)  Achieves optimal ventilation and oxygenation: Assess for changes in respiratory status     Problem: Metabolic/Fluid and Electrolytes - Adult  Goal: Electrolytes maintained within normal limits  12/23/2023 0438 by Heri Sam RN  Outcome: Progressing  Flowsheets (Taken 12/23/2023 0438)  Electrolytes maintained within normal limits: Monitor labs and assess patient for signs and symptoms of electrolyte imbalances     Problem: Cardiovascular - Adult  Goal: Absence of cardiac dysrhythmias or at baseline  12/23/2023 0438 by Heri Sam RN  Outcome: Progressing  Flowsheets (Taken 12/23/2023 0438)  Absence of cardiac dysrhythmias or at baseline: Monitor cardiac rate and rhythm

## 2023-12-23 NOTE — PROGRESS NOTES
APRN notified by RN of 9 beat run of Vtach  -noted hypomagnesemia with oral replacement underway  -repeat Mg level tonight 1.7, consistent with AM level  -will give 1gm IV replacement tonight in light of Vtach, cont oral replacement  -repeat labs in AM    RICHELLE Werner - NP

## 2023-12-23 NOTE — PROGRESS NOTES
V2.0    [facility] Progress Note      Name:  Catrachita Segal /Age/Sex: 1945  (78 y.o. female)   MRN & CSN:  [id number] & [id number] Encounter Date/Time: 2023 11:59 AM EST   Location:  [facility] PCP: Napoleon Stiles MD     Attending:Arron Barron MD       Hospital Day: 19    Assessment and Recommendations   Ms. Catrachita Segal is a 78 year old female with PMHx of arthritis, CHF, COPD, hyperlipidemia, htn, metastatic sebas adenocarcinoma who presented to the ED with worsening fatigue loss of appetite and SOB .       Patient was admitted on 11/10/2023 - 2023 where she was admitted for COPD exacerbation. She was extubated and discharged home     In the ED she became unresponsive and her ABG looked bad so he was intubated . CT showed moderate left-sided pleural effusion, slightly increased from the prior study. Increased left lower lobe airspace density consistent with compressive atelectasis or infiltrate.     She was started on antibiotics, nebs and steroids. She was extubated and slowly improved     Acute hypoxic and hypercapnic resp failure sec to pneumonia, Bilateral pleural effusion and COPD exacerbation  - Recent recurrent admissions seen by Dr. Donaldson on  for hypoxia and hypercapnia, failed BiPAP and required intubation.    She was extubated on .  - Re-admitted on , diagnosed with COPD exacerbation / respiratory acidosis requiring vent support.  She was extubated on .   Completed Levaquin and prednisone  -Worsened hypoxia and hypercapnia  likely 2/2 fluid overload and pleural effusion. See below  -Cont BIPAP at night and as needed     Pleural effusions, Bilateral  Acute on Chronic CHF with preserved EF: POA  -CT  showed large LT sided effusion that has slightly increased in size. Pt is being diuresed and she seems to be improving. Labs are better with diamox. Will defer thoracentesis to pulm team     NSVT - correct electrolytes     Metastatic adenocarcinoma of the

## 2023-12-23 NOTE — PROGRESS NOTES
4 Eyes Skin Assessment     NAME:  Catrachita Segal  YOB: 1945  MEDICAL RECORD NUMBER:  [id number]    The patient is being assessed for  Shift Handoff    I agree that at least one RN has performed a thorough Head to Toe Skin Assessment on the patient. ALL assessment sites listed below have been assessed.      Areas assessed by both nurses:    Head, Face, Ears, Shoulders, Back, Chest, Arms, Elbows, Hands, Sacrum. Buttock, Coccyx, Ischium, Legs. Feet and Heels, and Under Medical Devices         Does the Patient have a Wound? Yes wound(s) were present on assessment. LDA wound assessment was Initiated and completed by RN       Selwyn Prevention initiated by RN: Yes  Wound Care Orders initiated by RN: Yes    Pressure Injury (Stage 3,4, Unstageable, DTI, NWPT, and Complex wounds) if present, place Wound referral order by RN under : No    New Ostomies, if present place, Ostomy referral order under : No     Nurse 1 eSignature: Electronically signed by Heri Sam RN on 12/23/23 at 1:17 AM EST    **SHARE this note so that the co-signing nurse can place an eSignature**    Nurse 2 eSignature: Electronically signed by Vanessa Sandoval RN on 12/23/23 at 6:09 PM EST

## 2023-12-23 NOTE — PROGRESS NOTES
4 Eyes Skin Assessment     NAME:  Catrachita Segal  YOB: 1945  MEDICAL RECORD NUMBER:  [id number]    The patient is being assessed for  Shift Handoff    I agree that at least one RN has performed a thorough Head to Toe Skin Assessment on the patient. ALL assessment sites listed below have been assessed.      Areas assessed by both nurses:    Head, Face, Ears, Shoulders, Back, Chest, Arms, Elbows, Hands, Sacrum. Buttock, Coccyx, Ischium, Legs. Feet and Heels, and Under Medical Devices         Does the Patient have a Wound? Yes wound(s) were present on assessment. LDA wound assessment was Initiated and completed by RN       Selwyn Prevention initiated by RN: Yes  Wound Care Orders initiated by RN: Yes    Pressure Injury (Stage 3,4, Unstageable, DTI, NWPT, and Complex wounds) if present, place Wound referral order by RN under : No    New Ostomies, if present place, Ostomy referral order under : No     Nurse 1 eSignature: Electronically signed by Vanessa Sandoval RN on 12/23/23 at 6:11 PM EST    **SHARE this note so that the co-signing nurse can place an eSignature**    Nurse 2 eSignature: Electronically signed by Heri Sam RN on 12/24/23 at 12:12 AM EST

## 2023-12-23 NOTE — PLAN OF CARE
Problem: Safety - Adult  Goal: Free from fall injury  12/23/2023 1355 by Vanessa Sandoval, RN  Outcome: Progressing  Flowsheets (Taken 12/23/2023 1355)  Free From Fall Injury: Instruct family/caregiver on patient safety  Note: Pt in bed with bed alarm on, instructed to call for assistance. Verbalized understanding. All fall precautions in place.      Problem: Skin/Tissue Integrity  Goal: Absence of new skin breakdown  Description: 1.  Monitor for areas of redness and/or skin breakdown  2.  Assess vascular access sites hourly  3.  Every 4-6 hours minimum:  Change oxygen saturation probe site  4.  Every 4-6 hours:  If on nasal continuous positive airway pressure, respiratory therapy assess nares and determine need for appliance change or resting period.  12/23/2023 1355 by Vanessa Sandoval, RN  Note: Assessed wound on coccyx, removed dressing, cleaned with soap and water, applied barrier cream. Pt has been changing position q2 hours and using pillows for support.      Problem: Respiratory - Adult  Goal: Achieves optimal ventilation and oxygenation  12/23/2023 1355 by Vanessa Sandoval, RN  Outcome: Progressing  Note: Pt continues to be on 6L of high flow and Bipap at night

## 2023-12-23 NOTE — PROGRESS NOTES
At 09:50 PM, notified Precious Levine, NP, concerning patient's status. See below.    12/22/23 9:50 PM   [phone] Hospital or Facility: [facility] From: Heri Cecy RE: PEDRO PABLO MELGOZA 1945 RM: 3502-01 At 2127, nine beats of V tach noted in telemetry. Patient is asymptomatic. /72. Stat EKG ordered. Would you like to advise any interventions? Need Callback: NO CALLBACK REQ Bone Marrow ROUTINE  Read 9:51 PM     12/22/23 9:53 PM  NP \"repeat mag level, order is in, thanks\"    12/22/23 10:05 PM   This RN \" EKG done. Result is in the epic.\"  Read 10:07 PM     12/22/23 10:09 PM  NP \" Ok, thanks\"    12/22/23 10:45 PM   This RN \" Patient's Mag level 1.7 \"  Read 10:57 PM     12/22/23 11:00 PM  NP \" order is in....she's only a little low and has oral replacement but it's likely a contributing factor for the tachyarrhythmia \"      Magnesium given as per the order. See MAR.

## 2023-12-24 ENCOUNTER — APPOINTMENT (OUTPATIENT)
Dept: GENERAL RADIOLOGY | Age: 78
DRG: 208 | End: 2023-12-24
Payer: MEDICARE

## 2023-12-24 LAB
ALBUMIN SERPL-MCNC: 3.4 G/DL (ref 3.4–5)
ANION GAP SERPL CALCULATED.3IONS-SCNC: 6 MMOL/L (ref 3–16)
BASOPHILS # BLD: 0.1 K/UL (ref 0–0.2)
BASOPHILS NFR BLD: 0.9 %
BUN SERPL-MCNC: 11 MG/DL (ref 7–20)
CALCIUM SERPL-MCNC: 8.6 MG/DL (ref 8.3–10.6)
CHLORIDE SERPL-SCNC: 95 MMOL/L (ref 99–110)
CO2 SERPL-SCNC: 38 MMOL/L (ref 21–32)
CREAT SERPL-MCNC: <0.5 MG/DL (ref 0.6–1.2)
DEPRECATED RDW RBC AUTO: 17.1 % (ref 12.4–15.4)
EOSINOPHIL # BLD: 0.1 K/UL (ref 0–0.6)
EOSINOPHIL NFR BLD: 2.7 %
GFR SERPLBLD CREATININE-BSD FMLA CKD-EPI: >60 ML/MIN/{1.73_M2}
GLUCOSE SERPL-MCNC: 96 MG/DL (ref 70–99)
HCT VFR BLD AUTO: 28.7 % (ref 36–48)
HGB BLD-MCNC: 9 G/DL (ref 12–16)
LYMPHOCYTES # BLD: 0.7 K/UL (ref 1–5.1)
LYMPHOCYTES NFR BLD: 13.1 %
MAGNESIUM SERPL-MCNC: 1.9 MG/DL (ref 1.8–2.4)
MCH RBC QN AUTO: 24.2 PG (ref 26–34)
MCHC RBC AUTO-ENTMCNC: 31.2 G/DL (ref 31–36)
MCV RBC AUTO: 77.4 FL (ref 80–100)
MONOCYTES # BLD: 0.6 K/UL (ref 0–1.3)
MONOCYTES NFR BLD: 11.4 %
NEUTROPHILS # BLD: 3.9 K/UL (ref 1.7–7.7)
NEUTROPHILS NFR BLD: 71.9 %
PHOSPHATE SERPL-MCNC: 3 MG/DL (ref 2.5–4.9)
PLATELET # BLD AUTO: 172 K/UL (ref 135–450)
PMV BLD AUTO: 8.1 FL (ref 5–10.5)
POTASSIUM SERPL-SCNC: 3.4 MMOL/L (ref 3.5–5.1)
RBC # BLD AUTO: 3.7 M/UL (ref 4–5.2)
SODIUM SERPL-SCNC: 139 MMOL/L (ref 136–145)
WBC # BLD AUTO: 5.4 K/UL (ref 4–11)

## 2023-12-24 PROCEDURE — 6370000000 HC RX 637 (ALT 250 FOR IP)

## 2023-12-24 PROCEDURE — 6370000000 HC RX 637 (ALT 250 FOR IP): Performed by: INTERNAL MEDICINE

## 2023-12-24 PROCEDURE — 94660 CPAP INITIATION&MGMT: CPT

## 2023-12-24 PROCEDURE — 6360000002 HC RX W HCPCS: Performed by: HOSPITALIST

## 2023-12-24 PROCEDURE — 6370000000 HC RX 637 (ALT 250 FOR IP): Performed by: NURSE PRACTITIONER

## 2023-12-24 PROCEDURE — 2700000000 HC OXYGEN THERAPY PER DAY

## 2023-12-24 PROCEDURE — 83735 ASSAY OF MAGNESIUM: CPT

## 2023-12-24 PROCEDURE — 6370000000 HC RX 637 (ALT 250 FOR IP): Performed by: HOSPITALIST

## 2023-12-24 PROCEDURE — 71045 X-RAY EXAM CHEST 1 VIEW: CPT

## 2023-12-24 PROCEDURE — 94640 AIRWAY INHALATION TREATMENT: CPT

## 2023-12-24 PROCEDURE — 6360000002 HC RX W HCPCS

## 2023-12-24 PROCEDURE — 36592 COLLECT BLOOD FROM PICC: CPT

## 2023-12-24 PROCEDURE — 80069 RENAL FUNCTION PANEL: CPT

## 2023-12-24 PROCEDURE — 94761 N-INVAS EAR/PLS OXIMETRY MLT: CPT

## 2023-12-24 PROCEDURE — 2060000000 HC ICU INTERMEDIATE R&B

## 2023-12-24 PROCEDURE — 2580000003 HC RX 258

## 2023-12-24 PROCEDURE — 36591 DRAW BLOOD OFF VENOUS DEVICE: CPT

## 2023-12-24 PROCEDURE — 85025 COMPLETE CBC W/AUTO DIFF WBC: CPT

## 2023-12-24 PROCEDURE — 1200000000 HC SEMI PRIVATE

## 2023-12-24 RX ORDER — LEVALBUTEROL INHALATION SOLUTION 0.63 MG/3ML
0.63 SOLUTION RESPIRATORY (INHALATION) EVERY 4 HOURS
Status: DISCONTINUED | OUTPATIENT
Start: 2023-12-24 | End: 2023-12-26

## 2023-12-24 RX ADMIN — BUDESONIDE 250 MCG: 0.25 INHALANT RESPIRATORY (INHALATION) at 09:01

## 2023-12-24 RX ADMIN — TIOTROPIUM BROMIDE INHALATION SPRAY 2 PUFF: 3.12 SPRAY, METERED RESPIRATORY (INHALATION) at 09:01

## 2023-12-24 RX ADMIN — PANTOPRAZOLE SODIUM 40 MG: 40 TABLET, DELAYED RELEASE ORAL at 06:55

## 2023-12-24 RX ADMIN — FUROSEMIDE 20 MG: 10 INJECTION, SOLUTION INTRAMUSCULAR; INTRAVENOUS at 08:24

## 2023-12-24 RX ADMIN — LEVALBUTEROL 0.63 MG: 0.63 SOLUTION RESPIRATORY (INHALATION) at 16:00

## 2023-12-24 RX ADMIN — POTASSIUM BICARBONATE 40 MEQ: 782 TABLET, EFFERVESCENT ORAL at 06:55

## 2023-12-24 RX ADMIN — BUDESONIDE 250 MCG: 0.25 INHALANT RESPIRATORY (INHALATION) at 20:58

## 2023-12-24 RX ADMIN — FUROSEMIDE 20 MG: 10 INJECTION, SOLUTION INTRAMUSCULAR; INTRAVENOUS at 18:21

## 2023-12-24 RX ADMIN — ATORVASTATIN CALCIUM 10 MG: 10 TABLET, FILM COATED ORAL at 08:24

## 2023-12-24 RX ADMIN — ENOXAPARIN SODIUM 40 MG: 100 INJECTION SUBCUTANEOUS at 08:23

## 2023-12-24 RX ADMIN — ARFORMOTEROL TARTRATE 15 MCG: 15 SOLUTION RESPIRATORY (INHALATION) at 09:01

## 2023-12-24 RX ADMIN — DIBASIC SODIUM PHOSPHATE, MONOBASIC POTASSIUM PHOSPHATE AND MONOBASIC SODIUM PHOSPHATE 1 TABLET: 852; 155; 130 TABLET ORAL at 21:21

## 2023-12-24 RX ADMIN — Medication 5 MG: at 21:21

## 2023-12-24 RX ADMIN — LEVALBUTEROL 0.63 MG: 0.63 SOLUTION RESPIRATORY (INHALATION) at 12:45

## 2023-12-24 RX ADMIN — SODIUM CHLORIDE, PRESERVATIVE FREE 10 ML: 5 INJECTION INTRAVENOUS at 08:24

## 2023-12-24 RX ADMIN — DIBASIC SODIUM PHOSPHATE, MONOBASIC POTASSIUM PHOSPHATE AND MONOBASIC SODIUM PHOSPHATE 1 TABLET: 852; 155; 130 TABLET ORAL at 08:24

## 2023-12-24 RX ADMIN — LEVALBUTEROL 0.63 MG: 0.63 SOLUTION RESPIRATORY (INHALATION) at 21:06

## 2023-12-24 RX ADMIN — Medication 400 MG: at 08:24

## 2023-12-24 RX ADMIN — ARFORMOTEROL TARTRATE 15 MCG: 15 SOLUTION RESPIRATORY (INHALATION) at 20:58

## 2023-12-24 ASSESSMENT — PAIN SCALES - GENERAL
PAINLEVEL_OUTOF10: 0

## 2023-12-24 NOTE — PROGRESS NOTES
Pt began to desat around 1600 with 6 L hi flow NC on. Messages sent to Dr. Barron and Dr. Puri. See below  To Dr. Barron- RE: PEDRO PABLO MELGOZA 1945 RM: 3502-01 Hi, around 1615 this afternoon pt began to desat and pressures dropped slightly. She was on 6 liters, I now have her on 15 L high flow with sats recovering but still mid 80s. I had her on a nonrebreather, just put it back on to increase her saturation. pressures are improving with most recent 113/60. Thanks Need Callback: NO CALLBACK REQ Bone Marrow ROUTINE. No orders received.    Also reached out to Dr Puri- placed orders for CXR and to start vapotherm. Vapotherm initiated around 1845, sat 95% on 15 L.

## 2023-12-24 NOTE — PLAN OF CARE
Problem: Skin/Tissue Integrity  Goal: Absence of new skin breakdown  Description: 1.  Monitor for areas of redness and/or skin breakdown  2.  Assess vascular access sites hourly  3.  Every 4-6 hours minimum:  Change oxygen saturation probe site  4.  Every 4-6 hours:  If on nasal continuous positive airway pressure, respiratory therapy assess nares and determine need for appliance change or resting period.  12/24/2023 1741 by Tracy Burgess, RN  Outcome: Progressing- no new skin breakdown noted on shift. Care continues.     Problem: Respiratory - Adult  Goal: Achieves optimal ventilation and oxygenation  12/24/2023 1741 by Tracy Burgess, RN  Outcome: Progressing- pt with increased oxygen demand around 1615, placed on 15 L nonrebreather to keep sat goal above 90%. Care continues.     Problem: Cardiovascular - Adult  Goal: Absence of cardiac dysrhythmias or at baseline  12/24/2023 1741 by Tracy Burgess, RN  Outcome: Progressing- patient running sinus tachy on tele. Care continues.

## 2023-12-24 NOTE — PROGRESS NOTES
4 Eyes Skin Assessment     NAME:  Catrachita Segal  YOB: 1945  MEDICAL RECORD NUMBER:  [id number]    The patient is being assessed for  Shift Handoff    I agree that at least one RN has performed a thorough Head to Toe Skin Assessment on the patient. ALL assessment sites listed below have been assessed.      Areas assessed by both nurses:    Head, Face, Ears, Shoulders, Back, Chest, Arms, Elbows, Hands, Sacrum. Buttock, Coccyx, Ischium, Legs. Feet and Heels, Under Medical Devices , and Other ***        Does the Patient have a Wound? Yes wound(s) were present on assessment. LDA wound assessment was Initiated and completed by RN       Selwyn Prevention initiated by RN: Yes  Wound Care Orders initiated by RN: Yes    Pressure Injury (Stage 3,4, Unstageable, DTI, NWPT, and Complex wounds) if present, place Wound referral order by RN under : No    New Ostomies, if present place, Ostomy referral order under : No     Nurse 1 eSignature: Electronically signed by Tracy Burgess RN, RN on 12/24/23 at 12:21 PM EST    **SHARE this note so that the co-signing nurse can place an eSignature**    Nurse 2 eSignature: {Esignature:[id number]}

## 2023-12-24 NOTE — PROGRESS NOTES
At 0735 PM, notified Precious Levine, NP, concerning patient's status. See below.    12/23/23 7:35 PM   [phone] Hospital or Facility: [facility] From: Heri Cecy RE: PEDRO PABLO MELGOZA 1945 RM: 3502-01 Patient's HR is in low 130. It has been like this since 30 minutes. Her BP is 92/61 with MAP 72. She is on HFNC @ 5.5 L/min. Spo2 97%. She got back to bed from chair about 45 minutes ago. She doesn't have any complains of pain. Would you like to advise any interventions? Need Callback: NO CALLBACK REQ Bone Marrow ROUTINE  Read 7:38 PM     12/23/23 7:41 PM  NP \" She seems to be really sensitive to electrolyte abnormalities, will get repeat labs on her. Cont to monitor for now unless she becomes hemodynamically unstable\"      Order to send Potassium and magnesium levels were obtained.  Potassium and magnesium levels drawn and sent to lab as per order.      At 0843 PM, Notified NP about the lab results. See below.    12/23/23 8:43 PM   This RN \" Patient's potassium level 3.9 and Mag level 1.9. Patient's HR now is 108 bpm.\"  Read 8:49 PM     12/23/23 8:49 PM  NP \" Ok, no new orders. Thanks\"             no

## 2023-12-24 NOTE — PROGRESS NOTES
APRN notified by RN of HR 130s  -ST after getting back in bed  -no c/o pain, hemodynamically stable with SpO2 97% on HFNC @ 5.5, BP 92/61  -required magnesium oral/IV repletion early AM with improvement in tachycardia/NSVT  -will repeat electrolytes, replace if needed  -cont telemetry monitor  -I suspect an element of deconditioning to current NSVT    Precious Arteaga, RICHELLE - NP

## 2023-12-24 NOTE — PROGRESS NOTES
V2.0    [facility] [place]      Name:  Catrachita Segal /Age/Sex: 1945  (78 y.o. female)   MRN & CSN:  [id number] & [id number] Encounter Date/Time: 2023 11:59 AM EST   Location:  [facility]- PCP: Napoleon Stiles MD     Attending:Arron Barron MD       Hospital Day:     Assessment and Recommendations   Ms. Catrachita Segal is a 78 year old female with PMHx of arthritis, CHF, COPD, hyperlipidemia, htn, metastatic sebas adenocarcinoma who presented to the ED with worsening fatigue loss of appetite and SOB .   Patient was admitted on 11/10/2023 - 2023 where she was admitted for COPD exacerbation. She was extubated and discharged home     In the ED she became unresponsive and her ABG looked bad so he was intubated . CT showed moderate left-sided pleural effusion, slightly increased from the prior study. Increased left lower lobe airspace density consistent with compressive atelectasis or infiltrate.     She was started on antibiotics, nebs and steroids. She was extubated and slowly improved     Acute hypoxic and hypercapnic resp failure sec to pneumonia, Bilateral pleural effusion and COPD exacerbation  - Recent recurrent admissions seen by Dr. Donaldson on  for hypoxia and hypercapnia, failed BiPAP and required intubation.    She was extubated on .  - Re-admitted on , diagnosed with COPD exacerbation / respiratory acidosis requiring vent support.  She was extubated on .   Completed Levaquin and prednisone  -Worsened hypoxia and hypercapnia  likely 2/2 fluid overload and pleural effusion. CT  showed large LT sided effusion that has slightly increased in size.  -Pt is being diuresed and she seems to be responding well but BP cont to be soft with episodes of sinus tachycardia. Cont lasix as tolerated   -Cont BIPAP at night and as needed     NSVT - correct electrolytes as needed. 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

## 2023-12-24 NOTE — PLAN OF CARE
Problem: Safety - Adult  Goal: Free from fall injury  Outcome: Progressing     Problem: ABCDS Injury Assessment  Goal: Absence of physical injury  Outcome: Progressing  Flowsheets (Taken 12/24/2023 0527)  Absence of Physical Injury: Implement safety measures based on patient assessment     Problem: Respiratory - Adult  Goal: Achieves optimal ventilation and oxygenation  Outcome: Progressing  Flowsheets (Taken 12/24/2023 0527)  Achieves optimal ventilation and oxygenation: Assess for changes in respiratory status     Problem: Metabolic/Fluid and Electrolytes - Adult  Goal: Electrolytes maintained within normal limits  Outcome: Progressing  Flowsheets (Taken 12/24/2023 0527)  Electrolytes maintained within normal limits: Monitor labs and assess patient for signs and symptoms of electrolyte imbalances     Problem: Cardiovascular - Adult  Goal: Absence of cardiac dysrhythmias or at baseline  Outcome: Progressing  Flowsheets (Taken 12/24/2023 0527)  Absence of cardiac dysrhythmias or at baseline: Monitor cardiac rate and rhythm

## 2023-12-24 NOTE — PROGRESS NOTES
4 Eyes Skin Assessment     NAME:  Catrachita Segal  YOB: 1945  MEDICAL RECORD NUMBER:  [id number]    The patient is being assessed for  Shift Handoff    I agree that at least one RN has performed a thorough Head to Toe Skin Assessment on the patient. ALL assessment sites listed below have been assessed.      Areas assessed by both nurses:    Head, Face, Ears, Shoulders, Back, Chest, Arms, Elbows, Hands, Sacrum. Buttock, Coccyx, Ischium, Legs. Feet and Heels, and Under Medical Devices         Does the Patient have a Wound? Yes wound(s) were present on assessment. LDA wound assessment was Initiated and completed by RN       Selwyn Prevention initiated by RN: Yes  Wound Care Orders initiated by RN: Yes    Pressure Injury (Stage 3,4, Unstageable, DTI, NWPT, and Complex wounds) if present, place Wound referral order by RN under : No    New Ostomies, if present place, Ostomy referral order under : No     Nurse 1 eSignature: Electronically signed by Heri Sam RN on 12/24/23 at 12:15 AM EST    **SHARE this note so that the co-signing nurse can place an eSignature**    Nurse 2 eSignature: Electronically signed by Tracy Burgess RN, RN on 12/24/23 at 12:21 PM EST

## 2023-12-25 LAB
ALBUMIN SERPL-MCNC: 3.1 G/DL (ref 3.4–5)
ANION GAP SERPL CALCULATED.3IONS-SCNC: -1 MMOL/L (ref 3–16)
BASE EXCESS BLDV CALC-SCNC: 13.5 MMOL/L (ref -2–3)
BASE EXCESS BLDV CALC-SCNC: 18.3 MMOL/L (ref -2–3)
BASOPHILS # BLD: 0 K/UL (ref 0–0.2)
BASOPHILS NFR BLD: 0.7 %
BUN SERPL-MCNC: 9 MG/DL (ref 7–20)
CALCIUM SERPL-MCNC: 8.3 MG/DL (ref 8.3–10.6)
CHLORIDE SERPL-SCNC: 95 MMOL/L (ref 99–110)
CO2 BLDV-SCNC: 46 MMOL/L
CO2 BLDV-SCNC: 50 MMOL/L
CO2 SERPL-SCNC: 47 MMOL/L (ref 21–32)
COHGB MFR BLDV: 1.6 % (ref 0–1.5)
COHGB MFR BLDV: 3.1 % (ref 0–1.5)
CREAT SERPL-MCNC: <0.5 MG/DL (ref 0.6–1.2)
DEPRECATED RDW RBC AUTO: 17 % (ref 12.4–15.4)
DO-HGB MFR BLDV: 17.7 %
DO-HGB MFR BLDV: 46.1 %
EOSINOPHIL # BLD: 0.1 K/UL (ref 0–0.6)
EOSINOPHIL NFR BLD: 2.1 %
GFR SERPLBLD CREATININE-BSD FMLA CKD-EPI: >60 ML/MIN/{1.73_M2}
GLUCOSE SERPL-MCNC: 83 MG/DL (ref 70–99)
HCO3 BLDV-SCNC: 42.7 MMOL/L (ref 24–28)
HCO3 BLDV-SCNC: 47.2 MMOL/L (ref 24–28)
HCT VFR BLD AUTO: 27.7 % (ref 36–48)
HGB BLD-MCNC: 8.6 G/DL (ref 12–16)
LYMPHOCYTES # BLD: 1 K/UL (ref 1–5.1)
LYMPHOCYTES NFR BLD: 23 %
MAGNESIUM SERPL-MCNC: 1.5 MG/DL (ref 1.8–2.4)
MCH RBC QN AUTO: 24.1 PG (ref 26–34)
MCHC RBC AUTO-ENTMCNC: 31 G/DL (ref 31–36)
MCV RBC AUTO: 77.7 FL (ref 80–100)
METHGB MFR BLDV: 0.4 % (ref 0–1.5)
METHGB MFR BLDV: 1.5 % (ref 0–1.5)
MONOCYTES # BLD: 0.5 K/UL (ref 0–1.3)
MONOCYTES NFR BLD: 11.9 %
NEUTROPHILS # BLD: 2.7 K/UL (ref 1.7–7.7)
NEUTROPHILS NFR BLD: 62.3 %
PCO2 BLDV: 87 MMHG (ref 41–51)
PCO2 BLDV: 93.4 MMHG (ref 41–51)
PH BLDV: 7.27 [PH] (ref 7.35–7.45)
PH BLDV: 7.34 [PH] (ref 7.35–7.45)
PHOSPHATE SERPL-MCNC: 2.9 MG/DL (ref 2.5–4.9)
PLATELET # BLD AUTO: 164 K/UL (ref 135–450)
PMV BLD AUTO: 8.3 FL (ref 5–10.5)
PO2 BLDV: 30.7 MMHG (ref 25–40)
PO2 BLDV: 53.8 MMHG (ref 25–40)
POTASSIUM SERPL-SCNC: 4 MMOL/L (ref 3.5–5.1)
RBC # BLD AUTO: 3.56 M/UL (ref 4–5.2)
SAO2 % BLDV: 53 %
SAO2 % BLDV: 81 %
SODIUM SERPL-SCNC: 141 MMOL/L (ref 136–145)
WBC # BLD AUTO: 4.4 K/UL (ref 4–11)

## 2023-12-25 PROCEDURE — 6360000002 HC RX W HCPCS: Performed by: HOSPITALIST

## 2023-12-25 PROCEDURE — 2580000003 HC RX 258

## 2023-12-25 PROCEDURE — 99233 SBSQ HOSP IP/OBS HIGH 50: CPT | Performed by: INTERNAL MEDICINE

## 2023-12-25 PROCEDURE — 85025 COMPLETE CBC W/AUTO DIFF WBC: CPT

## 2023-12-25 PROCEDURE — 6370000000 HC RX 637 (ALT 250 FOR IP)

## 2023-12-25 PROCEDURE — 83735 ASSAY OF MAGNESIUM: CPT

## 2023-12-25 PROCEDURE — 82803 BLOOD GASES ANY COMBINATION: CPT

## 2023-12-25 PROCEDURE — 6370000000 HC RX 637 (ALT 250 FOR IP): Performed by: HOSPITALIST

## 2023-12-25 PROCEDURE — 6360000002 HC RX W HCPCS

## 2023-12-25 PROCEDURE — 80069 RENAL FUNCTION PANEL: CPT

## 2023-12-25 PROCEDURE — 94660 CPAP INITIATION&MGMT: CPT

## 2023-12-25 PROCEDURE — 6370000000 HC RX 637 (ALT 250 FOR IP): Performed by: NURSE PRACTITIONER

## 2023-12-25 PROCEDURE — 2700000000 HC OXYGEN THERAPY PER DAY

## 2023-12-25 PROCEDURE — 94640 AIRWAY INHALATION TREATMENT: CPT

## 2023-12-25 PROCEDURE — 2060000000 HC ICU INTERMEDIATE R&B

## 2023-12-25 PROCEDURE — 94761 N-INVAS EAR/PLS OXIMETRY MLT: CPT

## 2023-12-25 RX ORDER — MAGNESIUM SULFATE 1 G/100ML
1000 INJECTION INTRAVENOUS ONCE
Status: COMPLETED | OUTPATIENT
Start: 2023-12-25 | End: 2023-12-25

## 2023-12-25 RX ORDER — LANOLIN ALCOHOL/MO/W.PET/CERES
800 CREAM (GRAM) TOPICAL 3 TIMES DAILY
Status: COMPLETED | OUTPATIENT
Start: 2023-12-25 | End: 2023-12-26

## 2023-12-25 RX ADMIN — Medication 800 MG: at 15:56

## 2023-12-25 RX ADMIN — BUDESONIDE 250 MCG: 0.25 INHALANT RESPIRATORY (INHALATION) at 08:34

## 2023-12-25 RX ADMIN — ARFORMOTEROL TARTRATE 15 MCG: 15 SOLUTION RESPIRATORY (INHALATION) at 08:33

## 2023-12-25 RX ADMIN — LEVALBUTEROL 0.63 MG: 0.63 SOLUTION RESPIRATORY (INHALATION) at 15:56

## 2023-12-25 RX ADMIN — ARFORMOTEROL TARTRATE 15 MCG: 15 SOLUTION RESPIRATORY (INHALATION) at 20:31

## 2023-12-25 RX ADMIN — PANTOPRAZOLE SODIUM 40 MG: 40 TABLET, DELAYED RELEASE ORAL at 06:17

## 2023-12-25 RX ADMIN — LEVALBUTEROL 0.63 MG: 0.63 SOLUTION RESPIRATORY (INHALATION) at 00:25

## 2023-12-25 RX ADMIN — LEVALBUTEROL 0.63 MG: 0.63 SOLUTION RESPIRATORY (INHALATION) at 12:50

## 2023-12-25 RX ADMIN — Medication 800 MG: at 08:55

## 2023-12-25 RX ADMIN — Medication 800 MG: at 21:26

## 2023-12-25 RX ADMIN — LEVALBUTEROL 0.63 MG: 0.63 SOLUTION RESPIRATORY (INHALATION) at 08:34

## 2023-12-25 RX ADMIN — DIBASIC SODIUM PHOSPHATE, MONOBASIC POTASSIUM PHOSPHATE AND MONOBASIC SODIUM PHOSPHATE 1 TABLET: 852; 155; 130 TABLET ORAL at 08:55

## 2023-12-25 RX ADMIN — SODIUM CHLORIDE, PRESERVATIVE FREE 10 ML: 5 INJECTION INTRAVENOUS at 21:33

## 2023-12-25 RX ADMIN — BUDESONIDE 250 MCG: 0.25 INHALANT RESPIRATORY (INHALATION) at 20:31

## 2023-12-25 RX ADMIN — ATORVASTATIN CALCIUM 10 MG: 10 TABLET, FILM COATED ORAL at 08:55

## 2023-12-25 RX ADMIN — LEVALBUTEROL 0.63 MG: 0.63 SOLUTION RESPIRATORY (INHALATION) at 05:09

## 2023-12-25 RX ADMIN — DIBASIC SODIUM PHOSPHATE, MONOBASIC POTASSIUM PHOSPHATE AND MONOBASIC SODIUM PHOSPHATE 1 TABLET: 852; 155; 130 TABLET ORAL at 21:26

## 2023-12-25 RX ADMIN — FUROSEMIDE 20 MG: 10 INJECTION, SOLUTION INTRAMUSCULAR; INTRAVENOUS at 18:18

## 2023-12-25 RX ADMIN — Medication 5 MG: at 21:26

## 2023-12-25 RX ADMIN — SODIUM CHLORIDE, PRESERVATIVE FREE 10 ML: 5 INJECTION INTRAVENOUS at 08:56

## 2023-12-25 RX ADMIN — LEVALBUTEROL 0.63 MG: 0.63 SOLUTION RESPIRATORY (INHALATION) at 20:31

## 2023-12-25 RX ADMIN — FUROSEMIDE 20 MG: 10 INJECTION, SOLUTION INTRAMUSCULAR; INTRAVENOUS at 08:55

## 2023-12-25 RX ADMIN — ENOXAPARIN SODIUM 40 MG: 100 INJECTION SUBCUTANEOUS at 08:55

## 2023-12-25 RX ADMIN — MAGNESIUM SULFATE HEPTAHYDRATE 1000 MG: 1 INJECTION, SOLUTION INTRAVENOUS at 10:18

## 2023-12-25 RX ADMIN — TIOTROPIUM BROMIDE INHALATION SPRAY 2 PUFF: 3.12 SPRAY, METERED RESPIRATORY (INHALATION) at 08:35

## 2023-12-25 ASSESSMENT — PAIN SCALES - GENERAL
PAINLEVEL_OUTOF10: 0

## 2023-12-25 ASSESSMENT — PAIN SCALES - WONG BAKER
WONGBAKER_NUMERICALRESPONSE: 0
WONGBAKER_NUMERICALRESPONSE: 0

## 2023-12-25 NOTE — PROGRESS NOTES
Patient A&Ox4. VSC medically and mechanically. Skin and accessed assessed per protocol. Room safety measures in place.

## 2023-12-25 NOTE — RT PROTOCOL NOTE
RT Inhaler-Nebulizer Bronchodilator Protocol Note    There is a bronchodilator order in the chart from a provider indicating to follow the RT Bronchodilator Protocol and there is an “Initiate RT Inhaler-Nebulizer Bronchodilator Protocol” order as well (see protocol at bottom of note).    CXR Findings:  XR CHEST PORTABLE    Result Date: 12/24/2023  Unchanged pulmonary edema and moderate bilateral pleural effusions.       The findings from the last RT Protocol Assessment were as follows:   History Pulmonary Disease: Chronic pulmonary disease  Respiratory Pattern: Dyspnea on exertion or RR 21-25 bpm  Breath Sounds: Slightly diminished and/or crackles  Cough: Strong, productive  Indication for Bronchodilator Therapy: Decreased or absent breath sounds  Bronchodilator Assessment Score: 7    Aerosolized bronchodilator medication orders have been revised according to the RT Inhaler-Nebulizer Bronchodilator Protocol below.    Respiratory Therapist to perform RT Therapy Protocol Assessment initially then follow the protocol.  Repeat RT Therapy Protocol Assessment PRN for score 0-3 or on second treatment, BID, and PRN for scores above 3.    No Indications - adjust the frequency to every 6 hours PRN wheezing or bronchospasm, if no treatments needed after 48 hours then discontinue using Per Protocol order mode.     If indication present, adjust the RT bronchodilator orders based on the Bronchodilator Assessment Score as indicated below.  Use Inhaler orders unless patient has one or more of the following: on home nebulizer, not able to hold breath for 10 seconds, is not alert and oriented, cannot activate and use MDI correctly, or respiratory rate 25 breaths per minute or more, then use the equivalent nebulizer order(s) with same Frequency and PRN reasons based on the score.  If a patient is on this medication at home then do not decrease Frequency below that used at home.    0-3 - enter or revise RT bronchodilator order(s) to

## 2023-12-25 NOTE — PROGRESS NOTES
Pulmonary & Critical Care Medicine    Admit Date: 2023  PCP: Napoleon Stiles MD    CC:  hypoxia     Events of Last 24 hours:     Paul had desaturation around 4 PM yesterday to 75% on 6 L high flow nasal cannula and was placed on 15 L nasal cannula with increased saturations to 83%.  Respiratory rate was approximately 26. A 100% nonrebreather added on top with an increase in oxygen saturation to 100%.  Chest x-ray revealed persistent moderate bilateral pleural effusions with some pulmonary edema, although may be mildly improved since  on my review of images.  Currently she is on her AVAPS for 450 with 40% FiO2 with oxygen saturation 100% and respiratory rate of 14.  She has a charted net negative diuresis of 1.1 L    Brief history  Known hx of lung cancer.  She was seen by Dr. Donaldson on  for hypoxia and hypercapnia, failed BiPAP and required intubation.    She was extubated on .  She was treated with steroids and lasix  She was seen by Dr. Garcia in the office on .  At that time she was on 1L O2 at rest and 2 with ambulation.  She was continued on Trelegy.    She was readmitted on  and was diagnosed with COPD exacerbation / respiratory acidosis requiring vent support.  She was extubated on .      Vitals:  Tmax: 98  VITALS:  BP (!) 96/55   Pulse 99   Temp 97.8 °F (36.6 °C) (Axillary)   Resp 14   Ht 1.676 m (5' 5.98\")   Wt 51.6 kg (113 lb 12.1 oz)   SpO2 100%   BMI 18.37 kg/m²   24HR INTAKE/OUTPUT:    Intake/Output Summary (Last 24 hours) at 2023 0624  Last data filed at 2023 0607  Gross per 24 hour   Intake 890 ml   Output 1845 ml   Net -955 ml       CURRENT PULSE OXIMETRY:  SpO2: 100 %  24HR PULSE OXIMETRY RANGE:  SpO2  Av %  Min: 75 %  Max: 100 %    EXAM:  General: No distress. Alert.  Eyes: PERRL. No sclera icterus. No conjunctival injection.  ENT: No discharge. Moist oral mucosa   Neck: Trachea midline. Neck is supple   Resp: No accessory muscle

## 2023-12-26 LAB
ALBUMIN SERPL-MCNC: 3.2 G/DL (ref 3.4–5)
ANION GAP SERPL CALCULATED.3IONS-SCNC: 2 MMOL/L (ref 3–16)
BASE EXCESS BLDA CALC-SCNC: 19.8 MMOL/L (ref -3–3)
BASOPHILS # BLD: 0 K/UL (ref 0–0.2)
BASOPHILS NFR BLD: 0.7 %
BUN SERPL-MCNC: 11 MG/DL (ref 7–20)
CALCIUM SERPL-MCNC: 8.5 MG/DL (ref 8.3–10.6)
CHLORIDE SERPL-SCNC: 95 MMOL/L (ref 99–110)
CO2 BLDA-SCNC: 49 MMOL/L
CO2 SERPL-SCNC: 45 MMOL/L (ref 21–32)
COHGB MFR BLDA: 1.5 % (ref 0–1.5)
CREAT SERPL-MCNC: 0.5 MG/DL (ref 0.6–1.2)
DEPRECATED RDW RBC AUTO: 17 % (ref 12.4–15.4)
EOSINOPHIL # BLD: 0.1 K/UL (ref 0–0.6)
EOSINOPHIL NFR BLD: 2.6 %
GFR SERPLBLD CREATININE-BSD FMLA CKD-EPI: >60 ML/MIN/{1.73_M2}
GLUCOSE SERPL-MCNC: 100 MG/DL (ref 70–99)
HCO3 BLDA-SCNC: 47 MMOL/L (ref 21–29)
HCT VFR BLD AUTO: 26.5 % (ref 36–48)
HGB BLD-MCNC: 8.3 G/DL (ref 12–16)
HGB BLDA-MCNC: 9.3 G/DL
LYMPHOCYTES # BLD: 0.6 K/UL (ref 1–5.1)
LYMPHOCYTES NFR BLD: 12.3 %
MCH RBC QN AUTO: 24.3 PG (ref 26–34)
MCHC RBC AUTO-ENTMCNC: 31.4 G/DL (ref 31–36)
MCV RBC AUTO: 77.6 FL (ref 80–100)
METHGB MFR BLDA: 0.2 % (ref 0–1.4)
MONOCYTES # BLD: 0.6 K/UL (ref 0–1.3)
MONOCYTES NFR BLD: 11.3 %
NEUTROPHILS # BLD: 3.8 K/UL (ref 1.7–7.7)
NEUTROPHILS NFR BLD: 73.1 %
PCO2 BLDA: 70 MMHG (ref 35–45)
PH BLDA: 7.43 [PH] (ref 7.35–7.45)
PHOSPHATE SERPL-MCNC: 2.8 MG/DL (ref 2.5–4.9)
PLATELET # BLD AUTO: 157 K/UL (ref 135–450)
PMV BLD AUTO: 8.3 FL (ref 5–10.5)
PO2 BLDA: 97.6 MMHG (ref 75–108)
POTASSIUM SERPL-SCNC: 3.4 MMOL/L (ref 3.5–5.1)
RBC # BLD AUTO: 3.41 M/UL (ref 4–5.2)
SAO2 % BLDA: 98 % (ref 93–100)
SODIUM SERPL-SCNC: 142 MMOL/L (ref 136–145)
WBC # BLD AUTO: 5.1 K/UL (ref 4–11)

## 2023-12-26 PROCEDURE — 6360000002 HC RX W HCPCS

## 2023-12-26 PROCEDURE — 94660 CPAP INITIATION&MGMT: CPT

## 2023-12-26 PROCEDURE — 93005 ELECTROCARDIOGRAM TRACING: CPT | Performed by: INTERNAL MEDICINE

## 2023-12-26 PROCEDURE — 6370000000 HC RX 637 (ALT 250 FOR IP)

## 2023-12-26 PROCEDURE — 2700000000 HC OXYGEN THERAPY PER DAY

## 2023-12-26 PROCEDURE — 2580000003 HC RX 258

## 2023-12-26 PROCEDURE — 2060000000 HC ICU INTERMEDIATE R&B

## 2023-12-26 PROCEDURE — 94640 AIRWAY INHALATION TREATMENT: CPT

## 2023-12-26 PROCEDURE — 6370000000 HC RX 637 (ALT 250 FOR IP): Performed by: NURSE PRACTITIONER

## 2023-12-26 PROCEDURE — 6370000000 HC RX 637 (ALT 250 FOR IP): Performed by: HOSPITALIST

## 2023-12-26 PROCEDURE — 82803 BLOOD GASES ANY COMBINATION: CPT

## 2023-12-26 PROCEDURE — 6360000002 HC RX W HCPCS: Performed by: INTERNAL MEDICINE

## 2023-12-26 PROCEDURE — 36591 DRAW BLOOD OFF VENOUS DEVICE: CPT

## 2023-12-26 PROCEDURE — 94761 N-INVAS EAR/PLS OXIMETRY MLT: CPT

## 2023-12-26 PROCEDURE — 6360000002 HC RX W HCPCS: Performed by: HOSPITALIST

## 2023-12-26 PROCEDURE — 80069 RENAL FUNCTION PANEL: CPT

## 2023-12-26 PROCEDURE — 85025 COMPLETE CBC W/AUTO DIFF WBC: CPT

## 2023-12-26 PROCEDURE — 99233 SBSQ HOSP IP/OBS HIGH 50: CPT | Performed by: INTERNAL MEDICINE

## 2023-12-26 RX ORDER — LEVALBUTEROL INHALATION SOLUTION 0.63 MG/3ML
0.63 SOLUTION RESPIRATORY (INHALATION)
Status: DISCONTINUED | OUTPATIENT
Start: 2023-12-26 | End: 2024-01-04 | Stop reason: HOSPADM

## 2023-12-26 RX ADMIN — DIBASIC SODIUM PHOSPHATE, MONOBASIC POTASSIUM PHOSPHATE AND MONOBASIC SODIUM PHOSPHATE 1 TABLET: 852; 155; 130 TABLET ORAL at 21:50

## 2023-12-26 RX ADMIN — TIOTROPIUM BROMIDE INHALATION SPRAY 2 PUFF: 3.12 SPRAY, METERED RESPIRATORY (INHALATION) at 08:08

## 2023-12-26 RX ADMIN — SODIUM CHLORIDE, PRESERVATIVE FREE 10 ML: 5 INJECTION INTRAVENOUS at 09:06

## 2023-12-26 RX ADMIN — ARFORMOTEROL TARTRATE 15 MCG: 15 SOLUTION RESPIRATORY (INHALATION) at 08:08

## 2023-12-26 RX ADMIN — LEVALBUTEROL HYDROCHLORIDE 0.63 MG: 0.63 SOLUTION RESPIRATORY (INHALATION) at 08:08

## 2023-12-26 RX ADMIN — BUDESONIDE 250 MCG: 0.25 INHALANT RESPIRATORY (INHALATION) at 20:09

## 2023-12-26 RX ADMIN — BUDESONIDE 250 MCG: 0.25 INHALANT RESPIRATORY (INHALATION) at 08:08

## 2023-12-26 RX ADMIN — DIBASIC SODIUM PHOSPHATE, MONOBASIC POTASSIUM PHOSPHATE AND MONOBASIC SODIUM PHOSPHATE 1 TABLET: 852; 155; 130 TABLET ORAL at 09:06

## 2023-12-26 RX ADMIN — SODIUM CHLORIDE, PRESERVATIVE FREE 10 ML: 5 INJECTION INTRAVENOUS at 21:50

## 2023-12-26 RX ADMIN — LEVALBUTEROL HYDROCHLORIDE 0.63 MG: 0.63 SOLUTION RESPIRATORY (INHALATION) at 11:34

## 2023-12-26 RX ADMIN — PANTOPRAZOLE SODIUM 40 MG: 40 TABLET, DELAYED RELEASE ORAL at 09:06

## 2023-12-26 RX ADMIN — ATORVASTATIN CALCIUM 10 MG: 10 TABLET, FILM COATED ORAL at 09:06

## 2023-12-26 RX ADMIN — LEVALBUTEROL 0.63 MG: 0.63 SOLUTION RESPIRATORY (INHALATION) at 02:37

## 2023-12-26 RX ADMIN — Medication 5 MG: at 21:50

## 2023-12-26 RX ADMIN — ENOXAPARIN SODIUM 40 MG: 100 INJECTION SUBCUTANEOUS at 09:04

## 2023-12-26 RX ADMIN — LEVALBUTEROL HYDROCHLORIDE 0.63 MG: 0.63 SOLUTION RESPIRATORY (INHALATION) at 20:08

## 2023-12-26 RX ADMIN — FUROSEMIDE 20 MG: 10 INJECTION, SOLUTION INTRAMUSCULAR; INTRAVENOUS at 19:53

## 2023-12-26 RX ADMIN — Medication 800 MG: at 21:50

## 2023-12-26 RX ADMIN — Medication 800 MG: at 09:06

## 2023-12-26 RX ADMIN — FUROSEMIDE 20 MG: 10 INJECTION, SOLUTION INTRAMUSCULAR; INTRAVENOUS at 09:06

## 2023-12-26 RX ADMIN — ARFORMOTEROL TARTRATE 15 MCG: 15 SOLUTION RESPIRATORY (INHALATION) at 20:08

## 2023-12-26 RX ADMIN — Medication 800 MG: at 16:11

## 2023-12-26 ASSESSMENT — PAIN SCALES - GENERAL
PAINLEVEL_OUTOF10: 0

## 2023-12-26 ASSESSMENT — PAIN SCALES - WONG BAKER: WONGBAKER_NUMERICALRESPONSE: 0

## 2023-12-26 NOTE — CARE COORDINATION
11:17 AM    GARTH spoke to pt's daughter, SW dicussed options for LTACH. SW provided locations. Pt's daughter is in agreement with LTACH at [facility]. Pt's daughter doesnt want pt to dc to a rehab or snf, she wants pt to stay in a hospital setting.     GARTH spoke to admissions at select LTACH, pt meets criteria and once the director approves she will submit pre-cert today.     GARTH updated MD.    GARTH following.  Electronically signed by JYOTHI Briscoe, LSW on 12/26/2023 at 11:17 AM  [phone]

## 2023-12-26 NOTE — PROGRESS NOTES
V2.0    [facility] Progress Note      Name:  Catrachita Segal /Age/Sex: 1945  (78 y.o. female)   MRN & CSN:  [id number] & [id number] Encounter Date/Time: 2023 8:37 AM EST   Location:  [address] [place] [address] PCP: Napoleon Stiles MD     Attending:Julián Chavez MD       Hospital Day:     Subjective:     Chief Complaint: SOB    Catrachita Segal is a 78 y.o. female who presents with SOB    Subjective: Breathing is better today.      Review of Systems:      Pertinent positives and negatives discussed in HPI    Objective:     Intake/Output Summary (Last 24 hours) at 2023 0837  Last data filed at 2023 0408  Gross per 24 hour   Intake --   Output 300 ml   Net -300 ml      Vitals:   Vitals:    23 0300 23 0301 23 0400 23 0810   BP: (!) 89/55 95/60 94/62    Pulse: 97 96 (!) 102 (!) 104   Resp: (!) 0 (!) 0 20 24   Temp:   98.8 °F (37.1 °C)    TempSrc:   Axillary    SpO2: 100% 100% 98% 96%   Weight:       Height:             Physical Exam:      General: NAD   Thin F on high flow up in chair  Eyes: Clear nonicteric   ENT: neck supple trach midline  Cardiovascular: Regular rate.  Respiratory: crackles wheezing L side   R side crackles   Gastrointestinal: Soft, non tender  BS Positive  Genitourinary: no suprapubic tenderness  Musculoskeletal:  edema none  Skin: warm, dry  Neuro: A & O: 3  Psych: Mood appropriate. pleasant        Medications:   Medications:    levalbuterol  0.63 mg Nebulization Q4H WA RT    magnesium oxide  800 mg Oral TID    tiotropium  2 puff Inhalation Daily RT    furosemide  20 mg IntraVENous BID    LORazepam  0.5 mg Oral Once    enoxaparin  40 mg SubCUTAneous Daily    melatonin  5 mg Oral Nightly    phosphorus  250 mg Oral BID    atorvastatin  10 mg Oral Daily    pantoprazole  40 mg Oral QAM AC    [Held by provider] metoprolol succinate  25 mg Oral Daily    sodium chloride flush  5-40 mL IntraVENous 2 times per day    budesonide  0.25 mg Nebulization BID RT

## 2023-12-26 NOTE — PROGRESS NOTES
Pulmonary & Critical Care Medicine    Admit Date: 2023  PCP: Napoleon Stiles MD    CC:  hypoxia     Events of Last 24 hours:     Paul remains afebrile.  She is on Vapotherm 35 L flow at 45% FiO2.  Pulse oximetry is hard to obtain so ABG was drawn that shows 7.43/70/98.  At bedside she looked comfortable and denied dyspnea.  Nursing was able to obtain an adequate pleth and Vapotherm was weaned to 20 L flow with 100% FiO2    Brief history  Known hx of lung cancer.  She was seen by Dr. Donaldson on  for hypoxia and hypercapnia, failed BiPAP and required intubation.    She was extubated on .  She was treated with steroids and lasix  She was seen by Dr. Garcia in the office on .  At that time she was on 1L O2 at rest and 2 with ambulation.  She was continued on Trelegy.    She was readmitted on  and was diagnosed with COPD exacerbation / respiratory acidosis requiring vent support.  She was extubated on .      Vitals:  Tmax: 98  VITALS:  /63   Pulse (!) 110   Temp 97.9 °F (36.6 °C) (Oral)   Resp 29   Ht 1.676 m (5' 5.98\")   Wt 50.1 kg (110 lb 7.2 oz)   SpO2 94%   BMI 17.84 kg/m²   24HR INTAKE/OUTPUT:    Intake/Output Summary (Last 24 hours) at 2023 1038  Last data filed at 2023 0408  Gross per 24 hour   Intake --   Output 300 ml   Net -300 ml       CURRENT PULSE OXIMETRY:  SpO2: 94 %  24HR PULSE OXIMETRY RANGE:  SpO2  Av.2 %  Min: 72 %  Max: 100 %    EXAM:  General: No distress. Alert.  Eyes: PERRL. No sclera icterus. No conjunctival injection.  ENT: No discharge. Moist oral mucosa   Neck: Trachea midline. Neck is supple   Resp: No accessory muscle use. Diminished air entry.    CV: Regular rate. Regular rhythm. No mumur or rub. Trace edema.  JVD is elevated.    GI: Non-tender. Non-distended.  Normal bowel sounds.   Neuro: Awake. Speech is clear.    Psych: No anxiety or agitation.     Medications:    IV:   sodium chloride           Scheduled Meds:

## 2023-12-27 ENCOUNTER — APPOINTMENT (OUTPATIENT)
Dept: GENERAL RADIOLOGY | Age: 78
DRG: 208 | End: 2023-12-27
Payer: MEDICARE

## 2023-12-27 LAB
ALBUMIN SERPL-MCNC: 3.2 G/DL (ref 3.4–5)
ANION GAP SERPL CALCULATED.3IONS-SCNC: 3 MMOL/L (ref 3–16)
BUN SERPL-MCNC: 11 MG/DL (ref 7–20)
CALCIUM SERPL-MCNC: 8.8 MG/DL (ref 8.3–10.6)
CHLORIDE SERPL-SCNC: 90 MMOL/L (ref 99–110)
CO2 SERPL-SCNC: 46 MMOL/L (ref 21–32)
CREAT SERPL-MCNC: <0.5 MG/DL (ref 0.6–1.2)
EKG ATRIAL RATE: 134 BPM
EKG DIAGNOSIS: NORMAL
EKG P AXIS: 73 DEGREES
EKG P-R INTERVAL: 190 MS
EKG Q-T INTERVAL: 336 MS
EKG QRS DURATION: 76 MS
EKG QTC CALCULATION (BAZETT): 501 MS
EKG R AXIS: 82 DEGREES
EKG T AXIS: 18 DEGREES
EKG VENTRICULAR RATE: 134 BPM
GFR SERPLBLD CREATININE-BSD FMLA CKD-EPI: >60 ML/MIN/{1.73_M2}
GLUCOSE SERPL-MCNC: 100 MG/DL (ref 70–99)
PHOSPHATE SERPL-MCNC: 2.7 MG/DL (ref 2.5–4.9)
POTASSIUM SERPL-SCNC: 3.5 MMOL/L (ref 3.5–5.1)
SODIUM SERPL-SCNC: 139 MMOL/L (ref 136–145)

## 2023-12-27 PROCEDURE — 6370000000 HC RX 637 (ALT 250 FOR IP): Performed by: HOSPITALIST

## 2023-12-27 PROCEDURE — 2060000000 HC ICU INTERMEDIATE R&B

## 2023-12-27 PROCEDURE — 99233 SBSQ HOSP IP/OBS HIGH 50: CPT | Performed by: INTERNAL MEDICINE

## 2023-12-27 PROCEDURE — 6370000000 HC RX 637 (ALT 250 FOR IP)

## 2023-12-27 PROCEDURE — 2580000003 HC RX 258

## 2023-12-27 PROCEDURE — 6360000002 HC RX W HCPCS: Performed by: INTERNAL MEDICINE

## 2023-12-27 PROCEDURE — 2700000000 HC OXYGEN THERAPY PER DAY

## 2023-12-27 PROCEDURE — 93005 ELECTROCARDIOGRAM TRACING: CPT | Performed by: INTERNAL MEDICINE

## 2023-12-27 PROCEDURE — 6360000002 HC RX W HCPCS

## 2023-12-27 PROCEDURE — 6360000002 HC RX W HCPCS: Performed by: HOSPITALIST

## 2023-12-27 PROCEDURE — 80069 RENAL FUNCTION PANEL: CPT

## 2023-12-27 PROCEDURE — 94640 AIRWAY INHALATION TREATMENT: CPT

## 2023-12-27 PROCEDURE — 6370000000 HC RX 637 (ALT 250 FOR IP): Performed by: NURSE PRACTITIONER

## 2023-12-27 PROCEDURE — 93010 ELECTROCARDIOGRAM REPORT: CPT | Performed by: INTERNAL MEDICINE

## 2023-12-27 PROCEDURE — 94660 CPAP INITIATION&MGMT: CPT

## 2023-12-27 PROCEDURE — 94761 N-INVAS EAR/PLS OXIMETRY MLT: CPT

## 2023-12-27 PROCEDURE — 71045 X-RAY EXAM CHEST 1 VIEW: CPT

## 2023-12-27 RX ADMIN — ENOXAPARIN SODIUM 40 MG: 100 INJECTION SUBCUTANEOUS at 10:00

## 2023-12-27 RX ADMIN — FUROSEMIDE 20 MG: 10 INJECTION, SOLUTION INTRAMUSCULAR; INTRAVENOUS at 10:00

## 2023-12-27 RX ADMIN — Medication 5 MG: at 21:31

## 2023-12-27 RX ADMIN — TIOTROPIUM BROMIDE INHALATION SPRAY 2 PUFF: 3.12 SPRAY, METERED RESPIRATORY (INHALATION) at 08:14

## 2023-12-27 RX ADMIN — ARFORMOTEROL TARTRATE 15 MCG: 15 SOLUTION RESPIRATORY (INHALATION) at 21:57

## 2023-12-27 RX ADMIN — PANTOPRAZOLE SODIUM 40 MG: 40 TABLET, DELAYED RELEASE ORAL at 06:53

## 2023-12-27 RX ADMIN — SODIUM CHLORIDE, PRESERVATIVE FREE 10 ML: 5 INJECTION INTRAVENOUS at 21:32

## 2023-12-27 RX ADMIN — LEVALBUTEROL HYDROCHLORIDE 0.63 MG: 0.63 SOLUTION RESPIRATORY (INHALATION) at 21:57

## 2023-12-27 RX ADMIN — BUDESONIDE 250 MCG: 0.25 INHALANT RESPIRATORY (INHALATION) at 08:14

## 2023-12-27 RX ADMIN — LEVALBUTEROL HYDROCHLORIDE 0.63 MG: 0.63 SOLUTION RESPIRATORY (INHALATION) at 11:53

## 2023-12-27 RX ADMIN — LEVALBUTEROL HYDROCHLORIDE 0.63 MG: 0.63 SOLUTION RESPIRATORY (INHALATION) at 15:50

## 2023-12-27 RX ADMIN — ARFORMOTEROL TARTRATE 15 MCG: 15 SOLUTION RESPIRATORY (INHALATION) at 08:14

## 2023-12-27 RX ADMIN — ATORVASTATIN CALCIUM 10 MG: 10 TABLET, FILM COATED ORAL at 10:00

## 2023-12-27 RX ADMIN — LEVALBUTEROL HYDROCHLORIDE 0.63 MG: 0.63 SOLUTION RESPIRATORY (INHALATION) at 08:21

## 2023-12-27 RX ADMIN — SODIUM CHLORIDE, PRESERVATIVE FREE 10 ML: 5 INJECTION INTRAVENOUS at 10:02

## 2023-12-27 RX ADMIN — DIBASIC SODIUM PHOSPHATE, MONOBASIC POTASSIUM PHOSPHATE AND MONOBASIC SODIUM PHOSPHATE 1 TABLET: 852; 155; 130 TABLET ORAL at 10:00

## 2023-12-27 RX ADMIN — DIBASIC SODIUM PHOSPHATE, MONOBASIC POTASSIUM PHOSPHATE AND MONOBASIC SODIUM PHOSPHATE 1 TABLET: 852; 155; 130 TABLET ORAL at 21:31

## 2023-12-27 RX ADMIN — BUDESONIDE 250 MCG: 0.25 INHALANT RESPIRATORY (INHALATION) at 21:57

## 2023-12-27 ASSESSMENT — PAIN SCALES - GENERAL
PAINLEVEL_OUTOF10: 0

## 2023-12-27 NOTE — CARE COORDINATION
2:31 PM    Plan for [facility] at [facility], pt and family are in agreement.     SW received VM from admissions at [facility], pre-cert is being denied due to pt not being medically stable for dc. They are offering a P2P.    GARTH messaged MD, he is available anytime tomorrow or Thursday for the P2P.  GARTH spoke to admissions at [facility] and she will schedule the P2P.     SW following.  Electronically signed by JYOTHI Briscoe, LIZZETHW on 12/27/2023 at 2:31 PM  [phone]

## 2023-12-27 NOTE — PROGRESS NOTES
given her progressive wt loss.  CT scan in June 2023 was stable.  No recent PET      Continue  lasix 20 IV BID twice a day (UOP 1.8L in the past 24 hours with net negative 1.1 L)     Disposition appears to be LTAC with likely precertification today.  She is ready for transfer to LTAC from a pulmonary standpoint      Marques Puri MD

## 2023-12-27 NOTE — PROGRESS NOTES
4 Eyes Skin Assessment     NAME:  Catrachita Segal  YOB: 1945  MEDICAL RECORD NUMBER:  [id number]    The patient is being assessed for  Shift Handoff    I agree that at least one RN has performed a thorough Head to Toe Skin Assessment on the patient. ALL assessment sites listed below have been assessed.      Areas assessed by both nurses:    Head, Face, Ears, Shoulders, Back, Chest, Arms, Elbows, Hands, Sacrum. Buttock, Coccyx, Ischium, Legs. Feet and Heels, and Under Medical Devices         Does the Patient have a Wound? Yes wound(s) were present on assessment. LDA wound assessment was Initiated and completed by RN       Selwyn Prevention initiated by RN: Yes  Wound Care Orders initiated by RN:N/A- wound care consulted     Pressure Injury (Stage 3,4, Unstageable, DTI, NWPT, and Complex wounds) if present, place Wound referral order by RN under : Yes    New Ostomies, if present place, Ostomy referral order under : No     Nurse 1 eSignature: Electronically signed by Natividad Dai RN on 12/27/23 at 2:40 PM EST    **SHARE this note so that the co-signing nurse can place an eSignature**    Nurse 2 eSignature: Electronically signed by Joaquin Salcido RN on 12/27/23 at 7:15 PM EST

## 2023-12-27 NOTE — PROGRESS NOTES
Patient tolerated vapotherm settings of 15L/100% since 0915 without signs of respiratory distress, SpO2 maintained > 90%. Attempted to switch patient from vapotherm to 15L HFNC per Dr. Puri, but patient did not tolerate. SpO2 dropped to 78%, patient became SOB, tachypneic, and tachycardic in the 130s. Patient back on vapotherm at this time (15L/100%) with O2 sats in the high 90s. Patient no longer SOB or tachypneic. HR down to 110s. Dr. Puri updated. No new orders at this time.

## 2023-12-27 NOTE — RT PROTOCOL NOTE
RT Nebulizer Bronchodilator Protocol Note    There is a bronchodilator order in the chart from a provider indicating to follow the RT Bronchodilator Protocol and there is an “Initiate RT Bronchodilator Protocol” order as well (see protocol at bottom of note).    CXR Findings:  No results found.    The findings from the last RT Protocol Assessment were as follows:  Smoking: Chronic pulmonary disease  Respiratory Pattern: Dyspnea on exertion or RR 21-25 bpm  Breath Sounds: Slightly diminished and/or crackles  Cough: Strong, productive  Indication for Bronchodilator Therapy: Decreased or absent breath sounds  Bronchodilator Assessment Score: 7    Aerosolized bronchodilator medication orders have been revised according to the RT Nebulizer Bronchodilator Protocol below.    Respiratory Therapist to perform RT Therapy Protocol Assessment initially then follow the protocol.  Repeat RT Therapy Protocol Assessment PRN for score 0-3 or on second treatment, BID, and PRN for scores above 3.    No Indications - adjust the frequency to every 6 hours PRN wheezing or bronchospasm, if no treatments needed after 48 hours then discontinue using Per Protocol order mode.     If indication present, adjust the RT bronchodilator orders based on the Bronchodilator Assessment Score as indicated below.  If a patient is on this medication at home then do not decrease Frequency below that used at home.    0-3 - enter or revise RT bronchodilator order(s) to equivalent RT Bronchodilator order with Frequency of every 4 hours PRN for wheezing or increased work of breathing using Per Protocol order mode.       4-6 - enter or revise RT Bronchodilator order(s) to two equivalent RT bronchodilator orders with one order with BID Frequency and one order with Frequency of every 4 hours PRN wheezing or increased work of breathing using Per Protocol order mode.         7-10 - enter or revise RT Bronchodilator order(s) to two equivalent RT bronchodilator

## 2023-12-27 NOTE — PROGRESS NOTES
When patient was eating, Sp02 dropped to 88%. HR to 135. Pt had currently been at 20L 100%. Patient wasn't able to recover oxygen saturation until HHF was increased to 35L 100%. Khushi notified. Stated to keep HHF settings at 35L 100%. No further orders at this time.

## 2023-12-27 NOTE — PROGRESS NOTES
Comprehensive Nutrition Assessment    RECOMMENDATIONS:  PO Diet: Continue Regular  ONS: Continue Fruit Smoothie bid  Nutrition Education: Education not indicated     NUTRITION ASSESSMENT:   Nutritional summary & status: Follow-up. Meal intake improving past several days at >51% consumed. Receiving Fuit Smoothie bid for increased kcal/pro. Wt essentially stable with minor fluctuations through admit. Pt wishes to d/c to LTACH, P2P tomorrow. Continue to follow.   Admission // PMH: Pneumonia//adenocarcinoma of the lung stage IV, CHF, HTN/HLD    MALNUTRITION ASSESSMENT  Context of Malnutrition: Chronic Illness   Malnutrition Status: Severe malnutrition  Findings of the 6 clinical characteristics of malnutrition (Minimum of 2 out of 6 clinical characteristics is required to make the diagnosis of moderate or severe Protein Calorie Malnutrition based on AND/ASPEN Guidelines):  Energy Intake:  Mild decrease in energy intake (Comment)  Weight Loss:  Greater than 20% over 1 year     Body Fat Loss:  Severe body fat loss Orbital, Triceps, Buccal region   Muscle Mass Loss:  Severe muscle mass loss Clavicles (pectoralis & deltoids), Temples (temporalis)  Fluid Accumulation:  No significant fluid accumulation     Strength:  Not Performed    NUTRITION DIAGNOSIS   Severe malnutrition related to catabolic illness as evidenced by Criteria as identified in malnutrition assessment    Nutrition Monitoring and Evaluation:   Food/Nutrient Intake Outcomes:  Food and Nutrient Intake, Supplement Intake  Physical Signs/Symptoms Outcomes:  Biochemical Data, Nutrition Focused Physical Findings, Weight, Hemodynamic Status     OBJECTIVE DATA: Significant to nutrition assessment  Nutrition Related Findings: LBM12/21. No edema. Glu 100.  Wounds: Stage II, Pressure Injury (coccyx)  Nutrition Goals: Meet at least 75% of estimated needs, prior to discharge     CURRENT NUTRITION THERAPIES  ADULT DIET; Regular  ADULT ORAL NUTRITION SUPPLEMENT;

## 2023-12-27 NOTE — PROGRESS NOTES
Physical Therapy/Occupational Therapy  Hold note    Attempted to see pt this am and she continues on 25L vapotherm this am. Discussed with RN and will hold therapy at this time due to increased 02 needs. Will f/u per POC.       Kathryn Tadeo, PT   Maritza Sainz, OTR/L

## 2023-12-27 NOTE — PROGRESS NOTES
V2.0    [facility] [place]      Name:  Catrachita Segal /Age/Sex: 1945  (78 y.o. female)   MRN & CSN:  [id number] & [id number] Encounter Date/Time: 2023 8:37 AM EST   Location:  [address] [place] [address] PCP: Napoleon Stiles MD     Attending:Julián Chavez MD       Hospital Day:     Subjective:     Chief Complaint: SOB    Catrachita Segal is a 78 y.o. female who presents with SOB    Subjective: Breathing is better today.      Review of Systems:      Pertinent positives and negatives discussed in HPI    Objective:     Intake/Output Summary (Last 24 hours) at 2023 0653  Last data filed at 2023 0400  Gross per 24 hour   Intake 480 ml   Output 1375 ml   Net -895 ml        Vitals:   Vitals:    23 0210 23 0300 23 0400 23 0500   BP:  95/64 92/61 (!) 89/59   Pulse: (!) 102 (!) 101 95 99   Resp: 23 (!) 0 (!) 0    Temp:   97.8 °F (36.6 °C)    TempSrc:   Oral    SpO2: 95% 93% 99% 95%   Weight:    51.8 kg (114 lb 3.2 oz)   Height:             Physical Exam:      General: NAD   Thin F on high flow up in chair  Eyes: Clear nonicteric   ENT: neck supple trach midline  Cardiovascular: Regular rate.  Respiratory: crackles wheezing L side   R side crackles   Gastrointestinal: Soft, non tender  BS Positive  Genitourinary: no suprapubic tenderness  Musculoskeletal:  edema none  Skin: warm, dry  Neuro: A & O: 3  Psych: Mood appropriate. pleasant        Medications:   Medications:    levalbuterol  0.63 mg Nebulization Q4H WA RT    tiotropium  2 puff Inhalation Daily RT    furosemide  20 mg IntraVENous BID    LORazepam  0.5 mg Oral Once    enoxaparin  40 mg SubCUTAneous Daily    melatonin  5 mg Oral Nightly    phosphorus  250 mg Oral BID    atorvastatin  10 mg Oral Daily    pantoprazole  40 mg Oral QAM AC    [Held by provider] metoprolol succinate  25 mg Oral Daily    sodium chloride flush  5-40 mL IntraVENous 2 times per day    budesonide  0.25 mg Nebulization BID RT    arformoterol tartrate

## 2023-12-28 LAB
ALBUMIN SERPL-MCNC: 3.2 G/DL (ref 3.4–5)
ANION GAP SERPL CALCULATED.3IONS-SCNC: 5 MMOL/L (ref 3–16)
BUN SERPL-MCNC: 14 MG/DL (ref 7–20)
CALCIUM SERPL-MCNC: 8.8 MG/DL (ref 8.3–10.6)
CHLORIDE SERPL-SCNC: 89 MMOL/L (ref 99–110)
CO2 SERPL-SCNC: 45 MMOL/L (ref 21–32)
CREAT SERPL-MCNC: <0.5 MG/DL (ref 0.6–1.2)
EKG ATRIAL RATE: 132 BPM
EKG DIAGNOSIS: NORMAL
EKG P AXIS: 66 DEGREES
EKG P-R INTERVAL: 198 MS
EKG Q-T INTERVAL: 372 MS
EKG QRS DURATION: 70 MS
EKG QTC CALCULATION (BAZETT): 551 MS
EKG R AXIS: 51 DEGREES
EKG T AXIS: 51 DEGREES
EKG VENTRICULAR RATE: 132 BPM
GFR SERPLBLD CREATININE-BSD FMLA CKD-EPI: >60 ML/MIN/{1.73_M2}
GLUCOSE SERPL-MCNC: 92 MG/DL (ref 70–99)
PHOSPHATE SERPL-MCNC: 3.3 MG/DL (ref 2.5–4.9)
POTASSIUM SERPL-SCNC: 3.4 MMOL/L (ref 3.5–5.1)
SODIUM SERPL-SCNC: 139 MMOL/L (ref 136–145)

## 2023-12-28 PROCEDURE — 2060000000 HC ICU INTERMEDIATE R&B

## 2023-12-28 PROCEDURE — 93010 ELECTROCARDIOGRAM REPORT: CPT | Performed by: INTERNAL MEDICINE

## 2023-12-28 PROCEDURE — 6360000002 HC RX W HCPCS: Performed by: INTERNAL MEDICINE

## 2023-12-28 PROCEDURE — 99233 SBSQ HOSP IP/OBS HIGH 50: CPT | Performed by: INTERNAL MEDICINE

## 2023-12-28 PROCEDURE — 6370000000 HC RX 637 (ALT 250 FOR IP): Performed by: NURSE PRACTITIONER

## 2023-12-28 PROCEDURE — 6360000002 HC RX W HCPCS

## 2023-12-28 PROCEDURE — 2580000003 HC RX 258

## 2023-12-28 PROCEDURE — 6370000000 HC RX 637 (ALT 250 FOR IP)

## 2023-12-28 PROCEDURE — 2700000000 HC OXYGEN THERAPY PER DAY

## 2023-12-28 PROCEDURE — 94660 CPAP INITIATION&MGMT: CPT

## 2023-12-28 PROCEDURE — 80069 RENAL FUNCTION PANEL: CPT

## 2023-12-28 PROCEDURE — 94640 AIRWAY INHALATION TREATMENT: CPT

## 2023-12-28 PROCEDURE — 94761 N-INVAS EAR/PLS OXIMETRY MLT: CPT

## 2023-12-28 PROCEDURE — 6370000000 HC RX 637 (ALT 250 FOR IP): Performed by: HOSPITALIST

## 2023-12-28 PROCEDURE — 6360000002 HC RX W HCPCS: Performed by: HOSPITALIST

## 2023-12-28 PROCEDURE — 36591 DRAW BLOOD OFF VENOUS DEVICE: CPT

## 2023-12-28 PROCEDURE — 6370000000 HC RX 637 (ALT 250 FOR IP): Performed by: INTERNAL MEDICINE

## 2023-12-28 RX ORDER — BUSPIRONE HYDROCHLORIDE 5 MG/1
10 TABLET ORAL 3 TIMES DAILY
Status: DISCONTINUED | OUTPATIENT
Start: 2023-12-28 | End: 2024-01-04 | Stop reason: HOSPADM

## 2023-12-28 RX ORDER — POTASSIUM CHLORIDE 20 MEQ/1
40 TABLET, EXTENDED RELEASE ORAL ONCE
Status: COMPLETED | OUTPATIENT
Start: 2023-12-28 | End: 2023-12-28

## 2023-12-28 RX ADMIN — ENOXAPARIN SODIUM 40 MG: 100 INJECTION SUBCUTANEOUS at 08:06

## 2023-12-28 RX ADMIN — FUROSEMIDE 20 MG: 10 INJECTION, SOLUTION INTRAMUSCULAR; INTRAVENOUS at 11:07

## 2023-12-28 RX ADMIN — FUROSEMIDE 20 MG: 10 INJECTION, SOLUTION INTRAMUSCULAR; INTRAVENOUS at 18:53

## 2023-12-28 RX ADMIN — POTASSIUM CHLORIDE 40 MEQ: 1500 TABLET, EXTENDED RELEASE ORAL at 05:27

## 2023-12-28 RX ADMIN — LEVALBUTEROL HYDROCHLORIDE 0.63 MG: 0.63 SOLUTION RESPIRATORY (INHALATION) at 20:28

## 2023-12-28 RX ADMIN — DIBASIC SODIUM PHOSPHATE, MONOBASIC POTASSIUM PHOSPHATE AND MONOBASIC SODIUM PHOSPHATE 1 TABLET: 852; 155; 130 TABLET ORAL at 08:05

## 2023-12-28 RX ADMIN — ARFORMOTEROL TARTRATE 15 MCG: 15 SOLUTION RESPIRATORY (INHALATION) at 20:28

## 2023-12-28 RX ADMIN — PANTOPRAZOLE SODIUM 40 MG: 40 TABLET, DELAYED RELEASE ORAL at 06:30

## 2023-12-28 RX ADMIN — BUDESONIDE 250 MCG: 0.25 INHALANT RESPIRATORY (INHALATION) at 09:11

## 2023-12-28 RX ADMIN — LEVALBUTEROL HYDROCHLORIDE 0.63 MG: 0.63 SOLUTION RESPIRATORY (INHALATION) at 17:27

## 2023-12-28 RX ADMIN — ARFORMOTEROL TARTRATE 15 MCG: 15 SOLUTION RESPIRATORY (INHALATION) at 09:11

## 2023-12-28 RX ADMIN — Medication 5 MG: at 21:15

## 2023-12-28 RX ADMIN — BUDESONIDE 250 MCG: 0.25 INHALANT RESPIRATORY (INHALATION) at 20:28

## 2023-12-28 RX ADMIN — DIBASIC SODIUM PHOSPHATE, MONOBASIC POTASSIUM PHOSPHATE AND MONOBASIC SODIUM PHOSPHATE 1 TABLET: 852; 155; 130 TABLET ORAL at 21:15

## 2023-12-28 RX ADMIN — ATORVASTATIN CALCIUM 10 MG: 10 TABLET, FILM COATED ORAL at 08:05

## 2023-12-28 RX ADMIN — BUSPIRONE HYDROCHLORIDE 10 MG: 5 TABLET ORAL at 21:15

## 2023-12-28 RX ADMIN — LEVALBUTEROL HYDROCHLORIDE 0.63 MG: 0.63 SOLUTION RESPIRATORY (INHALATION) at 13:36

## 2023-12-28 RX ADMIN — LEVALBUTEROL HYDROCHLORIDE 0.63 MG: 0.63 SOLUTION RESPIRATORY (INHALATION) at 09:11

## 2023-12-28 RX ADMIN — BUSPIRONE HYDROCHLORIDE 10 MG: 5 TABLET ORAL at 11:06

## 2023-12-28 RX ADMIN — SODIUM CHLORIDE, PRESERVATIVE FREE 10 ML: 5 INJECTION INTRAVENOUS at 08:05

## 2023-12-28 RX ADMIN — TIOTROPIUM BROMIDE INHALATION SPRAY 2 PUFF: 3.12 SPRAY, METERED RESPIRATORY (INHALATION) at 09:12

## 2023-12-28 NOTE — CARE COORDINATION
2:28 PM    Plan for [facility] at [facility], pt and family are in agreement.   Still awaiting P2P, MD will discuss goals of care w/pt.    SW following.  Electronically signed by JYOTHI Briscoe, LIZZETHW on 12/28/2023 at 2:28 PM  [phone]

## 2023-12-28 NOTE — PLAN OF CARE
Problem: Discharge Planning  Goal: Discharge to home or other facility with appropriate resources  12/28/2023 1336 by Natividad Dai, RN  Outcome: Progressing  Flowsheets (Taken 12/28/2023 1336)  Discharge to home or other facility with appropriate resources: Identify barriers to discharge with patient and caregiver     Problem: Safety - Adult  Goal: Free from fall injury  12/28/2023 1336 by Natividad Dai, RN  Outcome: Progressing  Flowsheets (Taken 12/28/2023 1336)  Free From Fall Injury: Instruct family/caregiver on patient safety  Note:   Pt is a High fall risk. See Marie Fall Score and ABCDS Injury Risk assessments.   + Screening for Orthostasis and/or + High Fall Risk per MARIE/ABCDS: Explained fall risk precautions to pt and family and rationale behind their use to keep the patient safe. Pt bed is in low position, side rails up, call light and belongings are in reach. Fall wristband applied and present on pts wrist.  Chair Alarm on.  Pt encouraged to call for assistance. Will continue with hourly rounds for PO intake, pain needs, toileting and repositioning as needed.      Problem: Pain  Goal: Verbalizes/displays adequate comfort level or baseline comfort level  12/28/2023 1336 by Natividad Dai RN  Outcome: Progressing  Flowsheets (Taken 12/28/2023 1336)  Verbalizes/displays adequate comfort level or baseline comfort level:   Encourage patient to monitor pain and request assistance   Assess pain using appropriate pain scale  Note: No complaints of pain at this time      Problem: Skin/Tissue Integrity  Goal: Absence of new skin breakdown  Description: 1.  Monitor for areas of redness and/or skin breakdown  2.  Assess vascular access sites hourly  3.  Every 4-6 hours minimum:  Change oxygen saturation probe site  4.  Every 4-6 hours:  If on nasal continuous positive airway pressure, respiratory therapy assess nares and determine need for appliance change or resting period.  12/28/2023 1336 by Malaika

## 2023-12-28 NOTE — PROGRESS NOTES
V2.0    [facility] [place] Progress Note      Name:  Catrachita Segal /Age/Sex: 1945  (78 y.o. female)   MRN & CSN:  [id number] & [id number] Encounter Date/Time: 2023 8:37 AM EST   Location:  [address] [place] [address] PCP: Napoleon Stiles MD     Attending:Julián Chavez MD       Hospital Day: 24    Subjective:     Chief Complaint: SOB    Catrachita Segal is a 78 y.o. female who presents with SOB    Subjective: Daughter in patient's room as well.  Patient is not feeling well today.  Having increasing shortness of breath.  Discussed with family about next steps.      Review of Systems:      Pertinent positives and negatives discussed in HPI    Objective:     Intake/Output Summary (Last 24 hours) at 2023 0728  Last data filed at 2023 2300  Gross per 24 hour   Intake --   Output 700 ml   Net -700 ml        Vitals:   Vitals:    23 0300 23 0315 23 0350 23 0519   BP: 104/71      Pulse: (!) 102 (!) 104 (!) 107 (!) 110   Resp: 20 14 15 20   Temp: 97.7 °F (36.5 °C)      TempSrc: Axillary      SpO2: 96% 96% 95% 99%   Weight:       Height:             Physical Exam:      General: Thin ill-appearing female  Using accessory muscles to breathe  Eyes: Clear nonicteric   ENT: neck supple trach midline  Cardiovascular: Regular rate.  Respiratory: crackles wheezing L side   R side crackles continues  Gastrointestinal: Soft, non tender  BS Positive  Genitourinary: no suprapubic tenderness  Musculoskeletal:  edema none  Skin: warm, dry  Neuro: A & O: X 3  Psych: Mood slightly anxious      Medications:   Medications:    levalbuterol  0.63 mg Nebulization Q4H WA RT    tiotropium  2 puff Inhalation Daily RT    furosemide  20 mg IntraVENous BID    LORazepam  0.5 mg Oral Once    enoxaparin  40 mg SubCUTAneous Daily    melatonin  5 mg Oral Nightly    phosphorus  250 mg Oral BID    atorvastatin  10 mg Oral Daily    pantoprazole  40 mg Oral QAM AC    [Held by provider] metoprolol succinate  25 mg Oral Daily

## 2023-12-28 NOTE — PROGRESS NOTES
Consulted for \"stage 2 pressure injury on coccyx\". Assessed pt's coccyx - pink, dry, flaky and shearing. Per pt's daughter, wound has been there for a while. Combination of pressure and ICD d/t urinary incontinence. Wound care orders placed. Wound Care to sign off. Re-consult for changes or deterioration.

## 2023-12-28 NOTE — PROGRESS NOTES
1715- Patient tachycardic in the 140-150s. Upon entering patient room, patient stated that her family had brought her coffee. She stated that she only had 2 sips. Advised patient that coffee could contribute to elevated HR. Coffee taken from patient with permission. STAT EKG ordered- see flowsheets for results. /66. RR mid 30s. Patient tripoding. Vapotherm increased to 20L/100%. RT notified. Dr. Chavez contacted about change in patient status- see orders.     1850- HR still maintaining 130-140s. BP softer- 87/66. Scheduled lasix held. Dr. Chavez notified of abnormal VS. Awaiting response at this time.

## 2023-12-28 NOTE — PROGRESS NOTES
Patient is with potassium of 3.4 with morning labs.  0435, This RN sends message to ERIKA Diaz NP.  This RN sends, \"Hello.  The patient is with a potassium of 3.4 this morning with no ordered replacements.  Would you like to have the potassium replaced?  Please advise.\"    NP places orders for potassium chloride (Klor- Con) 40mEq PO x1.

## 2023-12-28 NOTE — PLAN OF CARE
chronic and comorbid conditions and prevent exacerbation or deterioration   Monitor and assess patient's chronic conditions and comorbid symptoms for stability, deterioration, or improvement   Update acute care plan with appropriate goals if chronic or comorbid symptoms are exacerbated and prevent overall improvement and discharge     Problem: Nutrition Deficit:  Goal: Optimize nutritional status  Outcome: Progressing  Flowsheets (Taken 12/28/2023 0508)  Nutrient intake appropriate for improving, restoring, or maintaining nutritional needs:   Assess nutritional status and recommend course of action   Monitor oral intake, labs, and treatment plans     Problem: ABCDS Injury Assessment  Goal: Absence of physical injury  Outcome: Progressing  Flowsheets (Taken 12/28/2023 0508)  Absence of Physical Injury: Implement safety measures based on patient assessment     Problem: Respiratory - Adult  Goal: Achieves optimal ventilation and oxygenation  Outcome: Progressing  Flowsheets (Taken 12/28/2023 0508)  Achieves optimal ventilation and oxygenation:   Assess for changes in respiratory status   Assess for changes in mentation and behavior     Problem: Metabolic/Fluid and Electrolytes - Adult  Goal: Electrolytes maintained within normal limits  Outcome: Progressing  Flowsheets (Taken 12/28/2023 0508)  Electrolytes maintained within normal limits:   Monitor labs and assess patient for signs and symptoms of electrolyte imbalances   Administer electrolyte replacement as ordered   Monitor response to electrolyte replacements, including repeat lab results as appropriate  Note: Patient is with potassium level of 3.4 this morning.  Orders placed for 40mEq potassium chloride PO.      Problem: Hematologic - Adult  Goal: Maintains hematologic stability  Outcome: Progressing  Flowsheets (Taken 12/28/2023 0508)  Maintains hematologic stability:   Assess for signs and symptoms of bleeding or hemorrhage   Administer blood products/factors

## 2023-12-28 NOTE — RT PROTOCOL NOTE
on the score.  If a patient is on this medication at home then do not decrease Frequency below that used at home.    0-3 - enter or revise RT bronchodilator order(s) to equivalent RT Bronchodilator order with Frequency of every 4 hours PRN for wheezing or increased work of breathing using Per Protocol order mode.        4-6 - enter or revise RT Bronchodilator order(s) to two equivalent RT bronchodilator orders with one order with BID Frequency and one order with Frequency of every 4 hours PRN wheezing or increased work of breathing using Per Protocol order mode.        7-10 - enter or revise RT Bronchodilator order(s) to two equivalent RT bronchodilator orders with one order with TID Frequency and one order with Frequency of every 4 hours PRN wheezing or increased work of breathing using Per Protocol order mode.       11-13 - enter or revise RT Bronchodilator order(s) to one equivalent RT bronchodilator order with QID Frequency and an Albuterol order with Frequency of every 4 hours PRN wheezing or increased work of breathing using Per Protocol order mode.      Greater than 13 - enter or revise RT Bronchodilator order(s) to one equivalent RT bronchodilator order with every 4 hours Frequency and an Albuterol order with Frequency of every 2 hours PRN wheezing or increased work of breathing using Per Protocol order mode.     RT to enter RT Home Evaluation for COPD & MDI Assessment order using Per Protocol order mode.    Electronically signed by Dioni Gifford RCP on 12/28/2023 at 5:30 PM

## 2023-12-28 NOTE — PROGRESS NOTES
Pulmonary & Critical Care Medicine    Admit Date: 2023  PCP: Napoleon Stiles MD    CC:  hypoxia     Events of Last 24 hours:     Catrachita cannot seem to have consistent improvement.  Every several days it has something else whether acute on chronic hypercapnia, worsening hypoxemic respiratory failure, hypotension, or worsening pulmonary edema.  This is despite 25 days of aggressive treatment.    Last night she developed worsening tachycardia to 150 with increased tachypnea to the 30s, tripoding, and requiring Vapotherm to be increased to 20 L at 100% FiO2.  She was hypotensive and Lasix was held.  Chest x-ray shows worsening pulmonary edema.    Currently she is on 25 L with 100% FiO2 resulting in oxygen saturation of 97%.  She has a sinus tachycardia to 130 but her blood pressure is 111/63.  She looks dyspneic but only complains of anxiety.  Her morning Lasix was also held    Brief history  Known hx of lung cancer.  She was seen by Dr. Donaldson on  for hypoxia and hypercapnia, failed BiPAP and required intubation.    She was extubated on .  She was treated with steroids and lasix  She was seen by Dr. Garcia in the office on .  At that time she was on 1L O2 at rest and 2 with ambulation.  She was continued on Trelegy.    She was readmitted on  and was diagnosed with COPD exacerbation / respiratory acidosis requiring vent support.  She was extubated on .      Vitals:  Tmax: 98.2  VITALS:  /63   Pulse (!) 120   Temp 97.6 °F (36.4 °C) (Oral)   Resp 25   Ht 1.676 m (5' 5.98\")   Wt 54 kg (119 lb 0.8 oz)   SpO2 98%   BMI 19.22 kg/m²   24HR INTAKE/OUTPUT:    Intake/Output Summary (Last 24 hours) at 2023 1110  Last data filed at 2023 2300  Gross per 24 hour   Intake --   Output 450 ml   Net -450 ml       CURRENT PULSE OXIMETRY:  SpO2: 98 %  24HR PULSE OXIMETRY RANGE:  SpO2  Av.9 %  Min: 76 %  Max: 100 %    EXAM:  General: No distress. Alert.  Eyes: PERRL. No sclera

## 2023-12-28 NOTE — PROGRESS NOTES
4 Eyes Skin Assessment     NAME:  Catrachita Segal  YOB: 1945  MEDICAL RECORD NUMBER:  [id number]    The patient is being assessed for  Shift Handoff    I agree that at least one RN has performed a thorough Head to Toe Skin Assessment on the patient. ALL assessment sites listed below have been assessed.      Areas assessed by both nurses: Narinder    Head, Face, Ears, Shoulders, Back, Chest, Arms, Elbows, Hands, Sacrum. Buttock, Coccyx, Ischium, Legs. Feet and Heels, and Under Medical Devices         Does the Patient have a Wound? Yes wound(s) were present on assessment. LDA wound assessment was Initiated and completed by RN       Selwyn Prevention initiated by RN: Yes  Wound Care Orders initiated by RN: No    Pressure Injury (Stage 3,4, Unstageable, DTI, NWPT, and Complex wounds) if present, place Wound referral order by RN under : Yes    New Ostomies, if present place, Ostomy referral order under : No     Nurse 1 eSignature: Electronically signed by Joaquin Salcido RN on 12/28/23 at 5:08 AM EST    **SHARE this note so that the co-signing nurse can place an eSignature**    Nurse 2 eSignature: Electronically signed by Natividad Dai RN on 12/28/23 at 7:06 PM EST

## 2023-12-28 NOTE — RT PROTOCOL NOTE
bronchodilator orders with one order with BID Frequency and one order with Frequency of every 4 hours PRN wheezing or increased work of breathing using Per Protocol order mode.         7-10 - enter or revise RT Bronchodilator order(s) to two equivalent RT bronchodilator orders with one order with TID Frequency and one order with Frequency of every 4 hours PRN wheezing or increased work of breathing using Per Protocol order mode.       11-13 - enter or revise RT Bronchodilator order(s) to one equivalent RT bronchodilator order with QID Frequency and an Albuterol order with Frequency of every 4 hours PRN wheezing or increased work of breathing using Per Protocol order mode.      Greater than 13 - enter or revise RT Bronchodilator order(s) to one equivalent RT bronchodilator order with every 4 hours Frequency and an Albuterol order with Frequency of every 2 hours PRN wheezing or increased work of breathing using Per Protocol order mode.     RT to enter RT Home Evaluation for COPD & MDI Assessment order using Per Protocol order mode.    Electronically signed by Argentina Larry RCP on 12/28/2023 at 4:23 AM

## 2023-12-28 NOTE — PROGRESS NOTES
Central line dressing changed per twice weekly dressing change policy. Dressing changed using sterile technique. Central line insertion site is clean, dry and intact with no signs or symptoms of infection.

## 2023-12-28 NOTE — PROGRESS NOTES
Palliative Care Chart Review  and Check in Note:     NAME:  Catrachita Segal  Admit Date: 12/5/2023  Hospital Day:  Hospital Day: 24   Current Code status: DNR-CCA    Palliative care is continuing to following Ms. Segal for symptom management,  and goals of care discussion as needed. Patient's chart reviewed today 12/28/23.      Spoke with pt and daughter Yovani at the bedside. The pt c/o sob and anxiety/frustration at times. We discussed her wishes regarding intubation and she says she'd be willing to be intubated again if needed but would not want a tracheostomy. For now, she wants to continue the current aggressive management in hopes of being able to recover and return home. She says she understands she may not recover and in that case wants to be cremated. She said she has been having discussions with her daughters about that possibility.     The following are the currently established goals/code status, and Symptom management.     Goals of care: Pt/family hoping for improvement and wanting to continue the current aggressive management for now.    Code status: DNR-CCA, confirmed with pt today    Discharge plan: MIGUEL ANGEL Dexter NP  Palliative Care  [phone]

## 2023-12-29 PROCEDURE — 6360000002 HC RX W HCPCS: Performed by: INTERNAL MEDICINE

## 2023-12-29 PROCEDURE — 6360000002 HC RX W HCPCS

## 2023-12-29 PROCEDURE — 6370000000 HC RX 637 (ALT 250 FOR IP): Performed by: INTERNAL MEDICINE

## 2023-12-29 PROCEDURE — 6370000000 HC RX 637 (ALT 250 FOR IP): Performed by: NURSE PRACTITIONER

## 2023-12-29 PROCEDURE — 97535 SELF CARE MNGMENT TRAINING: CPT

## 2023-12-29 PROCEDURE — 6370000000 HC RX 637 (ALT 250 FOR IP)

## 2023-12-29 PROCEDURE — 2700000000 HC OXYGEN THERAPY PER DAY

## 2023-12-29 PROCEDURE — 99233 SBSQ HOSP IP/OBS HIGH 50: CPT | Performed by: INTERNAL MEDICINE

## 2023-12-29 PROCEDURE — 2060000000 HC ICU INTERMEDIATE R&B

## 2023-12-29 PROCEDURE — 97110 THERAPEUTIC EXERCISES: CPT

## 2023-12-29 PROCEDURE — 94761 N-INVAS EAR/PLS OXIMETRY MLT: CPT

## 2023-12-29 PROCEDURE — 97530 THERAPEUTIC ACTIVITIES: CPT

## 2023-12-29 PROCEDURE — 94640 AIRWAY INHALATION TREATMENT: CPT

## 2023-12-29 PROCEDURE — 6360000002 HC RX W HCPCS: Performed by: HOSPITALIST

## 2023-12-29 PROCEDURE — 94660 CPAP INITIATION&MGMT: CPT

## 2023-12-29 PROCEDURE — 6370000000 HC RX 637 (ALT 250 FOR IP): Performed by: HOSPITALIST

## 2023-12-29 PROCEDURE — 2580000003 HC RX 258

## 2023-12-29 RX ADMIN — PANTOPRAZOLE SODIUM 40 MG: 40 TABLET, DELAYED RELEASE ORAL at 10:12

## 2023-12-29 RX ADMIN — LEVALBUTEROL HYDROCHLORIDE 0.63 MG: 0.63 SOLUTION RESPIRATORY (INHALATION) at 19:30

## 2023-12-29 RX ADMIN — DIBASIC SODIUM PHOSPHATE, MONOBASIC POTASSIUM PHOSPHATE AND MONOBASIC SODIUM PHOSPHATE 1 TABLET: 852; 155; 130 TABLET ORAL at 10:12

## 2023-12-29 RX ADMIN — FUROSEMIDE 20 MG: 10 INJECTION, SOLUTION INTRAMUSCULAR; INTRAVENOUS at 18:43

## 2023-12-29 RX ADMIN — TIOTROPIUM BROMIDE INHALATION SPRAY 2 PUFF: 3.12 SPRAY, METERED RESPIRATORY (INHALATION) at 08:37

## 2023-12-29 RX ADMIN — FUROSEMIDE 20 MG: 10 INJECTION, SOLUTION INTRAMUSCULAR; INTRAVENOUS at 09:25

## 2023-12-29 RX ADMIN — BUSPIRONE HYDROCHLORIDE 10 MG: 5 TABLET ORAL at 22:03

## 2023-12-29 RX ADMIN — DIBASIC SODIUM PHOSPHATE, MONOBASIC POTASSIUM PHOSPHATE AND MONOBASIC SODIUM PHOSPHATE 1 TABLET: 852; 155; 130 TABLET ORAL at 22:03

## 2023-12-29 RX ADMIN — ARFORMOTEROL TARTRATE 15 MCG: 15 SOLUTION RESPIRATORY (INHALATION) at 19:30

## 2023-12-29 RX ADMIN — ATORVASTATIN CALCIUM 10 MG: 10 TABLET, FILM COATED ORAL at 10:12

## 2023-12-29 RX ADMIN — BUSPIRONE HYDROCHLORIDE 10 MG: 5 TABLET ORAL at 10:12

## 2023-12-29 RX ADMIN — BUDESONIDE 250 MCG: 0.25 INHALANT RESPIRATORY (INHALATION) at 19:30

## 2023-12-29 RX ADMIN — Medication 5 MG: at 22:03

## 2023-12-29 RX ADMIN — SODIUM CHLORIDE, PRESERVATIVE FREE 10 ML: 5 INJECTION INTRAVENOUS at 22:03

## 2023-12-29 RX ADMIN — LEVALBUTEROL HYDROCHLORIDE 0.63 MG: 0.63 SOLUTION RESPIRATORY (INHALATION) at 08:37

## 2023-12-29 RX ADMIN — BUSPIRONE HYDROCHLORIDE 10 MG: 5 TABLET ORAL at 15:13

## 2023-12-29 RX ADMIN — LEVALBUTEROL HYDROCHLORIDE 0.63 MG: 0.63 SOLUTION RESPIRATORY (INHALATION) at 16:41

## 2023-12-29 RX ADMIN — ENOXAPARIN SODIUM 40 MG: 100 INJECTION SUBCUTANEOUS at 10:11

## 2023-12-29 RX ADMIN — LEVALBUTEROL HYDROCHLORIDE 0.63 MG: 0.63 SOLUTION RESPIRATORY (INHALATION) at 12:41

## 2023-12-29 RX ADMIN — BUDESONIDE 250 MCG: 0.25 INHALANT RESPIRATORY (INHALATION) at 08:37

## 2023-12-29 RX ADMIN — ARFORMOTEROL TARTRATE 15 MCG: 15 SOLUTION RESPIRATORY (INHALATION) at 08:37

## 2023-12-29 RX ADMIN — SODIUM CHLORIDE, PRESERVATIVE FREE 10 ML: 5 INJECTION INTRAVENOUS at 10:17

## 2023-12-29 NOTE — PROGRESS NOTES
4 Eyes Skin Assessment     NAME:  Catrachita Segal  YOB: 1945  MEDICAL RECORD NUMBER:  [id number]    The patient is being assessed for  Shift Handoff    I agree that at least one RN has performed a thorough Head to Toe Skin Assessment on the patient. ALL assessment sites listed below have been assessed.      Areas assessed by both nurses:    Head, Face, Ears, Shoulders, Back, Chest, Arms, Elbows, Hands, Sacrum. Buttock, Coccyx, Ischium, Legs. Feet and Heels, and Under Medical Devices         Does the Patient have a Wound? Yes wound(s) were present on assessment. LDA wound assessment was Initiated and completed by RN       Selwyn Prevention initiated by RN: Yes  Wound Care Orders initiated by RN: Yes    Pressure Injury (Stage 3,4, Unstageable, DTI, NWPT, and Complex wounds) if present, place Wound referral order by RN under : No    New Ostomies, if present place, Ostomy referral order under : No     Nurse 1 eSignature: Electronically signed by Natividad Dai RN on 12/28/23 at 7:06 PM EST    **SHARE this note so that the co-signing nurse can place an eSignature**    Nurse 2 eSignature: {Esignature:[id number]}

## 2023-12-29 NOTE — PROGRESS NOTES
notified, Gait belt, Call light within reach, Left in chair, Chair alarm in place  Restraints  Restraints Initially in Place: No     Restrictions  Position Activity Restriction  Other position/activity restrictions: up with assist     Subjective   General  Chart Reviewed: Yes  Additional Pertinent Hx: Patient is a 79 y/o female admitted 12/6 with increased lethargy and fatigue.  Patient found to be unresponsive overnight in ED with rapid response called requiring intubation on 12/6.  Patient extubated on 12/7.  chest x-ray (+) for New left-sided effusion, and increased density in the left lung.  PMH significant for lung cancer, COPD.  Family / Caregiver Present: No  Referring Practitioner: Poncho Jones MD  Diagnosis: PNA  Follows Commands: Within Functional Limits  Subjective  Subjective: Found in chair, agreeable to PT.  \"Oh good.\"  \"I've got to get moving.\"  \"I want to go home.\"  RN reports pt wanting to work with therapy.  Reports had pt standing earlier & did well.  Denies pain.         Social/Functional History  Social/Functional History  Lives With: Daughter  Type of Home: House  Home Layout: Two level, Bed/Bath upstairs, 1/2 bath on main level  Home Access: Stairs to enter with rails  Entrance Stairs - Number of Steps: 4  Bathroom Shower/Tub: Tub/Shower unit  Bathroom Toilet: Handicap height  Bathroom Equipment:  (has a shower chair at her home)  Home Equipment: Cane, Walker, rolling  Has the patient had two or more falls in the past year or any fall with injury in the past year?: No  ADL Assistance: Needs assistance (daughters assist showers- pt stands)  Homemaking Assistance: Needs assistance (family complete)  Ambulation Assistance: Independent (uses RW)  Transfer Assistance: Independent  Active : No  Patient's  Info: daughter  Additional Comments: Pt recently d/c home from hospital- has been receiving home therapy. Family provide 24hr assist/supervision       Cognition -WFL        Objective

## 2023-12-29 NOTE — PROGRESS NOTES
4 Eyes Skin Assessment     NAME:  Catrachita Segal  YOB: 1945  MEDICAL RECORD NUMBER:  [id number]    The patient is being assessed for  Shift Handoff    I agree that at least one RN has performed a thorough Head to Toe Skin Assessment on the patient. ALL assessment sites listed below have been assessed.      Areas assessed by both nurses:    Head, Face, Ears, Shoulders, Back, Chest, Arms, Elbows, Hands, Sacrum. Buttock, Coccyx, Ischium, Legs. Feet and Heels, and Under Medical Devices         Does the Patient have a Wound? yes       Selwyn Prevention initiated by RN: Yes  Wound Care Orders initiated by RN: Yes    Pressure Injury (Stage 3,4, Unstageable, DTI, NWPT, and Complex wounds) if present, place Wound referral order by RN under : No    New Ostomies, if present place, Ostomy referral order under : No     Nurse 1 eSignature: Electronically signed by Vanessa Sandoval RN on 12/29/23 at 11:33 AM EST    **SHARE this note so that the co-signing nurse can place an eSignature**    Nurse 2 eSignature: Electronically signed by Joaquin Salcido RN on 12/29/23 at 8:06 PM EST    No

## 2023-12-29 NOTE — PROGRESS NOTES
right lung apex. Small right effusion similar in size to the prior examination.     Mediastinum:Stable aortic ectasia with the ascending aorta measuring 4.3 cm in transverse dimension. Moderate pericardial effusion similar to prior. Diffuse third spacing and body wall edema. No progressive lymphadenopathy identified. Evaluation limited   without administration of contrast.     Lower neck and chest wall:No axillary lymphadenopathy. No supraclavicular lymphadenopathy identified.     Upper Abdomen: Stable upper abdomen. No acute change.     Bones: Stable osseous structures. No suspicious osseous lesion.     IMPRESSION:     1. Large left pleural effusion slightly increased in size in comparison to prior exam with increased atelectasis in left lower lung compared to prior exam.  2. Small right effusion not appreciably changed from prior.  3. Extensive bronchiectasis and architectural distortion in the right upper lung with adjacent fibrotic changes and large bleb at the apex. No significant change compared to prior.  4. Cardiomegaly with moderate pericardial effusion not appreciably changed in comparison to prior exam.  5. Diffuse body wall edema.    Echo November 11, 2023   Left Ventricle   Normal left ventricle size, moderate hypertrophy, and systolic function with   an estimated ejection fraction of 65-70%. No regional wall motion   abnormalities are seen.   Grade I diastolic dysfunction with normal LV filling pressures.      Mitral Valve   The mitral valve is normal in structure and function. No evidence of mitral   regurgitation or stenosis.      Left Atrium   The left atrium is normal in size.      Aortic Valve   The aortic valve is structurally normal and trileaflet. There is no   significant aortic valve regurgitation or stenosis.      Aorta   The aortic root is normal in size.      Right Ventricle   The right ventricle is severely dilated with preserved systolic function.   RVS-13.9cm/s. TAPSE-2.0cm.

## 2023-12-29 NOTE — RT PROTOCOL NOTE
RT Nebulizer Bronchodilator Protocol Note    There is a bronchodilator order in the chart from a provider indicating to follow the RT Bronchodilator Protocol and there is an “Initiate RT Bronchodilator Protocol” order as well (see protocol at bottom of note).    CXR Findings:  XR CHEST PORTABLE    Result Date: 12/27/2023  Loculated small left pleural effusion without change. Coarse opacity in the right lung base without change. Moderate pulmonary edema. Cardiomegaly. Left subclavian central venous catheter without change. Electronically signed by Rome Owen MD      The findings from the last RT Protocol Assessment were as follows:  Smoking: Chronic pulmonary disease  Respiratory Pattern: Mild dyspnea at rest, irregular pattern, or RR 21-25 bpm  Breath Sounds: Slightly diminished and/or crackles  Cough: Strong, productive  Indication for Bronchodilator Therapy: On home bronchodilators  Bronchodilator Assessment Score: 9    Aerosolized bronchodilator medication orders have been revised according to the RT Nebulizer Bronchodilator Protocol below.    Respiratory Therapist to perform RT Therapy Protocol Assessment initially then follow the protocol.  Repeat RT Therapy Protocol Assessment PRN for score 0-3 or on second treatment, BID, and PRN for scores above 3.    No Indications - adjust the frequency to every 6 hours PRN wheezing or bronchospasm, if no treatments needed after 48 hours then discontinue using Per Protocol order mode.     If indication present, adjust the RT bronchodilator orders based on the Bronchodilator Assessment Score as indicated below.  If a patient is on this medication at home then do not decrease Frequency below that used at home.    0-3 - enter or revise RT bronchodilator order(s) to equivalent RT Bronchodilator order with Frequency of every 4 hours PRN for wheezing or increased work of breathing using Per Protocol order mode.       4-6 - enter or revise RT Bronchodilator order(s) to two

## 2023-12-29 NOTE — CARE COORDINATION
ADDENDUM:  SW met w/pt and pt's daughter at bedside and provided and update. Pt signed AOR form for appeal.   SW sent form to admissions at [facility].     2:16 PM    P2P denied as they feel she will not going to improve over at [facility].    SW spoke to admissions at [facility], they will submit an appeal.     Electronically signed by JYOTHI Briscoe, WERNER on 12/29/2023 at 2:16 PM  .    11:52 AM  Plan for [facility] LTACH at [facility], pt and family are in agreement. Pt's family still want to pursue aggressive treatment at this time.     P2P today at 2pm.  MD is aware.    SW following.  Electronically signed by JYOTHI Briscoe, WERNER on 12/29/2023 at 11:52 AM  [phone]

## 2023-12-29 NOTE — PROGRESS NOTES
Occupational Therapy  Facility/Department: [address]  Occupational Therapy Treatment    Name: Catrachita Segal  : 1945  MRN: [id number]  Date of Service: 2023    Discharge Recommendations:  Long Term Care with OT, LTACH          Patient Diagnosis(es): The primary encounter diagnosis was HCAP (healthcare-associated pneumonia). Diagnoses of Pleural effusion on left, Urinary tract infection without hematuria, site unspecified, Other fatigue, and Bullous emphysema (HCC) were also pertinent to this visit.  Past Medical History:  has a past medical history of Arthritis, CHF (congestive heart failure) (HCC), COPD (chronic obstructive pulmonary disease) (HCC), Hyperlipidemia, Hypertension, and Lung cancer (HCC).  Past Surgical History:  has a past surgical history that includes Tubal ligation; bronchoscopy (2017); and other surgical history (Left, 2017).    Treatment Diagnosis: Impaired ADLs, mobility and activity tolerance      Assessment   Performance deficits / Impairments: Decreased ADL status;Decreased functional mobility ;Decreased endurance;Decreased strength  Assessment: On 25L vapotherm, seated in chair upon arrival, stand EOB for brief/purewick change. Seated therex in chair, created print out for HEP. Pt tolerating fairly well, desatting to 87%, then up to % on 25L vapotherm. Planning for LTACH at [place]. Will cont POC.  Treatment Diagnosis: Impaired ADLs, mobility and activity tolerance  REQUIRES OT FOLLOW-UP: Yes  Activity Tolerance  Activity Tolerance: Patient Tolerated treatment well        Plan   Occupational Therapy Plan  Times Per Week: 2-5  Current Treatment Recommendations: Functional mobility training, Self-Care / ADL, Balance training, Home management training, Safety education & training, Patient/Caregiver education & training, Endurance training, Strengthening     Restrictions  Position Activity Restriction  Other position/activity restrictions: up with

## 2023-12-29 NOTE — PROGRESS NOTES
V2.0    [facility] Progress Note      Name:  Catrachita Segal /Age/Sex: 1945  (78 y.o. female)   MRN & CSN:  [id number] & [id number] Encounter Date/Time: 2023 8:37 AM EST   Location:  [address] [place] [address] PCP: Napoleon Stiles MD     Attending:Julián Chavez MD       Hospital Day:     Subjective:     Chief Complaint: SOB    Catrachita Segal is a 78 y.o. female who presents with SOB    Subjective: Patient is feeling better today    Review of Systems:      Pertinent positives and negatives discussed in HPI    Objective:     Intake/Output Summary (Last 24 hours) at 2023 0552  Last data filed at 2023  Gross per 24 hour   Intake 150 ml   Output --   Net 150 ml        Vitals:   Vitals:    23 2245 23 2317 23 0209 23 0411   BP:  99/68  102/66   Pulse: (!) 110 (!) 109 (!) 106 (!) 107   Resp:    Temp:  97.8 °F (36.6 °C)  97.4 °F (36.3 °C)   TempSrc:  Axillary  Axillary   SpO2: 99% 100% 100% 100%   Weight:       Height:             Physical Exam:      General: Female sitting  Somewhat anxious  Eyes: Clear nonicteric   ENT: neck supple trach midline  Cardiovascular: Regular rate.  Respiratory: Labored  Decreased breath sound  Gastrointestinal: Soft, non tender  BS Positive  Genitourinary: no suprapubic tenderness  Musculoskeletal:  edema none  Skin: warm, dry  Neuro: A & O: X 3  Psych: Mood slightly anxious      Medications:   Medications:    busPIRone  10 mg Oral TID    levalbuterol  0.63 mg Nebulization Q4H WA RT    tiotropium  2 puff Inhalation Daily RT    furosemide  20 mg IntraVENous BID    LORazepam  0.5 mg Oral Once    enoxaparin  40 mg SubCUTAneous Daily    melatonin  5 mg Oral Nightly    phosphorus  250 mg Oral BID    atorvastatin  10 mg Oral Daily    pantoprazole  40 mg Oral QAM AC    [Held by provider] metoprolol succinate  25 mg Oral Daily    sodium chloride flush  5-40 mL IntraVENous 2 times per day    budesonide  0.25 mg Nebulization BID RT

## 2023-12-29 NOTE — PLAN OF CARE
Problem: Safety - Adult  Goal: Free from fall injury  Outcome: Progressing  Flowsheets (Taken 12/29/2023 1340)  Free From Fall Injury: Instruct family/caregiver on patient safety  Note: Pt in bed with bed alarm on, instructed to call for assistance. Verbalized understanding. All fall precautions in place.      Problem: Skin/Tissue Integrity  Goal: Absence of new skin breakdown  Description: 1.  Monitor for areas of redness and/or skin breakdown  2.  Assess vascular access sites hourly  3.  Every 4-6 hours minimum:  Change oxygen saturation probe site  4.  Every 4-6 hours:  If on nasal continuous positive airway pressure, respiratory therapy assess nares and determine need for appliance change or resting period.  Outcome: Progressing  Note: Assessed wound. Applied creams and dressing. Skin clean, dry, and intact     Problem: Nutrition Deficit:  Goal: Optimize nutritional status  Outcome: Progressing  Note: Low PO intake, pt still increasing diet intake     Problem: Respiratory - Adult  Goal: Achieves optimal ventilation and oxygenation  Outcome: Progressing  Flowsheets (Taken 12/29/2023 1340)  Achieves optimal ventilation and oxygenation:   Assess for changes in respiratory status   Position to facilitate oxygenation and minimize respiratory effort   Initiate smoking cessation protocol as indicated   Assess for changes in mentation and behavior   Oxygen supplementation based on oxygen saturation or arterial blood gases   Encourage broncho-pulmonary hygiene including cough, deep breathe, incentive spirometry

## 2023-12-30 ENCOUNTER — APPOINTMENT (OUTPATIENT)
Dept: GENERAL RADIOLOGY | Age: 78
DRG: 208 | End: 2023-12-30
Payer: MEDICARE

## 2023-12-30 PROBLEM — J90 PLEURAL EFFUSION: Status: ACTIVE | Noted: 2023-12-30

## 2023-12-30 PROBLEM — I27.20 PULMONARY HYPERTENSION (HCC): Status: ACTIVE | Noted: 2023-12-30

## 2023-12-30 LAB
ALBUMIN FLD-MCNC: 2.4 G/DL
ALBUMIN SERPL-MCNC: 3.5 G/DL (ref 3.4–5)
ALBUMIN SERPL-MCNC: 3.5 G/DL (ref 3.4–5)
ALP SERPL-CCNC: 74 U/L (ref 40–129)
ALT SERPL-CCNC: 12 U/L (ref 10–40)
ANION GAP SERPL CALCULATED.3IONS-SCNC: 5 MMOL/L (ref 3–16)
APPEARANCE FLUID: NORMAL
AST SERPL-CCNC: 27 U/L (ref 15–37)
BASOPHILS # BLD: 0 K/UL (ref 0–0.2)
BASOPHILS NFR BLD: 0.5 %
BDY FLUID QUALITY: NORMAL
BILIRUB DIRECT SERPL-MCNC: <0.2 MG/DL (ref 0–0.3)
BILIRUB INDIRECT SERPL-MCNC: NORMAL MG/DL (ref 0–1)
BILIRUB SERPL-MCNC: 0.4 MG/DL (ref 0–1)
BUN SERPL-MCNC: 19 MG/DL (ref 7–20)
CALCIUM SERPL-MCNC: 8.8 MG/DL (ref 8.3–10.6)
CELL COUNT FLUID TYPE: NORMAL
CHLORIDE SERPL-SCNC: 90 MMOL/L (ref 99–110)
CHOLEST FLD-MCNC: 48 MG/DL
CO2 SERPL-SCNC: 46 MMOL/L (ref 21–32)
COLOR FLUID: NORMAL
CREAT SERPL-MCNC: <0.5 MG/DL (ref 0.6–1.2)
DEPRECATED RDW RBC AUTO: 16.4 % (ref 12.4–15.4)
EOSINOPHIL # BLD: 0.1 K/UL (ref 0–0.6)
EOSINOPHIL NFR BLD: 2.3 %
GFR SERPLBLD CREATININE-BSD FMLA CKD-EPI: >60 ML/MIN/{1.73_M2}
GLUCOSE FLD-MCNC: 130 MG/DL
GLUCOSE SERPL-MCNC: 128 MG/DL (ref 70–99)
HCT VFR BLD AUTO: 28.7 % (ref 36–48)
HGB BLD-MCNC: 8.9 G/DL (ref 12–16)
LDH FLD L TO P-CCNC: 122 U/L
LDH SERPL L TO P-CCNC: 212 U/L (ref 100–190)
LYMPHOCYTES # BLD: 0.9 K/UL (ref 1–5.1)
LYMPHOCYTES NFR BLD: 14.4 %
LYMPHOCYTES NFR FLD: 64 %
MACROPHAGES # FLD: 2 %
MCH RBC QN AUTO: 24.1 PG (ref 26–34)
MCHC RBC AUTO-ENTMCNC: 30.9 G/DL (ref 31–36)
MCV RBC AUTO: 78 FL (ref 80–100)
MESOTHL CELL NFR FLD: 3 %
MONOCYTES # BLD: 0.6 K/UL (ref 0–1.3)
MONOCYTES NFR BLD: 10.1 %
MONOCYTES NFR FLD: 15 %
NEUTROPHIL, FLUID: 16 %
NEUTROPHILS # BLD: 4.4 K/UL (ref 1.7–7.7)
NEUTROPHILS NFR BLD: 72.7 %
NUC CELL # FLD: 797 /CUMM
PLATELET # BLD AUTO: 164 K/UL (ref 135–450)
PMV BLD AUTO: 8.6 FL (ref 5–10.5)
POTASSIUM SERPL-SCNC: 3.4 MMOL/L (ref 3.5–5.1)
PROT FLD-MCNC: 3.5 G/DL
PROT SERPL-MCNC: 6.5 G/DL (ref 6.4–8.2)
PROT SERPL-MCNC: 6.5 G/DL (ref 6.4–8.2)
RBC # BLD AUTO: 3.68 M/UL (ref 4–5.2)
RBC FLUID: NORMAL /CUMM
SODIUM SERPL-SCNC: 141 MMOL/L (ref 136–145)
SPECIMEN SOURCE FLD: NORMAL
TOTAL CELLS COUNTED FLD: 100
WBC # BLD AUTO: 6.1 K/UL (ref 4–11)

## 2023-12-30 PROCEDURE — 94640 AIRWAY INHALATION TREATMENT: CPT

## 2023-12-30 PROCEDURE — 0W9B3ZZ DRAINAGE OF LEFT PLEURAL CAVITY, PERCUTANEOUS APPROACH: ICD-10-PCS | Performed by: INTERNAL MEDICINE

## 2023-12-30 PROCEDURE — 80076 HEPATIC FUNCTION PANEL: CPT

## 2023-12-30 PROCEDURE — 84157 ASSAY OF PROTEIN OTHER: CPT

## 2023-12-30 PROCEDURE — 2700000000 HC OXYGEN THERAPY PER DAY

## 2023-12-30 PROCEDURE — 82465 ASSAY BLD/SERUM CHOLESTEROL: CPT

## 2023-12-30 PROCEDURE — 6360000002 HC RX W HCPCS: Performed by: INTERNAL MEDICINE

## 2023-12-30 PROCEDURE — 89051 BODY FLUID CELL COUNT: CPT

## 2023-12-30 PROCEDURE — 83615 LACTATE (LD) (LDH) ENZYME: CPT

## 2023-12-30 PROCEDURE — 36591 DRAW BLOOD OFF VENOUS DEVICE: CPT

## 2023-12-30 PROCEDURE — 6370000000 HC RX 637 (ALT 250 FOR IP): Performed by: NURSE PRACTITIONER

## 2023-12-30 PROCEDURE — 6370000000 HC RX 637 (ALT 250 FOR IP): Performed by: INTERNAL MEDICINE

## 2023-12-30 PROCEDURE — 82042 OTHER SOURCE ALBUMIN QUAN EA: CPT

## 2023-12-30 PROCEDURE — 85025 COMPLETE CBC W/AUTO DIFF WBC: CPT

## 2023-12-30 PROCEDURE — 6370000000 HC RX 637 (ALT 250 FOR IP)

## 2023-12-30 PROCEDURE — 80048 BASIC METABOLIC PNL TOTAL CA: CPT

## 2023-12-30 PROCEDURE — 94660 CPAP INITIATION&MGMT: CPT

## 2023-12-30 PROCEDURE — 6360000002 HC RX W HCPCS

## 2023-12-30 PROCEDURE — 6370000000 HC RX 637 (ALT 250 FOR IP): Performed by: HOSPITALIST

## 2023-12-30 PROCEDURE — 99233 SBSQ HOSP IP/OBS HIGH 50: CPT | Performed by: INTERNAL MEDICINE

## 2023-12-30 PROCEDURE — 2060000000 HC ICU INTERMEDIATE R&B

## 2023-12-30 PROCEDURE — 84155 ASSAY OF PROTEIN SERUM: CPT

## 2023-12-30 PROCEDURE — 88305 TISSUE EXAM BY PATHOLOGIST: CPT

## 2023-12-30 PROCEDURE — 88112 CYTOPATH CELL ENHANCE TECH: CPT

## 2023-12-30 PROCEDURE — 71045 X-RAY EXAM CHEST 1 VIEW: CPT

## 2023-12-30 PROCEDURE — 2580000003 HC RX 258

## 2023-12-30 PROCEDURE — 6360000002 HC RX W HCPCS: Performed by: HOSPITALIST

## 2023-12-30 PROCEDURE — 82945 GLUCOSE OTHER FLUID: CPT

## 2023-12-30 PROCEDURE — 94761 N-INVAS EAR/PLS OXIMETRY MLT: CPT

## 2023-12-30 PROCEDURE — 82040 ASSAY OF SERUM ALBUMIN: CPT

## 2023-12-30 RX ORDER — MAGNESIUM SULFATE IN WATER 40 MG/ML
2000 INJECTION, SOLUTION INTRAVENOUS PRN
Status: DISCONTINUED | OUTPATIENT
Start: 2023-12-30 | End: 2024-01-04 | Stop reason: HOSPADM

## 2023-12-30 RX ORDER — POTASSIUM CHLORIDE 7.45 MG/ML
10 INJECTION INTRAVENOUS PRN
Status: DISCONTINUED | OUTPATIENT
Start: 2023-12-30 | End: 2024-01-04 | Stop reason: HOSPADM

## 2023-12-30 RX ORDER — POTASSIUM CHLORIDE 20 MEQ/1
40 TABLET, EXTENDED RELEASE ORAL PRN
Status: DISCONTINUED | OUTPATIENT
Start: 2023-12-30 | End: 2024-01-04 | Stop reason: HOSPADM

## 2023-12-30 RX ADMIN — LEVALBUTEROL HYDROCHLORIDE 0.63 MG: 0.63 SOLUTION RESPIRATORY (INHALATION) at 11:59

## 2023-12-30 RX ADMIN — BUSPIRONE HYDROCHLORIDE 10 MG: 5 TABLET ORAL at 21:24

## 2023-12-30 RX ADMIN — BUSPIRONE HYDROCHLORIDE 10 MG: 5 TABLET ORAL at 13:30

## 2023-12-30 RX ADMIN — FUROSEMIDE 20 MG: 10 INJECTION, SOLUTION INTRAMUSCULAR; INTRAVENOUS at 08:54

## 2023-12-30 RX ADMIN — PANTOPRAZOLE SODIUM 40 MG: 40 TABLET, DELAYED RELEASE ORAL at 06:31

## 2023-12-30 RX ADMIN — ARFORMOTEROL TARTRATE 15 MCG: 15 SOLUTION RESPIRATORY (INHALATION) at 08:53

## 2023-12-30 RX ADMIN — POTASSIUM CHLORIDE 40 MEQ: 1500 TABLET, EXTENDED RELEASE ORAL at 06:30

## 2023-12-30 RX ADMIN — BUDESONIDE 250 MCG: 0.25 INHALANT RESPIRATORY (INHALATION) at 08:53

## 2023-12-30 RX ADMIN — ENOXAPARIN SODIUM 40 MG: 100 INJECTION SUBCUTANEOUS at 08:54

## 2023-12-30 RX ADMIN — LEVALBUTEROL HYDROCHLORIDE 0.63 MG: 0.63 SOLUTION RESPIRATORY (INHALATION) at 20:17

## 2023-12-30 RX ADMIN — SODIUM CHLORIDE, PRESERVATIVE FREE 10 ML: 5 INJECTION INTRAVENOUS at 21:24

## 2023-12-30 RX ADMIN — ATORVASTATIN CALCIUM 10 MG: 10 TABLET, FILM COATED ORAL at 08:54

## 2023-12-30 RX ADMIN — ARFORMOTEROL TARTRATE 15 MCG: 15 SOLUTION RESPIRATORY (INHALATION) at 20:17

## 2023-12-30 RX ADMIN — Medication 5 MG: at 21:24

## 2023-12-30 RX ADMIN — IPRATROPIUM BROMIDE AND ALBUTEROL SULFATE 1 DOSE: 2.5; .5 SOLUTION RESPIRATORY (INHALATION) at 03:21

## 2023-12-30 RX ADMIN — BUDESONIDE 250 MCG: 0.25 INHALANT RESPIRATORY (INHALATION) at 20:17

## 2023-12-30 RX ADMIN — BUSPIRONE HYDROCHLORIDE 10 MG: 5 TABLET ORAL at 08:54

## 2023-12-30 RX ADMIN — LEVALBUTEROL HYDROCHLORIDE 0.63 MG: 0.63 SOLUTION RESPIRATORY (INHALATION) at 15:17

## 2023-12-30 RX ADMIN — DIBASIC SODIUM PHOSPHATE, MONOBASIC POTASSIUM PHOSPHATE AND MONOBASIC SODIUM PHOSPHATE 1 TABLET: 852; 155; 130 TABLET ORAL at 08:54

## 2023-12-30 RX ADMIN — TIOTROPIUM BROMIDE INHALATION SPRAY 2 PUFF: 3.12 SPRAY, METERED RESPIRATORY (INHALATION) at 08:53

## 2023-12-30 RX ADMIN — DIBASIC SODIUM PHOSPHATE, MONOBASIC POTASSIUM PHOSPHATE AND MONOBASIC SODIUM PHOSPHATE 1 TABLET: 852; 155; 130 TABLET ORAL at 21:24

## 2023-12-30 RX ADMIN — LEVALBUTEROL HYDROCHLORIDE 0.63 MG: 0.63 SOLUTION RESPIRATORY (INHALATION) at 08:53

## 2023-12-30 RX ADMIN — SODIUM CHLORIDE, PRESERVATIVE FREE 10 ML: 5 INJECTION INTRAVENOUS at 08:56

## 2023-12-30 NOTE — RT PROTOCOL NOTE
RT Nebulizer Bronchodilator Protocol Note    There is a bronchodilator order in the chart from a provider indicating to follow the RT Bronchodilator Protocol and there is an “Initiate RT Bronchodilator Protocol” order as well (see protocol at bottom of note).    CXR Findings:  XR CHEST PORTABLE    Result Date: 12/30/2023  Cardiomegaly with moderate pulmonary edema and small to moderate bilateral pleural effusions, unchanged. Electronically signed by Aaron Steele MD      The findings from the last RT Protocol Assessment were as follows:  Smoking: Chronic pulmonary disease  Respiratory Pattern: Mild dyspnea at rest, irregular pattern, or RR 21-25 bpm  Breath Sounds: Slightly diminished and/or crackles  Cough: Strong, productive  Indication for Bronchodilator Therapy: On home bronchodilators  Bronchodilator Assessment Score: 9    Aerosolized bronchodilator medication orders have been revised according to the RT Nebulizer Bronchodilator Protocol below.    Respiratory Therapist to perform RT Therapy Protocol Assessment initially then follow the protocol.  Repeat RT Therapy Protocol Assessment PRN for score 0-3 or on second treatment, BID, and PRN for scores above 3.    No Indications - adjust the frequency to every 6 hours PRN wheezing or bronchospasm, if no treatments needed after 48 hours then discontinue using Per Protocol order mode.     If indication present, adjust the RT bronchodilator orders based on the Bronchodilator Assessment Score as indicated below.  If a patient is on this medication at home then do not decrease Frequency below that used at home.    0-3 - enter or revise RT bronchodilator order(s) to equivalent RT Bronchodilator order with Frequency of every 4 hours PRN for wheezing or increased work of breathing using Per Protocol order mode.       4-6 - enter or revise RT Bronchodilator order(s) to two equivalent RT bronchodilator orders with one order with BID Frequency and one order with Frequency of

## 2023-12-30 NOTE — PLAN OF CARE
Problem: Discharge Planning  Goal: Discharge to home or other facility with appropriate resources  Outcome: Progressing     Problem: Safety - Adult  Goal: Free from fall injury  Outcome: Progressing     Problem: Pain  Goal: Verbalizes/displays adequate comfort level or baseline comfort level  Outcome: Progressing     Problem: Skin/Tissue Integrity  Goal: Absence of new skin breakdown  Description: 1.  Monitor for areas of redness and/or skin breakdown  2.  Assess vascular access sites hourly  3.  Every 4-6 hours minimum:  Change oxygen saturation probe site  4.  Every 4-6 hours:  If on nasal continuous positive airway pressure, respiratory therapy assess nares and determine need for appliance change or resting period.  Outcome: Progressing     Problem: Chronic Conditions and Co-morbidities  Goal: Patient's chronic conditions and co-morbidity symptoms are monitored and maintained or improved  Outcome: Progressing     Problem: Nutrition Deficit:  Goal: Optimize nutritional status  Outcome: Progressing     Problem: ABCDS Injury Assessment  Goal: Absence of physical injury  Outcome: Progressing     Problem: Respiratory - Adult  Goal: Achieves optimal ventilation and oxygenation  Outcome: Progressing     Problem: Gastrointestinal - Adult  Goal: Minimal or absence of nausea and vomiting  Outcome: Progressing     Problem: Metabolic/Fluid and Electrolytes - Adult  Goal: Electrolytes maintained within normal limits  Outcome: Progressing     Problem: Hematologic - Adult  Goal: Maintains hematologic stability  Outcome: Progressing     Problem: Cardiovascular - Adult  Goal: Absence of cardiac dysrhythmias or at baseline  Outcome: Progressing

## 2023-12-30 NOTE — PROCEDURES
Procedure planned:  Pleural drainage with catheter left side with imaging    Time out: per Dr. Donaldson    Procedure:  Patient positioned sitting up at the side of the bed with nursing assistance to prevent falls.  Ultrasound was used to isolate best area for procedure.  Patient prepped and draped in sterile fashion.  Anesthetized locally with lidocaine.  Access to pleural space with lidocaine needle and pleura anesthetized as well.  Thoracentesis catheter introduced over needle into the pleural space and 1700 mL of sanguinous fluid was drained from the pleural space.  Drainage of fluid ceased at the end of the procedure.      Patient tolerated the procedure well post procedure xray is pending.  EBL:  None  Pleural fluid studies were sent including for cytology.    Ryan Donaldson MD

## 2023-12-30 NOTE — PROGRESS NOTES
Called for desaturation on Trilogy. Found patient on NRB mask. Sats coming back to normal. Placed patient back on VAPOTHERM. Concern for trilogy malfunction, trilogy replaced with another machine.   Initial on NRB:     12/30/23 0259   Oxygen Therapy/Pulse Ox   O2 Therapy Oxygen   O2 Device PAP (positive airway pressure)   Pulse 99   Respirations 24   SpO2 (!) 79 %     After back on vapotherm, SPO2 was  above 95% on VAPOTHERM 30L/100% before this RRT left room.  Breathing tx was given.

## 2023-12-30 NOTE — PLAN OF CARE
Problem: Discharge Planning  Goal: Discharge to home or other facility with appropriate resources  12/30/2023 1410 by Shayy Mueller RN  Outcome: Progressing   Pt verbalize understanding of treatment plan.     Problem: Safety - Adult  Goal: Free from fall injury  12/30/2023 1410 by Shayy Mueller RN  Outcome: Progressing    Pt with activity orders for up with assist and walker gait belt.  Bed and chair alarm in use today. Call light within reach.  Encouraged pt to be up OOB as much as possible throughout the day and for all meals.  Encouraged frequent short naps as necessary to preserve energy but instructed that while awake, pt should be OOB.    Pt is visualized to be OOB 51-75% of the time this shift.  Will continue to encourage frequent activity.        Problem: Pain  Goal: Verbalizes/displays adequate comfort level or baseline comfort level  12/30/2023 1410 by Shayy Mueller RN  Outcome: Progressing   Pt denies pain at this time. Encourage pt to call out with any needs or status changes.     Problem: Skin/Tissue Integrity  Goal: Absence of new skin breakdown  Description: 1.  Monitor for areas of redness and/or skin breakdown  2.  Assess vascular access sites hourly  3.  Every 4-6 hours minimum:  Change oxygen saturation probe site  4.  Every 4-6 hours:  If on nasal continuous positive airway pressure, respiratory therapy assess nares and determine need for appliance change or resting period.  12/30/2023 1410 by Shayy Mueller RN  Outcome: Progressing   Pt has wound on coccyx. Triad cream applied. Pt has sacral heart. Pt repositioned per self through out shift. Pt up in chair most of day.     Problem: Nutrition Deficit:  Goal: Optimize nutritional status  12/30/2023 1410 by Shayy Mueller, RN  Outcome: Progressing   Encourage small frequent meals.     Problem: Respiratory - Adult  Goal: Achieves optimal ventilation and oxygenation  12/30/2023 1410 by Shayy Mueller, RN  Outcome: Progressing   Patient

## 2023-12-30 NOTE — PROGRESS NOTES
4 Eyes Skin Assessment     NAME:  Catrachita Segal  YOB: 1945  MEDICAL RECORD NUMBER:  [id number]    The patient is being assessed for  Shift Handoff    I agree that at least one RN has performed a thorough Head to Toe Skin Assessment on the patient. ALL assessment sites listed below have been assessed.      Areas assessed by both nurses: Marlon/Shayy    Head, Face, Ears, Shoulders, Back, Chest, Arms, Elbows, Hands, Sacrum. Buttock, Coccyx, Ischium, Legs. Feet and Heels, and Under Medical Devices         Does the Patient have a Wound? Yes wound(s) were present on assessment. LDA wound assessment was Initiated and completed by RN       Selwyn Prevention initiated by RN: Yes  Wound Care Orders initiated by RN: No    Pressure Injury (Stage 3,4, Unstageable, DTI, NWPT, and Complex wounds) if present, place Wound referral order by RN under : Yes    New Ostomies, if present place, Ostomy referral order under : No     Nurse 1 eSignature: Electronically signed by Joaquin Salcido RN on 12/30/23 at 6:06 AM EST    **SHARE this note so that the co-signing nurse can place an eSignature**    Nurse 2 eSignature: Electronically signed by Shayy Mueller RN on 12/30/23 at 2:09 PM EST

## 2023-12-30 NOTE — SIGNIFICANT EVENT
Rapid response    Rapid response called at 03:00 AM for hypoxia.    Patient admitted to the [facility] for COPD exacerbation, , was intubated 12/05, extubated 12/07. Patient currently on vapotherm and Lasix. She also has a history of pulmonary hypertension.    According to the nurse, patient took her BIPAP off, was hypoxic in the 40s. Patient also reported that she can not breathe. Rapid response was called and patient was placed on Vapotherm 40L @100% at this time.       Bp 158/97    O2 sat: 90's    Patient reported improvement in hypoxia, she denies any chest pain or any other concerning symptom. According to the patient and the nurse, she felt the BIPAP was not feeling right, which is why patient removed it.    Stat CXR was ordered which was similar to recent CXR on 12/27. Patient was given a breathing treatment, duonebs. Her breathing improved.   Nurse expressed concern that the Bipap machine was not functioning well, as patient normally is very compliant and she never takes it off. Nurse requested change of BIPAP machine.    Patient was stable and agreed to using the other BIPAP machine.      Yoly Amin MD  PGY-1, Internal Medicine  12/30/23  3:38 AM

## 2023-12-30 NOTE — PROGRESS NOTES
Pulmonary & Critical Care Medicine    Admit Date: 2023  PCP: Napoleon Stiles MD    CC:  hypoxia     Events of Last 24 hours:     Patient had an issue with the BiPAP overnight, where it was not working and her saturations dropped into the teens.  A rapid response was called.  She remains on Vapotherm and from my initial evaluation was saturating in the low 90s on 30 L at 100%.  She appears dyspneic but said she was feeling a little better.    Brief history  Known hx of lung cancer.  She was seen by Dr. Donaldson on  for hypoxia and hypercapnia, failed BiPAP and required intubation.    She was extubated on .  She was treated with steroids and lasix  She was seen by Dr. Garcia in the office on .  At that time she was on 1L O2 at rest and 2 with ambulation.  She was continued on Trelegy.    She was readmitted on  and was diagnosed with COPD exacerbation / respiratory acidosis requiring vent support.  She was extubated on .      Vitals:  Tmax: 98.2  VITALS:  /65   Pulse (!) 115   Temp 97.7 °F (36.5 °C) (Oral)   Resp 21   Ht 1.676 m (5' 5.98\")   Wt 51.2 kg (112 lb 14 oz)   SpO2 94%   BMI 18.23 kg/m²   24HR INTAKE/OUTPUT:    Intake/Output Summary (Last 24 hours) at 2023 1630  Last data filed at 2023 1335  Gross per 24 hour   Intake 120 ml   Output 1000 ml   Net -880 ml       CURRENT PULSE OXIMETRY:  SpO2: 94 %  24HR PULSE OXIMETRY RANGE:  SpO2  Av.1 %  Min: 33 %  Max: 100 %    EXAM:  General: Mild distress. Alert.  Eyes: PERRL. No sclera icterus. No conjunctival injection.  ENT: No discharge. Moist oral mucosa   Neck: Trachea midline. Neck is supple   Resp: No accessory muscle use.  Decreased breath sounds on the left with SONDRA changes to the midlung field.  CV: Regular rate. Regular rhythm. No mumur or rub.  No edema.  JVD is not elevated.    GI: Non-tender. Non-distended.  Normal bowel sounds.   Neuro: Awake. Speech is clear.    Psych: No anxiety or agitation.

## 2023-12-30 NOTE — PROGRESS NOTES
This RN responds to patient room after seeing  patient try to get OOB.  This RN witnesses patient take off bipap mask on video monitor.  This RN goes to patient bedside and sees patient oxygenation at 49.  This RN places vapotherm back on patient at max settings, places patient on NRB and initiates rapid response    0300: MD's, residents, RT at patient bedside.  Patient oxygenation starting to go up.  Orders placed for STAT CXR.      This RN informs MD's and RT that patient c/o not  being able to breathe with bipap mask on. This RN informs RT and MD at bedside that patient has been on bipap at night and usually tolerates well.  This RN suggests new mask for bipap- MD's and RT agrees to this.    Patient remains alert through out entire rapid.  Patient able to remain alert and oriented.

## 2023-12-30 NOTE — PROGRESS NOTES
V2.0    [facility] Progress [facility]      Name:  Catrachita Segal /Age/Sex: 1945  (78 y.o. female)   MRN & CSN:  [id number] & [id number] Encounter Date/Time: 2023 8:37 AM EST   Location:  [address] [place] [address] PCP: Napoleon Stiles MD     Attending:Juliná Chavez MD       Hospital Day:     Subjective:     Chief Complaint: SOB    Catrachita Segal is a 78 y.o. female who presents with SOB    Subjective: Patient is feeling a little bit short of breath.  Conversation with the patient's daughter and her other daughter by phone regarding possibility of the thoracentesis.  Reviewed with them the procedure showing them images of lung with pleural effusion and how the procedure goes.    Review of Systems:      Pertinent positives and negatives discussed in HPI    Objective:     Intake/Output Summary (Last 24 hours) at 2023 1642  Last data filed at 2023 1335  Gross per 24 hour   Intake 120 ml   Output 1000 ml   Net -880 ml        Vitals:   Vitals:    23 1201 23 1221 23 1515 23 1519   BP:  106/68 114/65    Pulse: (!) 114 (!) 108 (!) 115 (!) 115   Resp:    Temp:  97.5 °F (36.4 °C) 97.7 °F (36.5 °C)    TempSrc:  Oral Oral    SpO2: 93% 94% 94% 94%   Weight:       Height:             Physical Exam:      General: Female sitting  Up in a chair NAD  Eyes: Clear nonicteric   ENT: neck supple trach midline  Cardiovascular: Regular rate.  Respiratory:  Decreased breath sound   left base posteriorly  Gastrointestinal: Soft, non tender  BS Positive  Genitourinary: no suprapubic tenderness  Musculoskeletal:  edema none  Skin: warm, dry  Neuro: A & O: X 3  Psych: Mood pleasant      Medications:   Medications:    busPIRone  10 mg Oral TID    levalbuterol  0.63 mg Nebulization Q4H WA RT    tiotropium  2 puff Inhalation Daily RT    furosemide  20 mg IntraVENous BID    LORazepam  0.5 mg Oral Once    enoxaparin  40 mg SubCUTAneous Daily    melatonin  5 mg Oral Nightly    phosphorus  250 mg Oral

## 2023-12-31 ENCOUNTER — APPOINTMENT (OUTPATIENT)
Dept: GENERAL RADIOLOGY | Age: 78
DRG: 208 | End: 2023-12-31
Payer: MEDICARE

## 2023-12-31 LAB
ANION GAP SERPL CALCULATED.3IONS-SCNC: 4 MMOL/L (ref 3–16)
BASOPHILS # BLD: 0.1 K/UL (ref 0–0.2)
BASOPHILS NFR BLD: 0.6 %
BUN SERPL-MCNC: 19 MG/DL (ref 7–20)
CALCIUM SERPL-MCNC: 9 MG/DL (ref 8.3–10.6)
CHLORIDE SERPL-SCNC: 92 MMOL/L (ref 99–110)
CO2 SERPL-SCNC: 47 MMOL/L (ref 21–32)
CREAT SERPL-MCNC: <0.5 MG/DL (ref 0.6–1.2)
DEPRECATED RDW RBC AUTO: 16.5 % (ref 12.4–15.4)
EOSINOPHIL # BLD: 0.1 K/UL (ref 0–0.6)
EOSINOPHIL NFR BLD: 1.2 %
GFR SERPLBLD CREATININE-BSD FMLA CKD-EPI: >60 ML/MIN/{1.73_M2}
GLUCOSE SERPL-MCNC: 105 MG/DL (ref 70–99)
HCT VFR BLD AUTO: 29.1 % (ref 36–48)
HGB BLD-MCNC: 9.1 G/DL (ref 12–16)
LYMPHOCYTES # BLD: 0.6 K/UL (ref 1–5.1)
LYMPHOCYTES NFR BLD: 7.5 %
MCH RBC QN AUTO: 24.4 PG (ref 26–34)
MCHC RBC AUTO-ENTMCNC: 31.4 G/DL (ref 31–36)
MCV RBC AUTO: 77.8 FL (ref 80–100)
MONOCYTES # BLD: 0.8 K/UL (ref 0–1.3)
MONOCYTES NFR BLD: 10.2 %
NEUTROPHILS # BLD: 6.7 K/UL (ref 1.7–7.7)
NEUTROPHILS NFR BLD: 80.5 %
PLATELET # BLD AUTO: 179 K/UL (ref 135–450)
PMV BLD AUTO: 8.5 FL (ref 5–10.5)
POTASSIUM SERPL-SCNC: 3.7 MMOL/L (ref 3.5–5.1)
RBC # BLD AUTO: 3.74 M/UL (ref 4–5.2)
SODIUM SERPL-SCNC: 143 MMOL/L (ref 136–145)
WBC # BLD AUTO: 8.3 K/UL (ref 4–11)

## 2023-12-31 PROCEDURE — 6360000002 HC RX W HCPCS: Performed by: HOSPITALIST

## 2023-12-31 PROCEDURE — 2580000003 HC RX 258

## 2023-12-31 PROCEDURE — 94660 CPAP INITIATION&MGMT: CPT

## 2023-12-31 PROCEDURE — 6370000000 HC RX 637 (ALT 250 FOR IP): Performed by: HOSPITALIST

## 2023-12-31 PROCEDURE — 6360000002 HC RX W HCPCS: Performed by: INTERNAL MEDICINE

## 2023-12-31 PROCEDURE — 6370000000 HC RX 637 (ALT 250 FOR IP): Performed by: INTERNAL MEDICINE

## 2023-12-31 PROCEDURE — 80048 BASIC METABOLIC PNL TOTAL CA: CPT

## 2023-12-31 PROCEDURE — 2700000000 HC OXYGEN THERAPY PER DAY

## 2023-12-31 PROCEDURE — 6370000000 HC RX 637 (ALT 250 FOR IP): Performed by: NURSE PRACTITIONER

## 2023-12-31 PROCEDURE — 71045 X-RAY EXAM CHEST 1 VIEW: CPT

## 2023-12-31 PROCEDURE — 2060000000 HC ICU INTERMEDIATE R&B

## 2023-12-31 PROCEDURE — 85025 COMPLETE CBC W/AUTO DIFF WBC: CPT

## 2023-12-31 PROCEDURE — 6360000002 HC RX W HCPCS

## 2023-12-31 PROCEDURE — 94761 N-INVAS EAR/PLS OXIMETRY MLT: CPT

## 2023-12-31 PROCEDURE — 36591 DRAW BLOOD OFF VENOUS DEVICE: CPT

## 2023-12-31 PROCEDURE — 6370000000 HC RX 637 (ALT 250 FOR IP)

## 2023-12-31 PROCEDURE — 94640 AIRWAY INHALATION TREATMENT: CPT

## 2023-12-31 RX ORDER — HYDROXYZINE PAMOATE 25 MG/1
25 CAPSULE ORAL NIGHTLY PRN
Status: DISCONTINUED | OUTPATIENT
Start: 2023-12-31 | End: 2024-01-04 | Stop reason: HOSPADM

## 2023-12-31 RX ADMIN — FUROSEMIDE 20 MG: 10 INJECTION, SOLUTION INTRAMUSCULAR; INTRAVENOUS at 18:13

## 2023-12-31 RX ADMIN — BUSPIRONE HYDROCHLORIDE 10 MG: 5 TABLET ORAL at 15:00

## 2023-12-31 RX ADMIN — FUROSEMIDE 20 MG: 10 INJECTION, SOLUTION INTRAMUSCULAR; INTRAVENOUS at 09:30

## 2023-12-31 RX ADMIN — LEVALBUTEROL HYDROCHLORIDE 0.63 MG: 0.63 SOLUTION RESPIRATORY (INHALATION) at 10:42

## 2023-12-31 RX ADMIN — BUSPIRONE HYDROCHLORIDE 10 MG: 5 TABLET ORAL at 09:31

## 2023-12-31 RX ADMIN — ARFORMOTEROL TARTRATE 15 MCG: 15 SOLUTION RESPIRATORY (INHALATION) at 21:21

## 2023-12-31 RX ADMIN — PANTOPRAZOLE SODIUM 40 MG: 40 TABLET, DELAYED RELEASE ORAL at 06:39

## 2023-12-31 RX ADMIN — DIBASIC SODIUM PHOSPHATE, MONOBASIC POTASSIUM PHOSPHATE AND MONOBASIC SODIUM PHOSPHATE 1 TABLET: 852; 155; 130 TABLET ORAL at 09:31

## 2023-12-31 RX ADMIN — BUDESONIDE 250 MCG: 0.25 INHALANT RESPIRATORY (INHALATION) at 21:20

## 2023-12-31 RX ADMIN — ATORVASTATIN CALCIUM 10 MG: 10 TABLET, FILM COATED ORAL at 09:31

## 2023-12-31 RX ADMIN — LEVALBUTEROL HYDROCHLORIDE 0.63 MG: 0.63 SOLUTION RESPIRATORY (INHALATION) at 15:52

## 2023-12-31 RX ADMIN — BUDESONIDE 250 MCG: 0.25 INHALANT RESPIRATORY (INHALATION) at 10:43

## 2023-12-31 RX ADMIN — SODIUM CHLORIDE, PRESERVATIVE FREE 10 ML: 5 INJECTION INTRAVENOUS at 09:35

## 2023-12-31 RX ADMIN — SODIUM CHLORIDE, PRESERVATIVE FREE 10 ML: 5 INJECTION INTRAVENOUS at 21:05

## 2023-12-31 RX ADMIN — LEVALBUTEROL HYDROCHLORIDE 0.63 MG: 0.63 SOLUTION RESPIRATORY (INHALATION) at 21:20

## 2023-12-31 RX ADMIN — Medication 5 MG: at 21:04

## 2023-12-31 RX ADMIN — BUSPIRONE HYDROCHLORIDE 10 MG: 5 TABLET ORAL at 21:04

## 2023-12-31 RX ADMIN — DIBASIC SODIUM PHOSPHATE, MONOBASIC POTASSIUM PHOSPHATE AND MONOBASIC SODIUM PHOSPHATE 1 TABLET: 852; 155; 130 TABLET ORAL at 21:04

## 2023-12-31 RX ADMIN — ENOXAPARIN SODIUM 40 MG: 100 INJECTION SUBCUTANEOUS at 09:31

## 2023-12-31 RX ADMIN — ARFORMOTEROL TARTRATE 15 MCG: 15 SOLUTION RESPIRATORY (INHALATION) at 10:48

## 2023-12-31 NOTE — PROGRESS NOTES
The patient was placed on AVAPS at 2225, the RN called to say that the patient was not tolerating the BiPAP. The RN will place the patient back on the vapotherm. Will continue to monitor.

## 2023-12-31 NOTE — PROGRESS NOTES
Patient oxygen saturations 86-88%.  Patient current vapotherm settings 35/100.  This RN sends perfect serve to NP Marisol Diaz.    0052: This RN- \"Hi, just sending a message because this patient got a thoracentesis today, they got like 1.7L off and tonight I am having to go up on vapotherm settings.  She is currently at 35/100 so almost max on settings and her oxygenation is steadily 85-88.  I was just curious if you guys wanted another CXR or anything?\"    0057: NP- \"CXR ordered\"    0113: This RN- \" results for cxr are in epic\"

## 2023-12-31 NOTE — PROGRESS NOTES
as well as words and phrases that may be inappropriate. If there are any questions or concerns please feel free to contact the dictating provider for clarification.

## 2023-12-31 NOTE — PLAN OF CARE
Problem: Discharge Planning  Goal: Discharge to home or other facility with appropriate resources  12/31/2023 1414 by Glenys Rainey RN  Outcome: Progressing  Flowsheets (Taken 12/31/2023 0800)  Discharge to home or other facility with appropriate resources:   Identify barriers to discharge with patient and caregiver   Arrange for needed discharge resources and transportation as appropriate       Problem: Safety - Adult  Goal: Free from fall injury  12/31/2023 1414 by Glenys Rainey RN  Outcome: Progressing  Flowsheets (Taken 12/31/2023 1412)  Free From Fall Injury: Instruct family/caregiver on patient safety  Problem: Pain  Goal: Verbalizes/displays adequate comfort level or baseline comfort level  12/31/2023 1414 by Glenys Rianey RN  Outcome: Progressing  Flowsheets (Taken 12/31/2023 1109)  Verbalizes/displays adequate comfort level or baseline comfort level:   Assess pain using appropriate pain scale   Encourage patient to monitor pain and request assistance     Problem: Skin/Tissue Integrity  Goal: Absence of new skin breakdown  Description: 1.  Monitor for areas of redness and/or skin breakdown  2.  Assess vascular access sites hourly  3.  Every 4-6 hours minimum:  Change oxygen saturation probe site  4.  Every 4-6 hours:  If on nasal continuous positive airway pressure, respiratory therapy assess nares and determine need for appliance change or resting period.  12/31/2023 1414 by Glenys Rainey RN  Outcome: Progressing    Problem: Chronic Conditions and Co-morbidities  Goal: Patient's chronic conditions and co-morbidity symptoms are monitored and maintained or improved  12/31/2023 1414 by Glenys Rainey RN  Outcome: Progressing  Flowsheets (Taken 12/31/2023 0800)  Care Plan - Patient's Chronic Conditions and Co-Morbidity Symptoms are Monitored and Maintained or Improved: Monitor and assess patient's chronic conditions and comorbid symptoms for stability, deterioration, or

## 2023-12-31 NOTE — PROGRESS NOTES
4 Eyes Skin Assessment     NAME:  Catrachita Segal  YOB: 1945  MEDICAL RECORD NUMBER:  [id number]    The patient is being assessed for  Shift Handoff    I agree that at least one RN has performed a thorough Head to Toe Skin Assessment on the patient. ALL assessment sites listed below have been assessed.      Areas assessed by both nurses: Jose     Head, Face, Ears, Shoulders, Back, Chest, Arms, Elbows, Hands, Sacrum. Buttock, Coccyx, Ischium, Legs. Feet and Heels, and Under Medical Devices         Does the Patient have a Wound? Yes wound(s) were present on assessment. LDA wound assessment was Initiated and completed by RN       Selwyn Prevention initiated by RN: Yes  Wound Care Orders initiated by RN: No    Pressure Injury (Stage 3,4, Unstageable, DTI, NWPT, and Complex wounds) if present, place Wound referral order by RN under : Yes    New Ostomies, if present place, Ostomy referral order under : No     Nurse 1 eSignature: Electronically signed by Joaquin Salcido RN on 12/31/23 at 6:06 AM EST    **SHARE this note so that the co-signing nurse can place an eSignature**    Nurse 2 eSignature: {Esignature:[id number]}

## 2023-12-31 NOTE — PROGRESS NOTES
Pulmonary & Critical Care Medicine    Admit Date: 2023  PCP: Napoleon Stiles MD    CC:  hypoxia     Events of Last 24 hours:     Had a thoracentesis yesterday with 1.7 L of fluid removed.  She says she feels her breathing is a bit better but her oxygen requirements remain high and she is on 35 L of high flow.  She did not sleep well and was not on BiPAP very much overnight.    Brief history  Known hx of lung cancer.  She was seen by Dr. Donaldson on  for hypoxia and hypercapnia, failed BiPAP and required intubation.    She was extubated on .  She was treated with steroids and lasix  She was seen by Dr. Garcia in the office on .  At that time she was on 1L O2 at rest and 2 with ambulation.  She was continued on Trelegy.    She was readmitted on  and was diagnosed with COPD exacerbation / respiratory acidosis requiring vent support.  She was extubated on .      Vitals:  Tmax: 98.2  VITALS:  /67   Pulse (!) 110   Temp 97.5 °F (36.4 °C) (Oral)   Resp (!) 39   Ht 1.676 m (5' 5.98\")   Wt 51.2 kg (112 lb 14 oz)   SpO2 90%   BMI 18.23 kg/m²   24HR INTAKE/OUTPUT:    Intake/Output Summary (Last 24 hours) at 2023 0957  Last data filed at 2023 0328  Gross per 24 hour   Intake --   Output 650 ml   Net -650 ml       CURRENT PULSE OXIMETRY:  SpO2: 90 %  24HR PULSE OXIMETRY RANGE:  SpO2  Av.2 %  Min: 90 %  Max: 96 %    EXAM:  General: Mild distress. Alert.  Eyes: PERRL. No sclera icterus. No conjunctival injection.  ENT: No discharge. Moist oral mucosa   Neck: Trachea midline. Neck is supple   Resp: No accessory muscle use.  Decreased breath sounds on the left with E to A changes to the midlung field.  CV: Regular rate. Regular rhythm. No mumur or rub.  No edema.  JVD is not elevated.    GI: Non-tender. Non-distended.  Normal bowel sounds.   Neuro: Awake. Speech is clear.    Psych: No anxiety or agitation.     Medications:    IV:   sodium chloride           Scheduled

## 2023-12-31 NOTE — PROGRESS NOTES
Patient is with oxygen saturations 86-88 on monitor.  This RN goes to patient bedside to assess.  Patient is with increased work of breathing on bipap.  This RN asks patient if mask is not comfortable and if patient is feeling uneasy about wearing mask d/t events from earlier in morning (0300 rapid-see notes), patient states yes.  This RN places patient on Vapotherm and takes off bipap mask. RT is made aware of this.

## 2024-01-01 ENCOUNTER — APPOINTMENT (OUTPATIENT)
Dept: GENERAL RADIOLOGY | Age: 79
DRG: 208 | End: 2024-01-01
Payer: MEDICARE

## 2024-01-01 LAB
ANION GAP SERPL CALCULATED.3IONS-SCNC: 3 MMOL/L (ref 3–16)
BASE EXCESS BLDV CALC-SCNC: 30 MMOL/L (ref -3–3)
BASOPHILS # BLD: 0 K/UL (ref 0–0.2)
BASOPHILS NFR BLD: 0.6 %
BUN SERPL-MCNC: 20 MG/DL (ref 7–20)
CALCIUM SERPL-MCNC: 8.8 MG/DL (ref 8.3–10.6)
CHLORIDE SERPL-SCNC: 90 MMOL/L (ref 99–110)
CO2 BLDV-SCNC: >50 MMOL/L
CO2 SERPL-SCNC: 48 MMOL/L (ref 21–32)
CREAT SERPL-MCNC: <0.5 MG/DL (ref 0.6–1.2)
DEPRECATED RDW RBC AUTO: 16.7 % (ref 12.4–15.4)
EOSINOPHIL # BLD: 0.1 K/UL (ref 0–0.6)
EOSINOPHIL NFR BLD: 1.2 %
GFR SERPLBLD CREATININE-BSD FMLA CKD-EPI: >60 ML/MIN/{1.73_M2}
GLUCOSE SERPL-MCNC: 112 MG/DL (ref 70–99)
HCO3 BLDV-SCNC: 55 MMOL/L (ref 23–29)
HCT VFR BLD AUTO: 31.4 % (ref 36–48)
HGB BLD-MCNC: 9.5 G/DL (ref 12–16)
LYMPHOCYTES # BLD: 0.5 K/UL (ref 1–5.1)
LYMPHOCYTES NFR BLD: 8.3 %
MCH RBC QN AUTO: 23.9 PG (ref 26–34)
MCHC RBC AUTO-ENTMCNC: 30.1 G/DL (ref 31–36)
MCV RBC AUTO: 79.5 FL (ref 80–100)
MONOCYTES # BLD: 0.7 K/UL (ref 0–1.3)
MONOCYTES NFR BLD: 12.2 %
NEUTROPHILS # BLD: 4.6 K/UL (ref 1.7–7.7)
NEUTROPHILS NFR BLD: 77.7 %
PCO2 BLDV: 94.2 MM HG (ref 40–50)
PERFORMED ON: ABNORMAL
PH BLDV: 7.37 [PH] (ref 7.35–7.45)
PLATELET # BLD AUTO: 212 K/UL (ref 135–450)
PMV BLD AUTO: 9.6 FL (ref 5–10.5)
PO2 BLDV: 36 MM HG
POC SAMPLE TYPE: ABNORMAL
POTASSIUM SERPL-SCNC: 3.7 MMOL/L (ref 3.5–5.1)
RBC # BLD AUTO: 3.95 M/UL (ref 4–5.2)
SAO2 % BLDV: 61 %
SODIUM SERPL-SCNC: 141 MMOL/L (ref 136–145)
WBC # BLD AUTO: 6 K/UL (ref 4–11)

## 2024-01-01 PROCEDURE — 85025 COMPLETE CBC W/AUTO DIFF WBC: CPT

## 2024-01-01 PROCEDURE — 6360000002 HC RX W HCPCS

## 2024-01-01 PROCEDURE — 71045 X-RAY EXAM CHEST 1 VIEW: CPT

## 2024-01-01 PROCEDURE — 2060000000 HC ICU INTERMEDIATE R&B

## 2024-01-01 PROCEDURE — 94660 CPAP INITIATION&MGMT: CPT

## 2024-01-01 PROCEDURE — 2580000003 HC RX 258

## 2024-01-01 PROCEDURE — 6370000000 HC RX 637 (ALT 250 FOR IP): Performed by: HOSPITALIST

## 2024-01-01 PROCEDURE — 82803 BLOOD GASES ANY COMBINATION: CPT

## 2024-01-01 PROCEDURE — 94640 AIRWAY INHALATION TREATMENT: CPT

## 2024-01-01 PROCEDURE — 6360000002 HC RX W HCPCS: Performed by: INTERNAL MEDICINE

## 2024-01-01 PROCEDURE — 94669 MECHANICAL CHEST WALL OSCILL: CPT

## 2024-01-01 PROCEDURE — 6370000000 HC RX 637 (ALT 250 FOR IP): Performed by: INTERNAL MEDICINE

## 2024-01-01 PROCEDURE — 2700000000 HC OXYGEN THERAPY PER DAY

## 2024-01-01 PROCEDURE — 80048 BASIC METABOLIC PNL TOTAL CA: CPT

## 2024-01-01 PROCEDURE — 6370000000 HC RX 637 (ALT 250 FOR IP)

## 2024-01-01 PROCEDURE — 6370000000 HC RX 637 (ALT 250 FOR IP): Performed by: NURSE PRACTITIONER

## 2024-01-01 PROCEDURE — 6360000002 HC RX W HCPCS: Performed by: HOSPITALIST

## 2024-01-01 PROCEDURE — 36591 DRAW BLOOD OFF VENOUS DEVICE: CPT

## 2024-01-01 PROCEDURE — 94761 N-INVAS EAR/PLS OXIMETRY MLT: CPT

## 2024-01-01 RX ADMIN — ACETAMINOPHEN 650 MG: 325 TABLET ORAL at 18:13

## 2024-01-01 RX ADMIN — TIOTROPIUM BROMIDE INHALATION SPRAY 2 PUFF: 3.12 SPRAY, METERED RESPIRATORY (INHALATION) at 11:13

## 2024-01-01 RX ADMIN — SODIUM CHLORIDE, PRESERVATIVE FREE 10 ML: 5 INJECTION INTRAVENOUS at 19:56

## 2024-01-01 RX ADMIN — ARFORMOTEROL TARTRATE 15 MCG: 15 SOLUTION RESPIRATORY (INHALATION) at 09:21

## 2024-01-01 RX ADMIN — LEVALBUTEROL HYDROCHLORIDE 0.63 MG: 0.63 SOLUTION RESPIRATORY (INHALATION) at 16:05

## 2024-01-01 RX ADMIN — LEVALBUTEROL HYDROCHLORIDE 0.63 MG: 0.63 SOLUTION RESPIRATORY (INHALATION) at 09:23

## 2024-01-01 RX ADMIN — FUROSEMIDE 20 MG: 10 INJECTION, SOLUTION INTRAMUSCULAR; INTRAVENOUS at 10:02

## 2024-01-01 RX ADMIN — DIBASIC SODIUM PHOSPHATE, MONOBASIC POTASSIUM PHOSPHATE AND MONOBASIC SODIUM PHOSPHATE 1 TABLET: 852; 155; 130 TABLET ORAL at 19:56

## 2024-01-01 RX ADMIN — FUROSEMIDE 20 MG: 10 INJECTION, SOLUTION INTRAMUSCULAR; INTRAVENOUS at 17:53

## 2024-01-01 RX ADMIN — Medication 5 MG: at 19:56

## 2024-01-01 RX ADMIN — ATORVASTATIN CALCIUM 10 MG: 10 TABLET, FILM COATED ORAL at 10:02

## 2024-01-01 RX ADMIN — LEVALBUTEROL HYDROCHLORIDE 0.63 MG: 0.63 SOLUTION RESPIRATORY (INHALATION) at 12:40

## 2024-01-01 RX ADMIN — PANTOPRAZOLE SODIUM 40 MG: 40 TABLET, DELAYED RELEASE ORAL at 10:02

## 2024-01-01 RX ADMIN — SODIUM CHLORIDE, PRESERVATIVE FREE 10 ML: 5 INJECTION INTRAVENOUS at 10:04

## 2024-01-01 RX ADMIN — BUDESONIDE 250 MCG: 0.25 INHALANT RESPIRATORY (INHALATION) at 20:00

## 2024-01-01 RX ADMIN — ENOXAPARIN SODIUM 40 MG: 100 INJECTION SUBCUTANEOUS at 10:02

## 2024-01-01 RX ADMIN — BUSPIRONE HYDROCHLORIDE 10 MG: 5 TABLET ORAL at 19:56

## 2024-01-01 RX ADMIN — BUSPIRONE HYDROCHLORIDE 10 MG: 5 TABLET ORAL at 10:02

## 2024-01-01 RX ADMIN — BUDESONIDE 250 MCG: 0.25 INHALANT RESPIRATORY (INHALATION) at 09:21

## 2024-01-01 RX ADMIN — BUSPIRONE HYDROCHLORIDE 10 MG: 5 TABLET ORAL at 14:00

## 2024-01-01 RX ADMIN — DIBASIC SODIUM PHOSPHATE, MONOBASIC POTASSIUM PHOSPHATE AND MONOBASIC SODIUM PHOSPHATE 1 TABLET: 852; 155; 130 TABLET ORAL at 10:02

## 2024-01-01 RX ADMIN — ARFORMOTEROL TARTRATE 15 MCG: 15 SOLUTION RESPIRATORY (INHALATION) at 20:00

## 2024-01-01 RX ADMIN — LEVALBUTEROL HYDROCHLORIDE 0.63 MG: 0.63 SOLUTION RESPIRATORY (INHALATION) at 20:00

## 2024-01-01 ASSESSMENT — PAIN SCALES - GENERAL
PAINLEVEL_OUTOF10: 0

## 2024-01-01 NOTE — SIGNIFICANT EVENT
Rapid response called at 5:01 PM. ICU team responded immediately.    On examination, pt sitting up in chair with nonrebreather mask on. She is conversational and A&O x3. Per nurse, pt was on 30 L high flow NC before being uptitrated to 40 L and now on rebreather. Hx significant for being intubated several times over the past couple months for respiratory failure. Has hx of SHANNA and COPD. Was on Bipap earlier today at 100% FiO2. She was assessed by Pulmonology for concern of acute on chronic hypercapnic/hypoxemic respiratory failure 2/2 growing fluid effusion around the left lung. Pt with 'essentially nonfunctional right lung b/c of the radiation pneumonitis and bullous emphysema'. Thoracentesis was performed on 12/30 with 1.7 L removed and some improvement in breathing. Fluid removed c/w with transudate, was still on vapotherm after thoracentesis and now with increased O2 flow requirements.    Physical examination significant for scattered rhonchi to the most part clear auscultation bilaterally. Has a systolic murmur and was tachycardic. Somewhat tripoding but not using any accessory muscles.     Stat ABG ordered, last ABG on 12/26 with pH of 7.434 and pCO2 of 70.0. Stat CXR also ordered. Will follow.

## 2024-01-01 NOTE — PROGRESS NOTES
4 Eyes Skin Assessment     NAME:  Catrachita Segal  YOB: 1945  MEDICAL RECORD NUMBER:  [id number]    The patient is being assessed for  Shift Handoff    I agree that at least one RN has performed a thorough Head to Toe Skin Assessment on the patient. ALL assessment sites listed below have been assessed.      Areas assessed by both nurses:    Head, Face, Ears, Shoulders, Back, Chest, Arms, Elbows, Hands, Sacrum. Buttock, Coccyx, Ischium, Legs. Feet and Heels, and Under Medical Devices         Does the Patient have a Wound? Yes wound(s) were present on assessment. LDA wound assessment was Initiated and completed by RN       Selwyn Prevention initiated by RN: Yes  Wound Care Orders initiated by RN: Yes    Pressure Injury (Stage 3,4, Unstageable, DTI, NWPT, and Complex wounds) if present, place Wound referral order by RN under : No    New Ostomies, if present place, Ostomy referral order under : No     Nurse 1 eSignature: Electronically signed by Tracy Burgess, RN, RN on 1/1/24 at 6:26 PM EST    **SHARE this note so that the co-signing nurse can place an eSignature**    Nurse 2 eSignature: Electronically signed by Leanna Will RN on 1/2/24 at 7:50 AM EST

## 2024-01-01 NOTE — PLAN OF CARE
Problem: Skin/Tissue Integrity  Goal: Absence of new skin breakdown  Description: 1.  Monitor for areas of redness and/or skin breakdown  2.  Assess vascular access sites hourly  3.  Every 4-6 hours minimum:  Change oxygen saturation probe site  4.  Every 4-6 hours:  If on nasal continuous positive airway pressure, respiratory therapy assess nares and determine need for appliance change or resting period.  1/1/2024 1557 by Tracy Burgess, RN  Outcome: Progressing- no new skin breakdown noted on shift, patient able to turn self. Mepilex replaced and triad cream applied to wound on coccyx. Care continues.     Problem: Cardiovascular - Adult  Goal: Absence of cardiac dysrhythmias or at baseline  1/1/2024 1557 by Tracy Burgess, RN  Outcome: Progressing- patient running sinus tach with occasional PVCs on telemetry.     Problem: Respiratory - Adult  Goal: Achieves optimal ventilation and oxygenation  1/1/2024 0732 by Tracy Burgess, RN  Outcome: Progressing- patient taken off Bipap this am and transitioned to 30L vapotherm. Patient started to sat mid 80's, vapotherm turned up to 35L, RT and pulm notified. Pt continued to sat mid 80's at 35 L, vapotherm turned to 40L. No new orders received, pt currently satting 92 on 40L vapotherm. Care continues.

## 2024-01-01 NOTE — PROGRESS NOTES
input(s): \"PROBNP\" in the last 72 hours.  UA:  Lab Results   Component Value Date/Time    NITRU Negative 12/17/2023 06:57 PM    COLORU Yellow 12/17/2023 06:57 PM    PHUR 6.5 12/17/2023 06:57 PM    WBCUA 0-2 12/17/2023 06:57 PM    RBCUA 0-2 12/17/2023 06:57 PM    MUCUS 1+ 12/17/2023 06:57 PM    BACTERIA Rare 12/17/2023 06:57 PM    CLARITYU Clear 12/17/2023 06:57 PM    SPECGRAV 1.025 12/17/2023 06:57 PM    LEUKOCYTESUR SMALL 12/17/2023 06:57 PM    UROBILINOGEN 4.0 12/17/2023 06:57 PM    BILIRUBINUR Negative 12/17/2023 06:57 PM    BLOODU Negative 12/17/2023 06:57 PM    GLUCOSEU Negative 12/17/2023 06:57 PM    KETUA Negative 12/17/2023 06:57 PM     Urine Cultures:   Lab Results   Component Value Date/Time    LABURIN  12/05/2023 09:45 PM     <10,000 CFU/ml mixed skin/urogenital alexander. No further workup     Blood Cultures:   Lab Results   Component Value Date/Time    BC No Growth after 4 days of incubation. 12/08/2023 08:51 AM     Lab Results   Component Value Date/Time    BLOODCULT2 No Growth after 4 days of incubation. 12/08/2023 08:52 AM     Organism:   Lab Results   Component Value Date/Time    ORG Staphylococcus epidermidis DNA Detected 12/06/2023 01:15 AM    ORG Staphylococcus epidermidis 12/06/2023 01:15 AM    ORG Kocuria rhizophila 12/06/2023 01:15 AM         Electronically signed by Jaime Aaron MD on 1/1/2024 at 12:25 PM

## 2024-01-01 NOTE — PROGRESS NOTES
Rapid response called by this RN at 1701. Prior to rapid, patient was maxed on vapotherm 40L at 100% FiO2, was satting 79-82%. RN then additionally placed patient on nonrebreather 15 L, only increased pt's sat to 86. Rapid then called. Patient remained alert and oriented. ABG ordered then cancelled, VBG drawn and showed CO2 at 94.2. No new orders from rapid team.  Patient carla recovered for a half hour on 40 L vapotherm, then began to desat to 85 Began to show signs of confusion. RT contacted, decision to place patient on BiPap early for the evening at 1820. Patient saturating 100% currently on BiPap.   Care continues.

## 2024-01-01 NOTE — PROGRESS NOTES
4 Eyes Skin Assessment     NAME:  Catrachita Segal  YOB: 1945  MEDICAL RECORD NUMBER:  [id number]    The patient is being assessed for  Shift Handoff    I agree that at least one RN has performed a thorough Head to Toe Skin Assessment on the patient. ALL assessment sites listed below have been assessed.      Areas assessed by both nurses: Serafin    Head, Face, Ears, Shoulders, Back, Chest, Arms, Elbows, Hands, Sacrum. Buttock, Coccyx, Ischium, Legs. Feet and Heels, and Under Medical Devices         Does the Patient have a Wound? Yes wound(s) were present on assessment. LDA wound assessment was Initiated and completed by RN       Selwyn Prevention initiated by RN: Yes  Wound Care Orders initiated by RN: No    Pressure Injury (Stage 3,4, Unstageable, DTI, NWPT, and Complex wounds) if present, place Wound referral order by RN under : Yes    New Ostomies, if present place, Ostomy referral order under : No     Nurse 1 eSignature: Electronically signed by Joaquin Salcido RN on 1/1/24 at 7:33 AM EST    **SHARE this note so that the co-signing nurse can place an eSignature**    Nurse 2 eSignature: Electronically signed by Tracy Burgess RN, RN on 1/1/24 at 6:25 PM EST

## 2024-01-01 NOTE — PROGRESS NOTES
sodium chloride           Scheduled Meds:   busPIRone  10 mg Oral TID    levalbuterol  0.63 mg Nebulization Q4H WA RT    tiotropium  2 puff Inhalation Daily RT    furosemide  20 mg IntraVENous BID    LORazepam  0.5 mg Oral Once    enoxaparin  40 mg SubCUTAneous Daily    melatonin  5 mg Oral Nightly    phosphorus  250 mg Oral BID    atorvastatin  10 mg Oral Daily    pantoprazole  40 mg Oral QAM AC    [Held by provider] metoprolol succinate  25 mg Oral Daily    sodium chloride flush  5-40 mL IntraVENous 2 times per day    budesonide  0.25 mg Nebulization BID RT    arformoterol tartrate  15 mcg Nebulization BID RT         Diet: ADULT DIET; Regular  ADULT ORAL NUTRITION SUPPLEMENT; Breakfast, Dinner; Other Oral Supplement; Fruit Smoothie     Results:  CBC:   Recent Labs     12/30/23  0340 12/31/23 0337 01/01/24  0348   WBC 6.1 8.3 6.0   HGB 8.9* 9.1* 9.5*   HCT 28.7* 29.1* 31.4*   MCV 78.0* 77.8* 79.5*    179 212       BMP:   Recent Labs     12/30/23  0340 12/31/23 0337 01/01/24  0348    143 141   K 3.4* 3.7 3.7   CL 90* 92* 90*   CO2 46* 47* 48*   BUN 19 19 20   CREATININE <0.5* <0.5* <0.5*     proBNP 535  12/20  VBG 7.27/93    Chest x-ray   December 27  IMPRESSION:     Loculated small left pleural effusion without change.     Coarse opacity in the right lung base without change.     Moderate pulmonary edema.     Cardiomegaly.     Left subclavian central venous catheter without change.       CT chest December 20, 2023  FINDINGS:     Lungs and Pleura: Very large left pleural effusion which appears slightly increased in size in comparison to prior exam. Increase in atelectasis in the left lower lung in comparison to the prior.     Large architecture distortion and traction bronchiectasis in the right upper lobe with large bulla the right lung apex. Small right effusion similar in size to the prior examination.     Mediastinum:Stable aortic ectasia with the ascending aorta measuring 4.3 cm in transverse

## 2024-01-02 LAB
ANION GAP SERPL CALCULATED.3IONS-SCNC: 0 MMOL/L (ref 3–16)
BASE EXCESS BLDV CALC-SCNC: 20.9 MMOL/L (ref -2–3)
BASE EXCESS BLDV CALC-SCNC: 23.9 MMOL/L (ref -2–3)
BASOPHILS # BLD: 0 K/UL (ref 0–0.2)
BASOPHILS NFR BLD: 0.5 %
BUN SERPL-MCNC: 23 MG/DL (ref 7–20)
CALCIUM SERPL-MCNC: 8.8 MG/DL (ref 8.3–10.6)
CHLORIDE SERPL-SCNC: 90 MMOL/L (ref 99–110)
CO2 BLDV-SCNC: 54 MMOL/L
CO2 BLDV-SCNC: 55 MMOL/L
CO2 SERPL-SCNC: >50 MMOL/L (ref 21–32)
COHGB MFR BLDV: 1.4 % (ref 0–1.5)
COHGB MFR BLDV: 1.6 % (ref 0–1.5)
CREAT SERPL-MCNC: 0.5 MG/DL (ref 0.6–1.2)
DEPRECATED RDW RBC AUTO: 16.5 % (ref 12.4–15.4)
DO-HGB MFR BLDV: 30 %
EOSINOPHIL # BLD: 0.1 K/UL (ref 0–0.6)
EOSINOPHIL NFR BLD: 1.4 %
GFR SERPLBLD CREATININE-BSD FMLA CKD-EPI: >60 ML/MIN/{1.73_M2}
GLUCOSE SERPL-MCNC: 110 MG/DL (ref 70–99)
HCO3 BLDV-SCNC: 50.7 MMOL/L (ref 24–28)
HCO3 BLDV-SCNC: 52.1 MMOL/L (ref 24–28)
HCT VFR BLD AUTO: 28.7 % (ref 36–48)
HGB BLD-MCNC: 8.6 G/DL (ref 12–16)
LYMPHOCYTES # BLD: 0.5 K/UL (ref 1–5.1)
LYMPHOCYTES NFR BLD: 9 %
MCH RBC QN AUTO: 23.9 PG (ref 26–34)
MCHC RBC AUTO-ENTMCNC: 30.1 G/DL (ref 31–36)
MCV RBC AUTO: 79.3 FL (ref 80–100)
METHGB MFR BLDV: 0.3 % (ref 0–1.5)
METHGB MFR BLDV: 0.4 % (ref 0–1.5)
MONOCYTES # BLD: 0.7 K/UL (ref 0–1.3)
MONOCYTES NFR BLD: 12.5 %
NEUTROPHILS # BLD: 4.2 K/UL (ref 1.7–7.7)
NEUTROPHILS NFR BLD: 76.6 %
PCO2 BLDV: 84.1 MMHG (ref 41–51)
PCO2 BLDV: >98 MMHG (ref 41–51)
PH BLDV: 7.31 [PH] (ref 7.35–7.45)
PH BLDV: 7.4 [PH] (ref 7.35–7.45)
PLATELET # BLD AUTO: 216 K/UL (ref 135–450)
PMV BLD AUTO: 9.6 FL (ref 5–10.5)
PO2 BLDV: 42 MMHG (ref 25–40)
PO2 BLDV: 57 MMHG (ref 25–40)
POTASSIUM SERPL-SCNC: 3.7 MMOL/L (ref 3.5–5.1)
RBC # BLD AUTO: 3.61 M/UL (ref 4–5.2)
SAO2 % BLDV: 70 %
SAO2 % BLDV: 89 %
SODIUM SERPL-SCNC: 140 MMOL/L (ref 136–145)
WBC # BLD AUTO: 5.5 K/UL (ref 4–11)

## 2024-01-02 PROCEDURE — 6370000000 HC RX 637 (ALT 250 FOR IP): Performed by: INTERNAL MEDICINE

## 2024-01-02 PROCEDURE — 2700000000 HC OXYGEN THERAPY PER DAY

## 2024-01-02 PROCEDURE — 6360000002 HC RX W HCPCS: Performed by: INTERNAL MEDICINE

## 2024-01-02 PROCEDURE — 6370000000 HC RX 637 (ALT 250 FOR IP): Performed by: NURSE PRACTITIONER

## 2024-01-02 PROCEDURE — 82803 BLOOD GASES ANY COMBINATION: CPT

## 2024-01-02 PROCEDURE — 6370000000 HC RX 637 (ALT 250 FOR IP)

## 2024-01-02 PROCEDURE — 94761 N-INVAS EAR/PLS OXIMETRY MLT: CPT

## 2024-01-02 PROCEDURE — 2060000000 HC ICU INTERMEDIATE R&B

## 2024-01-02 PROCEDURE — 6360000002 HC RX W HCPCS

## 2024-01-02 PROCEDURE — 6370000000 HC RX 637 (ALT 250 FOR IP): Performed by: HOSPITALIST

## 2024-01-02 PROCEDURE — 80048 BASIC METABOLIC PNL TOTAL CA: CPT

## 2024-01-02 PROCEDURE — 94660 CPAP INITIATION&MGMT: CPT

## 2024-01-02 PROCEDURE — 85025 COMPLETE CBC W/AUTO DIFF WBC: CPT

## 2024-01-02 PROCEDURE — 2580000003 HC RX 258

## 2024-01-02 PROCEDURE — 94640 AIRWAY INHALATION TREATMENT: CPT

## 2024-01-02 RX ORDER — MIDODRINE HYDROCHLORIDE 5 MG/1
5 TABLET ORAL ONCE
Status: COMPLETED | OUTPATIENT
Start: 2024-01-02 | End: 2024-01-02

## 2024-01-02 RX ADMIN — LEVALBUTEROL HYDROCHLORIDE 0.63 MG: 0.63 SOLUTION RESPIRATORY (INHALATION) at 08:09

## 2024-01-02 RX ADMIN — SODIUM CHLORIDE, PRESERVATIVE FREE 10 ML: 5 INJECTION INTRAVENOUS at 09:31

## 2024-01-02 RX ADMIN — LEVALBUTEROL HYDROCHLORIDE 0.63 MG: 0.63 SOLUTION RESPIRATORY (INHALATION) at 12:48

## 2024-01-02 RX ADMIN — BUDESONIDE 250 MCG: 0.25 INHALANT RESPIRATORY (INHALATION) at 19:52

## 2024-01-02 RX ADMIN — DIBASIC SODIUM PHOSPHATE, MONOBASIC POTASSIUM PHOSPHATE AND MONOBASIC SODIUM PHOSPHATE 1 TABLET: 852; 155; 130 TABLET ORAL at 20:34

## 2024-01-02 RX ADMIN — ENOXAPARIN SODIUM 40 MG: 100 INJECTION SUBCUTANEOUS at 09:20

## 2024-01-02 RX ADMIN — ARFORMOTEROL TARTRATE 15 MCG: 15 SOLUTION RESPIRATORY (INHALATION) at 19:53

## 2024-01-02 RX ADMIN — MIDODRINE HYDROCHLORIDE 5 MG: 5 TABLET ORAL at 05:28

## 2024-01-02 RX ADMIN — PANTOPRAZOLE SODIUM 40 MG: 40 TABLET, DELAYED RELEASE ORAL at 05:28

## 2024-01-02 RX ADMIN — BUSPIRONE HYDROCHLORIDE 10 MG: 5 TABLET ORAL at 15:35

## 2024-01-02 RX ADMIN — BUSPIRONE HYDROCHLORIDE 10 MG: 5 TABLET ORAL at 20:34

## 2024-01-02 RX ADMIN — LEVALBUTEROL HYDROCHLORIDE 0.63 MG: 0.63 SOLUTION RESPIRATORY (INHALATION) at 17:02

## 2024-01-02 RX ADMIN — HYDROXYZINE PAMOATE 25 MG: 25 CAPSULE ORAL at 23:18

## 2024-01-02 RX ADMIN — BUDESONIDE 250 MCG: 0.25 INHALANT RESPIRATORY (INHALATION) at 08:09

## 2024-01-02 RX ADMIN — ARFORMOTEROL TARTRATE 15 MCG: 15 SOLUTION RESPIRATORY (INHALATION) at 08:09

## 2024-01-02 RX ADMIN — TIOTROPIUM BROMIDE INHALATION SPRAY 2 PUFF: 3.12 SPRAY, METERED RESPIRATORY (INHALATION) at 08:09

## 2024-01-02 RX ADMIN — LEVALBUTEROL HYDROCHLORIDE 0.63 MG: 0.63 SOLUTION RESPIRATORY (INHALATION) at 19:53

## 2024-01-02 ASSESSMENT — PAIN SCALES - GENERAL
PAINLEVEL_OUTOF10: 0

## 2024-01-02 NOTE — PROGRESS NOTES
Pulmonary & Critical Care Medicine    Admit Date: 2023  PCP: Napoleon Stiles MD    CC:  hypoxia     Events of Last 24 hours:     Patient continues to worsen.  She was on bipap overnight, but couldn't tolerate the transition back to vapotherm this morning and has developed an acute on chronic respiratory acidosis.    Brief history  Known hx of lung cancer.  She was seen by Dr. Donaldson on  for hypoxia and hypercapnia, failed BiPAP and required intubation.    She was extubated on .  She was treated with steroids and lasix  She was seen by Dr. Garcia in the office on .  At that time she was on 1L O2 at rest and 2 with ambulation.  She was continued on Trelegy.    She was readmitted on  and was diagnosed with COPD exacerbation / respiratory acidosis requiring vent support.  She was extubated on .      Vitals:  Tmax: 98.2  VITALS:  BP 91/66   Pulse (!) 132   Temp 97.7 °F (36.5 °C) (Axillary)   Resp 22   Ht 1.676 m (5' 5.98\")   Wt 51.6 kg (113 lb 12.1 oz)   SpO2 96%   BMI 18.37 kg/m²   24HR INTAKE/OUTPUT:    Intake/Output Summary (Last 24 hours) at 2024 1047  Last data filed at 2024 0930  Gross per 24 hour   Intake 680 ml   Output 700 ml   Net -20 ml       CURRENT PULSE OXIMETRY:  SpO2: 96 %  24HR PULSE OXIMETRY RANGE:  SpO2  Av.7 %  Min: 79 %  Max: 100 %    EXAM:  General: Mild distress. Interacts on bipap.  Eyes: PERRL. No sclera icterus. No conjunctival injection.  ENT: No discharge. Moist oral mucosa   Neck: Trachea midline. Neck is supple   Resp: mild accessory muscle use.  Decreased breath sounds on the left with.  CV: Regular rate. Regular rhythm. No mumur or rub.  No edema.  JVD is not elevated.    GI: Non-tender. Non-distended.  Normal bowel sounds.   Neuro: Awake. Speech is clear.    Psych: No anxiety or agitation.     Medications:    IV:   sodium chloride           Scheduled Meds:   busPIRone  10 mg Oral TID    levalbuterol  0.63 mg Nebulization Q4H WA RT

## 2024-01-02 NOTE — PLAN OF CARE
Problem: ABCDS Injury Assessment  Goal: Absence of physical injury  Recent Flowsheet Documentation  Taken 1/2/2024 0125 by Leanna Will RN  Absence of Physical Injury: Implement safety measures based on patient assessment     Problem: Chronic Conditions and Co-morbidities  Goal: Patient's chronic conditions and co-morbidity symptoms are monitored and maintained or improved  Recent Flowsheet Documentation  Taken 1/1/2024 2000 by Leanna Will RN  Care Plan - Patient's Chronic Conditions and Co-Morbidity Symptoms are Monitored and Maintained or Improved: Monitor and assess patient's chronic conditions and comorbid symptoms for stability, deterioration, or improvement     Problem: Safety - Adult  Goal: Free from fall injury  Outcome: Progressing  Flowsheets (Taken 1/2/2024 0125)  Free From Fall Injury: Instruct family/caregiver on patient safety

## 2024-01-02 NOTE — PROGRESS NOTES
Occupational Therapy/Physical Therapy  Chart reviewed, discussed with nurse.  Pt on BiPAP, but nurse gave OK to try exercises in bed.  Pt declined activity, no tx rendered.  PIPPA Fowler, OTR/L [id number]   Jennifer Teague PT [id number]

## 2024-01-02 NOTE — PROGRESS NOTES
\"TROPONINT\"  Lactic Acid: No results for input(s): \"LACTA\" in the last 72 hours.  BNP: No results for input(s): \"PROBNP\" in the last 72 hours.  UA:  Lab Results   Component Value Date/Time    NITRU Negative 12/17/2023 06:57 PM    COLORU Yellow 12/17/2023 06:57 PM    PHUR 6.5 12/17/2023 06:57 PM    WBCUA 0-2 12/17/2023 06:57 PM    RBCUA 0-2 12/17/2023 06:57 PM    MUCUS 1+ 12/17/2023 06:57 PM    BACTERIA Rare 12/17/2023 06:57 PM    CLARITYU Clear 12/17/2023 06:57 PM    SPECGRAV 1.025 12/17/2023 06:57 PM    LEUKOCYTESUR SMALL 12/17/2023 06:57 PM    UROBILINOGEN 4.0 12/17/2023 06:57 PM    BILIRUBINUR Negative 12/17/2023 06:57 PM    BLOODU Negative 12/17/2023 06:57 PM    GLUCOSEU Negative 12/17/2023 06:57 PM    KETUA Negative 12/17/2023 06:57 PM     Urine Cultures:   Lab Results   Component Value Date/Time    LABURIN  12/05/2023 09:45 PM     <10,000 CFU/ml mixed skin/urogenital alexander. No further workup     Blood Cultures:   Lab Results   Component Value Date/Time    BC No Growth after 4 days of incubation. 12/08/2023 08:51 AM     Lab Results   Component Value Date/Time    BLOODCULT2 No Growth after 4 days of incubation. 12/08/2023 08:52 AM     Organism:   Lab Results   Component Value Date/Time    ORG Staphylococcus epidermidis DNA Detected 12/06/2023 01:15 AM    ORG Staphylococcus epidermidis 12/06/2023 01:15 AM    ORG Kocuria rhizophila 12/06/2023 01:15 AM         Electronically signed by Jaime Aaron MD on 1/2/2024 at 12:48 PM

## 2024-01-02 NOTE — PROGRESS NOTES
4 Eyes Skin Assessment     NAME:  Catrachita Segal  YOB: 1945  MEDICAL RECORD NUMBER:  [id number]    The patient is being assessed for  Shift Handoff    I agree that at least one RN has performed a thorough Head to Toe Skin Assessment on the patient. ALL assessment sites listed below have been assessed.      Areas assessed by both nurses:    Head, Face, Ears, Shoulders, Back, Chest, Arms, Elbows, Hands, Sacrum. Buttock, Coccyx, Ischium, Legs. Feet and Heels, and Under Medical Devices         Does the Patient have a Wound? Yes wound(s) were present on assessment. LDA wound assessment was Initiated and completed by RN       Selwyn Prevention initiated by RN: Yes  Wound Care Orders initiated by RN: Yes    Pressure Injury (Stage 3,4, Unstageable, DTI, NWPT, and Complex wounds) if present, place Wound referral order by RN under : No    New Ostomies, if present place, Ostomy referral order under : Yes     Nurse 1 eSignature: Electronically signed by Leanna Will RN on 1/2/24 at 7:51 AM EST    **SHARE this note so that the co-signing nurse can place an eSignature**    Nurse 2 eSignature: {Esignature:[id number]}

## 2024-01-02 NOTE — PROGRESS NOTES
Comprehensive Nutrition Assessment    RECOMMENDATIONS:  PO Diet: Continue Regular  ONS: Modify Fruit Smoothies to tid  Nutrition Education: Education not indicated     NUTRITION ASSESSMENT:   Nutritional summary & status: Follow-up. Pt with varied meal intake at 1-75%. Stated that she is trying to eat and does wish to continue with Fruit Smoothies. Agreeable to increase Smoothies from bid to tid with meals. Will add to order. On Bipap with worsening resp status per pulmonary. Palliative care following with family considering transition to comfort care. Continue to follow.   Admission // PMH: Pneumonia//adenocarcinoma of the lung stage IV, CHF, HTN/HLD    MALNUTRITION ASSESSMENT  Context of Malnutrition: Chronic Illness   Malnutrition Status: Severe malnutrition  Findings of the 6 clinical characteristics of malnutrition (Minimum of 2 out of 6 clinical characteristics is required to make the diagnosis of moderate or severe Protein Calorie Malnutrition based on AND/ASPEN Guidelines):  Energy Intake:  Mild decrease in energy intake (Comment)  Weight Loss:  Greater than 20% over 1 year     Body Fat Loss:  Severe body fat loss Orbital, Triceps, Buccal region   Muscle Mass Loss:  Severe muscle mass loss Clavicles (pectoralis & deltoids), Temples (temporalis)  Fluid Accumulation:  No significant fluid accumulation     Strength:  Not Performed    NUTRITION DIAGNOSIS   Severe malnutrition related to catabolic illness as evidenced by Criteria as identified in malnutrition assessment    Nutrition Monitoring and Evaluation:   Food/Nutrient Intake Outcomes:  Food and Nutrient Intake, Supplement Intake  Physical Signs/Symptoms Outcomes:  Biochemical Data, Nutrition Focused Physical Findings, Weight, Hemodynamic Status     OBJECTIVE DATA: Significant to nutrition assessment  Nutrition Related Findings: LBm12/31. No edema. Glu 110.  Wounds: Stage II, Pressure Injury (coccyx)  Nutrition Goals: Meet at least 75% of estimated

## 2024-01-02 NOTE — PLAN OF CARE
Problem: Safety - Adult  Goal: Free from fall injury  1/2/2024 1757 by Shawn Patel RN  Note: Pt is a high fall risk (see Muse Fall Risk assessment).  Oriented pt to room and call light. Instructed pt to call for help when needed.  Call light and personal items within reach.  Bed in lowest position, brakes on, and 2/4 side rails up.  Non-skid footwear on. Falls risk stop sign on door; hourly visual checks implemented.  Falls risk arm band on pt.  Bed alarm is on.  Pt using call light appropriately.  Will continue to monitor.       Problem: Skin/Tissue Integrity  Goal: Absence of new skin breakdown  Description: 1.  Monitor for areas of redness and/or skin breakdown  2.  Assess vascular access sites hourly  3.  Every 4-6 hours minimum:  Change oxygen saturation probe site  4.  Every 4-6 hours:  If on nasal continuous positive airway pressure, respiratory therapy assess nares and determine need for appliance change or resting period.  Note: Pt up with assistance this shift and using BSC.  Pt continent of stool and urine, turns self frequently while in bed.  ADLs and hygiene performed with assistance throughout shift. Instructed pt on importance of maintaining good hygiene and activity levels.  No signs or symptoms of new skin breakdown noted.

## 2024-01-02 NOTE — CARE COORDINATION
Case Management                  Date/ Time of Note: 1/2/2024 9:13 AM         Note completed by: JYOTHI Briscoe, LSW    Patient Name: Catrachita Segal  YOB: 1945    Diagnosis:Pneumonia due to organism [J18.9]  Pleural effusion on left [J90]  HCAP (healthcare-associated pneumonia) [J18.9]  Urinary tract infection without hematuria, site unspecified [N39.0]  Other fatigue [R53.83]  Patient Admission Status: Inpatient  Date of Admission:12/5/2023  7:21 PM    Length of Stay: 27 GLOS: GMLOS: 5.2 Readmission Risk Score: Readmission Risk Score: 22.3    Current Plan of Care:   [facility] for [facility], pt and family are in agreement. Pt's family still want to pursue aggressive treatment at this time.   SW spoke to tian at [facility], the appeal was denied due to lack of medical necessity. They will submit a new pre-cert today.     Referrals completed: [facility]: [facility]     Resources/ information provided: Not indicated at this time    IMM Status: IMM Letter given to Patient/Family/Significant other/Guardian/POA/by:: Valentina Dupree  IMM Letter date given:: 12/22/23  IMM Letter time given:: 1500      Catrachita and her family were provided with choice of provider; she and her family are in agreement with the discharge plan.    Electronically signed by JYOTHI Briscoe, LIZZETHW on 1/2/2024 at 9:13 AM  [facility]  Case Management Department  Ph: [phone]

## 2024-01-02 NOTE — FLOWSHEET NOTE
01/01/24 2050   Vitals   BP (!) 87/64   MAP (Calculated) 72       Per order, hospitalist notified that SBP <90. Patient resting comfortably on bipap.

## 2024-01-02 NOTE — PROGRESS NOTES
Palliative Care Chart Review  and Check in Note:     NAME:  Catrachita Segal  Admit Date: 12/5/2023  Hospital Day:  Hospital Day: 29   Current Code status: DNR-CCA    Palliative care is continuing to following Ms. Segal for symptom management,  and goals of care discussion as needed. Patient's chart reviewed today 1/2/24.      Pt is sleeping on Bipap this afternoon. Spoke with her two daughters Yovani and Alexandra at the bedside about transitioning to comfort care. Daughters are in agreement with transitioning to comfort care and will speak with their mother about the timing. They want to bring in her adult grandchildren to visit prior to taking her off bipap and giving comfort meds. They agreed that we would not escalate care if she declines while they are waiting for other family to visit. Offered hospice support as well and they will consider that. D/w Dr. Donaldson and [place] Esc[place].    The following are the currently established goals/code status, and Symptom management.     Goals of care: Pt/family are leaning toward transitioning to comfort care.    Code status: DNR-CCA/DNI    Discharge plan: will likely transition to comfort care and remain inpatient due to not being stable to leave the hospital.     Marisol Dexter NP  Palliative Care  [phone]

## 2024-01-03 LAB
ANION GAP SERPL CALCULATED.3IONS-SCNC: 2 MMOL/L (ref 3–16)
BASOPHILS # BLD: 0 K/UL (ref 0–0.2)
BASOPHILS NFR BLD: 0.5 %
BUN SERPL-MCNC: 29 MG/DL (ref 7–20)
CALCIUM SERPL-MCNC: 8.9 MG/DL (ref 8.3–10.6)
CHLORIDE SERPL-SCNC: 86 MMOL/L (ref 99–110)
CO2 SERPL-SCNC: 48 MMOL/L (ref 21–32)
CREAT SERPL-MCNC: 0.7 MG/DL (ref 0.6–1.2)
DEPRECATED RDW RBC AUTO: 15.8 % (ref 12.4–15.4)
EOSINOPHIL # BLD: 0 K/UL (ref 0–0.6)
EOSINOPHIL NFR BLD: 0.7 %
GFR SERPLBLD CREATININE-BSD FMLA CKD-EPI: >60 ML/MIN/{1.73_M2}
GLUCOSE SERPL-MCNC: 101 MG/DL (ref 70–99)
HCT VFR BLD AUTO: 27.6 % (ref 36–48)
HGB BLD-MCNC: 8.5 G/DL (ref 12–16)
LYMPHOCYTES # BLD: 0.8 K/UL (ref 1–5.1)
LYMPHOCYTES NFR BLD: 13.3 %
MCH RBC QN AUTO: 24.4 PG (ref 26–34)
MCHC RBC AUTO-ENTMCNC: 30.8 G/DL (ref 31–36)
MCV RBC AUTO: 79.1 FL (ref 80–100)
MONOCYTES # BLD: 0.7 K/UL (ref 0–1.3)
MONOCYTES NFR BLD: 12.5 %
NEUTROPHILS # BLD: 4.1 K/UL (ref 1.7–7.7)
NEUTROPHILS NFR BLD: 73 %
PLATELET # BLD AUTO: 185 K/UL (ref 135–450)
PMV BLD AUTO: 8.2 FL (ref 5–10.5)
POTASSIUM SERPL-SCNC: 3.9 MMOL/L (ref 3.5–5.1)
RBC # BLD AUTO: 3.49 M/UL (ref 4–5.2)
SODIUM SERPL-SCNC: 136 MMOL/L (ref 136–145)
WBC # BLD AUTO: 5.6 K/UL (ref 4–11)

## 2024-01-03 PROCEDURE — 85025 COMPLETE CBC W/AUTO DIFF WBC: CPT

## 2024-01-03 PROCEDURE — 94640 AIRWAY INHALATION TREATMENT: CPT

## 2024-01-03 PROCEDURE — 2060000000 HC ICU INTERMEDIATE R&B

## 2024-01-03 PROCEDURE — 94660 CPAP INITIATION&MGMT: CPT

## 2024-01-03 PROCEDURE — 6370000000 HC RX 637 (ALT 250 FOR IP): Performed by: NURSE PRACTITIONER

## 2024-01-03 PROCEDURE — 6370000000 HC RX 637 (ALT 250 FOR IP): Performed by: HOSPITALIST

## 2024-01-03 PROCEDURE — 6370000000 HC RX 637 (ALT 250 FOR IP): Performed by: INTERNAL MEDICINE

## 2024-01-03 PROCEDURE — 6370000000 HC RX 637 (ALT 250 FOR IP)

## 2024-01-03 PROCEDURE — 2700000000 HC OXYGEN THERAPY PER DAY

## 2024-01-03 PROCEDURE — 94669 MECHANICAL CHEST WALL OSCILL: CPT

## 2024-01-03 PROCEDURE — 36591 DRAW BLOOD OFF VENOUS DEVICE: CPT

## 2024-01-03 PROCEDURE — 94761 N-INVAS EAR/PLS OXIMETRY MLT: CPT

## 2024-01-03 PROCEDURE — 2580000003 HC RX 258

## 2024-01-03 PROCEDURE — 6360000002 HC RX W HCPCS

## 2024-01-03 PROCEDURE — 6360000002 HC RX W HCPCS: Performed by: INTERNAL MEDICINE

## 2024-01-03 PROCEDURE — 80048 BASIC METABOLIC PNL TOTAL CA: CPT

## 2024-01-03 PROCEDURE — 6360000002 HC RX W HCPCS: Performed by: HOSPITALIST

## 2024-01-03 RX ADMIN — BUSPIRONE HYDROCHLORIDE 10 MG: 5 TABLET ORAL at 16:02

## 2024-01-03 RX ADMIN — ARFORMOTEROL TARTRATE 15 MCG: 15 SOLUTION RESPIRATORY (INHALATION) at 08:05

## 2024-01-03 RX ADMIN — DIBASIC SODIUM PHOSPHATE, MONOBASIC POTASSIUM PHOSPHATE AND MONOBASIC SODIUM PHOSPHATE 1 TABLET: 852; 155; 130 TABLET ORAL at 20:17

## 2024-01-03 RX ADMIN — ATORVASTATIN CALCIUM 10 MG: 10 TABLET, FILM COATED ORAL at 08:54

## 2024-01-03 RX ADMIN — ENOXAPARIN SODIUM 40 MG: 100 INJECTION SUBCUTANEOUS at 08:54

## 2024-01-03 RX ADMIN — BUSPIRONE HYDROCHLORIDE 10 MG: 5 TABLET ORAL at 08:54

## 2024-01-03 RX ADMIN — BUDESONIDE 250 MCG: 0.25 INHALANT RESPIRATORY (INHALATION) at 20:58

## 2024-01-03 RX ADMIN — SODIUM CHLORIDE, PRESERVATIVE FREE 10 ML: 5 INJECTION INTRAVENOUS at 08:55

## 2024-01-03 RX ADMIN — SODIUM CHLORIDE, PRESERVATIVE FREE 10 ML: 5 INJECTION INTRAVENOUS at 20:18

## 2024-01-03 RX ADMIN — LEVALBUTEROL HYDROCHLORIDE 0.63 MG: 0.63 SOLUTION RESPIRATORY (INHALATION) at 15:25

## 2024-01-03 RX ADMIN — BUDESONIDE 250 MCG: 0.25 INHALANT RESPIRATORY (INHALATION) at 08:05

## 2024-01-03 RX ADMIN — FUROSEMIDE 20 MG: 10 INJECTION, SOLUTION INTRAMUSCULAR; INTRAVENOUS at 18:25

## 2024-01-03 RX ADMIN — LEVALBUTEROL HYDROCHLORIDE 0.63 MG: 0.63 SOLUTION RESPIRATORY (INHALATION) at 20:59

## 2024-01-03 RX ADMIN — DIBASIC SODIUM PHOSPHATE, MONOBASIC POTASSIUM PHOSPHATE AND MONOBASIC SODIUM PHOSPHATE 1 TABLET: 852; 155; 130 TABLET ORAL at 08:54

## 2024-01-03 RX ADMIN — BUSPIRONE HYDROCHLORIDE 10 MG: 5 TABLET ORAL at 20:17

## 2024-01-03 RX ADMIN — LEVALBUTEROL HYDROCHLORIDE 0.63 MG: 0.63 SOLUTION RESPIRATORY (INHALATION) at 12:31

## 2024-01-03 RX ADMIN — PANTOPRAZOLE SODIUM 40 MG: 40 TABLET, DELAYED RELEASE ORAL at 08:54

## 2024-01-03 RX ADMIN — Medication 5 MG: at 20:17

## 2024-01-03 RX ADMIN — LEVALBUTEROL HYDROCHLORIDE 0.63 MG: 0.63 SOLUTION RESPIRATORY (INHALATION) at 08:05

## 2024-01-03 RX ADMIN — ARFORMOTEROL TARTRATE 15 MCG: 15 SOLUTION RESPIRATORY (INHALATION) at 20:58

## 2024-01-03 ASSESSMENT — PAIN SCALES - GENERAL: PAINLEVEL_OUTOF10: 0

## 2024-01-03 NOTE — PLAN OF CARE
Problem: Safety - Adult  Goal: Free from fall injury  Outcome: Progressing  Note: Pt in bed with bed alarm on, instructed to call for assistance. Verbalized understanding. All fall precautions in place.        Problem: Pain  Goal: Verbalizes/displays adequate comfort level or baseline comfort level  Outcome: Progressing  Note: Pt educated on importance of calling for pain meds when in pain. Pt verbalized understanding. Pain assessment completed at least once per shift. Will continue to monitor.        Problem: Skin/Tissue Integrity  Goal: Absence of new skin breakdown  Description: 1.  Monitor for areas of redness and/or skin breakdown  2.  Assess vascular access sites hourly  3.  Every 4-6 hours minimum:  Change oxygen saturation probe site  4.  Every 4-6 hours:  If on nasal continuous positive airway pressure, respiratory therapy assess nares and determine need for appliance change or resting period.  Outcome: Progressing  Note: Turn/reposition patient a minimum of every 2 hours . Assess skin q shift. Educate on benefits of turning/repositioning to maintain skin integrity and prevent pressure ulcers.        Problem: Nutrition Deficit:  Goal: Optimize nutritional status  Outcome: Progressing  Note: Pt educated on importance of staying hydrated and having as much intake as possible. Verbalized understanding. Will continue to monitor.        Problem: ABCDS Injury Assessment  Goal: Absence of physical injury  Outcome: Progressing  Note: Orthostatic vital signs obtained at start of shift - see flowsheet for details.  Pt does not meet criteria for orthostasis.  Pt is a High fall risk. See Marie Fall Score and ABCDS Injury Risk assessments.   + Screening for Orthostasis and/or + High Fall Risk per MARIE/ABCDS: Explained fall risk precautions to pt and family and rationale behind their use to keep the patient safe. Pt bed is in low position, side rails up, call light and belongings are in reach. Fall wristband applied

## 2024-01-03 NOTE — PROGRESS NOTES
Physical Therapy/Occupational Therapy  HOLD  Chart reviewed.  Attempted to see pt at 1109 on 1/3/24.  Per RN, pt is not appropriate for therapy at this time.  Pt and family with meeting this afternoon to discuss next steps of care.  Will re-attempt as schedule allows.    Imelda Mo, PT  PIPPA Fowler, OTR/L [id number]

## 2024-01-03 NOTE — PROGRESS NOTES
Palliative Care Chart Review  and Check in Note:     NAME:  Catrachita Segal  Admit Date: 12/5/2023  Hospital Day:  Hospital Day: 30   Current Code status: DNR-CCA    Palliative care is continuing to following Ms. Segal for symptom management,  and goals of care discussion as needed. Patient's chart reviewed today 1/3/24.      Pt remains on BiPAP this morning. She is alert and talking but difficult to understand. Daughter Alexandra is at the bedside and other family members arrived as well. Alexandra reports family is still visiting, adult grandchildren are due to visit today before transitioning to comfort care. Will f/u again once family has visited.    2 pm- Had a long discussion with pt's daughters, niece, brother, and grandsons in the family lounge. Answered their questions about pt's condition and previous admissions. They are understanding that transitioning to comfort care is appropriate at this point. They want to discuss as a family before proceeding with taking her off BiPAP.    The following are the currently established goals/code status, and Symptom management.     Goals of care: Family still discussing comfort care.    Code status: DNR-CCA/DNI    Discharge plan: likely will transition to comfort care today and remain inpatient due to instability to transfer    Marisol Dexter NP  Palliative Care  [phone]

## 2024-01-03 NOTE — CARE COORDINATION
Case Management                  Date/ Time of Note: 1/3/2024 4:32 PM         Note completed by: JYOTHI Briscoe, LSW    Patient Name: Catrachita Segal  YOB: 1945    Diagnosis:Pneumonia due to organism [J18.9]  Pleural effusion on left [J90]  HCAP (healthcare-associated pneumonia) [J18.9]  Urinary tract infection without hematuria, site unspecified [N39.0]  Other fatigue [R53.83]  Patient Admission Status: Inpatient  Date of Admission:12/5/2023  7:21 PM    Length of Stay: 28 GLOS: GMLOS: 5.2 Readmission Risk Score: Readmission Risk Score: 21.5    Current Plan of Care:   Plan for [facility] at [facility], pt and family are in agreement.   SW spoke to tian at [facility], the pre-cert was denied due to medical stability, but they are offering a P2P if family wants to continue to pursue care. Pt's famiyl declined a hospice referral.         Referrals completed: [facility]: [facility]     Resources/ information provided: Not indicated at this time    IMM Status: IMM Letter given to Patient/Family/Significant other/Guardian/POA/by:: Valentina Dupree  IMM Letter date given:: 12/22/23  IMM Letter time given:: 1500      Catrachita and her family were provided with choice of provider; she and her family are in agreement with the discharge plan.    Electronically signed by JYOTHI Briscoe, LSW on 1/3/2024 at 4:32 PM  [facility]  Case Management Department  Ph: [phone]

## 2024-01-03 NOTE — PLAN OF CARE
Problem: Respiratory - Adult  Goal: Achieves optimal ventilation and oxygenation  Outcome: Progressing  Flowsheets (Taken 1/3/2024 0422)  Achieves optimal ventilation and oxygenation:   Assess for changes in respiratory status   Assess for changes in mentation and behavior   Position to facilitate oxygenation and minimize respiratory effort   Respiratory therapy support as indicated     Problem: Cardiovascular - Adult  Goal: Absence of cardiac dysrhythmias or at baseline  Outcome: Progressing  Flowsheets (Taken 1/3/2024 0422)  Absence of cardiac dysrhythmias or at baseline: Monitor cardiac rate and rhythm     Problem: Chronic Conditions and Co-morbidities  Goal: Patient's chronic conditions and co-morbidity symptoms are monitored and maintained or improved  Flowsheets (Taken 1/1/2024 2000 by Leanna Will RN)  Care Plan - Patient's Chronic Conditions and Co-Morbidity Symptoms are Monitored and Maintained or Improved: Monitor and assess patient's chronic conditions and comorbid symptoms for stability, deterioration, or improvement

## 2024-01-03 NOTE — PROGRESS NOTES
Pulmonary & Critical Care Medicine    Admit Date: 2023  PCP: Napoleon Stiles MD    CC:  hypoxia     Events of Last 24 hours:     Patient has remained BiPAP dependent.  She has had brief breaks on Vapotherm so she could eat for 5 or 10 minutes but cannot tolerate much more than that.  They met with palliative care yesterday and will be transitioning to comfort when family has arrived and been able to say goodbye.    Brief history  Known hx of lung cancer.  She was seen by Dr. Donaldson on  for hypoxia and hypercapnia, failed BiPAP and required intubation.    She was extubated on .  She was treated with steroids and lasix  She was seen by Dr. Garcia in the office on .  At that time she was on 1L O2 at rest and 2 with ambulation.  She was continued on Trelegy.    She was readmitted on  and was diagnosed with COPD exacerbation / respiratory acidosis requiring vent support.  She was extubated on .      Vitals:  Tmax: 98.2  VITALS:  /73   Pulse (!) 111   Temp 97.6 °F (36.4 °C) (Axillary)   Resp 18   Ht 1.676 m (5' 5.98\")   Wt 51.6 kg (113 lb 12.1 oz)   SpO2 100%   BMI 18.37 kg/m²   24HR INTAKE/OUTPUT:    Intake/Output Summary (Last 24 hours) at 1/3/2024 1139  Last data filed at 1/3/2024 0856  Gross per 24 hour   Intake 460 ml   Output 200 ml   Net 260 ml       CURRENT PULSE OXIMETRY:  SpO2: 100 %  24HR PULSE OXIMETRY RANGE:  SpO2  Av.6 %  Min: 88 %  Max: 100 %    EXAM:  General: Mild distress. Interacts on bipap.  Eyes: PERRL. No sclera icterus. No conjunctival injection.  ENT: No discharge. Moist oral mucosa   Neck: Trachea midline. Neck is supple   Resp: mild accessory muscle use.  Decreased breath sounds on the left with, inspiratory crackles  CV: Regular rate. Regular rhythm. No mumur or rub.  No edema.  JVD is not elevated.    GI: Non-tender. Non-distended.  Normal bowel sounds.   Neuro: Awake. Speech is clear.    Psych: No anxiety or agitation.     Medications:    IV:

## 2024-01-03 NOTE — PROGRESS NOTES
Precious Levine NP notified via perfect serve of pt BP 75/60 and pt urine output of 0mL. No new orders given.

## 2024-01-03 NOTE — PROGRESS NOTES
Pt was placed on 40l hfnc 100% fio2, pt did not tolerate the vapotherm. Placed pt back on previous triology settings  . At this time pt vitals are 91%. 110 hr, 25 rr    Will continue plan of care.

## 2024-01-04 VITALS
TEMPERATURE: 97.9 F | HEART RATE: 99 BPM | SYSTOLIC BLOOD PRESSURE: 101 MMHG | OXYGEN SATURATION: 69 % | RESPIRATION RATE: 26 BRPM | HEIGHT: 66 IN | BODY MASS INDEX: 18.42 KG/M2 | WEIGHT: 114.64 LBS | DIASTOLIC BLOOD PRESSURE: 68 MMHG

## 2024-01-04 LAB
ANION GAP SERPL CALCULATED.3IONS-SCNC: 4 MMOL/L (ref 3–16)
BASOPHILS # BLD: 0 K/UL (ref 0–0.2)
BASOPHILS NFR BLD: 0.2 %
BUN SERPL-MCNC: 31 MG/DL (ref 7–20)
CALCIUM SERPL-MCNC: 8.9 MG/DL (ref 8.3–10.6)
CHLORIDE SERPL-SCNC: 85 MMOL/L (ref 99–110)
CO2 SERPL-SCNC: 47 MMOL/L (ref 21–32)
CREAT SERPL-MCNC: 0.6 MG/DL (ref 0.6–1.2)
DEPRECATED RDW RBC AUTO: 16 % (ref 12.4–15.4)
EOSINOPHIL # BLD: 0.1 K/UL (ref 0–0.6)
EOSINOPHIL NFR BLD: 1.1 %
GFR SERPLBLD CREATININE-BSD FMLA CKD-EPI: >60 ML/MIN/{1.73_M2}
GLUCOSE SERPL-MCNC: 117 MG/DL (ref 70–99)
HCT VFR BLD AUTO: 28.2 % (ref 36–48)
HGB BLD-MCNC: 8.8 G/DL (ref 12–16)
LYMPHOCYTES # BLD: 0.4 K/UL (ref 1–5.1)
LYMPHOCYTES NFR BLD: 6.3 %
MCH RBC QN AUTO: 24.1 PG (ref 26–34)
MCHC RBC AUTO-ENTMCNC: 31.1 G/DL (ref 31–36)
MCV RBC AUTO: 77.6 FL (ref 80–100)
MONOCYTES # BLD: 0.8 K/UL (ref 0–1.3)
MONOCYTES NFR BLD: 13.3 %
NEUTROPHILS # BLD: 4.9 K/UL (ref 1.7–7.7)
NEUTROPHILS NFR BLD: 79.1 %
PLATELET # BLD AUTO: 226 K/UL (ref 135–450)
PMV BLD AUTO: 9.8 FL (ref 5–10.5)
POTASSIUM SERPL-SCNC: 3.7 MMOL/L (ref 3.5–5.1)
RBC # BLD AUTO: 3.64 M/UL (ref 4–5.2)
SODIUM SERPL-SCNC: 136 MMOL/L (ref 136–145)
WBC # BLD AUTO: 6.2 K/UL (ref 4–11)

## 2024-01-04 PROCEDURE — 6360000002 HC RX W HCPCS: Performed by: NURSE PRACTITIONER

## 2024-01-04 PROCEDURE — 6360000002 HC RX W HCPCS: Performed by: INTERNAL MEDICINE

## 2024-01-04 PROCEDURE — 80048 BASIC METABOLIC PNL TOTAL CA: CPT

## 2024-01-04 PROCEDURE — 94640 AIRWAY INHALATION TREATMENT: CPT

## 2024-01-04 PROCEDURE — 6370000000 HC RX 637 (ALT 250 FOR IP): Performed by: HOSPITALIST

## 2024-01-04 PROCEDURE — 6370000000 HC RX 637 (ALT 250 FOR IP)

## 2024-01-04 PROCEDURE — 6370000000 HC RX 637 (ALT 250 FOR IP): Performed by: INTERNAL MEDICINE

## 2024-01-04 PROCEDURE — 94660 CPAP INITIATION&MGMT: CPT

## 2024-01-04 PROCEDURE — 6360000002 HC RX W HCPCS

## 2024-01-04 PROCEDURE — 85025 COMPLETE CBC W/AUTO DIFF WBC: CPT

## 2024-01-04 PROCEDURE — 2580000003 HC RX 258

## 2024-01-04 PROCEDURE — 94761 N-INVAS EAR/PLS OXIMETRY MLT: CPT

## 2024-01-04 PROCEDURE — 2700000000 HC OXYGEN THERAPY PER DAY

## 2024-01-04 PROCEDURE — 6370000000 HC RX 637 (ALT 250 FOR IP): Performed by: NURSE PRACTITIONER

## 2024-01-04 RX ORDER — BUSPIRONE HYDROCHLORIDE 10 MG/1
10 TABLET ORAL 3 TIMES DAILY
Qty: 30 TABLET | Refills: 0
Start: 2024-01-04 | End: 2024-01-05

## 2024-01-04 RX ORDER — FUROSEMIDE 10 MG/ML
20 INJECTION INTRAMUSCULAR; INTRAVENOUS 2 TIMES DAILY
Qty: 20 EACH | Refills: 0
Start: 2024-01-04 | End: 2024-01-05

## 2024-01-04 RX ORDER — LORAZEPAM 2 MG/ML
1 CONCENTRATE ORAL
Status: DISCONTINUED | OUTPATIENT
Start: 2024-01-04 | End: 2024-01-04 | Stop reason: HOSPADM

## 2024-01-04 RX ORDER — IPRATROPIUM BROMIDE AND ALBUTEROL SULFATE 2.5; .5 MG/3ML; MG/3ML
3 SOLUTION RESPIRATORY (INHALATION) EVERY 4 HOURS PRN
Qty: 360 ML | Refills: 0
Start: 2024-01-04 | End: 2024-01-05

## 2024-01-04 RX ORDER — HYDROMORPHONE HYDROCHLORIDE 1 MG/ML
0.5 INJECTION, SOLUTION INTRAMUSCULAR; INTRAVENOUS; SUBCUTANEOUS
Status: DISCONTINUED | OUTPATIENT
Start: 2024-01-04 | End: 2024-01-04 | Stop reason: HOSPADM

## 2024-01-04 RX ORDER — MIDAZOLAM HYDROCHLORIDE 1 MG/ML
0.5 INJECTION INTRAMUSCULAR; INTRAVENOUS
Status: DISCONTINUED | OUTPATIENT
Start: 2024-01-04 | End: 2024-01-04 | Stop reason: HOSPADM

## 2024-01-04 RX ORDER — GLYCOPYRROLATE 0.2 MG/ML
0.2 INJECTION INTRAMUSCULAR; INTRAVENOUS EVERY 4 HOURS PRN
Status: DISCONTINUED | OUTPATIENT
Start: 2024-01-04 | End: 2024-01-04 | Stop reason: HOSPADM

## 2024-01-04 RX ORDER — HYDROMORPHONE HYDROCHLORIDE 1 MG/ML
0.25 INJECTION, SOLUTION INTRAMUSCULAR; INTRAVENOUS; SUBCUTANEOUS
Status: DISCONTINUED | OUTPATIENT
Start: 2024-01-04 | End: 2024-01-04 | Stop reason: HOSPADM

## 2024-01-04 RX ORDER — LEVALBUTEROL INHALATION SOLUTION 0.63 MG/3ML
0.63 SOLUTION RESPIRATORY (INHALATION)
Qty: 120 EACH | Refills: 0
Start: 2024-01-04 | End: 2024-01-05

## 2024-01-04 RX ADMIN — HYDROXYZINE PAMOATE 25 MG: 25 CAPSULE ORAL at 06:02

## 2024-01-04 RX ADMIN — Medication 1 MG: at 13:15

## 2024-01-04 RX ADMIN — HYDROMORPHONE HYDROCHLORIDE 0.25 MG: 1 INJECTION, SOLUTION INTRAMUSCULAR; INTRAVENOUS; SUBCUTANEOUS at 12:32

## 2024-01-04 RX ADMIN — HYDROMORPHONE HYDROCHLORIDE 0.5 MG: 1 INJECTION, SOLUTION INTRAMUSCULAR; INTRAVENOUS; SUBCUTANEOUS at 13:39

## 2024-01-04 RX ADMIN — ENOXAPARIN SODIUM 40 MG: 100 INJECTION SUBCUTANEOUS at 08:56

## 2024-01-04 RX ADMIN — ATORVASTATIN CALCIUM 10 MG: 10 TABLET, FILM COATED ORAL at 08:56

## 2024-01-04 RX ADMIN — IPRATROPIUM BROMIDE AND ALBUTEROL SULFATE 1 DOSE: 2.5; .5 SOLUTION RESPIRATORY (INHALATION) at 11:34

## 2024-01-04 RX ADMIN — BUSPIRONE HYDROCHLORIDE 10 MG: 5 TABLET ORAL at 08:56

## 2024-01-04 RX ADMIN — HYDROMORPHONE HYDROCHLORIDE 0.5 MG: 1 INJECTION, SOLUTION INTRAMUSCULAR; INTRAVENOUS; SUBCUTANEOUS at 17:52

## 2024-01-04 RX ADMIN — ARFORMOTEROL TARTRATE 15 MCG: 15 SOLUTION RESPIRATORY (INHALATION) at 07:51

## 2024-01-04 RX ADMIN — BUDESONIDE 250 MCG: 0.25 INHALANT RESPIRATORY (INHALATION) at 07:51

## 2024-01-04 RX ADMIN — LEVALBUTEROL HYDROCHLORIDE 0.63 MG: 0.63 SOLUTION RESPIRATORY (INHALATION) at 07:52

## 2024-01-04 RX ADMIN — MIDAZOLAM 0.5 MG: 1 INJECTION, SOLUTION INTRAMUSCULAR; INTRAVENOUS at 18:08

## 2024-01-04 RX ADMIN — IPRATROPIUM BROMIDE AND ALBUTEROL SULFATE 1 DOSE: 2.5; .5 SOLUTION RESPIRATORY (INHALATION) at 15:50

## 2024-01-04 RX ADMIN — SODIUM CHLORIDE, PRESERVATIVE FREE 10 ML: 5 INJECTION INTRAVENOUS at 08:56

## 2024-01-04 RX ADMIN — HYDROMORPHONE HYDROCHLORIDE 0.5 MG: 1 INJECTION, SOLUTION INTRAMUSCULAR; INTRAVENOUS; SUBCUTANEOUS at 15:17

## 2024-01-04 RX ADMIN — PANTOPRAZOLE SODIUM 40 MG: 40 TABLET, DELAYED RELEASE ORAL at 06:02

## 2024-01-04 RX ADMIN — DIBASIC SODIUM PHOSPHATE, MONOBASIC POTASSIUM PHOSPHATE AND MONOBASIC SODIUM PHOSPHATE 1 TABLET: 852; 155; 130 TABLET ORAL at 08:56

## 2024-01-04 ASSESSMENT — PAIN SCALES - GENERAL
PAINLEVEL_OUTOF10: 0
PAINLEVEL_OUTOF10: 0

## 2024-01-04 NOTE — PROGRESS NOTES
01/04/24 1527   Encounter Summary   Service Provided For: Patient and family together   Referral/Consult From: Nurse   Support System Rastafari/maria a community;Family members;Children   Last Encounter  01/04/24   Complexity of Encounter High   Begin Time 1445   End Time  1529   Total Time Calculated 44 min   Grief, Loss, and Adjustments   Type End of Life;Life Adjustments   Assessment/Intervention/Outcome   Assessment Unable to assess  ((PT was found sleeping? Family at bedside))   Intervention Active listening;Discussed belief system/Jew practices/maria a;Discussed relationship with God;Explored/Affirmed feelings, thoughts, concerns   Outcome Comfort;Engaged in conversation;Expressed feelings, needs, and concerns;Expressed Gratitude;Receptive;Peace     PT was found resting / sleeping (?) with family at the bedside. PT is Muslim and the family has requested a sacrament of the sick to be provided. The priests at [facility] were contacted and a Fr. Ospina will be responding between 515p and 530p. Otherwise, the family seem to be at peace as they described the PT as \"transitioning\". There was also a brief legacy review as well. Family seemed to appreciate the time.  No other needs.    Staff , Joey Zuniga MA, [facility]

## 2024-01-04 NOTE — PROGRESS NOTES
Pt taken off BIPAP and placed on 3L NC for comfort care per family request. Family at bedside and Pt resting comfortably.

## 2024-01-04 NOTE — PLAN OF CARE
Problem: Skin/Tissue Integrity  Goal: Absence of new skin breakdown  Outcome: Not Progressing  Note: See skin integumentary flowsheet and four eyes for skin assessment.     Problem: Nutrition Deficit:  Goal: Optimize nutritional status  Outcome: Not Progressing  Note: Poor oral intake d/t continuous PAP.     Problem: Respiratory - Adult  Goal: Achieves optimal ventilation and oxygenation  Outcome: Not Progressing  Note: Patient remains on continuous PAP. Patient switched to high flow to take morning medications and a few sips of smoothie for breakfast. Patient did not tolerate well. Patient desat to 72% within 2 minutes and required 5 minutes on PAP to recover.     Problem: Cardiovascular - Adult  Goal: Absence of cardiac dysrhythmias or at baseline  Outcome: Not Progressing  Note: Patient hypotensive and tachycardic. MD aware. IV lasix held.     Problem: Discharge Planning  Goal: Discharge to home or other facility with appropriate resources  Outcome: Progressing  Note: Per chart review, plan is for patient to transition to comfort care.     Problem: Safety - Adult  Goal: Free from fall injury  Outcome: Progressing  Note: All safety precautions in place. CLWR. Patient demonstrates appropriate use of call light.     Problem: Pain  Goal: Verbalizes/displays adequate comfort level or baseline comfort level  Outcome: Progressing  Note: Patient denies pain this shift.     Problem: ABCDS Injury Assessment  Goal: Absence of physical injury  Outcome: Progressing  Note: Pt is a High fall risk. See Marie Fall Score and ABCDS Injury Risk assessments.   + Screening for Orthostasis and/or + High Fall Risk per MARIE/ABCDS: Explained fall risk precautions to pt and family and rationale behind their use to keep the patient safe. Pt bed is in low position, side rails up, call light and belongings are in reach. Fall wristband applied and present on pts wrist.  Bed alarm on.  Pt encouraged to call for assistance. Will continue with

## 2024-01-04 NOTE — CARE COORDINATION
5:15 PM    Pt has transition to comfort care, pt will be taken off bipap around 6pm. Family has declined hospice.   SW signing off.     Electronically signed by JYOTHI Briscoe, LIZZETHW on 1/4/2024 at 5:18 PM  [phone]

## 2024-01-04 NOTE — PROGRESS NOTES
MD notified of patient expiring with time of death . This RN along with Shirley Morelos RN have witnessed absence of vital signs. MD Adonay came to bedside to complete own assessment. [facility] called at [phone] with referral number of [id number]. [facility] will not follow patient, patient able to be released to  home. Family at bedside and wishes to have more time before departing from patient. [place] Sweeney to resume care.

## 2024-01-04 NOTE — DEATH NOTES
Death Pronouncement Note  Patient's Name: Catrachita Segal   Patient's YOB: 1945  MRN Number: [id number]    Admitting Provider: Nidhi Urbina MD  Attending Provider: Benito Urias MD    Patient was examined and the following were absent: Pulses, Blood Pressure, and Respiratory effort    I declared the patient dead on 1/4/24 at 1840    Preliminary Cause of Death: respiratory arrest    Electronically signed by Benito Urias MD on 1/4/24 at 6:58 PM EST

## 2024-01-04 NOTE — PROGRESS NOTES
4 Eyes Skin Assessment     NAME:  Catrachita Segal  YOB: 1945  MEDICAL RECORD NUMBER:  [id number]    The patient is being assessed for  Shift Handoff    I agree that at least one RN has performed a thorough Head to Toe Skin Assessment on the patient. ALL assessment sites listed below have been assessed.      Areas assessed by both nurses:    Head, Face, Ears, Shoulders, Back, Chest, Arms, Elbows, Hands, Sacrum. Buttock, Coccyx, Ischium, Legs. Feet and Heels, and Under Medical Devices         Does the Patient have a Wound? Yes wound(s) were present on assessment. LDA wound assessment was Initiated and completed by RN       Selwyn Prevention initiated by RN: Yes  Wound Care Orders initiated by RN: Yes    Pressure Injury (Stage 3,4, Unstageable, DTI, NWPT, and Complex wounds) if present, place Wound referral order by RN under : No    New Ostomies, if present place, Ostomy referral order under : No     Nurse 1 eSignature: Electronically signed by Isela John RN on 1/4/24 at 9:49 AM EST    **SHARE this note so that the co-signing nurse can place an eSignature**    Nurse 2 eSignature: {Esignature:[id number]}

## 2024-01-04 NOTE — PLAN OF CARE
Problem: Discharge Planning  Goal: Discharge to home or other facility with appropriate resources  1/4/2024 0430 by Zahra Jasmine RN  Outcome: Progressing  Flowsheets (Taken 1/4/2024 0430)  Discharge to home or other facility with appropriate resources:   Identify barriers to discharge with patient and caregiver   Identify discharge learning needs (meds, wound care, etc)     Problem: Safety - Adult  Goal: Free from fall injury  1/4/2024 0430 by Zahra Jasmine RN  Outcome: Progressing  Flowsheets (Taken 1/4/2024 0430)  Free From Fall Injury: Instruct family/caregiver on patient safety     Problem: Pain  Goal: Verbalizes/displays adequate comfort level or baseline comfort level  1/4/2024 0430 by Zahra Jasmine RN  Outcome: Progressing  Flowsheets (Taken 1/4/2024 0430)  Verbalizes/displays adequate comfort level or baseline comfort level:   Encourage patient to monitor pain and request assistance   Assess pain using appropriate pain scale   Administer analgesics based on type and severity of pain and evaluate response     Problem: Skin/Tissue Integrity  Goal: Absence of new skin breakdown  Description: 1.  Monitor for areas of redness and/or skin breakdown  2.  Assess vascular access sites hourly  3.  Every 4-6 hours minimum:  Change oxygen saturation probe site  4.  Every 4-6 hours:  If on nasal continuous positive airway pressure, respiratory therapy assess nares and determine need for appliance change or resting period.  1/4/2024 0430 by Zahra Jasmine RN  Outcome: Progressing  Note: Assess patient skin every shift, turn and reposition as tolerated. Monitor area to coccyx for signs and symptoms of infection. Off loading heels while in bed.      Problem: Chronic Conditions and Co-morbidities  Goal: Patient's chronic conditions and co-morbidity symptoms are monitored and maintained or improved  1/4/2024 0430 by Zahra Jasmine RN  Outcome: Progressing  Flowsheets (Taken 1/4/2024 0430)  Care Plan - Patient's Chronic

## 2024-01-04 NOTE — PROGRESS NOTES
Occupational Therapy/Physical Therapy  Discharge  Chart reviewed, discussed with Dr. Donaldson.  Dr. Donaldson advises for PT and OT to sign off as pt is going to hospice care.    PIPPA Fowler, OTR/L [id number]   Rayne Ordonez, PT [id number]

## 2024-01-04 NOTE — PROGRESS NOTES
Palliative Medicine Progress Note    Admit Date: 12/5/2023  Hospital Day:  Hospital Day: 31     CC: worsening fatigue and loss of appetite   HPI: Catrachita Segal is a 78 y.o. female with PMH of arthritis, CHF, COPD, hyperlipidemia, htn, metastatic lung cancer  who presented with worsening fatigue and loss of appetite. Patient was seen in the ED last month for dyspnea and a UTI and later transferred to the ICU. She was placed on a ventillator due to hypercapnic failure/ COPD exacerbation. Patient quickly improved over the next 1-3 days and was extubated. Patient was ultimately discharged back home and her symptoms improved. The pt's daughter noted that pt has been having more fatigue with increased infections and brought her to the ED when she found her to be more lethargic. Patient is followed by [facility] for adenocarcinoma of the lung. Dr. Garcia has seen this patient before and continues to follow her. Dr. Sewell recently saw her on 11/22/23 and mentions from a cardiac perspective she is stable.      In the ED she became unresponsive and was intubated.  CT showed moderate left-sided pleural effusion, slightly increased from the prior study. Increased left lower lobe airspace density consistent with compressive atelectasis or infiltrate. She improved and was able to be extubated 12/7.    Saw pt at the bedside. She remains on bipap and is lethargic today. D/w daughter outside room. There are many family members visiting. Her daughter reports that they plan on transitioning to comfort care when her sister arrives. I discussed changing her code status to DNRCC and ordering comfort meds so they are available when pt's other daughter arrives. Family is in agreement. I offered a hospice referral, her daughter reports that they will discuss it and let us know if they want one. They are not sure if hospice would be necessary as pt will likely be too unstable to leave the hospital and they do not need extra support since

## 2024-06-03 NOTE — PROGRESS NOTES
Historical Provider, MD   amLODIPine (NORVASC) 5 MG tablet Take 5 mg by mouth daily Yes Historical Provider, MD   albuterol sulfate HFA (VENTOLIN HFA) 108 (90 Base) MCG/ACT inhaler INHALE TWO PUFFS BY MOUTH EVERY 6 HOURS AS NEEDED FOR WHEEZING Yes Trell Christy MD   budesonide-formoterol (SYMBICORT) 160-4.5 MCG/ACT AERO INHALE TWO PUFFS BY MOUTH TWICE A DAY Yes Trell Christy MD   TAFINLAR 75 MG CAPS  Yes Historical Provider, MD   MEKINIST 2 MG TABS  Yes Historical Provider, MD   ibuprofen (ADVIL;MOTRIN) 800 MG tablet Take 1 tablet by mouth 2 times daily as needed for Pain Yes Trell Christy MD   ASPIRIN ADULT LOW STRENGTH 81 MG EC tablet TAKE ONE TABLET BY MOUTH DAILY Yes Trell Christy MD   folic acid (FOLVITE) 1 MG tablet Take 1 tablet by mouth daily Yes Eve Jacobs MD   atorvastatin (LIPITOR) 10 MG tablet Take 1 tablet by mouth daily  Trell Christy MD     Family History  No family history on file.     Current Medications  Current Outpatient Medications   Medication Sig Dispense Refill    omeprazole (PRILOSEC) 40 MG delayed release capsule TAKE ONE CAPSULE BY MOUTH EVERY MORNING BEFORE BREAKFAST 30 capsule 4    metoprolol succinate (TOPROL XL) 25 MG extended release tablet Take 1 tablet by mouth daily 90 tablet 1    hydroCHLOROthiazide (MICROZIDE) 12.5 MG capsule Take 12.5 mg by mouth daily      amLODIPine (NORVASC) 5 MG tablet Take 5 mg by mouth daily      albuterol sulfate HFA (VENTOLIN HFA) 108 (90 Base) MCG/ACT inhaler INHALE TWO PUFFS BY MOUTH EVERY 6 HOURS AS NEEDED FOR WHEEZING 18 g 3    budesonide-formoterol (SYMBICORT) 160-4.5 MCG/ACT AERO INHALE TWO PUFFS BY MOUTH TWICE A DAY 1 Inhaler 3    TAFINLAR 75 MG CAPS       MEKINIST 2 MG TABS       ibuprofen (ADVIL;MOTRIN) 800 MG tablet Take 1 tablet by mouth 2 times daily as needed for Pain 30 tablet 1    ASPIRIN ADULT LOW STRENGTH 81 MG EC tablet TAKE ONE TABLET BY MOUTH DAILY 30 tablet 5    folic acid (FOLVITE) 1 MG tablet Take 1 tablet by mouth daily 30 tablet 3    atorvastatin (LIPITOR) 10 MG tablet Take 1 tablet by mouth daily 90 tablet 3     No current facility-administered medications for this visit. REVIEW OF SYSTEMS:    CONSTITUTIONAL: No major weight gain or loss, fatigue, weakness, night sweats or fever. HEENT: No new vision difficulties or ringing in the ears. RESPIRATORY: No new SOB, PND, orthopnea or cough. CARDIOVASCULAR: See HPI  GI: No nausea, vomiting, diarrhea, constipation, abdominal pain or changes in bowel habits. : No urinary frequency, urgency, incontinence hematuria or dysuria. SKIN: No cyanosis or skin lesions. MUSCULOSKELETAL: No new muscle or joint pain. NEUROLOGICAL: No syncope or TIA-like symptoms. PSYCHIATRIC: No anxiety, pain, insomnia or depression    Objective:   PHYSICAL EXAM:        VITALS:    Wt Readings from Last 3 Encounters:   03/11/21 165 lb (74.8 kg)   02/04/21 166 lb (75.3 kg)   01/15/21 165 lb 12.8 oz (75.2 kg)     BP Readings from Last 3 Encounters:   03/11/21 110/80   02/04/21 128/85   01/15/21 (!) 138/95     Pulse Readings from Last 3 Encounters:   03/11/21 72   02/04/21 80   01/15/21 101       CONSTITUTIONAL: Cooperative, no apparent distress, and appears well nourished / developed  NEUROLOGIC:  Awake and orientated to person, place and time. PSYCH: Calm affect. SKIN: Warm and dry. HEENT: Sclera non-icteric, normocephalic, neck supple, no elevation of JVP, normal carotid pulses with no bruits and thyroid normal size. LUNGS:  No increased work of breathing and clear to auscultation, no crackles or wheezing  CARDIOVASCULAR:  Regular rate and rhythm with no murmurs, gallops, rubs, or abnormal heart sounds, normal PMI. The apical impulses not displaced  Heart tones are crisp and normal  Cervical veins are not engorged  The carotid upstroke is normal in amplitude and contour without delay or bruit  JVP is not elevated  ABDOMEN:  Normal bowel sounds, non-distended and non-tender to palpation  EXT: No edema, no calf tenderness. Pulses are present bilaterally. DATA:    Lab Results   Component Value Date    ALT 16 03/25/2018    AST 21 03/25/2018    ALKPHOS 64 03/25/2018    BILITOT 0.6 03/25/2018     Lab Results   Component Value Date    CREATININE 0.6 12/30/2020    BUN 9 12/30/2020     12/30/2020    K 4.3 12/30/2020    CL 99 12/30/2020    CO2 28 12/30/2020     Lab Results   Component Value Date    TSH 0.236 (L) 06/29/2017     Lab Results   Component Value Date    WBC 8.7 10/18/2018    HGB 12.0 10/18/2018    HCT 38.1 10/18/2018    MCV 80.0 10/18/2018     11/19/2018     No components found for: CHLPL  Lab Results   Component Value Date    TRIG 86 06/05/2019    TRIG 68 04/26/2018    TRIG 79 04/13/2016     Lab Results   Component Value Date    HDL 70 (H) 06/05/2019    HDL 60 04/26/2018    HDL 50 04/13/2016     Lab Results   Component Value Date    LDLCALC 61 06/05/2019    LDLCALC 50 04/26/2018    LDLCALC 70 04/13/2016     Lab Results   Component Value Date    LABVLDL 17 06/05/2019    LABVLDL 14 04/26/2018    LABVLDL 16 04/13/2016     Radiology Review:  Pertinent images / reports were reviewed as a part of this visit and reveals the following:    Echo:Summary1/28/21   Normal left ventricular size. Mild septal hypertrophy. Mildly decreased left ventricular systolic function with an estimated   ejection fraction of 45%. Normal diastolic function. The aortic root is dilated measuring 3.8 cm. The ascending aorta is dilated measuring 4.3 cm. Trivial tricuspid regurgitation. Stress Test / Angiogram:    ECG:Sinus  Tachycardia 1/15/21  WITHIN NORMAL LIMITS  This patient was educated using the patient point room wall mount device. Absence from smokers and smoking and diet and exercising are important. Assessment:   Late aortic root and hypertension and SVT. Plan:   Increase metoprolol to 25 mg/day succinate. Stop hydrochlorothiazide.   Observe leg to see if edema occurs and if that occurs we may have to restart the hydrochlorothiazide. To have a CAT scan of her chest that has been already scheduled. Return to see me in 2 months. Please call if we can assist further [phone]. Chilo Sewell MD, [facility]      This note was likely completed using voice recognition technology and may contain unintended errors denies

## 2025-01-10 NOTE — PROGRESS NOTES
I reviewed the H&P, I examined the patient, and there are no changes in the patient's condition.     PM    WBCUA 0-2 12/17/2023 06:57 PM    RBCUA 0-2 12/17/2023 06:57 PM    MUCUS 1+ 12/17/2023 06:57 PM    BACTERIA Rare 12/17/2023 06:57 PM    CLARITYU Clear 12/17/2023 06:57 PM    SPECGRAV 1.025 12/17/2023 06:57 PM    LEUKOCYTESUR SMALL 12/17/2023 06:57 PM    UROBILINOGEN 4.0 12/17/2023 06:57 PM    BILIRUBINUR Negative 12/17/2023 06:57 PM    BLOODU Negative 12/17/2023 06:57 PM    GLUCOSEU Negative 12/17/2023 06:57 PM    KETUA Negative 12/17/2023 06:57 PM     Urine Cultures:   Lab Results   Component Value Date/Time    LABURIN  12/05/2023 09:45 PM     <10,000 CFU/ml mixed skin/urogenital alexander. No further workup     Blood Cultures:   Lab Results   Component Value Date/Time    BC No Growth after 4 days of incubation. 12/08/2023 08:51 AM     Lab Results   Component Value Date/Time    BLOODCULT2 No Growth after 4 days of incubation. 12/08/2023 08:52 AM     Organism:   Lab Results   Component Value Date/Time    ORG Staphylococcus epidermidis DNA Detected 12/06/2023 01:15 AM    ORG Staphylococcus epidermidis 12/06/2023 01:15 AM    ORG Kocuria rhizophila 12/06/2023 01:15 AM         Electronically signed by Arron Barron MD on 12/25/2023 at 9:44 AM